# Patient Record
Sex: FEMALE | Race: WHITE | NOT HISPANIC OR LATINO | Employment: OTHER | URBAN - METROPOLITAN AREA
[De-identification: names, ages, dates, MRNs, and addresses within clinical notes are randomized per-mention and may not be internally consistent; named-entity substitution may affect disease eponyms.]

---

## 2017-02-16 DIAGNOSIS — Z12.31 ENCOUNTER FOR SCREENING MAMMOGRAM FOR MALIGNANT NEOPLASM OF BREAST: ICD-10-CM

## 2017-02-16 DIAGNOSIS — Z13.820 ENCOUNTER FOR SCREENING FOR OSTEOPOROSIS: ICD-10-CM

## 2017-03-08 ENCOUNTER — ALLSCRIPTS OFFICE VISIT (OUTPATIENT)
Dept: OTHER | Facility: OTHER | Age: 82
End: 2017-03-08

## 2018-01-12 NOTE — RESULT NOTES
Verified Results  (1) CBC/PLT/DIFF [id number] 09:51AM Omar Mathew Order Number: [id number]     Test Name Result Flag Reference   WBC COUNT 6 98 Thousand/uL  4 31-10 16   RBC COUNT 4 88 Million/uL  3 81-5 12   HEMOGLOBIN 14 6 g/dL  11 5-15 4   HEMATOCRIT 42 6 %  34 8-46  1   MCV 87 fL  82-98   MCH 29 9 pg  26 8-34 3   MCHC 34 3 g/dL  31 4-37 4   RDW 12 9 %  11 6-15 1   MPV 10 5 fL  8 9-12 7   PLATELET COUNT 144 Thousands/uL  149-390   NEUTROPHILS RELATIVE PERCENT 72 %  43-75   LYMPHOCYTES RELATIVE PERCENT 17 %  14-44   MONOCYTES RELATIVE PERCENT 10 %  4-12   EOSINOPHILS RELATIVE PERCENT 1 %  0-6   BASOPHILS RELATIVE PERCENT 0 %  0-1   NEUTROPHILS ABSOLUTE COUNT 5 02 Thousands/?L  1 85-7 62   LYMPHOCYTES ABSOLUTE COUNT 1 20 Thousands/?L  0 60-4 47   MONOCYTES ABSOLUTE COUNT 0 67 Thousand/?L  0 17-1 22   EOSINOPHILS ABSOLUTE COUNT 0 07 Thousand/?L  0 00-0 61   BASOPHILS ABSOLUTE COUNT 0 02 Thousands/?L  0 00-0 10   - Patient Instructions: This bloodwork is non-fasting  Please drink two glasses of water morning of bloodwork  - Patient Instructions: This bloodwork is non-fasting  Please drink two glasses of water morning of bloodwork  (1) COMPREHENSIVE METABOLIC PANEL [id number] 02:24OP Omar Mathew Order Number: [id number]     Test Name Result Flag Reference   GLUCOSE,RANDM 94 mg/dL     If the patient is fasting, the ADA then defines impaired fasting glucose as > 100 mg/dL and diabetes as > or equal to 123 mg/dL     SODIUM 135 mmol/L L 136-145   POTASSIUM 4 2 mmol/L  3 5-5 3   CHLORIDE 100 mmol/L  100-108   CARBON DIOXIDE 26 mmol/L  21-32   ANION GAP (CALC) 9 mmol/L  4-13   BLOOD UREA NITROGEN 10 mg/dL  5-25   CREATININE 0 93 mg/dL  0 60-1 30   Standardized to IDMS reference method   CALCIUM 8 7 mg/dL  8 3-10 1   BILI, TOTAL 0 90 mg/dL  0 20-1 00   ALK PHOSPHATAS 86 U/L     ALT (SGPT) 21 U/L  12-78   AST(SGOT) 20 U/L  5-45   ALBUMIN 3 7 g/dL  3 5-5 0   TOTAL PROTEIN 6 9 g/dL  6 4-8 2   eGFR Non-African American 57 2 ml/min/1 73sq m     - Patient Instructions: This is a fasting blood test  Water, black tea or black coffee only after 9:00pm the night before test Drink 2 glasses of water the morning of test - Patient Instructions: This bloodwork is non-fasting  Please drink two glasses of   water morning of bloodwork  National Kidney Disease Education Program recommendations are as follows:  GFR calculation is accurate only with a steady state creatinine  Chronic Kidney disease less than 60 ml/min/1 73 sq  meters  Kidney failure less than 15 ml/min/1 73 sq  meters  (1) LIPID PANEL FASTING W DIRECT LDL REFLEX [id number] 09:51AM Michael Kyle Order Number: [id number]     Test Name Result Flag Reference   CHOLESTEROL 188 mg/dL     LDL CHOLESTEROL CALCULATED 120 mg/dL H 0-100   - Patient Instructions: This is a fasting blood test  Water, black tea or black coffee only after 9:00pm the night before test   Drink 2 glasses of water the morning of test     - Patient Instructions: This is a fasting blood test  Water, black tea or black coffee only after 9:00pm the night before test Drink 2 glasses of water the morning of test - Patient Instructions: This bloodwork is non-fasting  Please drink two glasses of   water morning of bloodwork    Triglyceride:         Normal              <150 mg/dl       Borderline High    150-199 mg/dl       High               200-499 mg/dl       Very High          >499 mg/dl  Cholesterol:         Desirable        <200 mg/dl      Borderline High  200-239 mg/dl      High             >239 mg/dl  HDL Cholesterol:        High    >59 mg/dL      Low     <41 mg/dL  LDL Cholesterol:        Optimal          <100 mg/dl        Near Optimal     100-129 mg/dl        Above Optimal          Borderline High   130-159 mg/dl          High              160-189 mg/dl          Very High        >189 mg/dl  LDL CALCULATED:    This screening LDL is a calculated result  It does not have the accuracy of the Direct Measured LDL in the monitoring of patients with hyperlipidemia and/or statin therapy  Direct Measure LDL (PGL093) must be ordered separately in these patients  TRIGLYCERIDES 79 mg/dL  <=150   Specimen collection should occur prior to N-Acetylcysteine or Metamizole administration due to the potential for falsely depressed results  HDL,DIRECT 52 mg/dL  40-60   Specimen collection should occur prior to Metamizole administration due to the potential for falsely depressed results  (1) TSH [id number] 09:51AM Danni Parkinson Order Number: [id number]     Test Name Result Flag Reference   TSH 0 440 uIU/mL  0 358-3 740   - Patient Instructions: This bloodwork is non-fasting  Please drink two glasses of water morning of bloodwork  - Patient Instructions: This is a fasting blood test  Water, black tea or black coffee only after 9:00pm the night before test Drink 2 glasses of water the morning of test - Patient Instructions: This bloodwork is non-fasting  Please drink two glasses of   water morning of bloodwork  Patients undergoing fluorescein dye angiography may retain small amounts of fluorescein in the body for 48-72 hours post procedure  Samples containing fluorescein can produce falsely depressed TSH values  If the patient had this procedure,a specimen should be resubmitted post fluorescein clearance            The recommended reference ranges for TSH during pregnancy are as follows:  First trimester 0 1 to 2 5 uIU/mL  Second trimester  0 2 to 3 0 uIU/mL  Third trimester 0 3 to 3 0 uIU/m

## 2018-01-13 VITALS
WEIGHT: 142 LBS | HEART RATE: 76 BPM | TEMPERATURE: 97.8 F | SYSTOLIC BLOOD PRESSURE: 112 MMHG | BODY MASS INDEX: 27.88 KG/M2 | RESPIRATION RATE: 16 BRPM | HEIGHT: 60 IN | DIASTOLIC BLOOD PRESSURE: 70 MMHG

## 2018-01-15 ENCOUNTER — APPOINTMENT (OUTPATIENT)
Dept: RADIOLOGY | Facility: CLINIC | Age: 83
End: 2018-01-15
Payer: MEDICARE

## 2018-01-15 ENCOUNTER — GENERIC CONVERSION - ENCOUNTER (OUTPATIENT)
Dept: OTHER | Facility: OTHER | Age: 83
End: 2018-01-15

## 2018-01-15 ENCOUNTER — TRANSCRIBE ORDERS (OUTPATIENT)
Dept: RADIOLOGY | Facility: CLINIC | Age: 83
End: 2018-01-15

## 2018-01-15 ENCOUNTER — ALLSCRIPTS OFFICE VISIT (OUTPATIENT)
Dept: OTHER | Facility: OTHER | Age: 83
End: 2018-01-15

## 2018-01-15 DIAGNOSIS — J11.1 INFLUENZA DUE TO UNIDENTIFIED INFLUENZA VIRUS WITH OTHER RESPIRATORY MANIFESTATIONS: ICD-10-CM

## 2018-01-15 DIAGNOSIS — R53.81 OTHER MALAISE: ICD-10-CM

## 2018-01-15 DIAGNOSIS — R05.9 COUGH: ICD-10-CM

## 2018-01-15 DIAGNOSIS — Z13.220 ENCOUNTER FOR SCREENING FOR LIPOID DISORDERS: ICD-10-CM

## 2018-01-15 DIAGNOSIS — N28.9 DISORDER OF KIDNEY AND URETER: ICD-10-CM

## 2018-01-15 DIAGNOSIS — E87.1 HYPO-OSMOLALITY AND HYPONATREMIA (CODE): ICD-10-CM

## 2018-01-15 DIAGNOSIS — R53.83 OTHER FATIGUE: ICD-10-CM

## 2018-01-15 DIAGNOSIS — E87.2 ACIDOSIS: ICD-10-CM

## 2018-01-15 PROCEDURE — 71046 X-RAY EXAM CHEST 2 VIEWS: CPT

## 2018-01-16 ENCOUNTER — LAB REQUISITION (OUTPATIENT)
Dept: LAB | Facility: HOSPITAL | Age: 83
End: 2018-01-16
Payer: MEDICARE

## 2018-01-16 DIAGNOSIS — Z11.1 ENCOUNTER FOR SCREENING FOR RESPIRATORY TUBERCULOSIS: ICD-10-CM

## 2018-01-16 LAB
ALBUMIN SERPL BCP-MCNC: 3.6 G/DL (ref 3.5–5)
ALP SERPL-CCNC: 76 U/L (ref 46–116)
ALT SERPL W P-5'-P-CCNC: 34 U/L (ref 12–78)
ANION GAP SERPL CALCULATED.3IONS-SCNC: 7 MMOL/L (ref 4–13)
AST SERPL W P-5'-P-CCNC: 23 U/L (ref 5–45)
BASOPHILS # BLD AUTO: 0.02 THOUSANDS/ΜL (ref 0–0.1)
BASOPHILS NFR BLD AUTO: 0 % (ref 0–1)
BILIRUB SERPL-MCNC: 0.86 MG/DL (ref 0.2–1)
BUN SERPL-MCNC: 8 MG/DL (ref 5–25)
CALCIUM SERPL-MCNC: 8.8 MG/DL (ref 8.3–10.1)
CHLORIDE SERPL-SCNC: 99 MMOL/L (ref 100–108)
CHOLEST SERPL-MCNC: 167 MG/DL (ref 50–200)
CO2 SERPL-SCNC: 25 MMOL/L (ref 21–32)
CREAT SERPL-MCNC: 0.87 MG/DL (ref 0.6–1.3)
EOSINOPHIL # BLD AUTO: 0.1 THOUSAND/ΜL (ref 0–0.61)
EOSINOPHIL NFR BLD AUTO: 1 % (ref 0–6)
ERYTHROCYTE [DISTWIDTH] IN BLOOD BY AUTOMATED COUNT: 13.3 % (ref 11.6–15.1)
GFR SERPL CREATININE-BSD FRML MDRD: 60 ML/MIN/1.73SQ M
GLUCOSE SERPL-MCNC: 90 MG/DL (ref 65–140)
HCT VFR BLD AUTO: 40.7 % (ref 34.8–46.1)
HDLC SERPL-MCNC: 50 MG/DL (ref 40–60)
HGB BLD-MCNC: 14.3 G/DL (ref 11.5–15.4)
LDLC SERPL CALC-MCNC: 101 MG/DL (ref 0–100)
LYMPHOCYTES # BLD AUTO: 0.99 THOUSANDS/ΜL (ref 0.6–4.47)
LYMPHOCYTES NFR BLD AUTO: 10 % (ref 14–44)
MCH RBC QN AUTO: 30.4 PG (ref 26.8–34.3)
MCHC RBC AUTO-ENTMCNC: 35.1 G/DL (ref 31.4–37.4)
MCV RBC AUTO: 86 FL (ref 82–98)
MONOCYTES # BLD AUTO: 1.35 THOUSAND/ΜL (ref 0.17–1.22)
MONOCYTES NFR BLD AUTO: 14 % (ref 4–12)
NEUTROPHILS # BLD AUTO: 7.29 THOUSANDS/ΜL (ref 1.85–7.62)
NEUTS SEG NFR BLD AUTO: 75 % (ref 43–75)
NRBC BLD AUTO-RTO: 0 /100 WBCS
PLATELET # BLD AUTO: 221 THOUSANDS/UL (ref 149–390)
PMV BLD AUTO: 10.4 FL (ref 8.9–12.7)
POTASSIUM SERPL-SCNC: 4.9 MMOL/L (ref 3.5–5.3)
PROT SERPL-MCNC: 7.3 G/DL (ref 6.4–8.2)
RBC # BLD AUTO: 4.71 MILLION/UL (ref 3.81–5.12)
SODIUM SERPL-SCNC: 131 MMOL/L (ref 136–145)
TRIGL SERPL-MCNC: 79 MG/DL
TSH SERPL DL<=0.05 MIU/L-ACNC: 0.36 UIU/ML (ref 0.36–3.74)
WBC # BLD AUTO: 9.78 THOUSAND/UL (ref 4.31–10.16)

## 2018-01-16 PROCEDURE — 80061 LIPID PANEL: CPT | Performed by: INTERNAL MEDICINE

## 2018-01-16 PROCEDURE — 80053 COMPREHEN METABOLIC PANEL: CPT | Performed by: INTERNAL MEDICINE

## 2018-01-16 PROCEDURE — 85025 COMPLETE CBC W/AUTO DIFF WBC: CPT | Performed by: INTERNAL MEDICINE

## 2018-01-16 PROCEDURE — 84443 ASSAY THYROID STIM HORMONE: CPT | Performed by: INTERNAL MEDICINE

## 2018-01-17 ENCOUNTER — GENERIC CONVERSION - ENCOUNTER (OUTPATIENT)
Dept: OTHER | Facility: OTHER | Age: 83
End: 2018-01-17

## 2018-01-22 ENCOUNTER — GENERIC CONVERSION - ENCOUNTER (OUTPATIENT)
Dept: OTHER | Facility: OTHER | Age: 83
End: 2018-01-22

## 2018-01-22 VITALS
DIASTOLIC BLOOD PRESSURE: 70 MMHG | OXYGEN SATURATION: 98 % | HEART RATE: 106 BPM | SYSTOLIC BLOOD PRESSURE: 120 MMHG | WEIGHT: 142 LBS | HEIGHT: 60 IN | BODY MASS INDEX: 27.88 KG/M2 | TEMPERATURE: 96.9 F

## 2018-01-23 NOTE — MISCELLANEOUS
Assessment    1  Influenza (487 1) (J11 1)   2  Hyponatremia (276 1) (E87 1)   3  Acute renal insufficiency (593 9) (N28 9)   4  Dehydration (276 51) (E86 0)   5  Lactic acid acidosis (276 2) (E87 2)   6  Screening for lipid disorders (V77 91) (Z13 220)   7  Persistent cough (786 2) (R05)   8  Malaise and fatigue (780 79) (R53 81,R53 83)    Plan  Acute renal insufficiency    · (1) TSH; Status:Active; Requested WMP:[id number]; Perform:[facility]; TCZ:[id number];CQYETCY; For:Acute renal insufficiency; Ordered By:Elpidio Walsh Ma;  Depression screen    · *VB - PHQ-9 Tool; Status:Complete - Retrospective By Protocol Authorization;   Done:  33QPR1634 08:41AM   Performed: In Office; KXY:[id number]; Last Updated Cynthia Labor; 1/15/2018 8:42:29 AM;Ordered; For:Depression screen; Ordered By:Elpidio Walsh Ma; Hyponatremia    · (1) COMPREHENSIVE METABOLIC PANEL; Status:Active; Requested GGN:[id number]; Perform:[facility]; NYW:[id number];CRXCPFX;  For:Hyponatremia; Ordered By:Elpidio Walsh Ma; Influenza, Malaise and fatigue, Persistent cough    · * XR CHEST PA & LATERAL; Status:Active; Requested PUQ:[id number]; Perform:[facility]; NBE:[id number];OSDUUGR; For:Influenza, Malaise and fatigue, Persistent cough; Ordered By:Elpidio Walsh Ma;   · *1 - [facility] Co-Management  *  Status: Hold For - Scheduling  Requested  for: [id number]   Ordered; For: Influenza, Malaise and fatigue, Persistent cough; Ordered By: David Jimenez Performed:  Due: 97UOS3167  Care Summary provided  : Yes  Lactic acid acidosis    · (1) CBC/PLT/DIFF; Status:Active; Requested YWT:[id number]; Perform:[facility]; SYP:[id number];FOBNTIP; For:Lactic acid acidosis; Ordered By:Kyler Walsh;  Malaise and fatigue, Persistent cough    · Azithromycin 250 MG Oral Tablet; TAKE 2 TABLETS ON DAY 1 THEN TAKE 1  TABLET A DAY FOR 4 DAYS   Rx By: David Jimenez; Dispense: 0 Days ; #:6 Tablet;  Refill: 0; For: Malaise and fatigue, Persistent cough; LEAH = N; Verified Transmission to [address]; Last Updated By: System, [employer]; 1/15/2018 8:53:08 AM   · Mucinex 600 MG Oral Tablet Extended Release 12 Hour; TAKE 1 TABLET EVERY 12  HOURS AS NEEDED FOR CONGESTION   Rx By: Manuela Johnson; Dispense: 10 Days ; #:20 Tablet Extended Release 12 Hour; Refill: 0; For: Malaise and fatigue, Persistent cough; LEAH = N; Verified Transmission to [facility]; Last Updated By: [employer]; 1/15/2018 8:53:09 AM  Screening for genitourinary condition    · *VB - Urinary Incontinence Screen (Dx Z13 89 Screen for UI); Status:Complete -  Retrospective By Protocol Authorization;   Done: 35ZDU3601 08:42AM   Performed: In Office; [phone]; Last Updated Jennie Soriano; 1/15/2018 8:42:29 AM;Ordered; For:Screening for genitourinary condition; Ordered By:Kyler Walsh;  Screening for lipid disorders    · (1) LIPID PANEL FASTING W DIRECT LDL REFLEX; Status:Canceled;    Perform:[facility]; JCL:[id number];RBDJQPO; For:Screening for lipid disorders; Ordered By:Dianne Walsh; Discussion/Summary  Discussion Summary:   Treated for the flu, but continues with a cough and still tired  ? ?IF she has URI  ? JVD and edema and ?volume overloaded  Will check labs and CXR  Will start empiric abx and mucinex  Will consult VNA  RTC one week  Medication SE Review and Pt Understands Tx: Possible side effects of new medications were reviewed with the patient/guardian today  The treatment plan was reviewed with the patient/guardian  The patient/guardian understands and agrees with the treatment plan      Chief Complaint  Chief Complaint Free Text Note Form: Patient is being seen today for a RENÉ  Patient states she feels better but still has a cough and nausea  History of Present Illness  TCM Communication [facility]: The patient is being contacted for follow-up after hospitalization and 1-15-18   She was hospitalized at [address] [place] [address] and [facility]  The dates of hospitalization: 1-5-18 to 1-10-18, date of admission: 1-5-18, date of discharge: 1-10-18  Diagnosis: Influenza-Flu  She was discharged to home  Communication performed and completed by Alta Goodell   HPI: Non smoking RASHEED presents with her granddaughter for f/u admission to [facility]  Pt was in [facility] and was exposed to the flu  Upon return, was extremely weak and coughing  Presented to ER and influenza screen was positive, renal function elevated, Tachycardiac, elevated lactic acid, and low sodium  Admitted for several days and treated with IVF's and tamiflu  D/c'd to home, but continues with a dry cough and feeling very tired and weaK  Pusing fluids  Reports VNA has never showed up  No fever or chills  Lives alone  Review of Systems  Complete-Female:   Constitutional: feeling poorly and feeling tired, but no fever and no chills  ENT: no earache, no sore throat and no nasal discharge  Cardiovascular: lower extremity edema, but no chest pain, no intermittent leg claudication and no palpitations  Respiratory: cough, but as noted in HPI, no shortness of breath, no orthopnea, no wheezing, no shortness of breath during exertion and no PND  Gastrointestinal: no abdominal pain, no nausea, no constipation and no diarrhea  Genitourinary: no dysuria  Musculoskeletal: No complaints of arthralgias, no myalgias, no joint swelling or stiffness, no limb pain or swelling  Integumentary: No complaints of skin rash or lesions, no itching, no skin wounds, no breast pain or lump  Neurological: no headache, no confusion and no convulsions  Psychiatric: no anxiety and no depression  Active Problems    1  Depression (311) (F32 9)   2  Depression screen (V79 0) (Z13 89)   3  Encounter for screening mammogram for malignant neoplasm of breast (V76 12)   (Z12 31)   4  Hearing Loss (389 9)   5   Medicare annual wellness visit, subsequent (V70 0) (Z00 00)   6  Osteoarthritis (715 90) (M19 90)   7  Screen for colon cancer (V76 51) (Z12 11)   8  Screening for genitourinary condition (V81 6) (Z13 89)   9  Screening for neurological condition (V80 09) (Z13 89)   10  Screening for osteoporosis (V82 81) (Z13 820)   11  Visit for screening (V82 9) (Z13 9)    Past Medical History    1  History of Dislocation Of The Left Shoulder (831 00)   2  History of Encounter for other preprocedural examination (V72 83) (Z01 818)   3  History of cataract (V12 49) (Z86 69)   4  History of herpes zoster (V12 09) (Z86 19)   5  History of Impacted cerumen of both ears (380 4) (H61 23)   6  History of Impacted cerumen of right ear (380 4) (H61 21)   7  History of Lightheadedness (780 4) (R42)   8  History of Muscle Hematoma (924 9)   9  History of Postherpetic polyneuropathy (053 13) (B02 23)    Surgical History    1  History of Hernia Repair   2  History of Tubal Ligation  Surgical History Reviewed: The surgical history was reviewed and updated today  Family History  Father    1  Family history of Cirrhosis  Family History    2  Denied: Family history of Family Problems  Family History Reviewed: The family history was reviewed and updated today  Social History    · Caffeine Use   · Never A Smoker   · Uses Safety Equipment - Seatbelts  Social History Reviewed: The social history was reviewed and updated today  The social history was reviewed and is unchanged  Current Meds   1  No Reported Medications Recorded  Medication List Reviewed: The medication list was reviewed and updated today  Allergies    1   No Known Drug Allergies    Vitals  Signs   Recorded: [id number] 08:32AM   Temperature: 96 9 F, Tympanic  Heart Rate: 550  Systolic: 550, Sitting  Diastolic: 70, Sitting  Height: 5 ft   Weight: 142 lb   BMI Calculated: 27 73  BSA Calculated: 1 61  O2 Saturation: 98    Physical Exam    Constitutional   General appearance: Abnormal   AAAOx3, hydarated and NAD, but looks tired  Eyes   Conjunctiva and lids: No swelling, erythema or discharge  Pupils and irises: Equal, round and reactive to light  Ears, Nose, Mouth, and Throat   Nasal mucosa, septum, and turbinates: Normal without edema or erythema  Oropharynx: Normal with no erythema, edema, exudate or lesions  Pulmonary   Respiratory effort: No increased work of breathing or signs of respiratory distress  Auscultation of lungs: Clear to auscultation  Cardiovascular   Auscultation of heart: Normal rate and rhythm, normal S1 and S2, without murmurs  Mild JVD  Examination of extremities for edema and/or varicosities: Abnormal   trace bilat Leg edema  Carotid pulses: Normal     Abdomen   Abdomen: Non-tender, no masses  Lymphatic   Palpation of lymph nodes in neck: No lymphadenopathy      Musculoskeletal   Gait and station: Normal     Psychiatric   Orientation to person, place, and time: Normal     Mood and affect: Normal          Results/Data  *VB - Urinary Incontinence Screen (Dx Z13 89 Screen for UI) [id number] 08:42AM Logan Soulier     Test Name Result Flag Reference   Urinary Incontinence Assessment No UI       Falls Risk Assessment (Dx Z13 89 Screen for Neurologic Disorder) [id number] 08:41AM User, Ahs     Test Name Result Flag Reference   Falls Risk      No falls in the past year     *VB - PHQ-9 Tool [id number] 08:41AM Logan Soulier     Test Name Result Flag Reference   PHQ-9 Adult Depression Score 0     PHQ-9 Adult Depression Screening Negative         Signatures   Electronically signed by : SUMANTH Castro ; Neal 15 2018  8:59AM EST                       (Author)

## 2018-01-23 NOTE — RESULT NOTES
Verified Results  * XR CHEST PA & LATERAL [id number] 09:03AM Maryana Canseco Order Number: [id number]     Test Name Result Flag Reference   XR CHEST PA & LATERAL (Report)     CHEST      INDICATION: J11 1: Influenza due to unidentified influenza virus with other respiratory manifestations   R05: Cough   R53 81: Other malaise   R53 83: Other fatigue  History taken directly from the electronic ordering system  COMPARISON: None     VIEWS: Frontal and lateral projections     IMAGES: 2     FINDINGS:          The cardiac silhouette is unremarkable  Mediastinal and hilar contour within normal limits  No acute consolidation or suspicious infiltrates  Minimal blunting on the RIGHT suggesting small pleural effusion  No pneumothorax  Bony thorax unremarkable  IMPRESSION:     No active pulmonary disease  Small RIGHT pleural effusion         Workstation performed: [id number]     Signed by:   Damien Cason MD   1/15/18

## 2018-01-23 NOTE — RESULT NOTES
Verified Results  (1) CBC/PLT/DIFF [id number] 03:52PM Meg Harman     Test Name Result Flag Reference   WBC COUNT 9 78 Thousand/uL  4 31-10 16   RBC COUNT 4 71 Million/uL  3 81-5 12   HEMOGLOBIN 14 3 g/dL  11 5-15 4   HEMATOCRIT 40 7 %  34 8-46  1   MCV 86 fL  82-98   MCH 30 4 pg  26 8-34 3   MCHC 35 1 g/dL  31 4-37 4   RDW 13 3 %  11 6-15 1   MPV 10 4 fL  8 9-12 7   PLATELET COUNT 774 Thousands/uL  149-390   nRBC AUTOMATED 0 /100 WBCs     NEUTROPHILS RELATIVE PERCENT 75 %  43-75   LYMPHOCYTES RELATIVE PERCENT 10 % L 14-44   MONOCYTES RELATIVE PERCENT 14 % H 4-12   EOSINOPHILS RELATIVE PERCENT 1 %  0-6   BASOPHILS RELATIVE PERCENT 0 %  0-1   NEUTROPHILS ABSOLUTE COUNT 7 29 Thousands/? ??L  1 85-7 62   LYMPHOCYTES ABSOLUTE COUNT 0 99 Thousands/? ??L  0 60-4 47   MONOCYTES ABSOLUTE COUNT 1 35 Thousand/? ??L H 0 17-1 22   EOSINOPHILS ABSOLUTE COUNT 0 10 Thousand/? ??L  0 00-0 61   BASOPHILS ABSOLUTE COUNT 0 02 Thousands/? ??L  0 00-0 10   This is a patient instruction: This test is non-fasting  Please drink two glasses of water morning of bloodwork  (1) COMPREHENSIVE METABOLIC PANEL [id number] 75:17VR Meg Harman     Test Name Result Flag Reference   GLUCOSE,RANDM 90 mg/dL     If the patient is fasting, the ADA then defines impaired fasting glucose as > 100 mg/dL and diabetes as > or equal to 123 mg/dL  Specimen collection should occur prior to Sulfasalazine administration due to the potential for falsely depressed results  Specimen collection should occur prior to Sulfapyridine administration due to the potential for falsely elevated results  SODIUM 131 mmol/L L 136-145   POTASSIUM 4 9 mmol/L  3 5-5 3   Slightly Hemolyzed;  Results May be Affected   CHLORIDE 99 mmol/L L 100-108   CARBON DIOXIDE 25 mmol/L  21-32   ANION GAP (CALC) 7 mmol/L  4-13   BLOOD UREA NITROGEN 8 mg/dL  5-25   CREATININE 0 87 mg/dL  0 60-1 30   Standardized to IDMS reference method   CALCIUM 8 8 mg/dL  8 3-10 1   BILI, TOTAL 0 86 mg/dL  0 20-1 00   ALK PHOSPHATAS 76 U/L     ALT (SGPT) 34 U/L  12-78   Specimen collection should occur prior to Sulfasalazine and/or Sulfapyridine administration due to the potential for falsely depressed results  AST(SGOT) 23 U/L  5-45   Slightly Hemolyzed; Results May be Affected  Specimen collection should occur prior to Sulfasalazine administration due to the potential for falsely depressed results  ALBUMIN 3 6 g/dL  3 5-5 0   TOTAL PROTEIN 7 3 g/dL  6 4-8 2   eGFR 60 ml/min/1 73sq m     This is a patient instruction: Patient fasting for 8 hours or longer recommended  National Kidney Disease Education Program recommendations are as follows:  GFR calculation is accurate only with a steady state creatinine  Chronic Kidney disease less than 60 ml/min/1 73 sq  meters  Kidney failure less than 15 ml/min/1 73 sq  meters  (1) LIPID PANEL, FASTING [id number] 03:52PM Kelleygricelda [facility]     Test Name Result Flag Reference   CHOLESTEROL 167 mg/dL     Cholesterol:    Desirable <200 mg/dl    Borderline 200-239 mg/dl    High>239   HDL,DIRECT 50 mg/dL  40-60   HDL Cholesterol:    High>59 mg/dL    Low <41 mg/dL   LDL CHOLESTEROL CALCULATED 101 mg/dL H 0-100   This screening LDL is a calculated result  It does not have the accuracy of the Direct Measured LDL in the monitoring of patients with hyperlipidemia and/or statin therapy  Direct Measure LDL (RZB905) must be ordered separately in these patients  This is a patient instruction: This test requires patient fasting for 10-12 hours or longer  Drinking of black coffee or black tea is acceptable  Triglyceride:        Normal <150 mg/dl   Borderline High 150-199 mg/dl   High 200-499 mg/dl   Very High >499 mg/dl   TRIGLYCERIDES 79 mg/dL  <=150   Specimen collection should occur prior to N-Acetylcysteine or Metamizole administration due to the potential for falsely depressed results       (1) TSH [id number] 03:52PM [facility]     Test Name Result Flag Reference   TSH 0 364 uIU/mL  0 358-3 740   This is a patient instruction: This test is non-fasting  Please drink two glasses of water morning of bloodwork  Patients undergoing fluorescein dye angiography may retain small amounts of fluorescein in the body for 48-72 hours post procedure  Samples containing fluorescein can produce falsely depressed TSH values  If the patient had this procedure,a specimen should be resubmitted post fluorescein clearance            The recommended reference ranges for TSH during pregnancy are as follows:  First trimester 0 1 to 2 5 uIU/mL  Second trimester  0 2 to 3 0 uIU/mL  Third trimester 0 3 to 3 0 uIU/m

## 2018-05-30 ENCOUNTER — OFFICE VISIT (OUTPATIENT)
Dept: INTERNAL MEDICINE CLINIC | Facility: CLINIC | Age: 83
End: 2018-05-30
Payer: MEDICARE

## 2018-05-30 ENCOUNTER — APPOINTMENT (OUTPATIENT)
Dept: LAB | Facility: CLINIC | Age: 83
End: 2018-05-30
Payer: MEDICARE

## 2018-05-30 VITALS
BODY MASS INDEX: 28.66 KG/M2 | SYSTOLIC BLOOD PRESSURE: 118 MMHG | DIASTOLIC BLOOD PRESSURE: 86 MMHG | RESPIRATION RATE: 16 BRPM | HEIGHT: 60 IN | WEIGHT: 146 LBS | TEMPERATURE: 99.1 F | HEART RATE: 72 BPM

## 2018-05-30 DIAGNOSIS — R53.81 MALAISE AND FATIGUE: Primary | ICD-10-CM

## 2018-05-30 DIAGNOSIS — R29.6 MULTIPLE FALLS: ICD-10-CM

## 2018-05-30 DIAGNOSIS — E87.1 HYPO-OSMOLALITY AND HYPONATREMIA (CODE): ICD-10-CM

## 2018-05-30 DIAGNOSIS — R53.81 MALAISE AND FATIGUE: ICD-10-CM

## 2018-05-30 DIAGNOSIS — R42 DISEQUILIBRIUM: ICD-10-CM

## 2018-05-30 DIAGNOSIS — R26.9 GAIT DISORDER: ICD-10-CM

## 2018-05-30 DIAGNOSIS — R53.83 MALAISE AND FATIGUE: Primary | ICD-10-CM

## 2018-05-30 DIAGNOSIS — R53.83 MALAISE AND FATIGUE: ICD-10-CM

## 2018-05-30 DIAGNOSIS — H61.23 BILATERAL IMPACTED CERUMEN: ICD-10-CM

## 2018-05-30 LAB
ALBUMIN SERPL BCP-MCNC: 3.7 G/DL (ref 3.5–5)
ALP SERPL-CCNC: 88 U/L (ref 46–116)
ALT SERPL W P-5'-P-CCNC: 19 U/L (ref 12–78)
ANION GAP SERPL CALCULATED.3IONS-SCNC: 6 MMOL/L (ref 4–13)
AST SERPL W P-5'-P-CCNC: 17 U/L (ref 5–45)
BACTERIA UR QL AUTO: ABNORMAL /HPF
BASOPHILS # BLD AUTO: 0.03 THOUSANDS/ΜL (ref 0–0.1)
BASOPHILS NFR BLD AUTO: 0 % (ref 0–1)
BILIRUB SERPL-MCNC: 0.58 MG/DL (ref 0.2–1)
BILIRUB UR QL STRIP: NEGATIVE
BUN SERPL-MCNC: 11 MG/DL (ref 5–25)
CALCIUM SERPL-MCNC: 9.1 MG/DL (ref 8.3–10.1)
CHLORIDE SERPL-SCNC: 102 MMOL/L (ref 100–108)
CLARITY UR: ABNORMAL
CO2 SERPL-SCNC: 27 MMOL/L (ref 21–32)
COLOR UR: ABNORMAL
CREAT SERPL-MCNC: 0.94 MG/DL (ref 0.6–1.3)
EOSINOPHIL # BLD AUTO: 0.08 THOUSAND/ΜL (ref 0–0.61)
EOSINOPHIL NFR BLD AUTO: 1 % (ref 0–6)
ERYTHROCYTE [DISTWIDTH] IN BLOOD BY AUTOMATED COUNT: 12.7 % (ref 11.6–15.1)
FOLATE SERPL-MCNC: 5.3 NG/ML (ref 3.1–17.5)
GFR SERPL CREATININE-BSD FRML MDRD: 54 ML/MIN/1.73SQ M
GLUCOSE SERPL-MCNC: 79 MG/DL (ref 65–140)
GLUCOSE UR STRIP-MCNC: NEGATIVE MG/DL
HCT VFR BLD AUTO: 44.6 % (ref 34.8–46.1)
HGB BLD-MCNC: 14.6 G/DL (ref 11.5–15.4)
HGB UR QL STRIP.AUTO: NEGATIVE
HYALINE CASTS #/AREA URNS LPF: ABNORMAL /LPF
IMM GRANULOCYTES # BLD AUTO: 0.02 THOUSAND/UL (ref 0–0.2)
IMM GRANULOCYTES NFR BLD AUTO: 0 % (ref 0–2)
KETONES UR STRIP-MCNC: NEGATIVE MG/DL
LEUKOCYTE ESTERASE UR QL STRIP: ABNORMAL
LYMPHOCYTES # BLD AUTO: 1.37 THOUSANDS/ΜL (ref 0.6–4.47)
LYMPHOCYTES NFR BLD AUTO: 17 % (ref 14–44)
MCH RBC QN AUTO: 29.9 PG (ref 26.8–34.3)
MCHC RBC AUTO-ENTMCNC: 32.7 G/DL (ref 31.4–37.4)
MCV RBC AUTO: 91 FL (ref 82–98)
MONOCYTES # BLD AUTO: 0.74 THOUSAND/ΜL (ref 0.17–1.22)
MONOCYTES NFR BLD AUTO: 9 % (ref 4–12)
NEUTROPHILS # BLD AUTO: 5.84 THOUSANDS/ΜL (ref 1.85–7.62)
NEUTS SEG NFR BLD AUTO: 73 % (ref 43–75)
NITRITE UR QL STRIP: NEGATIVE
NON-SQ EPI CELLS URNS QL MICRO: ABNORMAL /HPF
NRBC BLD AUTO-RTO: 0 /100 WBCS
PH UR STRIP.AUTO: 6.5 [PH] (ref 4.5–8)
PLATELET # BLD AUTO: 223 THOUSANDS/UL (ref 149–390)
PMV BLD AUTO: 10.7 FL (ref 8.9–12.7)
POTASSIUM SERPL-SCNC: 4.3 MMOL/L (ref 3.5–5.3)
PROT SERPL-MCNC: 7.1 G/DL (ref 6.4–8.2)
PROT UR STRIP-MCNC: NEGATIVE MG/DL
RBC # BLD AUTO: 4.88 MILLION/UL (ref 3.81–5.12)
RBC #/AREA URNS AUTO: ABNORMAL /HPF
SODIUM SERPL-SCNC: 135 MMOL/L (ref 136–145)
SP GR UR STRIP.AUTO: 1.02 (ref 1–1.03)
TSH SERPL DL<=0.05 MIU/L-ACNC: 0.55 UIU/ML (ref 0.36–3.74)
UROBILINOGEN UR QL STRIP.AUTO: 1 E.U./DL
VIT B12 SERPL-MCNC: 233 PG/ML (ref 100–900)
WBC # BLD AUTO: 8.08 THOUSAND/UL (ref 4.31–10.16)
WBC #/AREA URNS AUTO: ABNORMAL /HPF

## 2018-05-30 PROCEDURE — 99214 OFFICE O/P EST MOD 30 MIN: CPT | Performed by: INTERNAL MEDICINE

## 2018-05-30 PROCEDURE — 80053 COMPREHEN METABOLIC PANEL: CPT

## 2018-05-30 PROCEDURE — 36415 COLL VENOUS BLD VENIPUNCTURE: CPT

## 2018-05-30 PROCEDURE — 82607 VITAMIN B-12: CPT

## 2018-05-30 PROCEDURE — 82746 ASSAY OF FOLIC ACID SERUM: CPT

## 2018-05-30 PROCEDURE — 69209 REMOVE IMPACTED EAR WAX UNI: CPT | Performed by: INTERNAL MEDICINE

## 2018-05-30 PROCEDURE — 84443 ASSAY THYROID STIM HORMONE: CPT

## 2018-05-30 PROCEDURE — 85025 COMPLETE CBC W/AUTO DIFF WBC: CPT

## 2018-05-30 PROCEDURE — 81001 URINALYSIS AUTO W/SCOPE: CPT

## 2018-05-30 PROCEDURE — 93000 ELECTROCARDIOGRAM COMPLETE: CPT | Performed by: INTERNAL MEDICINE

## 2018-05-30 RX ORDER — MECLIZINE HYDROCHLORIDE 25 MG/1
25 TABLET ORAL 3 TIMES DAILY PRN
Qty: 30 TABLET | Refills: 0 | Status: SHIPPED | OUTPATIENT
Start: 2018-05-30 | End: 2018-07-06 | Stop reason: ALTCHOICE

## 2018-05-30 NOTE — PROGRESS NOTES
Assessment/Plan:    No problem-specific Assessment & Plan notes found for this encounter  Diagnoses and all orders for this visit:    Malaise and fatigue  -     CBC and differential; Future  -     Comprehensive metabolic panel; Future  -     TSH, 3rd generation; Future  -     Vitamin B12; Future  -     Folate; Future  -     UA w Reflex to Microscopic w Reflex to Culture; Future  -     POCT ECG  -     Ambulatory referral to Physical Therapy; Future    Gait disorder  -     CBC and differential; Future  -     Comprehensive metabolic panel; Future  -     TSH, 3rd generation; Future  -     Vitamin B12; Future  -     Folate; Future  -     UA w Reflex to Microscopic w Reflex to Culture; Future  -     POCT ECG  -     Ambulatory referral to Physical Therapy; Future  -     meclizine (ANTIVERT) 25 mg tablet; Take 1 tablet (25 mg total) by mouth 3 (three) times a day as needed for dizziness    Multiple falls  -     CBC and differential; Future  -     Comprehensive metabolic panel; Future  -     TSH, 3rd generation; Future  -     Vitamin B12; Future  -     Folate; Future  -     UA w Reflex to Microscopic w Reflex to Culture; Future  -     POCT ECG  -     Ambulatory referral to Physical Therapy; Future  -     meclizine (ANTIVERT) 25 mg tablet; Take 1 tablet (25 mg total) by mouth 3 (three) times a day as needed for dizziness    Disequilibrium  -     CBC and differential; Future  -     Comprehensive metabolic panel; Future  -     TSH, 3rd generation; Future  -     Vitamin B12; Future  -     Folate; Future  -     UA w Reflex to Microscopic w Reflex to Culture; Future  -     POCT ECG  -     Ambulatory referral to Physical Therapy; Future  -     meclizine (ANTIVERT) 25 mg tablet; Take 1 tablet (25 mg total) by mouth 3 (three) times a day as needed for dizziness    Bilateral impacted cerumen  -     Ear cerumen removal    Other orders  -     Cancel: Ear cerumen removal      A/P: ??cause  Borderline orthostatics   Ekg not overly impressive  PE not overly impressive  Cerumen removed  No evidence for CNS or cardiac event  Will check labs  Increase po fluids and an empiric trial of antivert  If persists, consider a CT scan or MRI  Refer to PT and may need a different cane  RTC one week  Subjective:      Patient ID: Salma Gomez is a 80 y o  female  Healthy WF presents with her relative for four weeks of not feeling well  Pt reports feeling lightheaded at times  Further questioning, she doesn't feel like she is going to pass out, but has balance issues and ?dizziness  Has fallen several times  No headache, CP, SOB, or palpitations  No slurred speech, facial droop, paralysis, or paraesthesias  S/s anytime, but especially with change in position  No Fever or chills  No recent illnesses  No meds or dietary changes  No loss of hearing or ringing in the ears  Dizziness   Associated symptoms include fatigue  Pertinent negatives include no abdominal pain, arthralgias, chest pain, chills, coughing, diaphoresis, fever, headaches, myalgias, nausea or vomiting  Shortness of Breath   Pertinent negatives include no abdominal pain, chest pain, ear pain, fever, headaches, leg swelling, vomiting or wheezing  The following portions of the patient's history were reviewed and updated as appropriate:   She  has a past medical history of Arthritis; Cataract; Depression; Dislocation of left shoulder joint; Gait disorder; Herpes zoster; HL (hearing loss); Impacted cerumen of both ears; Impacted cerumen of right ear; Lightheadedness; Multiple falls; Near syncope; and Postherpetic polyneuropathy  She   Patient Active Problem List    Diagnosis Date Noted    Depression 08/25/2015    Gait abnormality 08/11/2015    Hearing loss 07/23/2013    Osteoarthritis 07/09/2012     She  has a past surgical history that includes Hernia repair and Tubal ligation  Her family history includes Cirrhosis in her father    She  reports that she has never smoked  She has never used smokeless tobacco  She reports that she does not drink alcohol or use drugs  Current Outpatient Prescriptions   Medication Sig Dispense Refill    meclizine (ANTIVERT) 25 mg tablet Take 1 tablet (25 mg total) by mouth 3 (three) times a day as needed for dizziness 30 tablet 0     No current facility-administered medications for this visit  No current outpatient prescriptions on file prior to visit  No current facility-administered medications on file prior to visit  She has No Known Allergies       Review of Systems   Constitutional: Positive for activity change and fatigue  Negative for chills, diaphoresis and fever  HENT: Negative for ear pain, hearing loss, postnasal drip and trouble swallowing  Eyes: Negative for visual disturbance  Respiratory: Positive for shortness of breath  Negative for cough, chest tightness and wheezing  Cardiovascular: Negative for chest pain, palpitations and leg swelling  Gastrointestinal: Negative for abdominal pain, constipation, diarrhea, nausea and vomiting  Endocrine: Negative for cold intolerance and heat intolerance  Genitourinary: Negative for difficulty urinating, dysuria and frequency  Musculoskeletal: Negative for arthralgias, gait problem and myalgias  Neurological: Positive for dizziness  Negative for light-headedness and headaches  Psychiatric/Behavioral: Positive for dysphoric mood  Negative for confusion  The patient is not nervous/anxious  Objective:      /70   Pulse 72   Temp 99 1 °F (37 3 °C) (Tympanic)   Resp 16   Ht 5' (1 524 m)   Wt 66 2 kg (146 lb)   BMI 28 51 kg/m²          Physical Exam   Constitutional: She is oriented to person, place, and time  She appears well-developed and well-nourished  No distress  HENT:   Head: Normocephalic and atraumatic  Mouth/Throat: Oropharynx is clear and moist  No oropharyngeal exudate  AU EAC bilat blocked with cerumen  TM not visualized  Post disimpaction, AU EAC patent with TM intact and wnl  Eyes: Conjunctivae and EOM are normal  Pupils are equal, round, and reactive to light  Neck: Neck supple  No JVD present  Cardiovascular: Normal rate, regular rhythm and normal heart sounds  No murmur heard  Pulmonary/Chest: Effort normal and breath sounds normal  No respiratory distress  She has no wheezes  Abdominal: Soft  Bowel sounds are normal    Musculoskeletal: She exhibits no edema  Lymphadenopathy:     She has no cervical adenopathy  Neurological: She is alert and oriented to person, place, and time  She has normal reflexes  No cranial nerve deficit  Coordination normal    Negative Orozco pike's, but some s/s with change in position  Psychiatric: She has a normal mood and affect  Her behavior is normal  Judgment and thought content normal    Nursing note and vitals reviewed  Ear cerumen removal  Date/Time: 5/30/2018 9:14 AM  Performed by: Patience Winter by: Hayley Perkins     Patient location:  Clinic  Other Assisting Provider: No    Consent:     Consent obtained:  Verbal    Consent given by:  Patient    Risks discussed:  Bleeding, infection, dizziness, incomplete removal, pain and TM perforation    Alternatives discussed:  No treatment and alternative treatment  Universal protocol:     Procedure explained and questions answered to patient or proxy's satisfaction: yes      Patient identity confirmed:  Verbally with patient  Procedure details:     Location:  L ear and R ear    Procedure type: irrigation      Approach:  External  Post-procedure details:     Complication:  None    Hearing quality:  Normal    Patient tolerance of procedure: Tolerated well, no immediate complications  Comments:      Moderate amount of cerumen removed bilat  TM's intact and wnl

## 2018-06-05 ENCOUNTER — OFFICE VISIT (OUTPATIENT)
Dept: INTERNAL MEDICINE CLINIC | Facility: CLINIC | Age: 83
End: 2018-06-05
Payer: MEDICARE

## 2018-06-05 VITALS
HEIGHT: 60 IN | WEIGHT: 151 LBS | BODY MASS INDEX: 29.64 KG/M2 | DIASTOLIC BLOOD PRESSURE: 80 MMHG | OXYGEN SATURATION: 96 % | HEART RATE: 72 BPM | RESPIRATION RATE: 18 BRPM | TEMPERATURE: 97.8 F | SYSTOLIC BLOOD PRESSURE: 126 MMHG

## 2018-06-05 DIAGNOSIS — R26.9 GAIT DISORDER: ICD-10-CM

## 2018-06-05 DIAGNOSIS — R42 VERTIGO: Primary | ICD-10-CM

## 2018-06-05 DIAGNOSIS — R53.83 MALAISE AND FATIGUE: ICD-10-CM

## 2018-06-05 DIAGNOSIS — R53.81 MALAISE AND FATIGUE: ICD-10-CM

## 2018-06-05 DIAGNOSIS — R29.6 MULTIPLE FALLS: ICD-10-CM

## 2018-06-05 PROCEDURE — 99213 OFFICE O/P EST LOW 20 MIN: CPT | Performed by: INTERNAL MEDICINE

## 2018-06-05 NOTE — PROGRESS NOTES
Assessment/Plan:    No problem-specific Assessment & Plan notes found for this encounter  Diagnoses and all orders for this visit:    Vertigo    Malaise and fatigue    Gait disorder    Multiple falls      A/P: Doing a little better  ??BPV  COntinue treatment and await PT  Continue current treatment otherwise and RTC one month  Subjective:      Patient ID: Stacey Brumfield is a 80 y o  female  WF RTC with her daughter for f/u several falls, fatigue, and dizziness/disequilibrium  Labs ok  Started on anti-vert and increased her fluids , but hasn't seen PT yet  Slightly better with less dizziness and less severe attacks  No falls  No new issues  Fatigue a little better as well  Ears better after flushing them  NO headaches, facial droop, paralysis, parethesias, etc  No CP or palpitations  The following portions of the patient's history were reviewed and updated as appropriate:   She  has a past medical history of Arthritis; Cataract; Depression; Dislocation of left shoulder joint; Gait disorder; Herpes zoster; HL (hearing loss); Impacted cerumen of both ears; Impacted cerumen of right ear; Lightheadedness; Multiple falls; Near syncope; and Postherpetic polyneuropathy  She   Patient Active Problem List    Diagnosis Date Noted    Depression 08/25/2015    Gait abnormality 08/11/2015    Hearing loss 07/23/2013    Osteoarthritis 07/09/2012     She  has a past surgical history that includes Hernia repair and Tubal ligation  Her family history includes Cirrhosis in her father  She  reports that she has never smoked  She has never used smokeless tobacco  She reports that she does not drink alcohol or use drugs  Current Outpatient Prescriptions   Medication Sig Dispense Refill    meclizine (ANTIVERT) 25 mg tablet Take 1 tablet (25 mg total) by mouth 3 (three) times a day as needed for dizziness 30 tablet 0     No current facility-administered medications for this visit        Current Outpatient Prescriptions on File Prior to Visit   Medication Sig    meclizine (ANTIVERT) 25 mg tablet Take 1 tablet (25 mg total) by mouth 3 (three) times a day as needed for dizziness     No current facility-administered medications on file prior to visit  She has No Known Allergies       Review of Systems   Constitutional: Positive for fatigue  Negative for activity change, chills, diaphoresis and fever  HENT: Negative  Eyes: Negative for visual disturbance  Respiratory: Negative for cough, chest tightness, shortness of breath and wheezing  Cardiovascular: Negative for chest pain, palpitations and leg swelling  Gastrointestinal: Negative for abdominal pain, constipation, diarrhea, nausea and vomiting  Endocrine: Negative for cold intolerance and heat intolerance  Genitourinary: Negative for difficulty urinating, dysuria and frequency  Musculoskeletal: Negative for arthralgias, gait problem and myalgias  Neurological: Positive for dizziness, seizures, weakness and light-headedness  Negative for syncope, facial asymmetry, speech difficulty and headaches  Psychiatric/Behavioral: Negative for confusion  The patient is not nervous/anxious  Objective:      /80 (BP Location: Left arm, Patient Position: Sitting, Cuff Size: Adult)   Pulse 72   Temp 97 8 °F (36 6 °C) (Tympanic)   Resp 18   Ht 5' (1 524 m)   Wt 68 5 kg (151 lb)   SpO2 96%   BMI 29 49 kg/m²          Physical Exam   Constitutional: She is oriented to person, place, and time  She appears well-developed and well-nourished  No distress  HENT:   Head: Normocephalic  Mouth/Throat: Oropharynx is clear and moist  No oropharyngeal exudate  Eyes: Conjunctivae and EOM are normal  Pupils are equal, round, and reactive to light  Neck: Neck supple  No JVD present  Cardiovascular: Normal rate, regular rhythm and normal heart sounds  No murmur heard    Pulmonary/Chest: Effort normal and breath sounds normal  No respiratory distress  She has no wheezes  Abdominal: Soft  Bowel sounds are normal  She exhibits no distension  There is no tenderness  Musculoskeletal: She exhibits no edema  Neurological: She is alert and oriented to person, place, and time  No cranial nerve deficit  Coordination normal    Psychiatric: She has a normal mood and affect  Her behavior is normal  Judgment and thought content normal    Nursing note and vitals reviewed

## 2018-06-11 ENCOUNTER — EVALUATION (OUTPATIENT)
Dept: PHYSICAL THERAPY | Facility: CLINIC | Age: 83
End: 2018-06-11
Payer: MEDICARE

## 2018-06-11 DIAGNOSIS — R53.81 DEBILITY: Primary | ICD-10-CM

## 2018-06-11 DIAGNOSIS — R26.9 ABNORMALITY OF GAIT: ICD-10-CM

## 2018-06-11 PROCEDURE — 97163 PT EVAL HIGH COMPLEX 45 MIN: CPT | Performed by: PHYSICAL THERAPIST

## 2018-06-11 PROCEDURE — G8978 MOBILITY CURRENT STATUS: HCPCS | Performed by: PHYSICAL THERAPIST

## 2018-06-11 PROCEDURE — G8979 MOBILITY GOAL STATUS: HCPCS | Performed by: PHYSICAL THERAPIST

## 2018-06-11 PROCEDURE — 97112 NEUROMUSCULAR REEDUCATION: CPT | Performed by: PHYSICAL THERAPIST

## 2018-06-11 NOTE — PROGRESS NOTES
PT Evaluation     Today's date: 2018  Patient name: Melany Rossi  : 1930  MRN: [id number]  Referring provider: Amberly Almazan DO  Dx:   Encounter Diagnosis     ICD-10-CM    1  Debility R53 81    2  Abnormality of gait R26 9                   Assessment  Impairments: abnormal coordination, abnormal gait, abnormal or restricted ROM, activity intolerance, impaired balance, lacks appropriate home exercise program and safety issue    Assessment details: Patient is a 80y o  year old female presenting to OPPT with a recent history of frequent falls  Patient demonstrates decreased strength, endurance, balance, and functional independence  Patient is unable to return to normal household work at this time and requires assistance for ADLs and IADLs  She will benefit from skilled PT interventions to maximize return to PLOF and independence as able  Thank you for this referral and the ability to participate in the care of this patient  Goals  In 4 weeks, patient will:  1  Demonstrate ability to perform 45 minutes of activity without requiring seated rest break  2   Demonstrate ability to maintain upright balance unsupported by UEs while reaching above shoulder height without resistance to promote stability with ADLs  3  Demonstrate ability to lift up to 10 lbs from  knees to waist unsupported by UEs to promote stability with ADLs  4   Demonstrate increased strength of bilateral LEs to allow for improved transfer quality and stability  5  Demonstrate increased strength of bilateral UEs to allow for improved transfer quality and stability    In 4-10 weeks, patient will:  1  Demonstrate reduced fall risk based on outcome measures  2  Demonstrate consistent carryover with HEP  3    Return to community ambulation with least restrictive AD for at least 500 feet      Plan  Patient would benefit from: skilled physical therapy  Planned modality interventions: TENS, ultrasound and unattended electrical stimulation  Other planned modality interventions: modalties prn  Planned therapy interventions: manual therapy, neuromuscular re-education, therapeutic exercise and home exercise program  Frequency: 2x week  Duration in weeks: 4  Treatment plan discussed with: patient        Subjective Evaluation    History of Present Illness  Mechanism of injury: Reports she awoke in the morning feeling light headed  She gets up and sits a minute and then she starts to feel okay  She takes deep breaths and that helps  She also notes she has been taking more water  She notes that she has been sitting more over the fall and winter, stay home  She was by herself part of the winter  Since the weather has cleared, she has been going to the [facility] center more to help with depression  She hopes to go to the swimming pool more often now that the weather has been better  Not a recurrent problem   Quality of life: good    Pain  No pain reported    Social Support  Steps to enter house: yes  2  Stairs in house: no   Lives in: multiple-level home (Doesn't go to the basement, has assist with laundry )    Employment status: not working  Hand dominance: right      Diagnostic Tests  No diagnostic tests performed  Treatments  Current treatment: physical therapy  Patient Goals  Patient goals for therapy: improved balance, increased motion, increased strength, independence with ADLs/IADLs and return to sport/leisure activities          Objective     Postural Observations  Seated posture: good  Standing posture: fair  Correction of posture: has no consistent effect    Additional Postural Observation Details  Forward lean with SPC, increased hip flexion in standing and walking, increased LE co-contraction and rigidity  Tenderness     Additional Tenderness Details  Denies tenderness to palpation throughout LEs  Does have bilateral LE edema, pitting left distal   Right with varicosities       Neurological Testing     Sensation     Lumbar   Left Intact: light touch    Right   Intact: light touch    Additional Neurological Details  Reflexes intact  No clonus      Active Range of Motion   Cervical/Thoracic Spine     Thoracic   Flexion: WFL    Lumbar   Flexion: WFL  Extension: WFL  Left lateral flexion: WFL  Right lateral flexion: WFL  Left rotation: WFL  Right rotation: [facility]    Additional Active Range of Motion Details  Bilateral heel cord restriction to neutral ankle range  Bilateral hamstring restrictions by 30% on right, 50% on left    Strength/Myotome Testing     Left Hip   Planes of Motion   Flexion: 3  Extension: 3-  Abduction: 3  Adduction: 3    Right Hip   Planes of Motion   Flexion: 3  Extension: 3-  Abduction: 3  Adduction: 3    Left Knee   Flexion: 4  Extension: 4    Right Knee   Flexion: 4  Extension: 4    Left Ankle/Foot   Dorsiflexion: 4  Plantar flexion: 4  Inversion: 4  Eversion: 4    Right Ankle/Foot   Dorsiflexion: 4  Plantar flexion: 4  Inversion: 4  Eversion: 4    Functional Assessment     Comments  Mini Best    Sit to Stand  1=Moderate: Comes to stand WITH use of hands on first attempt     Rise to Toes  0=Severe:  < or equal to 3 seconds    Stand on one leg  0=Severe:  Unable    Compensatory Stepping Correction - Forward  1=Moderate: More than one step used to recover equilibrium    Compensatory Stepping Correction - Backward  1=Moderate: More than one step used to recover equilibrium    Compensatory Stepping Correction - Lateral  0=Severe: Falls OR cannot step    Stance (Feet Together); Eyes open, Firm surface  1=Moderate: < 30 seconds    Stance (Feet Together);  Eyes closed, Foam surface  0=Severe:  Unable    Incline - Eyes Closed  0=Severe:  Unable    Change in gait speed  1= Moderate:  Unable to change walking speed or signs of imbalance    Walk with Head Turns - Horizontal  0= Severe:  performs head turns with imbalance    Walk with Pivot Turns  0= Severe:  Cannot turn with feet close at any speed without imbalance    Step over obstacles  1= Moderate: step over box but touches box OR displays cautious behavior by slowing gait    Timed up and go with dual task (3 meter walk)  TU Seconds  Dual Task TU Seconds  1= Moderate: Dual Task affects counting OR walking (>10%) when compared to the TUG without Dual Task    Total Score:           Flowsheet Rows      Most Recent Value   PT/OT G-Codes   FOTO information reviewed  Yes   Assessment Type  Evaluation   G code set  Mobility: Walking & Moving Around   Mobility: Walking and Moving Around Current Status ()  CK   Mobility: Walking and Moving Around Goal Status ()  CK          Precautions: Falls  Re-eval Date: 7/10/18    Date        Visit Count 1       FOTO See IE       Pain In See IE       Pain Out See IE             Daily Treatment Diary     Manual                         Manual Therapy:  Upcoming  Hamstrings, heel cords, ITB    Exercise Diary         Rhom EO        Rhom EC        Tand - R        Tand - L                Step ups - fwd        SLR x 3 Foam       HR/TR        Mini Squats                Amb with head turns        VOR 1                            Modalities         prn

## 2018-06-13 ENCOUNTER — OFFICE VISIT (OUTPATIENT)
Dept: PHYSICAL THERAPY | Facility: CLINIC | Age: 83
End: 2018-06-13
Payer: MEDICARE

## 2018-06-13 DIAGNOSIS — R53.81 DEBILITY: Primary | ICD-10-CM

## 2018-06-13 DIAGNOSIS — R26.9 ABNORMALITY OF GAIT: ICD-10-CM

## 2018-06-13 PROCEDURE — 97112 NEUROMUSCULAR REEDUCATION: CPT | Performed by: PHYSICAL THERAPIST

## 2018-06-13 PROCEDURE — 97110 THERAPEUTIC EXERCISES: CPT | Performed by: PHYSICAL THERAPIST

## 2018-06-13 NOTE — PROGRESS NOTES
Daily Note     Today's date: 2018  Patient name: Mark Cárdenas  : 1930  MRN: [id number]  Referring provider: Faizan Reece DO  Dx:   Encounter Diagnosis     ICD-10-CM    1  Debility R53 81    2  Abnormality of gait R26 9      Precautions: Falls  Re-eval Date: 7/10/18    Date       Visit Count 1 2      FOTO See IE       Pain In See IE 0      Pain Out See IE 0        Subjective: Patient reports she did okay after her last session  Objective: See treatment diary below      Assessment: Tolerated treatment fair  Patient demonstrated fatigue post treatment and exhibited good technique with therapeutic exercises  Does require demonstration and cues for technique  Good technique after instruction and review  Ongoing stiffness in LEs with increased co-contraction  Seated rest breaks required  Remains unsteady and fearful of falling  Plan: Continue per plan of care     Daily Treatment Diary     Manual           15 mins              Manual Therapy:  Upcoming  Hamstrings, heel cords, ITB    Exercise Diary         Aerobic  10 mins L2  Nustep      Rhom EO        Rhom EC        Tand - R        Tand - L                Step ups - fwd        SLR x 3 Foam 2x10  Foam  2 UE support      HR/TR  2x10  Foam  2 UE support      Mini Squats  2x10  Foam  2 UE support      Heel cord stretch  1 min x 3 B/L      Amb with head turns        VOR 1                            Modalities         prn

## 2018-06-18 ENCOUNTER — OFFICE VISIT (OUTPATIENT)
Dept: PHYSICAL THERAPY | Facility: CLINIC | Age: 83
End: 2018-06-18
Payer: MEDICARE

## 2018-06-18 DIAGNOSIS — R53.81 DEBILITY: Primary | ICD-10-CM

## 2018-06-18 PROCEDURE — 97112 NEUROMUSCULAR REEDUCATION: CPT

## 2018-06-18 PROCEDURE — 97150 GROUP THERAPEUTIC PROCEDURES: CPT

## 2018-06-18 PROCEDURE — 97110 THERAPEUTIC EXERCISES: CPT

## 2018-06-18 NOTE — PROGRESS NOTES
Daily Note     Today's date: 2018  Patient name: Melany Rossi  : 1930  MRN: [id number]  Referring provider: Amberly Almazan DO  Dx:   Encounter Diagnosis     ICD-10-CM    1  Debility R53 81                 Precautions: Falls  Re-eval Date: 7/10/18    Date      Visit Count 1 2 3     FOTO See IE       Pain In See IE 0 0     Pain Out See IE 0 0         Subjective: I get dizzy occasionally especially with holding my breath      Objective: See treatment diary below      Assessment: Tolerated treatment fairly well with pt provided max cues for proper form / technique  Pt required occasionally seated rest secondary to fatigue  Patient would benefit from continued PT to address overall strength, endurance and balance deficit to maximize overall LOF    Plan: Continue per plan of care       Manual           15 mins Resume             Manual Therapy:  Upcoming  Hamstrings, heel cords, ITB    Exercise Diary    30' 1:1 PT/MW     Aerobic  10 mins L2  Nustep 10 mins L2  Nustep     Rhom EO   2x30" CS/CGA  firm     Rhom EC   2x30" CS/CGA  firm     Tand - R   2x30" CS/CGA  firm     Tand - L   2x30" CS/CGA  firm             Step ups - fwd   Upcoming     SLR x 3 Foam 2x10  Foam  2 UE support 2x10  Foam  2 UE support     HR/TR  2x10  Foam  2 UE support 2x10  Foam  2 UE support     Mini Squats  2x10  Foam  2 UE support 2x10  Foam  2 UE support     Heel cord stretch  1 min x 3 B/L Wedge  3x 1' to pt ga     Amb with head turns   Upcoming     VOR 1                            Modalities         prn

## 2018-06-20 ENCOUNTER — OFFICE VISIT (OUTPATIENT)
Dept: PHYSICAL THERAPY | Facility: CLINIC | Age: 83
End: 2018-06-20
Payer: MEDICARE

## 2018-06-20 DIAGNOSIS — R53.81 DEBILITY: Primary | ICD-10-CM

## 2018-06-20 DIAGNOSIS — R26.9 ABNORMALITY OF GAIT: ICD-10-CM

## 2018-06-20 PROCEDURE — 97112 NEUROMUSCULAR REEDUCATION: CPT

## 2018-06-20 PROCEDURE — 97110 THERAPEUTIC EXERCISES: CPT

## 2018-06-20 NOTE — PROGRESS NOTES
Daily Note     Today's date: 2018  Patient name: Kashif Soriano  : 1930  MRN: [id number]  Referring provider: Shante Mercedes DO  Dx:   Encounter Diagnosis     ICD-10-CM    1  Debility R53 81    2  Abnormality of gait R26 9                   Precautions: Falls  Re-eval Date: 7/10/18    Date     Visit Count 1 2 3 4    FOTO See IE       Pain In See IE 0 0 0    Pain Out See IE 0 0 0      Subjective: I almost trip over my dog in the house all the time  Dog gets in my way and dont want to get rid of the dog as it was my daughters who passed away 2 yrs ago  Objective: See treatment diary below      Assessment: Tolerated treatment fair with noted > challenge this date with balance activities  Pt requires CS to Min A for balance recovery with neuro exercises  Pt stating verbally afraid of falling and ambulates in home without AD with occasional furniture walking  Pt reports no falls but several near misses  Pt carries a phone for emergencies prn  Patient would benefit from continued PT to address balance, strength and endurance deficits  Demonstrates poor safety awareness with sit/stand transfers to review upcoming prn      Plan: Continue per plan of care         Manual           15 mins Resume Resume            Manual Therapy:  Upcoming  Hamstrings, heel cords, ITB    Exercise Diary    30' 1:1 PT/MW     Aerobic  10 mins L2  Nustep 10 mins L2  Nustep L2 10'    Rhom EO   2x30" CS/CGA  firm 2x30" CS/CGA  firm    Rhom EC   2x30" CS/CGA  firm 2x30" CS/Osmany  firm    Tand - R   2x30" CS/CGA  firm 2x30" CS/Osmany  firm    Tand - L   2x30" CS/CGA  firm 2x30" CS/Osmany  firm            Step ups - fwd   Upcoming Bottom step  15 x ea BL    SLR x 3 Foam 2x10  Foam  2 UE support 2x10  Foam  2 UE support 2x10  Foam  2 UE support    HR/TR  2x10  Foam  2 UE support 2x10  Foam  2 UE support 2x10  Foam  2 UE support    Mini Squats  2x10  Foam  2 UE support 2x10  Foam  2 UE support 2x10  Foam  2 UE support    Heel cord stretch  1 min x 3 B/L Wedge  3x 1' to pt ga Wedge  3x 1' to pt ga    Amb with head turns   Upcoming upcoming    VOR 1        Sit/stand    5x cues                Modalities         prn

## 2018-06-27 ENCOUNTER — APPOINTMENT (OUTPATIENT)
Dept: PHYSICAL THERAPY | Facility: CLINIC | Age: 83
End: 2018-06-27
Payer: MEDICARE

## 2018-06-27 NOTE — PROGRESS NOTES
Daily Note     Today's date: 2018  Patient name: Tr Douglass  : 1930  MRN: [id number]  Referring provider: Rudi Garcia DO  Dx: No diagnosis found  Precautions: Falls  Re-eval Date: 7/10/18    Date     Visit Count 1 2 3 4    FOTO See IE       Pain In See IE 0 0 0    Pain Out See IE 0 0 0      Subjective: ***      Objective: See treatment diary below      Assessment: Tolerated treatment {Tolerated treatment :5292471433}   Patient {assessment:8543371073}      Plan: {PLAN:1816083576}        Manual           15 mins Resume Resume            Manual Therapy:  Upcoming  Hamstrings, heel cords, ITB    Exercise Diary    30' 1:1 PT/MW     Aerobic  10 mins L2  Nustep 10 mins L2  Nustep L2 10'    Rhom EO   2x30" CS/CGA  firm 2x30" CS/CGA  firm    Rhom EC   2x30" CS/CGA  firm 2x30" CS/Osmany  firm    Tand - R   2x30" CS/CGA  firm 2x30" CS/Osmany  firm    Tand - L   2x30" CS/CGA  firm 2x30" CS/Osmany  firm            Step ups - fwd   Upcoming Bottom step  15 x ea BL    SLR x 3 Foam 2x10  Foam  2 UE support 2x10  Foam  2 UE support 2x10  Foam  2 UE support    HR/TR  2x10  Foam  2 UE support 2x10  Foam  2 UE support 2x10  Foam  2 UE support    Mini Squats  2x10  Foam  2 UE support 2x10  Foam  2 UE support 2x10  Foam  2 UE support    Heel cord stretch  1 min x 3 B/L Wedge  3x 1' to pt ga Wedge  3x 1' to pt ga    Amb with head turns   Upcoming upcoming    VOR 1        Sit/stand    5x cues                Modalities         prn

## 2018-06-28 ENCOUNTER — OFFICE VISIT (OUTPATIENT)
Dept: PHYSICAL THERAPY | Facility: CLINIC | Age: 83
End: 2018-06-28
Payer: MEDICARE

## 2018-06-28 DIAGNOSIS — R53.81 DEBILITY: Primary | ICD-10-CM

## 2018-06-28 PROCEDURE — 97150 GROUP THERAPEUTIC PROCEDURES: CPT

## 2018-06-28 PROCEDURE — 97112 NEUROMUSCULAR REEDUCATION: CPT

## 2018-06-28 NOTE — PROGRESS NOTES
Daily Note     Today's date: 2018  Patient name: Christina Hernandez  : 1930  MRN: [id number]  Referring provider: Idalmis Lara DO  Dx:   Encounter Diagnosis     ICD-10-CM    1  Debility R53 81                   Precautions: Falls  Re-eval Date: 7/10/18    Date    Visit Count 1 2 3 4 5   FOTO See IE       Pain In See IE 0 0 0 0   Pain Out See IE 0 0 0 0     Subjective: I havent fallen since that the time in my bedroom, I just slipped a little  Objective: See treatment diary below      Assessment: Tolerated treatment fair with noted increase overall mm fatigue  Pt requested several seated rest with requiring additional time to completed exercise this date  Pt indicates is fearful of falling and requires CS/CGA for balance recovery  Patient demonstrated fatigue post treatment      Plan: Continue per plan of care         Manual           15 mins Resume Resume NP           Manual Therapy:  Upcoming  Hamstrings, heel cords, ITB    Exercise Diary    30' 1:1 PT/MW     Aerobic  10 mins L2  Nustep 10 mins L2  Nustep L2 10' L2 10'   Rhom EO   2x30" CS/CGA  firm 2x30" CS/CGA  firm 2x30" CS/CGA  firm   Rhom EC   2x30" CS/CGA  firm 2x30" CS/Osmany  firm 2x30" CS/CGA  firm   Tand - R   2x30" CS/CGA  firm 2x30" CS/Osmany  firm 2x30" CS/CGA  firm   Tand - L   2x30" CS/CGA  firm 2x30" CS/Osmany  firm 2x30" CS/CGA  firm           Step ups - fwd   Upcoming Bottom step  15 x ea BL Bottom step  15 x ea BL NP   SLR x 3 Foam 2x10  Foam  2 UE support 2x10  Foam  2 UE support 2x10  Foam  2 UE support 2x10  Foam  2 UE support   HR/TR  2x10  Foam  2 UE support 2x10  Foam  2 UE support 2x10  Foam  2 UE support 2x10  Foam  2 UE support   Mini Squats  2x10  Foam  2 UE support 2x10  Foam  2 UE support 2x10  Foam  2 UE support 2x10  Foam  2 UE support   Heel cord stretch  1 min x 3 B/L Wedge  3x 1' to pt ga Wedge  3x 1' to pt ga NP   Amb with head turns   Upcoming upcoming Upcoming   VOR 1        Sit/stand 5x cues 2x 5 cues   FT foam - balloon toss     5' CS/CGA       Modalities         prn

## 2018-07-02 ENCOUNTER — OFFICE VISIT (OUTPATIENT)
Dept: PHYSICAL THERAPY | Facility: CLINIC | Age: 83
End: 2018-07-02
Payer: MEDICARE

## 2018-07-02 DIAGNOSIS — R26.9 ABNORMALITY OF GAIT: ICD-10-CM

## 2018-07-02 DIAGNOSIS — R53.81 DEBILITY: Primary | ICD-10-CM

## 2018-07-02 PROCEDURE — 97110 THERAPEUTIC EXERCISES: CPT

## 2018-07-02 PROCEDURE — 97112 NEUROMUSCULAR REEDUCATION: CPT

## 2018-07-02 NOTE — PROGRESS NOTES
Daily Note     Today's date: 2018  Patient name: Darshan Medina  : 1930  MRN: [id number]  Referring provider: Radha Kenny DO  Dx:   Encounter Diagnosis     ICD-10-CM    1  Debility R53 81    2  Abnormality of gait R26 9                   Precautions: Falls  Re-eval Date: 7/10/18    Date        Visit Count 6       FOTO Complete NV       Pain In        Pain Out        Vitals /82  SPO2 98%  HR 70 bpm         Subjective: I feel woozie every once in awhile  More so when I go to stand up or when I am standing too long        Objective: See treatment diary below      Assessment: Tolerated treatment fair with multiple co's of wooziness with prolong standing/neuro exercise  Orthostatic vitals taken by PT/MW with pt stabilized end rx session  Resume full program NV as tolerated  Patient would benefit from continued PT to address balance, strength and endurance deficits to maximize overall LOF  Plan: Continue per plan of care         Manual          10'               Manual Therapy:  Upcoming  Hamstrings, heel cords, ITB    Exercise Diary  Co-rx with PT MW prn orthostatic vitals monitored this date       Aerobic L2 13'       Rhom EO 2x30" CS/CGA  firm       Rhom EC 2x30" CS/CGA  Firm NP       Tand - R 2x30" CS/CGA  firm       Tand - L 2x30" CS/CGA  firm               Step ups - fwd Bottom step  15 x ea BL NP       SLR x 3 2x10  Foam  2 UE support NP       HR/TR 2x10  Foam  2 UE support       Mini Squats 2x10  Foam  2 UE support NP       Heel cord stretch        Amb with head turns        VOR 1        Sit/stand 2x 30"  1x 5 reps  1x9 reps   cues BL hands       FT foam - balloon toss            Modalities         prn

## 2018-07-05 ENCOUNTER — OFFICE VISIT (OUTPATIENT)
Dept: PHYSICAL THERAPY | Facility: CLINIC | Age: 83
End: 2018-07-05
Payer: MEDICARE

## 2018-07-05 DIAGNOSIS — R26.9 ABNORMALITY OF GAIT: ICD-10-CM

## 2018-07-05 DIAGNOSIS — R53.81 DEBILITY: Primary | ICD-10-CM

## 2018-07-05 PROCEDURE — 97112 NEUROMUSCULAR REEDUCATION: CPT

## 2018-07-05 PROCEDURE — 97110 THERAPEUTIC EXERCISES: CPT

## 2018-07-05 NOTE — PROGRESS NOTES
Daily Note     Today's date: 2018  Patient name: Odin Norton  : 1930  MRN: [id number]  Referring provider: Aubrie Frye DO  Dx:   Encounter Diagnosis     ICD-10-CM    1  Debility R53 81    2  Abnormality of gait R26 9                   Precautions: Falls  Re-eval Date: 7/10/18    Date       Visit Count 6       FOTO Complete NV Completed      Pain In        Pain Out        Vitals /82  SPO2 98%  HR 70 bpm         Subjective: I feel ok now, only once      Objective: See treatment diary below      Assessment: Tolerated treatment fair with pt co's of feeling occasional "wooziness"  Pt provided occasional brief seated rest with pt provided cues for proper diaphragmatic breathing as pt cont's to hold breathing  Patient would benefit from continued PT to address overall LE strength, balance and endurance to maximize overall LOF and safety with ambulation      Plan: Continue per plan of care           Manual          10' NP              Manual Therapy:  Upcoming  Hamstrings, heel cords, ITB    Exercise Diary  Co-rx with PT MW prn orthostatic vitals monitored this date Co-rx with PT MW      Aerobic L2 13' L2 12'      Rhom EO 2x30" CS/CGA  firm 2x30" CS/CGA  firm      Rhom EC 2x30" CS/CGA  Firm NP 2x30" CS/CGA  Firm      Tand - R 2x30" CS/CGA  firm 2x30" CS/CGA  firm      Tand - L 2x30" CS/CGA  firm 2x30" CS/CGA  firm              Step ups - fwd Bottom step  15 x ea BL NP NP      SLR x 3 2x10  Foam  2 UE support NP 2x10  Foam  2 UE support      HR/TR 2x10  Foam  2 UE support 2x10  Foam  2 UE support      Mini Squats 2x10  Foam  2 UE support NP 2x10  Foam  2 UE support      Heel cord stretch        Amb with head turns        VOR 1  upcoming      Sit/stand 2x 30"  1x 5 reps  1x9 reps   cues BL hands       FT foam - balloon toss            Modalities         prn

## 2018-07-06 ENCOUNTER — OFFICE VISIT (OUTPATIENT)
Dept: INTERNAL MEDICINE CLINIC | Facility: CLINIC | Age: 83
End: 2018-07-06
Payer: MEDICARE

## 2018-07-06 VITALS
OXYGEN SATURATION: 96 % | DIASTOLIC BLOOD PRESSURE: 74 MMHG | BODY MASS INDEX: 28.74 KG/M2 | WEIGHT: 146.4 LBS | SYSTOLIC BLOOD PRESSURE: 130 MMHG | HEIGHT: 60 IN | TEMPERATURE: 99.3 F | HEART RATE: 86 BPM | RESPIRATION RATE: 18 BRPM

## 2018-07-06 DIAGNOSIS — R53.83 MALAISE AND FATIGUE: ICD-10-CM

## 2018-07-06 DIAGNOSIS — Z00.00 MEDICARE ANNUAL WELLNESS VISIT, SUBSEQUENT: ICD-10-CM

## 2018-07-06 DIAGNOSIS — R29.6 MULTIPLE FALLS: ICD-10-CM

## 2018-07-06 DIAGNOSIS — R53.81 MALAISE AND FATIGUE: ICD-10-CM

## 2018-07-06 DIAGNOSIS — R42 VERTIGO: Primary | ICD-10-CM

## 2018-07-06 DIAGNOSIS — R26.9 GAIT DISORDER: ICD-10-CM

## 2018-07-06 DIAGNOSIS — R42 DISEQUILIBRIUM: ICD-10-CM

## 2018-07-06 PROCEDURE — 99213 OFFICE O/P EST LOW 20 MIN: CPT | Performed by: INTERNAL MEDICINE

## 2018-07-06 PROCEDURE — G0439 PPPS, SUBSEQ VISIT: HCPCS | Performed by: INTERNAL MEDICINE

## 2018-07-06 NOTE — PROGRESS NOTES
Assessment/Plan:    No problem-specific Assessment & Plan notes found for this encounter  Diagnoses and all orders for this visit:    Vertigo    Malaise and fatigue    Gait disorder    Disequilibrium    Multiple falls    Medicare annual wellness visit, subsequent      A/P: Doing better and will continue with PT  Recommend pt continue the exercises at home as well  Continue current treatment otherwise and RTC six months for routine  Subjective:      Patient ID: Stacey Brumfield is a 80 y o  female  WF RTC for f/u fatigue, gait issues with falls, vertigo, and balance issues  Sent to PT and is feeling great  Fatigue, vertigo, and balance are improving  No falls  No new issues  Overall activity improving  Still using a cane  The following portions of the patient's history were reviewed and updated as appropriate:   She  has a past medical history of Arthritis; Cataract; Depression; Dislocation of left shoulder joint; Gait disorder; Herpes zoster; HL (hearing loss); Impacted cerumen of both ears; Impacted cerumen of right ear; Lightheadedness; Multiple falls; Near syncope; and Postherpetic polyneuropathy  She   Patient Active Problem List    Diagnosis Date Noted    Depression 08/25/2015    Gait abnormality 08/11/2015    Hearing loss 07/23/2013    Osteoarthritis 07/09/2012     She  has a past surgical history that includes Hernia repair and Tubal ligation  Her family history includes Cirrhosis in her father  She  reports that she has never smoked  She has never used smokeless tobacco  She reports that she does not drink alcohol or use drugs  No current outpatient prescriptions on file  No current facility-administered medications for this visit        Current Outpatient Prescriptions on File Prior to Visit   Medication Sig    [DISCONTINUED] meclizine (ANTIVERT) 25 mg tablet Take 1 tablet (25 mg total) by mouth 3 (three) times a day as needed for dizziness     No current facility-administered medications on file prior to visit  She has No Known Allergies       Review of Systems   Constitutional: Negative for activity change, chills, diaphoresis, fatigue and fever  HENT: Negative  Respiratory: Negative for cough, chest tightness, shortness of breath and wheezing  Cardiovascular: Negative for chest pain, palpitations and leg swelling  Gastrointestinal: Negative for abdominal pain, constipation, diarrhea, nausea and vomiting  Genitourinary: Negative for difficulty urinating, dysuria and frequency  Musculoskeletal: Positive for gait problem  Negative for arthralgias and myalgias  Neurological: Negative for dizziness, tremors, facial asymmetry, weakness, light-headedness, numbness and headaches  Psychiatric/Behavioral: Negative for confusion and dysphoric mood  The patient is not nervous/anxious  Objective:      /74 (BP Location: Left arm, Patient Position: Sitting, Cuff Size: Standard)   Pulse 86   Temp 99 3 °F (37 4 °C)   Resp 18   Ht 5' (1 524 m)   Wt 66 4 kg (146 lb 6 4 oz)   SpO2 96%   BMI 28 59 kg/m²          Physical Exam   Constitutional: She is oriented to person, place, and time  She appears well-developed and well-nourished  No distress  HENT:   Head: Normocephalic and atraumatic  Mouth/Throat: Oropharynx is clear and moist    Eyes: Conjunctivae and EOM are normal  Pupils are equal, round, and reactive to light  Cardiovascular: Normal rate, regular rhythm and normal heart sounds  No murmur heard  Pulmonary/Chest: Effort normal and breath sounds normal  No respiratory distress  She has no wheezes  Musculoskeletal: She exhibits no edema  Neurological: She is alert and oriented to person, place, and time  No cranial nerve deficit  Coordination normal    Psychiatric: She has a normal mood and affect  Her behavior is normal  Judgment and thought content normal    Nursing note and vitals reviewed

## 2018-07-06 NOTE — PROGRESS NOTES
Assessment and Plan:    Problem List Items Addressed This Visit     None      Visit Diagnoses     Vertigo    -  Primary    Malaise and fatigue        Gait disorder        Disequilibrium        Multiple falls        Medicare annual wellness visit, subsequent            Health Maintenance Due   Topic Date Due    GLAUCOMA SCREENING 65 + YR  01/28/1997    DTaP,Tdap,and Td Vaccines (1 - Tdap) 11/18/2011    PT PLAN OF CARE  07/12/2018         HPI:  Jenaro Cash is a 80 y o  female here for her Subsequent Wellness Visit  Patient Active Problem List   Diagnosis    Depression    Hearing loss    Gait abnormality    Osteoarthritis     Past Medical History:   Diagnosis Date    Arthritis     Cataract     LAST ASSESSED: [id number]    Depression     Dislocation of left shoulder joint     LAST ASSESSSED: [id number]    Gait disorder     LAST ASSESSED: [id number]    Herpes zoster     LAST ASSESSED: [id number]    HL (hearing loss)     Impacted cerumen of both ears     LAST ASSESSED: [id number]    Impacted cerumen of right ear     LAST ASSESSED: [id number]    Lightheadedness     MILD AND PT REFUSES EKG ETC  PT PROMISES TO CALL IF WORSENS  LAST ASSESSED: [id number]    Multiple falls     LAST ASSESSED: [id number]    Near syncope     LAST ASSESSED: [id number]    Postherpetic polyneuropathy     LAST ASSESSED: [id number]     Past Surgical History:   Procedure Laterality Date    HERNIA REPAIR      TUBAL LIGATION       Family History   Problem Relation Age of Onset    Cirrhosis Father      History   Smoking Status    Never Smoker   Smokeless Tobacco    Never Used     History   Alcohol Use No      History   Drug Use No       No current outpatient prescriptions on file  No current facility-administered medications for this visit        No Known Allergies  Immunization History   Administered Date(s) Administered     Influenza (IM) Preservative Free 09/01/2015    Influenza 10/18/2014, 09/01/2015, 09/15/2016, 10/01/2017, 11/13/2017    Influenza TIV (IM) 10/01/2017    Pneumococcal Polysaccharide PPV23 11/17/2011    TD (adult) Preservative Free 11/17/2011    Zoster 01/17/2014       Patient Care Team:  Ranjan Ibrahim DO as PCP - General      Medicare Screening Tests and Risk Assessments:  AWV Clinical     ISAR:   Previous hospitalizations?:  No       Once in a Lifetime Medicare Screening:   EKG performed?:  Yes    AAA screening performed? (if performed, please add date to Health Maintenance):  No       Medicare Screening Tests and Risk Assessment:   AAA Risk Assessment    None Indicated:  Yes    Osteoporosis Risk Assessment    None indicated:  Yes    HIV Risk Assessment    None indicated:  Yes        Drug and Alcohol Use:   Tobacco use    Cigarettes:  never smoker    Tobacco use duration    Tobacco Cessation Readiness    Alcohol use    Alcohol use:  never    Alcohol Treatment Readiness   Illicit Drug Use    Drug use:  never    Drug type:  no sedative use       Diet & Exercise:   Diet   What is your diet?:  Regular   How many servings a day of the following:   Fruits and Vegetables:  1-2 Meat:  1-2   Whole Grains:  1 Simple Carbs:  0   Dairy:  1 Soda:  0   Coffee:  2 Tea:  1   Exercise    Do you currently exercise?:  yes    Frequency:  occasional   Times per week:  3     Type of exercise:  cardio, walking       Cognitive Impairment Screening:   Depression screening preformed:  Yes     PHQ-9 Depression scale score:  0   Depression screening results:  no significant symptoms   Cognitive Impairment Screening    Do you have difficulty learning or retaining new information?:  Yes Do you have difficulty handling new tasks?:  Yes   Do you have difficulty with reasoning?:  Yes Do you have difficulty with spatial ability and orientation?:  No   Do you have difficulty with language?:  No Do you have difficulty with behavior?:  No       Functional Ability/Level of Safety:   Hearing    Hearing difficulties:  No Bilateral:  normal Left:  normal Right:  normal   Hearing aid:  No    Hearing Impairment Assessment    Hearing status:  No impairment   Do your family members ever complain that you turn on the radio or T V  too loudly?:  No Do you find that other people have to repeat themselves when talking to you?:  No   Do you have difficulty hearing while talking on the phone?:  No Has anyone ever told you that you are speaking too loudly when talking with them?:  No   Do you have trouble hearing the doorbell or phone ringing?:  No Do you have difficulty hearing such that you feel frustrated talking to people?:  No   Do you feel sad because you cannot hear well?:  No Do you feel inconvienced due to your hearing problem?:  No   Do you think you would be a happier person if you could hear better?:  No Would you be willing to go for a hearing aid fitting if suggested?:  No   Current Activities    Status:  unlimited ADL's   Help needed with the folllowing:    Using the phone:  Yes Transportation:  Yes   Shopping:  Yes Preparing Meals:  Yes   Doing Housework:  No Doing Laundry:  Yes   Managing Medications:  Yes Managing Money:  Yes   ADL    Feeding:  Minimum assistance   Oral hygiene and Facial grooming:  Minimum assistance   Bathing:  Minimum assistance   Upper Body Dressing:  Minimum assistance   Lower Body Dressing:  Minimum assistance   Toileting:  Minimum assistance   Bed Mobility:  Minimum assistance   Fall Risk   Have you fallen in the last 12 months?:  Yes Are you unsteady on your feet?:  No   How many times?:  1 Are you taking any medications that may cause fatigue or dizziness?:  No    Do you rush to the bathroom potentially risking a fall?:  No   Injury History   Polypharmacy:  No Antidepressant Use:  No   Sedative Use:  No Antihypertensive Use:  No   Previous Fall:  Yes Alcohol Use:  No   Deconditioning:  No Visual Impairment:  No   Cogitive Impairment:  No Mmobility Impairment:  No   Postural Hypotension:  No Urinary Incontinence:  No Home Safety:   Are there hazards in your environment?:  No   If you fell, would you need help to get back up from the ground?:  Yes Do you have problems or concerns getting in/out of a bed, chair, tub, or toilet?:  No   Do you feel unsteady when walking?:  No Is your activity limited by pain?:  No   Do you have handrails and grab-bars in the home?:  Yes Are emergency numbers kept by the phone and regularly updated?:  Yes   Are you and/or family members aware of the dangers of smoking in bed?:  Yes Are firearms stored securely?:  No   Do you have working smoke alarms and fire extinguisher?:  Yes Do all household members know how to use them?:  No   Have you left the stove on unsupervised?:  No    Home Safety Risk Factors   Unfamilar with surroundings:  No Uneven floors:  No   Stairs or handrail saftey risk:  No Loose rugs:  No   Household clutter:  No Poor household lighting:  No   No grab bars in bathroom:  No Further evaluation needed:  No       Advanced Directives:   Advanced Directives    Living Will:  Yes Durable POA for healthcare:   Yes   Advanced directive:  Yes    Patient's End of Life Decisions    Reviewed with patient:  No        Urinary Incontinence:   Do you have urinary incontinence?:  Yes Do you have incomplete emptying?:  No   Do you urinate frequently?:  No Do you have urinary urgency?:  Yes   Do you have urinary hesitancy?:  No Do you have dysuria (painful and/or difficult urination)?:  No   Do you have nocturia (waking up to urinate)?:  Yes Do you strain when urinating (have to push to urinate)?:  No   Do you have a weak stream when urinating?:  No Do you have intermittent streaming when urinating?:  No   Do you dribble urine after finishing?:  No    Do you have vaginal pressure?:  No Do you have vaginal dryness?:  No       Glaucoma:            Provider Screening     Preventative Screening/Counseling:   Cardiovascular Screening/Counseling:   (Labs Q5 years, EKG optional one-time)   General: Screening Current Counseling:  Healthy Diet, Healthy Weight, Improve Exercise Tolerance    EKG (Optional):  Yes           Diabetes Screening/Counseling:   (2 tests/year if Pre-Diabetes or 1 test/year if no Diabetes)   General:  Screening Current Counseling:  Improve Physical Activity          Colorectal Cancer Screening/Counseling:   (FOBT Q1 yr; Flex Sig Q4 yrs or Q10 yrs after Screening Colonoscopy; Screening Colonoscpy Q2 yrs High Risk or Q10 yrs Low Risk; Barium Enema Q2 yrs High Risk or Q4 yrs Low Risk)   General:  Screening Not Indicated           Prostate Cancer Screening/Counseling:   (Annual)          Breast Cancer Screening/Counseling:   (Baseline Age 28 - 43; Annual Age 36+)   General:  Screening Not Indicated          Cervical Cancer Screening/Counseling:   (Annual for High Risk or Childbearing Age with Abnormal Pap in Last 3 yrs; Every 2 all others)   General:  Screening Not Indicated           Osteoporosis Screening/Counseling:   (Every 2 Yrs if at risk or more if medically necessary)   General:  Patient Declines Counseling:  Calcium and Vitamin D Intake, Regular Weightbearing Exercise          AAA Screening/Counseling:   (Once per Lifetime with risk factors)    General:  Screening Not Indicated           Glaucoma Screening/Counseling:   (Annual)   General:  Screening Current          HIV Screening/Counseling:   (Voluntary; Once annually for high risk OR 3 times for Pregnancy at diagnosis of IUP; 3rd trimester; and at Labor   General:  Screening Not Indicated           Hepatitis C Screening:             Immunizations:   Influenza (annual): Influenza UTD This Year, Influenza Recommended Annually   Pneumococcal (Once in a Lifetime):  Patient Declines   Hepatitis B Series (low risk patients):  Series Not Indicated   Tdap (Non-Medicare Wellness Visit required):   Tdap Vaccine UTD       Other Preventative Couseling (Non-Medicare Wellness Visit Required):   nutrition counseling performed, fall prevention education provided, car/seat belt/driving safety reviewed, Increased physical activity counseling given       Referrals (Non-Medicare Wellness Visit Required):       Medical Equipment/Suppliers:   none needed               A/P: Doing well and no new falls since starting PT  Denies depression and feels safe at home  Diverse diet  Still drives and no problems operating a MV and uses seat belts  Doesn't drive at night  Has a living will, but need a copy for her chart  No DME or referrals needed today  RTC one year for medicare wellness

## 2018-07-09 ENCOUNTER — APPOINTMENT (OUTPATIENT)
Dept: PHYSICAL THERAPY | Facility: CLINIC | Age: 83
End: 2018-07-09
Payer: MEDICARE

## 2018-07-09 ENCOUNTER — OFFICE VISIT (OUTPATIENT)
Dept: PHYSICAL THERAPY | Facility: CLINIC | Age: 83
End: 2018-07-09
Payer: MEDICARE

## 2018-07-09 DIAGNOSIS — R26.9 ABNORMALITY OF GAIT: ICD-10-CM

## 2018-07-09 DIAGNOSIS — R53.81 DEBILITY: Primary | ICD-10-CM

## 2018-07-09 PROCEDURE — 97112 NEUROMUSCULAR REEDUCATION: CPT

## 2018-07-09 PROCEDURE — 97110 THERAPEUTIC EXERCISES: CPT

## 2018-07-09 NOTE — PROGRESS NOTES
Daily Note     Today's date: 2018  Patient name: Kadi Charles  : 1930  MRN: [id number]  Referring provider: Dar Liang DO  Dx:   Encounter Diagnosis     ICD-10-CM    1  Debility R53 81    2  Abnormality of gait R26 9        Start Time: 1400        Precautions: Falls  Re-eval Date: 7/10/18    Date      Visit Count 6  8     FOTO Complete NV Completed      Pain In        Pain Out        Vitals /82  SPO2 98%  HR 70 bpm  BP- 127/84  SPO2- 95%  RHR-78       Subjective: Patient reported feeling nauseated upon arrival ; minimal dizziness noted at start of PT  Objective: See treatment diary below  Assessment: Tolerated treatment well  Patient demonstrated fatigue post treatment , as well as dizziness with standing balance TE  Patient will benefit from further PT to decrease sx , such as nausea and dizziness, and improve overall endurance and LE strength  RHR 73 post treat  Plan: Continue per plan of care       Manual          10' NP              Manual Therapy:  Upcoming  Hamstrings, heel cords, ITB    Exercise Diary  Co-rx with PT MW prn orthostatic vitals monitored this date Co-rx with PT MW      Aerobic L2 13' L2 12' L2 12',, bnb     Rhom EO 2x30" CS/CGA  firm 2x30" CS/CGA  firm 2x30" CS/CGA  foam     Rhom EC 2x30" CS/CGA  Firm NP 2x30" CS/CGA  Firm 2x30" CS/CGA foam      Tand - R 2x30" CS/CGA  firm 2x30" CS/CGA  firm 2x30" CS/CGA  Foam      Tand - L 2x30" CS/CGA  firm 2x30" CS/CGA  firm 2x30" CS/CGA  Foam              Step ups - fwd Bottom step  15 x ea BL NP NP      SLR x 3 2x10  Foam  2 UE support NP 2x10  Foam  2 UE support 2x10   Foam   2 UE support     HR/TR 2x10  Foam  2 UE support 2x10  Foam  2 UE support 2x10   Foam   2 UE support      Mini Squats 2x10  Foam  2 UE support NP 2x10  Foam  2 UE support 2x10  Foam 2   UE support      Heel cord stretch        Amb with head turns        VOR 1  upcoming      Sit/stand 2x 30"  1x 5 reps  1x9 reps   cues BL hands FT foam - balloon toss            Modalities         prn

## 2018-07-11 ENCOUNTER — OFFICE VISIT (OUTPATIENT)
Dept: PHYSICAL THERAPY | Facility: CLINIC | Age: 83
End: 2018-07-11
Payer: MEDICARE

## 2018-07-11 DIAGNOSIS — R53.81 DEBILITY: Primary | ICD-10-CM

## 2018-07-11 PROCEDURE — 97110 THERAPEUTIC EXERCISES: CPT

## 2018-07-11 PROCEDURE — 97112 NEUROMUSCULAR REEDUCATION: CPT

## 2018-07-11 NOTE — PROGRESS NOTES
Daily Note     Today's date: 2018  Patient name: Kadi Charles  : 1930  MRN: [id number]  Referring provider: Dar Liang DO  Dx:   Encounter Diagnosis     ICD-10-CM    1  Debility R53 81                 Precautions: Falls  Re-eval Date: 7/10/18    Date     Visit Count 6  8 9    FOTO Complete NV Completed      Pain In        Pain Out        Vitals /82  SPO2 98%  HR 70 bpm  BP- 127/84  SPO2- 95%  RHR-78       Subjective: I am a little down today, my grandson went back  Home after visit of 1 week and sr  rec center closed today  I sat at home all day      Objective: See treatment diary below      Assessment: Tolerated treatment fairly well with pt denying any sx's of dizziness/blurred pr double vision  Pt demonstrated increase rapid /shallow breathing with cues for diaphragmatic breathing   Requires CS to Osmany for balance recovery with neuro exercise  Patient would benefit from continued PT to address balance, strength and endurance defits      Plan: Continue per plan of care     Manual          10' NP  np            Manual Therapy:  Upcoming  Hamstrings, heel cords, ITB    Exercise Diary  Co-rx with PT MW prn orthostatic vitals monitored this date Co-rx with PT MW      Aerobic L2 13' L2 12' L2 12',, bnb L2 17'    Rhom EO 2x30" CS/CGA  firm 2x30" CS/CGA  firm 2x30" CS/CGA  foam 1x 1' CS/CGA  firm    Rhom EC 2x30" CS/CGA  Firm NP 2x30" CS/CGA  Firm 2x30" CS/CGA foam      Tand - R 2x30" CS/CGA  firm 2x30" CS/CGA  firm 2x30" CS/CGA  Foam  1x30" firm CGA/Osmany    Tand - L 2x30" CS/CGA  firm 2x30" CS/CGA  firm 2x30" CS/CGA  Foam  1x30" firm  CGA/Osmany            Step ups - fwd Bottom step  15 x ea BL NP NP      SLR x 3 2x10  Foam  2 UE support NP 2x10  Foam  2 UE support 2x10   Foam   2 UE support NP    HR/TR 2x10  Foam  2 UE support 2x10  Foam  2 UE support 2x10   Foam   2 UE support  2x10   Foam   2 UE support     Mini Squats 2x10  Foam  2 UE support NP 2x10  Foam  2 UE support 2x10  Foam 2   UE support  np    Heel cord stretch        Amb with head turns    Head turns    seated / busy  30x x1 CS    Firm/busy  30" x1 CGA    Foam/Busy  30" x1 CGA/Osmany      VOR 1  upcoming  Stand   Firm/foam  1x1min  Self pace  Busy  CS/Osmany    Sit/stand 2x 30"  1x 5 reps  1x9 reps   cues BL hands   10x @ 30"  13x @ 30"    0HHA  21" height      FT foam - balloon toss            Modalities         prn

## 2018-07-16 ENCOUNTER — OFFICE VISIT (OUTPATIENT)
Dept: PHYSICAL THERAPY | Facility: CLINIC | Age: 83
End: 2018-07-16
Payer: MEDICARE

## 2018-07-16 DIAGNOSIS — R53.81 DEBILITY: Primary | ICD-10-CM

## 2018-07-16 PROCEDURE — 97110 THERAPEUTIC EXERCISES: CPT

## 2018-07-16 PROCEDURE — 97112 NEUROMUSCULAR REEDUCATION: CPT

## 2018-07-16 NOTE — PROGRESS NOTES
Daily Note     Today's date: 2018  Patient name: Lloyd Figueroa  : 1930  MRN: [id number]  Referring provider: Anh Moulton DO  Dx:   Encounter Diagnosis     ICD-10-CM    1  Debility R53 81                 Precautions: Falls  Re-eval Date: 7/10/18    Date    Visit Count 6  8 9 10   FOTO Complete NV Completed      Pain In        Pain Out        Vitals /82  SPO2 98%  HR 70 bpm  BP- 127/84  SPO2- 95%  RHR-78         Subjective: I was carrying the waste paper basket back into  Bathroom and almost fell  I dropped the basket and was able to catch myself      Objective: See treatment diary below      Assessment: Tolerated treatment fairly well with pt requiring CS/CGA for balance recovery with neuro activities  Pt demonstrated 1 brief episode with wooziness with CGA during ball toss activity   Patient would benefit from continued PT to address balance, BL LE strengthening/stability deficits and improve overall endurance to maximize LOF      Plan: Continue per plan of care          Manual          10' NP  np np           Manual Therapy:  Upcoming  Hamstrings, heel cords, ITB    Exercise Diary  Co-rx with PT MW prn orthostatic vitals monitored this date Co-rx with PT MW   Co-rx with PT MW   Aerobic L2 13' L2 12' L2 12',, bnb L2 17' L2 15'   Rhom EO 2x30" CS/CGA  firm 2x30" CS/CGA  firm 2x30" CS/CGA  foam 1x 1' CS/CGA  firm 1x 1' CS/CGA  foam   Rhom EC 2x30" CS/CGA  Firm NP 2x30" CS/CGA  Firm 2x30" CS/CGA foam   2x30" CS/CGA foam    Tand - R 2x30" CS/CGA  firm 2x30" CS/CGA  firm 2x30" CS/CGA  Foam  1x30" firm CGA/Osmany 1x30" firm CGA/Osmany   Tand - L 2x30" CS/CGA  firm 2x30" CS/CGA  firm 2x30" CS/CGA  Foam  1x30" firm  CGA/Osmany 1x30" firm CGA/Osmany           Step ups - fwd Bottom step  15 x ea BL NP NP      SLR x 3 2x10  Foam  2 UE support NP 2x10  Foam  2 UE support 2x10   Foam   2 UE support NP 3x10   Foam   2 UE support   HR/TR 2x10  Foam  2 UE support 2x10  Foam  2 UE support 2x10   Foam   2 UE support  2x10   Foam   2 UE support  3x10   Foam   2 UE support    Mini Squats 2x10  Foam  2 UE support NP 2x10  Foam  2 UE support 2x10  Foam 2   UE support  np 2x10  Foam 2   UE support    Heel cord stretch        Amb with head turns    Head turns    seated / busy  30x x1 CS    Firm/busy  30" x1 CGA    Foam/Busy  30" x1 CGA/Osmany      VOR 1  upcoming  Stand   Firm/foam  1x1min  Self pace  Busy  CS/Osmany Sit/foam  2x 1min  Self pace  Busy  Near/Far   Sit/stand 2x 30"  1x 5 reps  1x9 reps   cues BL hands   10x @ 30"  13x @ 30"    0HHA  21" height      FT foam - balloon toss     Ball toss  Foam/FT  2x 1'         Modalities         prn

## 2018-07-18 ENCOUNTER — EVALUATION (OUTPATIENT)
Dept: PHYSICAL THERAPY | Facility: CLINIC | Age: 83
End: 2018-07-18
Payer: MEDICARE

## 2018-07-18 DIAGNOSIS — R53.81 DEBILITY: Primary | ICD-10-CM

## 2018-07-18 DIAGNOSIS — R26.9 ABNORMALITY OF GAIT: ICD-10-CM

## 2018-07-18 PROCEDURE — 97112 NEUROMUSCULAR REEDUCATION: CPT | Performed by: PHYSICAL THERAPIST

## 2018-07-18 PROCEDURE — 97110 THERAPEUTIC EXERCISES: CPT | Performed by: PHYSICAL THERAPIST

## 2018-07-18 PROCEDURE — G8979 MOBILITY GOAL STATUS: HCPCS | Performed by: PHYSICAL THERAPIST

## 2018-07-18 PROCEDURE — 97164 PT RE-EVAL EST PLAN CARE: CPT | Performed by: PHYSICAL THERAPIST

## 2018-07-18 PROCEDURE — G8978 MOBILITY CURRENT STATUS: HCPCS | Performed by: PHYSICAL THERAPIST

## 2018-07-18 NOTE — PROGRESS NOTES
PT Re-Evaluation     Today's date: 18  Patient name: Leah Davis  : 1930  MRN: [id number]  Referring provider: Belkis Stevens DO  Dx:   Encounter Diagnosis     ICD-10-CM    1  Debility R53 81    2  Abnormality of gait R26 9                   Assessment  Impairments: abnormal coordination, abnormal gait, abnormal or restricted ROM, activity intolerance, impaired balance, lacks appropriate home exercise program and safety issue    Assessment details: Patient is a 80y o  year old female presenting to OPPT with a recent history of frequent falls  Patient initially with a multi-faceted increased fall risk  She has a history of anxiety and depression, bilateral LEs edema, bilateral LE weakness  She notes a history of falls that makes her nervous  She has been encouraged to follow up with PCP regarding ongoing depression and anxiety  She will be discharged to [facility] in 1-2 weeks  Thank you for this referral and the ability to participate in the care of this patient  Barriers to therapy: Anxiety  Depression    Goals  In 4 weeks, patient will:  1  Demonstrate ability to perform 45 minutes of activity without requiring seated rest break - met  2  Demonstrate ability to maintain upright balance unsupported by UEs while reaching above shoulder height without resistance to promote stability with ADLs - met  3  Demonstrate ability to lift up to 10 lbs from  knees to waist unsupported by UEs to promote stability with ADLs  - partially met pending UE pain  4  Demonstrate increased strength of bilateral LEs to allow for improved transfer quality and stability - met  5  Demonstrate increased strength of bilateral UEs to allow for improved transfer quality and stability -  met    In 4-10 weeks, patient will:  1  Demonstrate reduced fall risk based on outcome measures - ongoing  2  Demonstrate consistent carryover with HEP -  ongoing  3    Return to community ambulation with least restrictive AD for at least 500 feet - met with SPC or RW      Plan  Patient would benefit from: skilled physical therapy  Planned modality interventions: TENS, ultrasound and unattended electrical stimulation  Other planned modality interventions: modalties prn  Planned therapy interventions: manual therapy, neuromuscular re-education, therapeutic exercise and home exercise program  Frequency: 2x week  Duration in weeks: 4  Treatment plan discussed with: patient        Subjective Evaluation    History of Present Illness  Mechanism of injury: Update:  Patient with ongoing sensation of unsteadiness  This is now primarily when she rises after prolonged sitting  She notes that it is not "room spinning", more "whifty"  She notes that when she stands for a minute she typically feels better  Also notes she has a sensation of ongoing depression when she is at home by herself  Gets upset and this increases her symptoms  Also notes some anxiety at times  Encouraged to return to PCP for follow up as well as encouraged to seek out options in community for maintenance program       Reports she awoke in the morning feeling light headed  She gets up and sits a minute and then she starts to feel okay  She takes deep breaths and that helps  She also notes she has been taking more water  She notes that she has been sitting more over the fall and winter, stay home  She was by herself part of the winter  Since the weather has cleared, she has been going to the [facility] center more to help with depression  She hopes to go to the swimming pool more often now that the weather has been better    Not a recurrent problem   Quality of life: good    Social Support  Steps to enter house: yes  2  Stairs in house: no   Lives in: multiple-level home (Doesn't go to the basement, has assist with laundry )    Employment status: not working  Hand dominance: right      Diagnostic Tests  No diagnostic tests performed  Treatments  Current treatment: physical therapy  Patient Goals  Patient goals for therapy: improved balance, increased motion, increased strength, independence with ADLs/IADLs and return to sport/leisure activities          Objective     Postural Observations  Seated posture: good  Standing posture: fair  Correction of posture: has no consistent effect    Additional Postural Observation Details  Forward lean with SPC, increased hip flexion in standing and walking, increased LE co-contraction and rigidity  Tenderness     Additional Tenderness Details  Denies tenderness to palpation throughout LEs  Does have bilateral LE edema, pitting left distal   Right with varicosities       Neurological Testing     Sensation     Lumbar   Left   Intact: light touch    Right   Intact: light touch    Additional Neurological Details  Reflexes intact  No clonus      Active Range of Motion   Cervical/Thoracic Spine     Thoracic   Flexion: WFL    Lumbar   Flexion: WFL  Extension: WFL  Left lateral flexion: WFL  Right lateral flexion: WFL  Left rotation: [facility]  Right rotation: [facility]    Additional Active Range of Motion Details  Bilateral heel cord restriction to neutral ankle range  Bilateral hamstring restrictions by 30% on right, 50% on left    Strength/Myotome Testing     Left Hip   Planes of Motion   Flexion: 3  Extension: 3-  Abduction: 3  Adduction: 3    Right Hip   Planes of Motion   Flexion: 3  Extension: 3-  Abduction: 3  Adduction: 3    Left Knee   Flexion: 4  Extension: 4    Right Knee   Flexion: 4  Extension: 4    Left Ankle/Foot   Dorsiflexion: 4  Plantar flexion: 4  Inversion: 4  Eversion: 4    Right Ankle/Foot   Dorsiflexion: 4  Plantar flexion: 4  Inversion: 4  Eversion: 4    Functional Assessment     Comments  Mini Best  Total Score: 7/28     Sit to Stand  1=Moderate: Comes to stand WITH use of hands on first attempt     Rise to Toes  1=Moderate:  Heels up, but not full range (smaller than when holding hands) OR noticeable instability for 3 seconds     Stand on one leg  0=Severe:  Unable     Compensatory Stepping Correction - Forward  1=Moderate: More than one step used to recover equilibrium    Compensatory Stepping Correction - Backward  1=Moderate: More than one step used to recover equilibrium     Compensatory Stepping Correction - Lateral  1=Moderate: Several steps to recover equilibrium    Stance (Feet Together); Eyes open, Firm surface  2= Normal:  30 seconds     Stance (Feet Together);  Eyes closed, Foam surface  1=Moderate: < 30 seconds    Incline - Eyes Closed  1=Moderate:  Stands independently <30 sec OR aligns with surface    Change in gait speed  2= Normal:  Significantly changes walking speed without imbalance     Walk with Head Turns - Horizontal  2= Normal:  Performs head turns with no change in gait speed and good balance     Walk with Pivot Turns  1= Moderate:  Turns with feet close SLOW (> or = to 4 steps) with good balance     Step over obstacles  1= Moderate: step over box but touches box OR displays cautious behavior by slowing gait       Timed up and go with dual task (3 meter walk)  TU Seconds  Dual Task TU Seconds  1= Moderate: Dual Task affects counting OR walking (>10%) when compared to the TUG without Dual Task      (When scoring, if subject's gait speed slows more than 10% between the TUG without and with the Dual Task the score should be decreased by a point)  Total Score:         Precautions: Falls  Re-eval Date: 7/10/18    Date        Visit Count 11       FOTO        Pain In        Pain Out        Vitals          Manual          NP               Manual Therapy:  Upcoming  Hamstrings, heel cords, ITB    Exercise Diary         Aerobic L2 10'       Rhom EO        Rhom EC        Tand - R        Tand - L                Step ups - fwd        SLR x 3 3x10   Foam   2 UE support       HR/TR 3x10   Foam   2 UE support        Mini Squats 2x10  Foam 2   UE support        Heel cord stretch        Amb with head turns        VOR 1 Sit/stand        FT foam - balloon toss              Modalities         prn

## 2018-07-23 ENCOUNTER — OFFICE VISIT (OUTPATIENT)
Dept: PHYSICAL THERAPY | Facility: CLINIC | Age: 83
End: 2018-07-23
Payer: MEDICARE

## 2018-07-23 DIAGNOSIS — R53.81 DEBILITY: Primary | ICD-10-CM

## 2018-07-23 PROCEDURE — 97110 THERAPEUTIC EXERCISES: CPT

## 2018-07-23 PROCEDURE — 97112 NEUROMUSCULAR REEDUCATION: CPT

## 2018-07-23 NOTE — PROGRESS NOTES
Daily Note     Today's date: 2018  Patient name: Marjorie East  : 1930  MRN: [id number]  Referring provider: Yusra Aranda DO  Dx:   Encounter Diagnosis     ICD-10-CM    1  Debility R53 81                   Precautions: Falls  Re-eval Date: 18    Date       Visit Count 11 12      FOTO        Pain In        Pain Out        Vitals          Subjective: I am trying to walk outside more, but afraid and feel unsteady walking outside  I am ok in the house  Use the cane with walking      Objective: See treatment diary below      Assessment: Tolerated treatment fairly well with pt indicating intermittent seated rest secondary to fatigue  Denied any co's of dizziness  Requires CS/Min A for balance recovery with neuro exercises/safety  Patient demonstrated fatigue post treatment      Plan: Continue per plan of care       Manual          NP               Manual Therapy:  Upcoming  Hamstrings, heel cords, ITB    Exercise Diary         Aerobic L2 10' L2 20'      Rhom EO  Foam  Dynamic reach  2x 1' alt UE  2x 1' R/red, L/green      Rhom EC  1 min   Foam/firm  CS/Osmany      Tand - R  2x 1 min w/dynamic cone reach  CS/CGA  firm      Tand - L  2x 1 min w/dynamic cone reach  CS/CGA  firm              Step ups - fwd  6" 1 HR  10x R/L      SLR x 3 3x10   Foam   2 UE support review      HR/TR 3x10   Foam   2 UE support  3x10   Foam   2 UE support       Mini Squats 2x10  Foam 2   UE support  review      Heel cord stretch  review      Amb with head turns  4x40'  S/s and Up/down      VOR 1  resume      Sit/stand        FT foam - balloon toss              Modalities         prn

## 2018-07-30 ENCOUNTER — APPOINTMENT (OUTPATIENT)
Dept: PHYSICAL THERAPY | Facility: CLINIC | Age: 83
End: 2018-07-30
Payer: MEDICARE

## 2018-08-01 ENCOUNTER — OFFICE VISIT (OUTPATIENT)
Dept: PHYSICAL THERAPY | Facility: CLINIC | Age: 83
End: 2018-08-01
Payer: MEDICARE

## 2018-08-01 ENCOUNTER — APPOINTMENT (OUTPATIENT)
Dept: PHYSICAL THERAPY | Facility: CLINIC | Age: 83
End: 2018-08-01
Payer: MEDICARE

## 2018-08-01 DIAGNOSIS — R53.81 DEBILITY: Primary | ICD-10-CM

## 2018-08-01 PROCEDURE — 97535 SELF CARE MNGMENT TRAINING: CPT

## 2018-08-01 NOTE — PROGRESS NOTES
Daily Note     Today's date: 2018  Patient name: Rosalynd Spatz  : 1930  MRN: [id number]  Referring provider: Lj Correa DO  Dx:   Encounter Diagnosis     ICD-10-CM    1  Debility R53 81                   Precautions: Falls  Re-eval Date: 18    Date      Visit Count 11 12 13     FOTO        Pain In        Pain Out        Vitals          Subjective: I want to be done with PT I am exercising almost every day  Go to rec center 3x/week  Occasional dizziness but only lasts a few seconds when I get up or move too fast    Objective: See treatment diary below      Assessment: Patient defer TE/neuro exercises today as pt states is wanting to go swimming before rain starts  Pt review HEP and provided additional handouts for BL LE strengthening and VOR exercises  Pt encourage carryover to tolerance          Plan: DC to HEP/community wellness  See last RA for additional findings    Manual          NP  NP             Manual Therapy:  Upcoming  Hamstrings, heel cords, ITB    Exercise Diary    Pt review     Aerobic L2 10' L2 20'      Rhom EO  Foam  Dynamic reach  2x 1' alt UE  2x 1' R/red, L/green      Rhom EC  1 min   Foam/firm  CS/Osmany      Tand - R  2x 1 min w/dynamic cone reach  CS/CGA  firm      Tand - L  2x 1 min w/dynamic cone reach  CS/CGA  firm              Step ups - fwd  6" 1 HR  10x R/L      SLR x 3 3x10   Foam   2 UE support review      HR/TR 3x10   Foam   2 UE support  3x10   Foam   2 UE support       Mini Squats 2x10  Foam 2   UE support  review      Heel cord stretch  review      Amb with head turns  4x40'  S/s and Up/down      VOR 1  resume      Sit/stand        FT foam - balloon toss              Modalities         prn

## 2018-11-12 ENCOUNTER — OFFICE VISIT (OUTPATIENT)
Dept: INTERNAL MEDICINE CLINIC | Facility: CLINIC | Age: 83
End: 2018-11-12
Payer: MEDICARE

## 2018-11-12 VITALS
WEIGHT: 151.4 LBS | TEMPERATURE: 98.7 F | DIASTOLIC BLOOD PRESSURE: 88 MMHG | SYSTOLIC BLOOD PRESSURE: 134 MMHG | HEIGHT: 60 IN | BODY MASS INDEX: 29.72 KG/M2 | HEART RATE: 82 BPM | RESPIRATION RATE: 18 BRPM | OXYGEN SATURATION: 96 %

## 2018-11-12 DIAGNOSIS — J01.10 ACUTE NON-RECURRENT FRONTAL SINUSITIS: Primary | ICD-10-CM

## 2018-11-12 PROCEDURE — 99213 OFFICE O/P EST LOW 20 MIN: CPT | Performed by: INTERNAL MEDICINE

## 2018-11-12 RX ORDER — AMOXICILLIN AND CLAVULANATE POTASSIUM 875; 125 MG/1; MG/1
1 TABLET, FILM COATED ORAL EVERY 12 HOURS SCHEDULED
Qty: 14 TABLET | Refills: 0 | Status: SHIPPED | OUTPATIENT
Start: 2018-11-12 | End: 2018-11-19

## 2018-11-12 NOTE — PROGRESS NOTES
Assessment/Plan:       Diagnoses and all orders for this visit:    Acute non-recurrent frontal sinusitis  -     amoxicillin-clavulanate (AUGMENTIN) 875-125 mg per tablet; Take 1 tablet by mouth every 12 (twelve) hours for 7 days        No problem-specific Assessment & Plan notes found for this encounter  We will follow up PRN    Subjective:      Patient ID: Nuha Soto is a 80 y o  female  The patient notes 3 days of URI symptoms and notes that she has cough and sinus congestion  She states the cough is productive and notes sinus congestion  She continues to have vertigo, but had vestibular rehab and notes that this helps  However, she has not been using the techniques shown there  Sinusitis   This is a chronic problem  The current episode started more than 1 year ago  The problem has been waxing and waning since onset  There has been no fever  The fever has been present for less than 1 day  The pain is mild  Associated symptoms include chills, congestion, coughing, shortness of breath and sinus pressure  Pertinent negatives include no diaphoresis, ear pain, headaches, hoarse voice, neck pain, sneezing, sore throat or swollen glands  Past treatments include nothing  The following portions of the patient's history were reviewed and updated as appropriate:   She has a past medical history of Arthritis; Cataract; Depression; Dislocation of left shoulder joint; Gait disorder; Herpes zoster; HL (hearing loss); Impacted cerumen of both ears; Impacted cerumen of right ear; Lightheadedness; Multiple falls; Near syncope; and Postherpetic polyneuropathy  ,   does not have any pertinent problems on file  ,   has a past surgical history that includes Hernia repair and Tubal ligation  ,  family history includes Cirrhosis in her father  ,   reports that she has never smoked  She has never used smokeless tobacco  She reports that she does not drink alcohol or use drugs  ,  has No Known Allergies     Current Outpatient Prescriptions   Medication Sig Dispense Refill    amoxicillin-clavulanate (AUGMENTIN) 875-125 mg per tablet Take 1 tablet by mouth every 12 (twelve) hours for 7 days 14 tablet 0     No current facility-administered medications for this visit  Review of Systems   Constitutional: Positive for chills  Negative for diaphoresis  HENT: Positive for congestion and sinus pressure  Negative for ear pain, hoarse voice, rhinorrhea, sinus pain, sneezing and sore throat  Respiratory: Positive for cough and shortness of breath  Cardiovascular: Negative for chest pain and palpitations  Gastrointestinal: Negative for abdominal pain, anal bleeding, constipation, diarrhea and nausea  Endocrine: Negative  Genitourinary: Negative  Musculoskeletal: Negative  Negative for neck pain  Neurological: Negative for headaches  Objective:  Vitals:    11/12/18 1518   BP: 134/88   BP Location: Right arm   Patient Position: Sitting   Cuff Size: Standard   Pulse: 82   Resp: 18   Temp: 98 7 °F (37 1 °C)   SpO2: 96%   Weight: 68 7 kg (151 lb 6 4 oz)   Height: 5' (1 524 m)     Body mass index is 29 57 kg/m²  Physical Exam   Constitutional: She is oriented to person, place, and time  She appears well-developed and well-nourished  HENT:   Head: Normocephalic and atraumatic  Eyes: Pupils are equal, round, and reactive to light  Conjunctivae and EOM are normal    Neck: Normal range of motion  Neck supple  Cardiovascular: Normal rate, regular rhythm, normal heart sounds and intact distal pulses  Exam reveals no gallop  No murmur heard  Pulmonary/Chest: Effort normal and breath sounds normal  No respiratory distress  She has no wheezes  She has no rales  Abdominal: Soft  Bowel sounds are normal  She exhibits no distension  There is no tenderness  There is no rebound  Musculoskeletal: Normal range of motion  She exhibits no edema or tenderness     Neurological: She is alert and oriented to person, place, and time  No cranial nerve deficit  Skin: Skin is warm and dry  No erythema  Psychiatric: She has a normal mood and affect  Vitals reviewed

## 2019-01-07 ENCOUNTER — OFFICE VISIT (OUTPATIENT)
Dept: INTERNAL MEDICINE CLINIC | Facility: CLINIC | Age: 84
End: 2019-01-07
Payer: MEDICARE

## 2019-01-07 VITALS
RESPIRATION RATE: 16 BRPM | HEIGHT: 60 IN | DIASTOLIC BLOOD PRESSURE: 78 MMHG | WEIGHT: 151 LBS | HEART RATE: 76 BPM | TEMPERATURE: 98.2 F | BODY MASS INDEX: 29.64 KG/M2 | SYSTOLIC BLOOD PRESSURE: 118 MMHG

## 2019-01-07 DIAGNOSIS — Z13.29 SCREENING FOR THYROID DISORDER: ICD-10-CM

## 2019-01-07 DIAGNOSIS — F41.1 GAD (GENERALIZED ANXIETY DISORDER): ICD-10-CM

## 2019-01-07 DIAGNOSIS — R26.9 GAIT ABNORMALITY: ICD-10-CM

## 2019-01-07 DIAGNOSIS — F32.A DEPRESSION, UNSPECIFIED DEPRESSION TYPE: ICD-10-CM

## 2019-01-07 DIAGNOSIS — R55 NEAR SYNCOPE: ICD-10-CM

## 2019-01-07 DIAGNOSIS — Z13.220 SCREENING FOR LIPID DISORDERS: ICD-10-CM

## 2019-01-07 DIAGNOSIS — Z13.1 SCREENING FOR DIABETES MELLITUS (DM): Primary | ICD-10-CM

## 2019-01-07 DIAGNOSIS — M15.9 PRIMARY OSTEOARTHRITIS INVOLVING MULTIPLE JOINTS: ICD-10-CM

## 2019-01-07 PROCEDURE — 93000 ELECTROCARDIOGRAM COMPLETE: CPT | Performed by: INTERNAL MEDICINE

## 2019-01-07 PROCEDURE — 99214 OFFICE O/P EST MOD 30 MIN: CPT | Performed by: INTERNAL MEDICINE

## 2019-01-07 NOTE — PROGRESS NOTES
Assessment/Plan:    No problem-specific Assessment & Plan notes found for this encounter  Diagnoses and all orders for this visit:    Screening for diabetes mellitus (DM)  -     Hemoglobin A1C; Future    Near syncope  -     CBC and differential; Future  -     Comprehensive metabolic panel; Future  -     Hemoglobin A1C; Future  -     TSH, 3rd generation with Free T4 reflex; Future  -     UA w Reflex to Microscopic w Reflex to Culture  -     POCT ECG  -     Ambulatory referral to Physical Therapy; Future    Gait abnormality  -     Ambulatory referral to Physical Therapy; Future    Screening for thyroid disorder  -     TSH, 3rd generation with Free T4 reflex; Future    Screening for lipid disorders  -     Lipid Panel with Direct LDL reflex; Future    Depression, unspecified depression type    Primary osteoarthritis involving multiple joints    AMANDA (generalized anxiety disorder)      A/P: EKG w/o acute changes and negative orthostatics  ??cause  Gait is bad and at risk of falls  Will check labs, including a urine  No meds  May need an CT head, carotids and echo  Will order PT  Hold on pneumo vaccine for now  Continue current treatment and RTC two weeks  Subjective:      Patient ID: Solange Odonnell is a 80 y o  female  WF RTC with her relative for f/u DJD, depression, etc  Reports several months for worsening walking and feeling lightheaded and like she may pass out at times  No triggers  No CP, SOB palpitations, edema, orthopnea, or PND  May get more when she goes from sitting to standing  Balance is off and needs to use a can and hold on to objects  No Slurred speech, facial droop, paralysis, etc  Due for labs and vaccines  No sz  No urinary changes  No dizziness  Some increase anxiety  The following portions of the patient's history were reviewed and updated as appropriate:   She  has a past medical history of Arthritis;  Cataract; Depression; Dislocation of left shoulder joint; Gait disorder; Herpes zoster; HL (hearing loss); Impacted cerumen of both ears; Impacted cerumen of right ear; Lightheadedness; Multiple falls; Near syncope; and Postherpetic polyneuropathy  She   Patient Active Problem List    Diagnosis Date Noted    Depression 08/25/2015    Gait abnormality 08/11/2015    Hearing loss 07/23/2013    Osteoarthritis 07/09/2012     She  has a past surgical history that includes Hernia repair and Tubal ligation  Her family history includes Cirrhosis in her father  She  reports that she has never smoked  She has never used smokeless tobacco  She reports that she does not drink alcohol or use drugs  No current outpatient prescriptions on file  No current facility-administered medications for this visit  No current outpatient prescriptions on file prior to visit  No current facility-administered medications on file prior to visit  She has No Known Allergies       Review of Systems   Constitutional: Negative for activity change, chills, diaphoresis, fatigue and fever  HENT: Negative  Eyes: Negative for visual disturbance  Respiratory: Negative for cough, chest tightness, shortness of breath and wheezing  Cardiovascular: Negative for chest pain, palpitations and leg swelling  Gastrointestinal: Negative for abdominal pain, constipation, diarrhea, nausea and vomiting  Endocrine: Negative for cold intolerance and heat intolerance  Genitourinary: Negative for difficulty urinating, dysuria and frequency  Musculoskeletal: Negative for arthralgias, gait problem and myalgias  Neurological: Positive for syncope, weakness and light-headedness  Negative for dizziness, tremors, seizures, facial asymmetry, speech difficulty, numbness and headaches  Psychiatric/Behavioral: Negative for confusion and dysphoric mood  The patient is nervous/anxious            Objective:      /78   Pulse 76   Temp 98 2 °F (36 8 °C) (Tympanic)   Resp 16   Ht 5' (1 524 m)   Wt 68 5 kg (151 lb)   BMI 29 49 kg/m²          Physical Exam   Constitutional: She is oriented to person, place, and time  She appears well-developed and well-nourished  No distress  HENT:   Head: Normocephalic and atraumatic  Right Ear: External ear normal    Left Ear: External ear normal    Nose: Nose normal    Mouth/Throat: Oropharynx is clear and moist    Eyes: Pupils are equal, round, and reactive to light  Conjunctivae and EOM are normal    Neck: Neck supple  No JVD present  Cardiovascular: Normal rate, regular rhythm and normal heart sounds  No murmur heard  Pulmonary/Chest: Effort normal and breath sounds normal  No respiratory distress  She has no wheezes  She has no rales  Abdominal: Soft  She exhibits no distension  There is no tenderness  Musculoskeletal: She exhibits no edema  Neurological: She is alert and oriented to person, place, and time  She has normal reflexes  No cranial nerve deficit  Gait is very unsteady and teeters side to side  Orozco Charles Mix's is negative, but some s/s with going from sitting to standing  Tilt test is negative  Psychiatric: She has a normal mood and affect  Her behavior is normal  Judgment and thought content normal    Nursing note and vitals reviewed

## 2019-01-09 ENCOUNTER — APPOINTMENT (OUTPATIENT)
Dept: LAB | Facility: CLINIC | Age: 84
End: 2019-01-09
Payer: MEDICARE

## 2019-01-09 DIAGNOSIS — Z13.1 SCREENING FOR DIABETES MELLITUS (DM): ICD-10-CM

## 2019-01-09 DIAGNOSIS — Z13.29 SCREENING FOR THYROID DISORDER: ICD-10-CM

## 2019-01-09 DIAGNOSIS — R55 NEAR SYNCOPE: ICD-10-CM

## 2019-01-09 DIAGNOSIS — Z13.220 SCREENING FOR LIPID DISORDERS: ICD-10-CM

## 2019-01-09 LAB
ALBUMIN SERPL BCP-MCNC: 4 G/DL (ref 3.5–5)
ALP SERPL-CCNC: 91 U/L (ref 46–116)
ALT SERPL W P-5'-P-CCNC: 20 U/L (ref 12–78)
ANION GAP SERPL CALCULATED.3IONS-SCNC: 7 MMOL/L (ref 4–13)
AST SERPL W P-5'-P-CCNC: 18 U/L (ref 5–45)
BACTERIA UR QL AUTO: NORMAL /HPF
BASOPHILS # BLD AUTO: 0.04 THOUSANDS/ΜL (ref 0–0.1)
BASOPHILS NFR BLD AUTO: 1 % (ref 0–1)
BILIRUB SERPL-MCNC: 0.86 MG/DL (ref 0.2–1)
BILIRUB UR QL STRIP: NEGATIVE
BUN SERPL-MCNC: 11 MG/DL (ref 5–25)
CALCIUM SERPL-MCNC: 9.1 MG/DL (ref 8.3–10.1)
CHLORIDE SERPL-SCNC: 98 MMOL/L (ref 100–108)
CHOLEST SERPL-MCNC: 210 MG/DL (ref 50–200)
CLARITY UR: CLEAR
CO2 SERPL-SCNC: 25 MMOL/L (ref 21–32)
COLOR UR: YELLOW
CREAT SERPL-MCNC: 1.06 MG/DL (ref 0.6–1.3)
EOSINOPHIL # BLD AUTO: 0.13 THOUSAND/ΜL (ref 0–0.61)
EOSINOPHIL NFR BLD AUTO: 2 % (ref 0–6)
ERYTHROCYTE [DISTWIDTH] IN BLOOD BY AUTOMATED COUNT: 12.5 % (ref 11.6–15.1)
EST. AVERAGE GLUCOSE BLD GHB EST-MCNC: 117 MG/DL
GFR SERPL CREATININE-BSD FRML MDRD: 47 ML/MIN/1.73SQ M
GLUCOSE P FAST SERPL-MCNC: 99 MG/DL (ref 65–99)
GLUCOSE UR STRIP-MCNC: NEGATIVE MG/DL
HBA1C MFR BLD: 5.7 % (ref 4.2–6.3)
HCT VFR BLD AUTO: 46.5 % (ref 34.8–46.1)
HDLC SERPL-MCNC: 49 MG/DL (ref 40–60)
HGB BLD-MCNC: 15.6 G/DL (ref 11.5–15.4)
HGB UR QL STRIP.AUTO: NEGATIVE
HYALINE CASTS #/AREA URNS LPF: NORMAL /LPF
IMM GRANULOCYTES # BLD AUTO: 0.02 THOUSAND/UL (ref 0–0.2)
IMM GRANULOCYTES NFR BLD AUTO: 0 % (ref 0–2)
KETONES UR STRIP-MCNC: NEGATIVE MG/DL
LDLC SERPL CALC-MCNC: 135 MG/DL (ref 0–100)
LEUKOCYTE ESTERASE UR QL STRIP: ABNORMAL
LYMPHOCYTES # BLD AUTO: 1.88 THOUSANDS/ΜL (ref 0.6–4.47)
LYMPHOCYTES NFR BLD AUTO: 25 % (ref 14–44)
MCH RBC QN AUTO: 30.6 PG (ref 26.8–34.3)
MCHC RBC AUTO-ENTMCNC: 33.5 G/DL (ref 31.4–37.4)
MCV RBC AUTO: 91 FL (ref 82–98)
MONOCYTES # BLD AUTO: 0.64 THOUSAND/ΜL (ref 0.17–1.22)
MONOCYTES NFR BLD AUTO: 9 % (ref 4–12)
NEUTROPHILS # BLD AUTO: 4.81 THOUSANDS/ΜL (ref 1.85–7.62)
NEUTS SEG NFR BLD AUTO: 63 % (ref 43–75)
NITRITE UR QL STRIP: NEGATIVE
NON-SQ EPI CELLS URNS QL MICRO: NORMAL /HPF
NRBC BLD AUTO-RTO: 0 /100 WBCS
PH UR STRIP.AUTO: 7 [PH] (ref 4.5–8)
PLATELET # BLD AUTO: 221 THOUSANDS/UL (ref 149–390)
PMV BLD AUTO: 10 FL (ref 8.9–12.7)
POTASSIUM SERPL-SCNC: 4.2 MMOL/L (ref 3.5–5.3)
PROT SERPL-MCNC: 7.5 G/DL (ref 6.4–8.2)
PROT UR STRIP-MCNC: NEGATIVE MG/DL
RBC # BLD AUTO: 5.1 MILLION/UL (ref 3.81–5.12)
RBC #/AREA URNS AUTO: NORMAL /HPF
SODIUM SERPL-SCNC: 130 MMOL/L (ref 136–145)
SP GR UR STRIP.AUTO: 1.01 (ref 1–1.03)
TRIGL SERPL-MCNC: 130 MG/DL
TSH SERPL DL<=0.05 MIU/L-ACNC: 1.39 UIU/ML (ref 0.36–3.74)
UROBILINOGEN UR QL STRIP.AUTO: 0.2 E.U./DL
WBC # BLD AUTO: 7.52 THOUSAND/UL (ref 4.31–10.16)
WBC #/AREA URNS AUTO: NORMAL /HPF

## 2019-01-09 PROCEDURE — 83036 HEMOGLOBIN GLYCOSYLATED A1C: CPT

## 2019-01-09 PROCEDURE — 80053 COMPREHEN METABOLIC PANEL: CPT

## 2019-01-09 PROCEDURE — 81001 URINALYSIS AUTO W/SCOPE: CPT | Performed by: INTERNAL MEDICINE

## 2019-01-09 PROCEDURE — 36415 COLL VENOUS BLD VENIPUNCTURE: CPT

## 2019-01-09 PROCEDURE — 85025 COMPLETE CBC W/AUTO DIFF WBC: CPT

## 2019-01-09 PROCEDURE — 84443 ASSAY THYROID STIM HORMONE: CPT

## 2019-01-09 PROCEDURE — 80061 LIPID PANEL: CPT

## 2019-01-10 DIAGNOSIS — R79.9 ABNORMAL BLOOD CHEMISTRY: Primary | ICD-10-CM

## 2019-01-15 ENCOUNTER — APPOINTMENT (OUTPATIENT)
Dept: PHYSICAL THERAPY | Facility: CLINIC | Age: 84
End: 2019-01-15
Payer: MEDICARE

## 2019-01-16 ENCOUNTER — EVALUATION (OUTPATIENT)
Dept: PHYSICAL THERAPY | Facility: CLINIC | Age: 84
End: 2019-01-16
Payer: MEDICARE

## 2019-01-16 DIAGNOSIS — R53.81 PHYSICAL DECONDITIONING: Primary | ICD-10-CM

## 2019-01-16 DIAGNOSIS — R29.898 LEG WEAKNESS, BILATERAL: ICD-10-CM

## 2019-01-16 PROCEDURE — 97163 PT EVAL HIGH COMPLEX 45 MIN: CPT | Performed by: PHYSICAL THERAPIST

## 2019-01-16 PROCEDURE — 97110 THERAPEUTIC EXERCISES: CPT | Performed by: PHYSICAL THERAPIST

## 2019-01-16 NOTE — PROGRESS NOTES
PT Evaluation     Today's date: 2019  Patient name: Shama De Santiago  : 1930  MRN: [id number]  Referring provider: Michelle Diallo DO  Dx:   Encounter Diagnosis     ICD-10-CM    1  Physical deconditioning R53 81    2  Leg weakness, bilateral R29 898        Start Time: 1300  Stop Time: 1400  Total time in clinic (min): 60 minutes    Assessment  Assessment details: Patient is a 80y o  year old female presenting to OPPT s/p diagnosis of debility and deconditioning, LE weakness  Patient demonstrates decreased strength, endurance, balance, and functional independence  Patient is unable to return to work at this time and requires assistance for ADLs and IADLs  She will benefit from skilled PT interventions to maximize return to PLOF and independence as able  Thank you for this referral and the ability to participate in the care of this patient  Impairments: abnormal coordination, abnormal gait, abnormal or restricted ROM, activity intolerance, impaired balance, impaired physical strength, lacks appropriate home exercise program and safety issue  Understanding of Dx/Px/POC: good   Prognosis: good    Goals  In 4 weeks, patient will:  1  Demonstrate ability to perform 30 minutes of activity without requiring seated rest break  2   Demonstrate ability to maintain upright balance unsupported by UEs while reaching above shoulder height without resistance to promote stability with ADLs  3  Demonstrate ability to lift up to 5 lbs from  knees to waist unsupported by UEs to promote stability with ADLs  4   Demonstrate increased strength of bilateral LEs to allow for improved transfer quality and stability    In 4-10 weeks, patient will:  1  Demonstrate reduced fall risk based on outcome measures  2  Demonstrate consistent carryover with HEP  3    Return to community ambulation with least restrictive AD for at least 400 feet      Plan  Patient would benefit from: skilled physical therapy  Other planned modality interventions: Modalities prn for symptom management  Planned therapy interventions: manual therapy, neuromuscular re-education, therapeutic exercise, home exercise program and gait training  Frequency: 2x week  Duration in weeks: 4  Treatment plan discussed with: patient        Subjective Evaluation    History of Present Illness  Mechanism of injury: Patient reports a few weeks of dizziness and weakness  Denies fall, depends on single point cane when outside her home  She has been limiting her mobility recently due to cold, possible icy weather  She has not been negotiating stairs  Patient does note waking in morning with back pain that decreases as the day goes on  She has intermittent knee pain as well  Her usual routine is to go to the [employer] for lunch daily M-F  She notes that she "used to go to water aerobics and do the exercises at the center", but she has stopped doing this for an unstated reason  She notes that she is not as mobile or steady on her feet  Additionally notes she does not cook for herself anymore  She will do light meals for breakfast and dinner  She does light housekeeping  Functional issues stated include: fear of falling, difficulty with stairs, sensation of unsteadiness with gait  Intermittent dizziness, decreased participation in community events and more homebound than 6 months ago    Recurrent probem    Quality of life: good    Pain  No pain reported    Social Support  Lives in: multiple-level home  Lives with: alone    Hand dominance: right    Treatments  Previous treatment: physical therapy  Patient Goals  Patient goals for therapy: increased strength, independence with ADLs/IADLs, return to sport/leisure activities, improved balance and increased motion          Objective     Neurological Testing     Sensation     Hip   Left Hip   Diminished: light touch    Right Hip   Diminished: light touch    Active Range of Motion     Additional Active Range of Motion Details  Hip flexion to 90 degrees bilaterally  Knee flexion to 90 degrees bilaterally  Heel cords limited by 5 degrees bilaterally  HS limited by 30% bilaterally    Strength/Myotome Testing     Left Hip   Planes of Motion   Flexion: 3  Extension: 3  Abduction: 3+  Adduction: 3+    Right Hip   Planes of Motion   Flexion: 3  Extension: 3  Abduction: 3+  Adduction: 3+    Additional Strength Details  Knee extension: R - 4/5; L - 4-/5  Knee flexion: R - 4/5; L - 4/5  DF and PF 4/5 bilaterally    Ambulation     Comments   Ambulates with decreased ankle and knee ROM, decreased flexibility and increased co-contraction  She demonstrates decreased foot clearance with increased ERLINDA      Functional Assessment     Comments  Tinetti Balance and Gait Assessment  Sitting Balance   1= Steady, safe    Rises From Chair  1= Able to Rise, requires use of arms to help    Attempts to Rise  1= Able to Rise, requires more than one attempt    Standing Balance (First 5 seconds)  1= Steady, but uses walker or other support    Nudged  1= Staggers, grabs, catches self    Eyes Closed  0= Unsteady    Turning 360 degrees  0= Discontinuous steps    1= Steady    Sitting Down (Getting Seated)  1= Uses arms or not a smooth transition    Initiation of Gait  1= No hesitancy    Step Length and Height  0= Step to     Foot Clearance  1= Left foot clears the floor  1= Right foot clears the floor    Step Symmetry  0= Right and left step length are not equal    Step Continuity  1= Steps appear continuous    Path  1= Mild/moderate deviation OR uses a walking aid    Trunk  0= Marked sway or uses a walking aid    Walking Time  0= Heels apart    Total Score: 12/28        Flowsheet Rows      Most Recent Value   PT/OT G-Codes   Current Score  55   Projected Score  62          Precautions: Falls, Fear of Falling  Re-eval Date: 2/15/19    Date 1/16       Visit Count 1       FOTO        Pain In See IE       Pain Out See IE             Daily Treatment Diary     Manual LE stretching                                            Date        [facility] vs  Nustep Nustep  L1  10 mins       HS stretch          Piriformis stretch        Hip flexor stretch         Hip abd/add         SLRs    x 4        TR/HR          Mini squats          Step ups fwd/lateral          ADL Suite for dynamic reach          Sit to stands        Lift and carry up to 5 lbs                        Modalities          prn

## 2019-01-17 ENCOUNTER — OFFICE VISIT (OUTPATIENT)
Dept: PHYSICAL THERAPY | Facility: CLINIC | Age: 84
End: 2019-01-17
Payer: MEDICARE

## 2019-01-17 DIAGNOSIS — R29.898 LEG WEAKNESS, BILATERAL: ICD-10-CM

## 2019-01-17 DIAGNOSIS — R53.81 PHYSICAL DECONDITIONING: Primary | ICD-10-CM

## 2019-01-17 PROCEDURE — 97140 MANUAL THERAPY 1/> REGIONS: CPT

## 2019-01-17 PROCEDURE — 97110 THERAPEUTIC EXERCISES: CPT

## 2019-01-17 NOTE — PROGRESS NOTES
Daily Note     Today's date: 2019  Patient name: Yunior Rios  : 1930  MRN: [id number]  Referring provider: Yaneth Molina DO  Dx:   Encounter Diagnosis     ICD-10-CM    1  Physical deconditioning R53 81    2  Leg weakness, bilateral R29 898                   Precautions: Falls, Fear of Falling  Re-eval Date: 2/15/19    Date       Visit Count 1 2      FOTO        Pain In See IE 0      Pain Out See IE 0        Subjective: I am very careful when I walk as I dont want to fall  Using my  husbands SPC      Objective: See treatment diary below      Assessment: Tolerated treatment fair with co's of quick mm fatigue with SLR flexion in supine  Requires AAROM this date with activity SLR flex  Pt expressed brief episode of dizziness with step up activity  Pt pt vitals WNL /80, HR 67, SPO2 97%  Pt denied any dizziness remaining duration of PT session  Patient would benefit from continued PT  To maximize overall strength, balance and endurance to improve QOL    Plan: Continue per plan of care       Manual   15 min      GENTLE LE stretching  BL HS, gastroc, add mm, piriformis      Date        [facility] vs  Nustep Nustep  L1  10 mins L2 10 min      HS stretch    W/ MT      Piriformis stretch  w/MT      Hip flexor stretch   upcoming      Hip abd/add   2x10  Red/ball      SLRs    x 4  Supine / flex  3x5 BL AAROM      TR/HR    Firm  2x10  2 HHA      Mini squats    Firm  2x10  2 HHA      Step ups fwd/lateral    Fwd  4"  1x 10 BL  2 HHA      ADL Suite for dynamic reach          Sit to stands        Lift and carry up to 5 lbs                    Modalities

## 2019-01-21 ENCOUNTER — OFFICE VISIT (OUTPATIENT)
Dept: PHYSICAL THERAPY | Facility: CLINIC | Age: 84
End: 2019-01-21
Payer: MEDICARE

## 2019-01-21 DIAGNOSIS — R53.81 PHYSICAL DECONDITIONING: Primary | ICD-10-CM

## 2019-01-21 PROCEDURE — 97110 THERAPEUTIC EXERCISES: CPT

## 2019-01-21 PROCEDURE — 97140 MANUAL THERAPY 1/> REGIONS: CPT

## 2019-01-21 NOTE — PROGRESS NOTES
Daily Note     Today's date: 2019  Patient name: Jessenia Reyes  : 1930  MRN: [id number]  Referring provider: Ayaan Francis DO  Dx:   Encounter Diagnosis     ICD-10-CM    1  Physical deconditioning R53 81                   Precautions: Falls, Fear of Falling  Re-eval Date: 2/15/19  RTD 19    Date      Visit Count 1 2 3     FOTO        Pain In See IE 0 0     Pain Out See IE 0 0       Subjective: Occasionally I have increase LBP depending on how I move      Objective: See treatment diary below      Assessment: Tolerated treatment fairly well with pt provided cues for proper breathing techniques with exercises as pt indicates holding her breath  Pt fatigues quickly and provided seated rest prn  Pt demonstrates inflexibility BL LE with pt indicating good tolerance with MT today  Patient would benefit from continued PT to improve overall strength, balance and endurance to max QOL      Plan: Continue per plan of care         Manual   15 min 15 min     GENTLE LE stretching  BL HS, gastroc, add mm, piriformis      Date        [facility] vs  Nustep Nustep  L1  10 mins L2 10 min L2 11 min     HS stretch    W/ MT W/ MT     Piriformis stretch  w/MT w/MT     Hip flexor stretch   upcoming      Hip abd/add   2x10  Red/ball 2x10  Green/ball     SLRs    x 4  Supine / flex  3x5 BL AAROM Supine / flex  3x5 BL AAROM     TR/HR    Firm  2x10  2 HHA Firm  3x10  2 HHA     Mini squats    Firm  2x10  2 HHA Firm  2x10  2 HHA     Step ups fwd/lateral    Fwd  4"  1x 10 BL  2 HHA Fwd  6"  1x 10 BL  2 HHA     ADL Suite for dynamic reach          Sit to stands        Lift and carry up to 5 lbs                    Modalities    Pt def

## 2019-01-23 ENCOUNTER — OFFICE VISIT (OUTPATIENT)
Dept: INTERNAL MEDICINE CLINIC | Facility: CLINIC | Age: 84
End: 2019-01-23
Payer: MEDICARE

## 2019-01-23 ENCOUNTER — APPOINTMENT (OUTPATIENT)
Dept: LAB | Facility: CLINIC | Age: 84
End: 2019-01-23
Payer: MEDICARE

## 2019-01-23 VITALS
DIASTOLIC BLOOD PRESSURE: 88 MMHG | SYSTOLIC BLOOD PRESSURE: 132 MMHG | RESPIRATION RATE: 18 BRPM | BODY MASS INDEX: 29.45 KG/M2 | TEMPERATURE: 98.6 F | OXYGEN SATURATION: 95 % | HEIGHT: 60 IN | WEIGHT: 150 LBS | HEART RATE: 83 BPM

## 2019-01-23 DIAGNOSIS — R55 NEAR SYNCOPE: ICD-10-CM

## 2019-01-23 DIAGNOSIS — D75.1 ERYTHROCYTOSIS: ICD-10-CM

## 2019-01-23 DIAGNOSIS — E87.1 HYPONATREMIA: ICD-10-CM

## 2019-01-23 DIAGNOSIS — R42 VERTIGO: ICD-10-CM

## 2019-01-23 DIAGNOSIS — E78.2 MIXED HYPERLIPIDEMIA: ICD-10-CM

## 2019-01-23 DIAGNOSIS — R26.9 GAIT ABNORMALITY: Primary | ICD-10-CM

## 2019-01-23 DIAGNOSIS — R06.02 SOB (SHORTNESS OF BREATH) ON EXERTION: ICD-10-CM

## 2019-01-23 LAB
ANION GAP SERPL CALCULATED.3IONS-SCNC: 7 MMOL/L (ref 4–13)
BUN SERPL-MCNC: 10 MG/DL (ref 5–25)
CALCIUM SERPL-MCNC: 9.1 MG/DL (ref 8.3–10.1)
CHLORIDE SERPL-SCNC: 99 MMOL/L (ref 100–108)
CO2 SERPL-SCNC: 26 MMOL/L (ref 21–32)
CREAT SERPL-MCNC: 0.94 MG/DL (ref 0.6–1.3)
GFR SERPL CREATININE-BSD FRML MDRD: 54 ML/MIN/1.73SQ M
GLUCOSE SERPL-MCNC: 96 MG/DL (ref 65–140)
POTASSIUM SERPL-SCNC: 4.3 MMOL/L (ref 3.5–5.3)
SODIUM SERPL-SCNC: 132 MMOL/L (ref 136–145)

## 2019-01-23 PROCEDURE — 99214 OFFICE O/P EST MOD 30 MIN: CPT | Performed by: INTERNAL MEDICINE

## 2019-01-23 PROCEDURE — 80048 BASIC METABOLIC PNL TOTAL CA: CPT

## 2019-01-23 PROCEDURE — 36415 COLL VENOUS BLD VENIPUNCTURE: CPT

## 2019-01-23 NOTE — PROGRESS NOTES
Assessment/Plan:    No problem-specific Assessment & Plan notes found for this encounter  Diagnoses and all orders for this visit:    Gait abnormality  -     Cane    Near syncope  -     Echo complete with contrast if indicated; Future    Vertigo    Hyponatremia  -     Basic metabolic panel; Future    Erythrocytosis    Mixed hyperlipidemia    SOB (shortness of breath) on exertion  -     Echo complete with contrast if indicated; Future        A/P: Doing a little better with walking, but could use a cane  Continue with PT  Discussed labs and pt to decrease her fluids a little bit  Will start a diet for her LDL  ?? hyponatremia contributing and will recheck and if low, start a w/u  ??cardiac issues and will check an echo  May need a stress test  If cardiac negative, ? MRI  Continue current treatment otherwise  RTC one month for f/u  Subjective:      Patient ID: Luis Manuel Knowles is a 80 y o  female  WF RTC for f/u near syncope, gait abnormality, and SOB  Attending PT and doing a little better  Still gets light headed at times and GOYAL  No associated CP, palpitations, edema, orthopnea, or PND  Lab ok except for slightly elevated h/h, elevated LDL, and low sodium  No new c/o's  The following portions of the patient's history were reviewed and updated as appropriate:   She  has a past medical history of Arthritis; Cataract; Depression; Dislocation of left shoulder joint; Gait disorder; Herpes zoster; HL (hearing loss); Impacted cerumen of both ears; Impacted cerumen of right ear; Lightheadedness; Multiple falls; Near syncope; and Postherpetic polyneuropathy  She   Patient Active Problem List    Diagnosis Date Noted    Depression 08/25/2015    Gait abnormality 08/11/2015    Hearing loss 07/23/2013    Osteoarthritis 07/09/2012     She  has a past surgical history that includes Hernia repair and Tubal ligation  Her family history includes Cirrhosis in her father    She  reports that she has never smoked  She has never used smokeless tobacco  She reports that she does not drink alcohol or use drugs  No current outpatient prescriptions on file  No current facility-administered medications for this visit  No current outpatient prescriptions on file prior to visit  No current facility-administered medications on file prior to visit  She has No Known Allergies       Review of Systems   Constitutional: Negative for activity change, chills, diaphoresis, fatigue and fever  HENT: Negative  Respiratory: Positive for shortness of breath  Negative for cough, chest tightness and wheezing  Cardiovascular: Negative for chest pain, palpitations and leg swelling  Gastrointestinal: Negative for abdominal pain, constipation, diarrhea, nausea and vomiting  Genitourinary: Negative for difficulty urinating, dysuria and frequency  Musculoskeletal: Positive for gait problem  Negative for arthralgias and myalgias  Neurological: Positive for dizziness, syncope and light-headedness  Negative for tremors, seizures, facial asymmetry, speech difficulty, weakness, numbness and headaches  Psychiatric/Behavioral: Negative for confusion and dysphoric mood  The patient is not nervous/anxious  Objective:      /88 (BP Location: Left arm, Patient Position: Sitting, Cuff Size: Adult)   Pulse 83   Temp 98 6 °F (37 °C) (Tympanic)   Resp 18   Ht 5' (1 524 m)   Wt 68 kg (150 lb)   SpO2 95%   BMI 29 29 kg/m²          Physical Exam   Constitutional: She is oriented to person, place, and time  She appears well-developed and well-nourished  No distress  HENT:   Head: Normocephalic and atraumatic  Mouth/Throat: Oropharynx is clear and moist    Eyes: Pupils are equal, round, and reactive to light  Conjunctivae and EOM are normal    Neck: Normal range of motion  Neck supple  No JVD present  Cardiovascular: Normal rate, regular rhythm and normal heart sounds      No murmur heard   Pulmonary/Chest: Effort normal and breath sounds normal  No respiratory distress  She has no wheezes  She has no rales  Abdominal: Soft  Bowel sounds are normal  She exhibits no distension  There is no tenderness  There is no rebound  Musculoskeletal: She exhibits no edema or tenderness  Neurological: She is alert and oriented to person, place, and time  Psychiatric: She has a normal mood and affect  Her behavior is normal  Judgment and thought content normal    Nursing note and vitals reviewed

## 2019-01-24 ENCOUNTER — OFFICE VISIT (OUTPATIENT)
Dept: PHYSICAL THERAPY | Facility: CLINIC | Age: 84
End: 2019-01-24
Payer: MEDICARE

## 2019-01-24 DIAGNOSIS — R53.81 PHYSICAL DECONDITIONING: Primary | ICD-10-CM

## 2019-01-24 DIAGNOSIS — R29.898 LEG WEAKNESS, BILATERAL: ICD-10-CM

## 2019-01-24 PROCEDURE — 97110 THERAPEUTIC EXERCISES: CPT

## 2019-01-24 PROCEDURE — 97112 NEUROMUSCULAR REEDUCATION: CPT

## 2019-01-24 NOTE — PROGRESS NOTES
Daily Note     Today's date: 2019  Patient name: Bhavani Reardon  : 1930  MRN: [id number]  Referring provider: Chiquita Muñiz DO  Dx:   Encounter Diagnosis     ICD-10-CM    1  Physical deconditioning R53 81    2  Leg weakness, bilateral R29 898                 MEDICARE  Precautions: Falls, Fear of Falling  Re-eval Date: 2/15/19    RTD - 19    Date     Visit Count 1 2 3 4    FOTO        Pain In See IE 0 0 0    Pain Out See IE 0 0 0        Subjective:  yesterday said I am doing better  Denies any recent falls      Objective: See treatment diary below      Assessment: Tolerated treatment fairly well  Request 2 brief seated rest with standing exercise with co's of brief episodes of dizziness lasting, <5 seconds  Pt indicated good tolerance with TE denied any increase pain/discomfort  Patient would benefit from continued PT to improve overall strength, balance and endurance to maximize QOL as live independent  home      Plan: Continue per plan of care         Manual   15 min 15 min resume    GENTLE LE stretching  BL HS, gastroc, add mm, piriformis      Date        [address] [facility] vs  Nustep Nustep  L1  10 mins L2 10 min L2 11 min L2 15 min    HS stretch    W/ MT W/ MT     Piriformis stretch  w/MT w/MT     Hip flexor stretch   upcoming      Hip abd/add   2x10  Red/ball 2x10  Green/ball 3x10  Green/ball  Sit/edge chair    SLRs    x 4  Supine / flex  3x5 BL AAROM Supine / flex  3x5 BL AAROM Stand/Foam  3x10  3way  2 HHA    TR/HR    Firm  2x10  2 HHA Firm  3x10  2 HHA Foam  3x10  2 HHA    Mini squats    Firm  2x10  2 HHA Firm  2x10  2 HHA Foam  2x10  2 HHA    Step ups fwd/lateral    Fwd  4"  1x 10 BL  2 HHA Fwd  6"  1x 10 BL  2 HHA Stairs 3x   1 HR   1 step at a time    ADL Suite for dynamic reach          Sit to stands        Lift and carry up to 5 lbs                    Modalities    Pt def Pt defer

## 2019-01-30 ENCOUNTER — HOSPITAL ENCOUNTER (OUTPATIENT)
Dept: NON INVASIVE DIAGNOSTICS | Facility: HOSPITAL | Age: 84
Discharge: HOME | End: 2019-01-30
Payer: MEDICARE

## 2019-01-30 DIAGNOSIS — R55 NEAR SYNCOPE: ICD-10-CM

## 2019-01-30 DIAGNOSIS — R06.02 SOB (SHORTNESS OF BREATH) ON EXERTION: ICD-10-CM

## 2019-01-30 PROCEDURE — 93306 TTE W/DOPPLER COMPLETE: CPT | Performed by: INTERNAL MEDICINE

## 2019-01-30 PROCEDURE — 93306 TTE W/DOPPLER COMPLETE: CPT

## 2019-01-31 ENCOUNTER — APPOINTMENT (OUTPATIENT)
Dept: PHYSICAL THERAPY | Facility: CLINIC | Age: 84
End: 2019-01-31
Payer: MEDICARE

## 2019-01-31 NOTE — PROGRESS NOTES
Daily Note     Today's date: 2019  Patient name: Salma Herrera  : 1930  MRN: [id number]  Referring provider: Sanjeev Hawk DO  Dx: No diagnosis found  MEDICARE  Precautions: Falls, Fear of Falling  Re-eval Date: 2/15/19    RTD - 19    Date     Visit Count 1 2 3 4    FOTO        Pain In See IE 0 0 0    Pain Out See IE 0 0 0      Subjective: ***      Objective: See treatment diary below      Assessment: Tolerated treatment {Tolerated treatment :3566233064}   Patient {assessment:1945701577}      Plan: {PLAN:3929604278}      Manual   15 min 15 min resume    GENTLE LE stretching  BL HS, gastroc, add mm, piriformis      Date        [address] [facility] vs  Nustep Nustep  L1  10 mins L2 10 min L2 11 min L2 15 min    HS stretch    W/ MT W/ MT     Piriformis stretch  w/MT w/MT     Hip flexor stretch   upcoming      Hip abd/add   2x10  Red/ball 2x10  Green/ball 3x10  Green/ball  Sit/edge chair    SLRs    x 4  Supine / flex  3x5 BL AAROM Supine / flex  3x5 BL AAROM Stand/Foam  3x10  3way  2 HHA    TR/HR    Firm  2x10  2 HHA Firm  3x10  2 HHA Foam  3x10  2 HHA    Mini squats    Firm  2x10  2 HHA Firm  2x10  2 HHA Foam  2x10  2 HHA    Step ups fwd/lateral    Fwd  4"  1x 10 BL  2 HHA Fwd  6"  1x 10 BL  2 HHA Stairs 3x   1 HR   1 step at a time    ADL Suite for dynamic reach          Sit to stands        Lift and carry up to 5 lbs                    Modalities    Pt def Pt defer

## 2019-02-05 ENCOUNTER — OFFICE VISIT (OUTPATIENT)
Dept: PHYSICAL THERAPY | Facility: CLINIC | Age: 84
End: 2019-02-05
Payer: MEDICARE

## 2019-02-05 DIAGNOSIS — R53.81 PHYSICAL DECONDITIONING: Primary | ICD-10-CM

## 2019-02-05 DIAGNOSIS — R29.898 LEG WEAKNESS, BILATERAL: ICD-10-CM

## 2019-02-05 PROCEDURE — 97112 NEUROMUSCULAR REEDUCATION: CPT | Performed by: PHYSICAL THERAPIST

## 2019-02-05 PROCEDURE — 97110 THERAPEUTIC EXERCISES: CPT | Performed by: PHYSICAL THERAPIST

## 2019-02-05 NOTE — PROGRESS NOTES
Daily Note     Today's date: 2019  Patient name: Julia Bloom  : 1930  MRN: [id number]  Referring provider: Billy Sullivan DO  Dx:   Encounter Diagnosis     ICD-10-CM    1  Physical deconditioning R53 81    2  Leg weakness, bilateral R29 898      MEDICARE  Precautions: Falls, Fear of Falling  Re-eval Date: 2/15/19    RTD - 19    Date    Visit Count 1 2 3 4 5   FOTO        Pain In See IE 0 0 0 0   Pain Out See IE 0 0 0 0     Subjective: I'm doing okay  I usually go home from here and sit  Objective: See treatment diary below      Assessment: Tolerated treatment fair  Patient would benefit from continued PT  Patient struggling cognitively this date  LOB with divided attention or distraction, required assist to recover from LOB with loss of attention/focus on task  Plan: Continue per plan of care       Manual   15 min 15 min resume    GENTLE LE stretching  BL HS, gastroc, add mm, piriformis      Date        SRC vs  Nustep Nustep  L1  10 mins L2 10 min L2 11 min L2 15 min L2 15 min   HS stretch    W/ MT W/ MT     Piriformis stretch  w/MT w/MT     Hip flexor stretch   upcoming      Hip abd/add   2x10  Red/ball 2x10  Green/ball 3x10  Green/ball  Sit/edge chair    SLRs    x 4  Supine / flex  3x5 BL AAROM Supine / flex  3x5 BL AAROM Stand/Foam  3x10  3way  2 HHA Stand/Firm  3x10  3way  2 HHA  *unable to tolerate foam   TR/HR    Firm  2x10  2 HHA Firm  3x10  2 HHA Foam  3x10  2 HHA Stand/Firm  3x10  3way  2 HHA  *unable to tolerate foam   Mini squats    Firm  2x10  2 HHA Firm  2x10  2 HHA Foam  2x10  2 HHA Stand/Firm  3x10  3way  2 HHA  *unable to tolerate foam   Step ups fwd/lateral    Fwd  4"  1x 10 BL  2 HHA Fwd  6"  1x 10 BL  2 HHA Stairs 3x   1 HR   1 step at a time Fwd  6"  1x 10 BL  2 HHA   ADL Suite for dynamic reach          Sit to stands      10 times with VCs for technique   Lift and carry up to 5 lbs        Rhom EO     1 mins x 2   Rhom EO with divided attention     5 mins, min A to recover       Modalities    Pt def Pt defer Defer

## 2019-02-07 ENCOUNTER — OFFICE VISIT (OUTPATIENT)
Dept: PHYSICAL THERAPY | Facility: CLINIC | Age: 84
End: 2019-02-07
Payer: MEDICARE

## 2019-02-07 DIAGNOSIS — R29.898 LEG WEAKNESS, BILATERAL: ICD-10-CM

## 2019-02-07 DIAGNOSIS — R53.81 PHYSICAL DECONDITIONING: Primary | ICD-10-CM

## 2019-02-07 PROCEDURE — 97110 THERAPEUTIC EXERCISES: CPT | Performed by: PHYSICAL THERAPIST

## 2019-02-07 PROCEDURE — 97112 NEUROMUSCULAR REEDUCATION: CPT | Performed by: PHYSICAL THERAPIST

## 2019-02-07 NOTE — PROGRESS NOTES
Daily Note     Today's date: 2019  Patient name: Salma Herrera  : 1930  MRN: [id number]  Referring provider: Sanjeev Hawk DO  Dx:   Encounter Diagnosis     ICD-10-CM    1  Physical deconditioning R53 81    2  Leg weakness, bilateral R29 898        MEDICARE  Precautions: Falls, Fear of Falling  Re-eval Date: 2/15/19    RTD - 19    Date        Visit Count 6       FOTO        Pain In 0       Pain Out 0         Subjective: Once in awhile I get pain in my back in the morning  I get frustrated ta home when I can't do the exercises like I do when I'm here  Objective: See treatment diary below      Assessment: Tolerated treatment well  Patient demonstrated fatigue post treatment and would benefit from continued PT  Noted to have improved attention and ability to fully participate this date  Plan: Continue per plan of care       Manual         GENTLE LE stretching  BL HS, gastroc, add mm, piriformis      Date        [facility] vs  Nustep L2 15 min       HS stretch          Piriformis stretch        Hip flexor stretch         Hip abd/add         SLRs    x 4 Stand/Firm  3x10  3way  2 HHA  *unable to tolerate foam       TR/HR   Stand/Firm  3x10  3way  2 HHA  *unable to tolerate foam       Mini squats   Stand/Firm  3x10  3way  2 HHA  *unable to tolerate foam       Step ups fwd/lateral   Fwd  6"  3x 10 BL  2 HHA       ADL Suite for dynamic reach          Sit to stands  10 times with VCs for technique       Lift and carry up to 5 lbs        Rhom EO 1 mins x 2       Rhom EO with divided attention 5 mins, min A to recover           Modalities

## 2019-02-12 ENCOUNTER — APPOINTMENT (OUTPATIENT)
Dept: PHYSICAL THERAPY | Facility: CLINIC | Age: 84
End: 2019-02-12
Payer: MEDICARE

## 2019-02-19 ENCOUNTER — OFFICE VISIT (OUTPATIENT)
Dept: PHYSICAL THERAPY | Facility: CLINIC | Age: 84
End: 2019-02-19
Payer: MEDICARE

## 2019-02-19 ENCOUNTER — TELEPHONE (OUTPATIENT)
Dept: INTERNAL MEDICINE CLINIC | Facility: CLINIC | Age: 84
End: 2019-02-19

## 2019-02-19 DIAGNOSIS — R53.81 PHYSICAL DECONDITIONING: Primary | ICD-10-CM

## 2019-02-19 DIAGNOSIS — R29.898 LEG WEAKNESS, BILATERAL: ICD-10-CM

## 2019-02-19 PROCEDURE — 97110 THERAPEUTIC EXERCISES: CPT | Performed by: PHYSICAL THERAPIST

## 2019-02-19 PROCEDURE — 97112 NEUROMUSCULAR REEDUCATION: CPT | Performed by: PHYSICAL THERAPIST

## 2019-02-19 NOTE — PROGRESS NOTES
Daily Note     Today's date: 2019  Patient name: Remi Lesches  : 1930  MRN: [id number]  Referring provider: Ruperto Soulier, DO  Dx:   Encounter Diagnosis     ICD-10-CM    1  Physical deconditioning R53 81    2  Leg weakness, bilateral R29 898        MEDICARE  Precautions: Falls, Fear of Falling  Re-eval Date: 2/15/19    RTD - 19    Date       Visit Count 6 7      FOTO        Pain In 0 0      Pain Out 0 0        Subjective: Patient reports she is doing well, but she rests a lot at home  Objective: See treatment diary below      Assessment: Tolerated treatment well  Patient demonstrated fatigue post treatment and would benefit from continued PT  Patient requires cues for safety and technique  Overall progressing steadily, but slowly  Balance and recovery from LOB remain limited with increased fall risk  Plan: Continue per plan of care       Manual         GENTLE LE stretching  BL HS, gastroc, add mm, piriformis      Date        SRC vs  Nustep L2 15 min L2 15 min      HS stretch          Piriformis stretch        Hip flexor stretch         Hip abd/add         SLRs    x 4 Stand/Firm  3x10  3way  2 HHA  *unable to tolerate foam Stand/Foam  3x10  3way  2 HHA      TR/HR   Stand/Firm  3x10  3way  2 HHA  *unable to tolerate foam Stand/Foam  3x10  3way  2 HHA      Mini squats   Stand/Firm  3x10  3way  2 HHA  *unable to tolerate foam Stand/Foam  3x10  3way  2 HHA      Step ups fwd/lateral   Fwd  6"  3x 10 BL  2 HHA Stand/Firm  3x10  3way  2 HHA      ADL Suite for dynamic reach          Sit to stands  10 times with VCs for technique  10 times with VCs for technique      Lift and carry up to 5 lbs        Rhom EO 1 mins x 2 1 mins x 2      Rhom EO with divided attention 5 mins, min A to recover 1 mins x 2, min A to recover LOB          Modalities

## 2019-02-19 NOTE — TELEPHONE ENCOUNTER
Do you want me to reach out to her now, or wait for her daughter to have the conversation with her first   I don't want to blond side her if she does not know about the converations

## 2019-02-19 NOTE — TELEPHONE ENCOUNTER
Daughter, Denton Walsh, lives in [place] and [address] neighbor called her stating she feels her mother should be in assistant living  Denton Walsh would like to know what you think about this and if they should look into getting a medical alert necklace for her? Ada has appt with you 2/22/19  After her visit with you, Denton Walsh would like us to reach out to her to let her know how her mother is doing       Call back number is [phone] or [phone]

## 2019-02-19 NOTE — TELEPHONE ENCOUNTER
If ok, may be talk with the daughter to get some idea of her concerns and then we can eval the pt on her appt  Not sure what her living situation is, ie house/apartment, local support, what kind of nursing home insurance she might have, etc    Thanks

## 2019-02-20 ENCOUNTER — PATIENT OUTREACH (OUTPATIENT)
Dept: INTERNAL MEDICINE CLINIC | Facility: CLINIC | Age: 84
End: 2019-02-20

## 2019-02-20 DIAGNOSIS — Z71.89 ENCOUNTER FOR COUNSELING FOR CARE MANAGEMENT OF PATIENT WITH CHRONIC CONDITIONS AND COMPLEX HEALTH NEEDS USING NURSE-BASED MODEL: Primary | ICD-10-CM

## 2019-02-20 NOTE — PROGRESS NOTES
Outpatient Care Management Note: Received return call from Riri Veronica  She spoke with her mother about assisted living, at this time Hanna Baeza is not interested  She is interested in a medical alert system  The neighbors concern is that the house has coal heat and someone assists with filling the coal hopper and mows the lawn for her and they may not always be willing/able to assist   Ada still drives and is active at the recreation  center and the pool  At this time Riri Veronica feels the only need is the medical alert system  If Hanna Baeza is agreeable I can make a referral to the [address] [place] [address] on Aging to obtain one  Outpatient Care Management Note:  Attempted to reach Ada's daughter, Riri Veronica for some additional information  Message left requesting a return call  Contact information left on message

## 2019-02-21 ENCOUNTER — APPOINTMENT (OUTPATIENT)
Dept: PHYSICAL THERAPY | Facility: CLINIC | Age: 84
End: 2019-02-21
Payer: MEDICARE

## 2019-02-22 ENCOUNTER — OFFICE VISIT (OUTPATIENT)
Dept: INTERNAL MEDICINE CLINIC | Facility: CLINIC | Age: 84
End: 2019-02-22
Payer: MEDICARE

## 2019-02-22 ENCOUNTER — OFFICE VISIT (OUTPATIENT)
Dept: PHYSICAL THERAPY | Facility: CLINIC | Age: 84
End: 2019-02-22
Payer: MEDICARE

## 2019-02-22 VITALS
SYSTOLIC BLOOD PRESSURE: 130 MMHG | BODY MASS INDEX: 29.25 KG/M2 | DIASTOLIC BLOOD PRESSURE: 80 MMHG | OXYGEN SATURATION: 92 % | HEART RATE: 74 BPM | HEIGHT: 60 IN | TEMPERATURE: 98.9 F | RESPIRATION RATE: 18 BRPM | WEIGHT: 149 LBS

## 2019-02-22 DIAGNOSIS — R55 NEAR SYNCOPE: Primary | ICD-10-CM

## 2019-02-22 DIAGNOSIS — R42 VERTIGO: ICD-10-CM

## 2019-02-22 DIAGNOSIS — E87.1 HYPONATREMIA: ICD-10-CM

## 2019-02-22 DIAGNOSIS — R26.9 GAIT ABNORMALITY: ICD-10-CM

## 2019-02-22 DIAGNOSIS — R26.9 GAIT ABNORMALITY: Primary | ICD-10-CM

## 2019-02-22 PROCEDURE — 97110 THERAPEUTIC EXERCISES: CPT

## 2019-02-22 PROCEDURE — 99214 OFFICE O/P EST MOD 30 MIN: CPT | Performed by: INTERNAL MEDICINE

## 2019-02-22 PROCEDURE — 97112 NEUROMUSCULAR REEDUCATION: CPT

## 2019-02-22 NOTE — PROGRESS NOTES
Daily Note     Today's date: 2019  Patient name: Jessenia Reyes  : 1930  MRN: [id number]  Referring provider: Ayaan Francis DO  Dx:   Encounter Diagnosis     ICD-10-CM    1  Gait abnormality R26 9                 MEDICARE  Precautions: Falls, Fear of Falling  Re-eval Date: 2/15/19    RTD - 19    Date      Visit Count 6 7 8     FOTO        Pain In 0 0 0     Pain Out 0 0 0       Subjective: "I just saw the doctor and he told me I am drinking to much  Before he said I have to drink more  I don't know what he is talking about "      Objective: See treatment diary below      Assessment: Tolerated treatment fairly well with pt demonstrating quick endurance fatigue  Requested 3 seated rest with standing exercises  Pt provided 50% cues verbal/tactile for proper form / technique  Pt performed diaphragmatic breathing t/o seated rest   Patient demonstrated fatigue post treatment      Plan: Continue per plan of care       Manual         GENTLE LE stretching  BL HS, gastroc, add mm, piriformis      Date        SRC vs  Nustep L2 15 min L2 15 min L2 15 min     HS stretch          Piriformis stretch        Hip flexor stretch         Hip abd/add         SLRs    x 4 Stand/Firm  3x10  3way  2 HHA  *unable to tolerate foam Stand/Foam  3x10  3way  2 HHA Stand/Foam  3x10  3way  2 HHA     TR/HR   Stand/Firm  3x10  3way  2 HHA  *unable to tolerate foam Stand/Foam  3x10  3way  2 HHA Stand/Foam  3x10  3way  2 HHA     Mini squats   Stand/Firm  3x10  3way  2 HHA  *unable to tolerate foam Stand/Foam  3x10  3way  2 HHA Stand/Foam  2x10  3way  2 HHA     Step ups fwd/lateral   Fwd  6"  3x 10 BL  2 HHA Stand/Firm  3x10  3way  2 HHA resume     ADL Suite for dynamic reach          Sit to stands  10 times with VCs for technique  10 times with VCs for technique 10 times with VCs for technique     Lift and carry up to 5 lbs        Rhom EO 1 mins x 2 1 mins x 2              Rhom EO with divided attention 5 mins, min A to recover 1 mins x 2, min A to recover LOB      LAQ   10x BL     Hip flex   10x BL  10x alt     Ant/Post Rolls   10x ea     Seated Rows   10x         Modalities

## 2019-02-22 NOTE — PROGRESS NOTES
Assessment/Plan:    No problem-specific Assessment & Plan notes found for this encounter  Diagnoses and all orders for this visit:    Near syncope    Gait abnormality    Vertigo    Hyponatremia      A/P: Better, but not a goal  Agreeable to the medical alert  Needs more PT and instructions  Appears more deconditioning than anything  Still not ready to consider downsizing  Will see if PT has any input  Appreciate  input and will see if she can help the daughter with the medical alert system  Continue current treatment and RTC two months for f/u and routine  Subjective:      Patient ID: Coy Streeter is a 80 y o  female  WF RTC for f/u gait abnormality, deconditioning, near syncope, hyponatremia, etc  Attending PT  Daughter called concerned about how much longer pt can live alone  Had the  reach out to the daughter and discuss her concerns  Pt not interested in different living arrangements, but did admit to be open about a medical alert system at home if that will keep her safe and able to live at home  F/u labs showed hyponatremia to be improving  No new issues  No further near syncope and GOYAL is less  Gait a little better  Went to a dance last week and did ok  The following portions of the patient's history were reviewed and updated as appropriate: WF rT  She  has a past medical history of Arthritis, Cataract, Depression, Dislocation of left shoulder joint, Gait disorder, Herpes zoster, HL (hearing loss), Impacted cerumen of both ears, Impacted cerumen of right ear, Lightheadedness, Multiple falls, Near syncope, and Postherpetic polyneuropathy  She   Patient Active Problem List    Diagnosis Date Noted    Depression 08/25/2015    Gait abnormality 08/11/2015    Hearing loss 07/23/2013    Osteoarthritis 07/09/2012     She  has a past surgical history that includes Hernia repair and Tubal ligation  Her family history includes Cirrhosis in her father    She  reports that she has never smoked  She has never used smokeless tobacco  She reports that she does not drink alcohol or use drugs  No current outpatient medications on file  No current facility-administered medications for this visit  No current outpatient medications on file prior to visit  No current facility-administered medications on file prior to visit  She has No Known Allergies       Review of Systems   Constitutional: Negative for activity change, chills, diaphoresis, fatigue and fever  Respiratory: Positive for shortness of breath  Negative for cough, chest tightness and wheezing  Cardiovascular: Negative for chest pain, palpitations and leg swelling  Gastrointestinal: Negative for abdominal pain, constipation, diarrhea, nausea and vomiting  Genitourinary: Negative for difficulty urinating, dysuria and frequency  Musculoskeletal: Positive for gait problem  Negative for arthralgias and myalgias  Neurological: Positive for weakness  Negative for light-headedness and headaches  Psychiatric/Behavioral: Negative for confusion and dysphoric mood  The patient is not nervous/anxious  Objective:      /80 (BP Location: Left arm, Patient Position: Sitting, Cuff Size: Adult)   Pulse 74   Temp 98 9 °F (37 2 °C) (Tympanic)   Resp 18   Ht 5' (1 524 m)   Wt 67 6 kg (149 lb)   SpO2 92%   BMI 29 10 kg/m²          Physical Exam   Constitutional: She is oriented to person, place, and time  She appears well-developed and well-nourished  HENT:   Head: Normocephalic and atraumatic  Mouth/Throat: Oropharynx is clear and moist    Eyes: Pupils are equal, round, and reactive to light  Conjunctivae and EOM are normal    Cardiovascular: Normal rate, regular rhythm and normal heart sounds  Pulmonary/Chest: Effort normal and breath sounds normal  No respiratory distress  She has no wheezes  She has no rales     Musculoskeletal:   Gait is a little more stable, but turns quickly and abruptly  Neurological: She is alert and oriented to person, place, and time  Psychiatric: She has a normal mood and affect  Her behavior is normal  Judgment and thought content normal    Nursing note and vitals reviewed

## 2019-02-26 ENCOUNTER — OFFICE VISIT (OUTPATIENT)
Dept: PHYSICAL THERAPY | Facility: CLINIC | Age: 84
End: 2019-02-26
Payer: MEDICARE

## 2019-02-26 DIAGNOSIS — R26.9 GAIT ABNORMALITY: Primary | ICD-10-CM

## 2019-02-26 DIAGNOSIS — R29.898 LEG WEAKNESS, BILATERAL: ICD-10-CM

## 2019-02-26 DIAGNOSIS — R53.81 PHYSICAL DECONDITIONING: ICD-10-CM

## 2019-02-26 PROCEDURE — 97112 NEUROMUSCULAR REEDUCATION: CPT | Performed by: PHYSICAL THERAPIST

## 2019-02-26 PROCEDURE — 97110 THERAPEUTIC EXERCISES: CPT | Performed by: PHYSICAL THERAPIST

## 2019-02-26 PROCEDURE — 97164 PT RE-EVAL EST PLAN CARE: CPT | Performed by: PHYSICAL THERAPIST

## 2019-02-27 ENCOUNTER — PATIENT OUTREACH (OUTPATIENT)
Dept: INTERNAL MEDICINE CLINIC | Facility: CLINIC | Age: 84
End: 2019-02-27

## 2019-02-27 NOTE — PROGRESS NOTES
PT Re-Evaluation     Today's date: 2019  Patient name: Yunior Rios  : 1930  MRN: [id number]  Referring provider: Yaneth Molina DO  Dx:   Encounter Diagnosis     ICD-10-CM    1  Gait abnormality R26 9    2  Physical deconditioning R53 81    3  Leg weakness, bilateral R29 898                   Assessment  Assessment details: Patient is a 80y o  year old female presenting to OPPT s/p diagnosis of debility and deconditioning, LE weakness  Patient has been increasing her activity and is going to the [employer] daily for lunch  She does note that at home she "sits a lot"  She has been provided with both seated and standing exercises to perform at home  Cognitively her status waxes and wanes  During times that she is more alert, her balance and endurance are noted to be improved  Patient does note incontinence that causes her to "rush" to the bathroom  Will discuss with pelvic floor therapist some strategies for patient to trial to decrease fall risk related to "rushing"  Patient has been noted with increased LE strength, endurance is slowly improving, and interventions are focused on continued balance deficits  Thank you for this referral and the ability to participate in the care of this patient  Impairments: abnormal coordination, abnormal gait, abnormal or restricted ROM, activity intolerance, impaired balance, impaired physical strength, lacks appropriate home exercise program and safety issue  Understanding of Dx/Px/POC: good   Prognosis: good    Goals  In 4 weeks, patient will:  1  Demonstrate ability to perform 30 minutes of activity without requiring seated rest break  - partially met  2  Demonstrate ability to maintain upright balance unsupported by UEs while reaching above shoulder height without resistance to promote stability with ADLs - met  3     Demonstrate ability to lift up to 5 lbs from  knees to waist unsupported by UEs to promote stability with ADLs - partially met  4  Demonstrate increased strength of bilateral LEs to allow for improved transfer quality and stability - ongoing    In 4-10 weeks, patient will: - ONGOING  1  Demonstrate reduced fall risk based on outcome measures  2  Demonstrate consistent carryover with HEP  3  Return to community ambulation with least restrictive AD for at least 400 feet      Plan  Patient would benefit from: skilled physical therapy  Other planned modality interventions: Modalities prn for symptom management  Planned therapy interventions: manual therapy, neuromuscular re-education, therapeutic exercise, home exercise program and gait training  Frequency: 2x week  Duration in weeks: 4  Treatment plan discussed with: patient        Subjective Evaluation    History of Present Illness  Mechanism of injury: UPDATE:  Patient reports she is doing better  Not getting as tired when she walks  Still feels "lazy" and isn't doing HEP as regularly as she should  Patient reports she is walking with her legs wider apart because incontinence pad is bothersome  Discussed other options for protection  Also discussed replacing her [facility] for improved stability  Declines use of her 4WRW because it is "too hard to fold it and lift it"  Patient reports a few weeks of dizziness and weakness  Denies fall, depends on single point cane when outside her home  She has been limiting her mobility recently due to cold, possible icy weather  She has not been negotiating stairs  Patient does note waking in morning with back pain that decreases as the day goes on  She has intermittent knee pain as well  Her usual routine is to go to the [employer] center for lunch daily M-F  She notes that she "used to go to water aerobics and do the exercises at the center", but she has stopped doing this for an unstated reason  She notes that she is not as mobile or steady on her feet  Additionally notes she does not cook for herself anymore    She will do light meals for breakfast and dinner  She does light housekeeping  Functional issues stated include: fear of falling, difficulty with stairs, sensation of unsteadiness with gait  Intermittent dizziness, decreased participation in community events and more homebound than 6 months ago  Recurrent probem    Quality of life: good    Pain  No pain reported    Social Support  Lives in: multiple-level home  Lives with: alone    Hand dominance: right    Treatments  Previous treatment: physical therapy  Patient Goals  Patient goals for therapy: increased strength, independence with ADLs/IADLs, return to sport/leisure activities, improved balance and increased motion          Objective     Neurological Testing     Sensation     Hip   Left Hip   Diminished: light touch    Right Hip   Diminished: light touch    Active Range of Motion     Additional Active Range of Motion Details  Hip flexion to 90 degrees bilaterally  Knee flexion to 90 degrees bilaterally  Heel cords to neutral bilaterally  HS limited by 30% bilaterally    Strength/Myotome Testing     Left Hip   Planes of Motion   Flexion: 4-  Extension: 3  Abduction: 4-  Adduction: 4-    Right Hip   Planes of Motion   Flexion: 4-  Extension: 3  Abduction: 4-  Adduction: 4-    Additional Strength Details  Knee extension: B/L: 4+/5  Knee flexion: B/L: 4/5  DF and PF 4/5 bilaterally    Ambulation     Comments   At eval:  Ambulates with decreased ankle and knee ROM, decreased flexibility and increased co-contraction  She demonstrates decreased foot clearance with increased ERLINDA  At re-eval:  Ambulates with SPC, continues with increased ERLINDA, however, this is not related to musculoskeletal deficits at this time  Patient reports this is related to discomfort with wearing an incontinence pad  Alternatives discussed      Functional Assessment        Comments  Tinetti Balance and Gait Assessment  Initial Score: 12/28    Sitting Balance   1= Steady, safe    Rises From Chair  2= Able to rise without using arms to help    Attempts to Rise  2= Able to rise, requires one attempt    Standing Balance (First 5 seconds)  1= Steady, but uses walker or other support    Nudged  1= Staggers, grabs, catches self    Eyes Closed  0= Unsteady     Turning 360 degrees  0= Discontinuous steps  1= Steady    Sitting Down (Getting Seated)  1= Uses arms or not a smooth transition    Initiation of Gait  1= No hesitancy    Step Length and Height  1= Step through right    Foot Clearance  1= Left foot clears the floor  1= Right foot clears the floor    Step Symmetry  1= Right and left step length appear equal    Step Continuity  1= Steps appear continuous    Path  1= Mild/moderate deviation OR uses a walking aid    Trunk  0= Marked sway or uses a walking aid    Walking Time  0= Heels apart    Total Score: 16/28      EDICARE  Precautions: Falls, Fear of Falling  Re-eval Date: 3/25/19    RTD - 2/22/19    Date 2/7 2/19 2/22 2/26    Visit Count 6 7 8 9    FOTO        Pain In 0 0 0 See RE    Pain Out 0 0 0 See RE        Manual         GENTLE LE stretching  BL HS, gastroc, add mm, piriformis      Date        SRC vs  Nustep L2 15 min L2 15 min L2 15 min L2 15 min    HS stretch          Piriformis stretch        Hip flexor stretch         Hip abd/add         SLRs    x 4 Stand/Firm  3x10  3way  2 HHA  *unable to tolerate foam Stand/Foam  3x10  3way  2 HHA Stand/Foam  3x10  3way  2 HHA     TR/HR   Stand/Firm  3x10  3way  2 HHA  *unable to tolerate foam Stand/Foam  3x10  3way  2 HHA Stand/Foam  3x10  3way  2 HHA     Mini squats   Stand/Firm  3x10  3way  2 HHA  *unable to tolerate foam Stand/Foam  3x10  3way  2 HHA Stand/Foam  2x10  3way  2 HHA     Step ups fwd/lateral   Fwd  6"  3x 10 BL  2 HHA Stand/Firm  3x10  3way  2 HHA resume     ADL Suite for dynamic reach      10 mins    Sit to stands  10 times with VCs for technique  10 times with VCs for technique 10 times with VCs for technique 10 times with VCs for technique    Lift and carry up to 5 lbs    Attempt next visit    Rhom EO 1 mins x 2 1 mins x 2              Rhom EO with divided attention 5 mins, min A to recover 1 mins x 2, min A to recover LOB      LAQ   10x BL     Hip flex   10x BL  10x alt     Ant/Post Rolls   10x ea     Seated Rows   10x     Obstacle Course/dynamic balance    30 mins      Dynamic Balance:  Yellow foam brayan pads, step up/over 2" objects  Walk with WBOS, walk semi-tandem, fwd reach of 5-7", throwing while standing on foam, balloon taps    Modalities

## 2019-02-27 NOTE — PROGRESS NOTES
Outpatient Care Management Note:  Received return call from [address]  She is agreeable to a isit from the [address] [place] [address] on Aging but does not feel that she needs care management services as she is on few medications  Left message for her daughter Xuan Goldberg that Lalo Greco was agreeable to the [facility] on Aging visit  and independent  Outpatient Care Management Note:  Reached out to the [facility] on [facility] to begin referral process for a medical alert system that was requested by [address], her daughter and Dr Saranya Pedro  Reached out to [facility] to notify her that the referral had been made and to complete an initial assessment and determine if she was agreeable to case management  Message left on machine to please return call  Contact information left on machine

## 2019-02-28 ENCOUNTER — OFFICE VISIT (OUTPATIENT)
Dept: PHYSICAL THERAPY | Facility: CLINIC | Age: 84
End: 2019-02-28
Payer: MEDICARE

## 2019-02-28 DIAGNOSIS — R26.9 GAIT ABNORMALITY: ICD-10-CM

## 2019-02-28 DIAGNOSIS — R29.898 LEG WEAKNESS, BILATERAL: ICD-10-CM

## 2019-02-28 DIAGNOSIS — R53.81 PHYSICAL DECONDITIONING: Primary | ICD-10-CM

## 2019-02-28 PROCEDURE — 97110 THERAPEUTIC EXERCISES: CPT | Performed by: PHYSICAL THERAPIST

## 2019-02-28 PROCEDURE — 97112 NEUROMUSCULAR REEDUCATION: CPT | Performed by: PHYSICAL THERAPIST

## 2019-02-28 NOTE — PROGRESS NOTES
Daily Note     Today's date: 2019  Patient name: Valerie Hawk  : 1930  MRN: [id number]  Referring provider: Regis Turcios DO  Dx:   Encounter Diagnosis     ICD-10-CM    1  Physical deconditioning R53 81    2  Leg weakness, bilateral R29 898    3  Gait abnormality R26 9      MEDICARE  Precautions: Falls, Fear of Falling  Re-eval Date: 3/25/19    RTD - 19    Date    Visit Count 6 7 8 9 10   FOTO        Pain In 0 0 0 See RE 0   Pain Out 0 0 0 See RE 0     Subjective: I'm doing okay  I got this new cane and I think I'm doing pretty well  Objective: See treatment diary below      Assessment: Tolerated treatment well  Patient demonstrated fatigue post treatment and would benefit from continued PT  Patient progressing steadily with gait and new SPC  SPC adjusted for appropriate fit  Patient does still require cues for decreasing ERLINDA  Very visually dependent, decreased head/trunk movement with ambulation  Plan: Continue per plan of care       Manual         GENTLE LE stretching  BL HS, gastroc, add mm, piriformis      Date        [address] [facility] vs  Nustep L2 15 min L2 15 min L2 15 min L2 15 min L2 15 min   HS stretch          Piriformis stretch        Hip flexor stretch         Hip abd/add         SLRs    x 4 Stand/Firm  3x10  3way  2 HHA  *unable to tolerate foam Stand/Foam  3x10  3way  2 HHA Stand/Foam  3x10  3way  2 HHA  Stand/Foam  3x10  3way  2 HHA   TR/HR   Stand/Firm  3x10  3way  2 HHA  *unable to tolerate foam Stand/Foam  3x10  3way  2 HHA Stand/Foam  3x10  3way  2 HHA  Stand/Foam  3x10  3way  2 HHA   Mini squats   Stand/Firm  3x10  3way  2 HHA  *unable to tolerate foam Stand/Foam  3x10  3way  2 HHA Stand/Foam  2x10  3way  2 HHA  Stand/Foam  3x10  3way  2 HHA   Step ups fwd/lateral   Fwd  6"  3x 10 BL  2 HHA Stand/Firm  3x10  3way  2 HHA resume     ADL Suite for dynamic reach      10 mins    Sit to stands  10 times with VCs for technique  10 times with VCs for technique 10 times with VCs for technique 10 times with VCs for technique 10 times with VCs for technique   Lift and carry up to 5 lbs    Attempt next visit 2#  With obstacle course   Rhom EO 1 mins x 2 1 mins x 2              Rhom EO with divided attention 5 mins, min A to recover 1 mins x 2, min A to recover LOB      LAQ   10x BL     Hip flex   10x BL  10x alt     Ant/Post Rolls   10x ea     Seated Rows   10x     Obstacle Course/dynamic balance    30 mins 15 mins     Dynamic Balance:  Yellow foam brayan pads, step up/over 2" objects  Walk with WBOS, walk semi-tandem, fwd reach of 5-7", throwing while standing on foam, balloon taps    Modalities

## 2019-03-05 ENCOUNTER — APPOINTMENT (OUTPATIENT)
Dept: PHYSICAL THERAPY | Facility: CLINIC | Age: 84
End: 2019-03-05
Payer: MEDICARE

## 2019-03-07 ENCOUNTER — OFFICE VISIT (OUTPATIENT)
Dept: PHYSICAL THERAPY | Facility: CLINIC | Age: 84
End: 2019-03-07
Payer: MEDICARE

## 2019-03-07 DIAGNOSIS — R53.81 PHYSICAL DECONDITIONING: Primary | ICD-10-CM

## 2019-03-07 DIAGNOSIS — R29.898 LEG WEAKNESS, BILATERAL: ICD-10-CM

## 2019-03-07 PROCEDURE — 97110 THERAPEUTIC EXERCISES: CPT

## 2019-03-07 PROCEDURE — 97112 NEUROMUSCULAR REEDUCATION: CPT

## 2019-03-07 NOTE — PROGRESS NOTES
Daily Note     Today's date: 3/7/2019  Patient name: Morgan Ferrera  : 1930  MRN: [id number]  Referring provider: Annel Bess DO  Dx:   Encounter Diagnosis     ICD-10-CM    1  Physical deconditioning R53 81    2  Leg weakness, bilateral R29 898                 MEDICARE  Precautions: Falls, Fear of Falling  Re-eval Date: 3/25/19    RTD - 19    Date 3/7       Visit Count 11       FOTO Completed       Pain In 0       Pain Out 0         Subjective: I had trouble getting out of my driveway with the snow  My friend had trouble picking me up to come to PT      Objective: See treatment diary below      Assessment: Tolerated treatment fairly well without pt co's of increase pain/sx's  Pt reported slight dizziness with pt holding breath with exercises  LOB 2x with Osmany for balance recovery  Pt ambulates with wide ERLINDA and decrease stride length and slow dhruv with SPC  Patient demonstrated fatigue post treatment      Plan: Continue per plan of care  Manual         GENTLE LE stretching  BL HS, gastroc, add mm, piriformis      Date        [address] [facility] vs  Nustep L2 15 min       HS stretch          Piriformis stretch        Hip flexor stretch         Hip abd/add         SLRs    x 4 Stand/Firm  3x10  3way  2 HHA  *unable to tolerate foam       TR/HR   Stand/Firm  3x10  3way  2 HHA  *unable to tolerate foam       Mini squats   Stand/Firm  3x10  3way  2 HHA  *unable to tolerate foam       Step ups fwd/lateral   Fwd  6"  3x 10 BL  2 HHA       ADL Suite for dynamic reach          Sit to stands  10 times with VCs for technique       Lift and carry up to 5 lbs 5#  With obstacle course       Rhom EO 1 mins x 2               Rhom EO with divided attention 5 mins, min A to recover       LAQ        Hip flex        Ant/Post Rolls        Seated Rows        Obstacle Course/dynamic balance          Dynamic Balance:  Yellow foam brayan pads, step up/over 2" objects    Walk with WBOS, walk semi-tandem, fwd reach of 5-7", throwing while standing on foam, balloon taps    Modalities

## 2019-03-12 ENCOUNTER — OFFICE VISIT (OUTPATIENT)
Dept: PHYSICAL THERAPY | Facility: CLINIC | Age: 84
End: 2019-03-12
Payer: MEDICARE

## 2019-03-12 DIAGNOSIS — R53.81 PHYSICAL DECONDITIONING: Primary | ICD-10-CM

## 2019-03-12 PROCEDURE — 97140 MANUAL THERAPY 1/> REGIONS: CPT

## 2019-03-12 PROCEDURE — 97110 THERAPEUTIC EXERCISES: CPT

## 2019-03-12 PROCEDURE — 97112 NEUROMUSCULAR REEDUCATION: CPT

## 2019-03-12 NOTE — PROGRESS NOTES
Daily Note     Today's date: 3/12/2019  Patient name: Geo Mendez  : 1930  MRN: [id number]  Referring provider: Dayton Barillas DO  Dx:   Encounter Diagnosis     ICD-10-CM    1  Physical deconditioning R53 81      Re-eval Date: 3/25/19    RTD - 19    Date 3/7 3 12 19      Visit Count 11 12      FOTO Completed       Pain In 0 0/10   B LE's "Just Tired"      Pain Out 0 0/10                   Subjective:  Pt  states her legs feel tired today  Objective: See treatment diary below      Assessment: Tolerated treatment FAIR+ overall    Patient felt B LE stretching was beneficial today, since she does feel tight in her Legs  Pt  received cueing well for trial on Obstacle course, and also for proper ERLINDA and feet placement during ambulation      Plan: Con't services 2x/week as per POC/Goals      Manual   3 12 19      Hams  *Gentle  B 5x10"      Gastrocs  *Gentle  B 5x10"      Adductors  *Gentle  B 5x10"      Piriformis  *Gentle  B 5x10"      GENTLE LE stretching  BL HS, gastroc, add mm, piriformis      Date  3 12 19      [facility] vs  Nustep L2 15 min L2 15 min      HS stretch    (See Above)      Piriformis stretch  (See Above)      Hip flexor stretch   *B SKTC  5x 10"      Hip abd/add         SLRs    x 4 Stand/Firm  3x10  3way  2 HHA  *unable to tolerate foam Stand/Firm  3x10  3way  2 HHA      TR/HR   Stand/Firm  3x10  3way  2 HHA  *unable to tolerate foam Stand/Firm  3x10  3way  2 HHA      Mini squats   Stand/Firm  3x10  3way  2 HHA  *unable to tolerate foam Stand/Firm  3x10  3way  2 HHA      Step ups fwd/lateral   Fwd  6"  3x 10 BL  2 HHA Fwd  6"  3x 10 BL  2 HHA      ADL Suite for dynamic reach          Sit to stands  10 times with VCs for technique 10 times with VCs for technique      Lift and carry up to 5 lbs 5#  With obstacle course       Rhom EO 1 mins x 2 1 mins x 2      Ambul Train for safe, proper ERLINDA and foot placement during Gait pattern  * t/o clinic during treatment session, and also out in corridor upon conclusion of treatment session  Emilee EO with divided attention 5 mins, min A to recover       LAQ  *B 30x  seated        Hip flex  *B 30x  seated        Ant/Post Rolls        Seated Rows        Obstacle Course/dynamic balance  *2 Trials  Yellow foam brayan pads to Red Floor Mat even/uneven surface,  to  step up onto wooden block        Dynamic Balance:  Yellow foam brayan pads, step up/over 2" objects  Walk with WBOS, walk semi-tandem, fwd reach of 5-7", throwing while standing on foam, balloon taps    Modalities   3 12 19

## 2019-03-14 ENCOUNTER — OFFICE VISIT (OUTPATIENT)
Dept: PHYSICAL THERAPY | Facility: CLINIC | Age: 84
End: 2019-03-14
Payer: MEDICARE

## 2019-03-14 DIAGNOSIS — R53.81 PHYSICAL DECONDITIONING: Primary | ICD-10-CM

## 2019-03-14 PROCEDURE — 97112 NEUROMUSCULAR REEDUCATION: CPT

## 2019-03-14 PROCEDURE — 97110 THERAPEUTIC EXERCISES: CPT

## 2019-03-14 PROCEDURE — 97140 MANUAL THERAPY 1/> REGIONS: CPT

## 2019-03-14 NOTE — PROGRESS NOTES
Daily Note     Today's date: 3/14/2019  Patient name: Carmita Villagomez  : 1930  MRN: [id number]  Referring provider: Manuela Johnson DO  Dx:   Encounter Diagnosis     ICD-10-CM    1  Physical deconditioning R53 81          Re-eval Date: 3/25/19    RTD - 19    Date 3/7 3 12 19 3 14 19     Visit Count 11 12 13     FOTO Completed       Pain In 0 0/10   B LE's "Just Tired" 0/10   B LE's "Tired"     Pain Out 0 0/10 0/10               Subjective:  Pt  states her Legs are "tired again today "      Objective: See treatment diary below      Assessment: Tolerated treatment FAIR+ overall    Patient noted with improved transfers (sit-stand-sit), as well as better ERLINDA during ambulation         Plan: Con't services 2x/week as per POC/Goals        Manual   3  3 14 19     Hams  *Gentle  B 5x10" Seated  Gentle  B 5x10"     Gastrocs  *Gentle  B 5x10" Seated  Gentle  B 5x10"     Adductors  *Gentle  B 5x10" Seated  Gentle  B 5x10"     Piriformis  *Gentle  B 5x10" Seated  Gentle  B 5x10"     GENTLE LE stretching  BL HS, gastroc, add mm, piriformis      Date  3 12 19 3 14 19     [facility] vs  [place] L2 15 min L2 15 min L2 15 min     HS stretch    (See Above) (See Above)     Piriformis stretch  (See Above) (See Above)     Hip flexor stretch   *B SKTC  5x 10" B SKTC  5x 10"     Hip abd/add         SLRs    x 4 Stand/Firm  3x10  3way  2 HHA  *unable to tolerate foam Stand/Firm  3x10  3way  2 HHA Stand/Firm  3x10  3way  2 HHA     TR/HR   Stand/Firm  3x10  3way  2 HHA  *unable to tolerate foam Stand/Firm  3x10  3way  2 HHA Stand/Firm  3x10  3way  2 HHA     Mini squats   Stand/Firm  3x10  3way  2 HHA  *unable to tolerate foam Stand/Firm  3x10  3way  2 HHA Stand/Firm  3x10  3way  2 HHA     Step ups fwd/lateral   Fwd  6"  3x 10 BL  2 HHA Fwd  6"  3x 10 BL  2 HHA Fwd  6"  3x 10 BL  2 HHA     ADL Suite for dynamic reach          Sit to stands  10 times with VCs for technique 10 times with VCs for technique 10 reps  Less VC's     Lift and carry up to 5 lbs 5#  With obstacle course       Rhom EO 1 mins x 2 1 mins x 2 1 mins x 2     Ambul Train for safe, proper ERLINDA and foot placement during Gait pattern  * t/o clinic during treatment session, and also out in corridor upon conclusion of treatment session  t/o clinic during treatment session, and also out in corridor upon conclusion of treatment session       Rhom EO with divided attention 5 mins, min A to recover       LAQ  *B 30x  seated   B 30x  seated     Hip flex  *B 30x  seated   B 30x  seated     Ant/Post Rolls        Seated Rows        Obstacle Course/dynamic balance  *2 Trials  Yellow foam brayan pads to Red Floor Mat even/uneven surface,  to  step up onto wooden block Resume       Modalities   3 12 19 3 14 19

## 2019-03-19 ENCOUNTER — OFFICE VISIT (OUTPATIENT)
Dept: PHYSICAL THERAPY | Facility: CLINIC | Age: 84
End: 2019-03-19
Payer: MEDICARE

## 2019-03-19 DIAGNOSIS — R53.81 PHYSICAL DECONDITIONING: Primary | ICD-10-CM

## 2019-03-19 PROCEDURE — 97112 NEUROMUSCULAR REEDUCATION: CPT

## 2019-03-19 PROCEDURE — 97110 THERAPEUTIC EXERCISES: CPT

## 2019-03-19 NOTE — PROGRESS NOTES
Daily Note     Today's date: 3/19/2019  Patient name: Cecile Richards  : 1930  MRN: [id number]  Referring provider: Bruno Castillo DO  Dx:   Encounter Diagnosis     ICD-10-CM    1  Physical deconditioning R53 81      Re-eval Date: 3/25/19    RTD - 19    Date 3/7 3 12 19 3 14 19 3 19 19    Visit Count 11 12 13 14    FOTO Completed       Pain In 0 0/10   B LE's "Just Tired" 0/10   B LE's "Tired" 0/10    Pain Out 0 0/10 0/10 0/10    Next MD Visit                      Subjective:  Pt  States her main issue is feeling Dizzy, which she says is "Nothing New", however episodes have become more frequent  Objective: See treatment diary below      Assessment: Tolerated treatment Fair+ with exercises and ambulation: Fair to Fair minus with neuro-re-ed activities  Attempted fast/slow/with turn activity, however, pt did not tolerate well, as this made her even more dizzy and unsteady  Plan: Con't services as per POC      Manual   3 12 19 3 14 19 3 19 19    Hams  *Gentle  B 5x10" Seated  Gentle  B 5x10" Resume NV    Gastrocs  *Gentle  B 5x10" Seated  Gentle  B 5x10" Resume NV    Adductors  *Gentle  B 5x10" Seated  Gentle  B 5x10" Resume NV    Piriformis  *Gentle  B 5x10" Seated  Gentle  B 5x10" Resume NV    GENTLE LE stretching  BL HS, gastroc, add mm, piriformis      Date  3 12 19 3 14 19 3 19 19    [address] [facility] vs  [facility] L2 15 min L2 15 min L2 15 min L2 15 min    HS stretch    (See Above) (See Above)     Piriformis stretch  (See Above) (See Above)     Hip flexor stretch   *B SKTC  5x 10" B SKTC  5x 10"     Hip abd/add         SLRs    x 4 Stand/Firm  3x10  3way  2 HHA  *unable to tolerate foam Stand/Firm  3x10  3way  2 HHA Stand/Firm  3x10  3way  2 HHA Stand/Firm  3x10  3way  2 HHA    TR/HR   Stand/Firm  3x10  3way  2 HHA  *unable to tolerate foam Stand/Firm  3x10  3way  2 HHA Stand/Firm  3x10  3way  2 HHA Stand/Firm  3x10  3way  2 HHA    Mini squats   Stand/Firm  3x10  3way  2 HHA  *unable to tolerate foam Stand/Firm  3x10  3way  2 HHA Stand/Firm  3x10  3way  2 HHA Stand/Firm  3x10  3way  2 HHA    Step ups fwd/lateral   Fwd  6"  3x 10 BL  2 HHA Fwd  6"  3x 10 BL  2 HHA Fwd  6"  3x 10 BL  2 HHA Fwd  6"  3x 10 BL  2 HHA    ADL Suite for dynamic reach          Sit to stands  10 times with VCs for technique 10 times with VCs for technique 10 reps  Less VC's 2 x 5 reps    Lift and carry up to 5 lbs 5#  With obstacle course       Rhom EO 1 mins x 2 1 mins x 2 1 mins x 2 1 min x 2    Ambul Train for safe, proper ERLINDA and foot placement during Gait pattern  * t/o clinic during treatment session, and also out in corridor upon conclusion of treatment session  t/o clinic during treatment session, and also out in corridor upon conclusion of treatment session   t/o clinic during treatment session  *VC's for proper OS and feet placement    Rhom EO with divided attention 5 mins, min A to recover       LAQ  *B 30x  seated   B 30x  seated B 30x  seated    Hip flex  *B 30x  seated   B 30x  seated B 30x  seated    Ant/Post Rolls        Seated Rows        Obstacle Course/dynamic balance  *2 Trials  Yellow foam brayan pads to Red Floor Mat even/uneven surface,  to  step up onto wooden block Resume **Fast/slow walk with incorp  Turns    **4 short dist trials      Modalities   3 12 19 3 14 19 3 19 19

## 2019-03-21 ENCOUNTER — APPOINTMENT (OUTPATIENT)
Dept: PHYSICAL THERAPY | Facility: CLINIC | Age: 84
End: 2019-03-21
Payer: MEDICARE

## 2019-03-26 ENCOUNTER — OFFICE VISIT (OUTPATIENT)
Dept: PHYSICAL THERAPY | Facility: CLINIC | Age: 84
End: 2019-03-26
Payer: MEDICARE

## 2019-03-26 DIAGNOSIS — R53.81 PHYSICAL DECONDITIONING: Primary | ICD-10-CM

## 2019-03-26 PROCEDURE — 97112 NEUROMUSCULAR REEDUCATION: CPT

## 2019-03-26 PROCEDURE — 97110 THERAPEUTIC EXERCISES: CPT

## 2019-03-26 NOTE — PROGRESS NOTES
Daily Note     Today's date: 3/26/2019  Patient name: Nuha Soto  : 1930  MRN: [id number]  Referring provider: Pearl Bahena DO  Dx:   Encounter Diagnosis     ICD-10-CM    1  Physical deconditioning R53 81                 Re-eval Date: 3/25/19    RTD - 19    Date 3/7 3 12 19 3 14 19 3 19 19 3/261   Visit Count 11 12 13 14 15   FOTO Completed       Pain In 0 0/10   B LE's "Just Tired" 0/10   B LE's "Tired" 0/10 0   Pain Out 0 0/10 0/10 0/10 0   Next MD Visit            Subjective: I am driving myself to/from PT as my friend moved away  Denies any recent falls      Objective: See treatment diary below      Assessment: Tolerated treatment fair with noted increase co's of dizziness associated with anxiety with activities with LOB  Cues to narrow ERLINDA with ambulation and WS   Patient would benefit from continued PT      Plan: Continue per plan of care       Manual   3 12 19 3 14 19 3 19 19    Hams  *Gentle  B 5x10" Seated  Gentle  B 5x10" Resume NV    Gastrocs  *Gentle  B 5x10" Seated  Gentle  B 5x10" Resume NV    Adductors  *Gentle  B 5x10" Seated  Gentle  B 5x10" Resume NV    Piriformis  *Gentle  B 5x10" Seated  Gentle  B 5x10" Resume NV    GENTLE LE stretching  BL HS, gastroc, add mm, piriformis      TE  3 12 19 3 14 19 3 19 19 TE 1:1 with co/tx   [facility] vs  Nustep L2 15 min L2 15 min L2 15 min L2 15 min L3 15 min   HS stretch    (See Above) (See Above)     Piriformis stretch  (See Above) (See Above)     Hip flexor stretch   *B SKTC  5x 10" B SKTC  5x 10"     Hip abd/add         SLRs    x 4 Stand/Firm  3x10  3way  2 HHA  *unable to tolerate foam Stand/Firm  3x10  3way  2 HHA Stand/Firm  3x10  3way  2 HHA Stand/Firm  3x10  3way  2 HHA Stand/Firm  3x10  3way  2 HHA   TR/HR   Stand/Firm  3x10  3way  2 HHA  *unable to tolerate foam Stand/Firm  3x10  3way  2 HHA Stand/Firm  3x10  3way  2 HHA Stand/Firm  3x10  3way  2 HHA Stand/Firm  3x10  3way  2 HHA   Mini squats   Stand/Firm  3x10  3way  2 HHA  *unable to tolerate foam Stand/Firm  3x10  3way  2 HHA Stand/Firm  3x10  3way  2 HHA Stand/Firm  3x10  3way  2 HHA Stand/Firm  3x10  3way  2 HHA   Step ups fwd/lateral   Fwd  6"  3x 10 BL  2 HHA Fwd  6"  3x 10 BL  2 HHA Fwd  6"  3x 10 BL  2 HHA Fwd  6"  3x 10 BL  2 HHA Alt toe taps  6" 6x  4" 6x  2" 6x   ADL Suite for dynamic reach          Sit to stands  10 times with VCs for technique 10 times with VCs for technique 10 reps  Less VC's 2 x 5 reps 2 x 5 reps   Lift and carry up to 5 lbs 5#  With obstacle course       Rhom EO 1 mins x 2 1 mins x 2 1 mins x 2 1 min x 2    Ambul Train for safe, proper ERLINDA and foot placement during Gait pattern  * t/o clinic during treatment session, and also out in corridor upon conclusion of treatment session  t/o clinic during treatment session, and also out in corridor upon conclusion of treatment session   t/o clinic during treatment session  *VC's for proper OS and feet placement    Rhom EO with divided attention 5 mins, min A to recover       LAQ  *B 30x  seated   B 30x  seated B 30x  seated    Hip flex  *B 30x  seated   B 30x  seated B 30x  seated    Ant/Post Rolls        Seated Rows        Obstacle Course/dynamic balance  *2 Trials  Yellow foam brayan pads to Red Floor Mat even/uneven surface,  to  step up onto wooden block Resume **Fast/slow walk with incorp  Turns    **4 short dist trials Sit/stand, step over/walk around obstacles,   Side step,  360* CW/CCW     Modalities   3 12 19 3 14 19 3 19 19

## 2019-03-28 ENCOUNTER — EVALUATION (OUTPATIENT)
Dept: PHYSICAL THERAPY | Facility: CLINIC | Age: 84
End: 2019-03-28
Payer: MEDICARE

## 2019-03-28 DIAGNOSIS — R26.9 GAIT ABNORMALITY: ICD-10-CM

## 2019-03-28 DIAGNOSIS — R29.898 LEG WEAKNESS, BILATERAL: ICD-10-CM

## 2019-03-28 DIAGNOSIS — R53.81 PHYSICAL DECONDITIONING: Primary | ICD-10-CM

## 2019-03-28 PROCEDURE — 97110 THERAPEUTIC EXERCISES: CPT | Performed by: PHYSICAL MEDICINE & REHABILITATION

## 2019-03-28 PROCEDURE — 97164 PT RE-EVAL EST PLAN CARE: CPT | Performed by: PHYSICAL THERAPIST

## 2019-03-28 PROCEDURE — 97112 NEUROMUSCULAR REEDUCATION: CPT | Performed by: PHYSICAL MEDICINE & REHABILITATION

## 2019-03-28 NOTE — PROGRESS NOTES
PT Re-Evaluation     Today's date: 3/28/2019  Patient name: Ronnie Rodriguez  : 1930  MRN: [id number]  Referring provider: David Jimenez DO  Dx:   Encounter Diagnosis     ICD-10-CM    1  Physical deconditioning R53 81    2  Leg weakness, bilateral R29 898    3  Gait abnormality R26 9                   Assessment  Assessment details: Patient is a 80y o  year old female presenting to [facility] s/p diagnosis of debility and deconditioning, LE weakness  Patient has been increasing her activity and is going to the [employer] [facility] center daily for lunch, now participating in exercises when available there  She does note that at home she "sits a lot", but plans to return to water aerobics this summer  Cognitively her status continues to wax and wane  During times that she is more alert, her balance and endurance are noted to be improved  Patient continues to be irritated with incontinence pads  Patient will be discharged in one week to [facility]  Thank you for this referral and the ability to participate in the care of this patient  Impairments: abnormal coordination, abnormal gait, abnormal or restricted ROM, activity intolerance, impaired balance, impaired physical strength, lacks appropriate home exercise program and safety issue  Understanding of Dx/Px/POC: good   Prognosis: good    Goals  In 4 weeks, patient will:  1  Demonstrate ability to perform 30 minutes of activity without requiring seated rest break  - met  2  Demonstrate ability to maintain upright balance unsupported by UEs while reaching above shoulder height without resistance to promote stability with ADLs - met  3  Demonstrate ability to lift up to 5 lbs from  knees to waist unsupported by UEs to promote stability with ADLs - met  4  Demonstrate increased strength of bilateral LEs to allow for improved transfer quality and stability - ongoing    In 4-10 weeks, patient will:  1  Demonstrate reduced fall risk based on outcome measures - met  2  Demonstrate consistent carryover with HEP - ongoing  3  Return to community ambulation with least restrictive AD for at least 400 feet - met      Plan  Patient would benefit from: skilled physical therapy  Other planned modality interventions: Modalities prn for symptom management  Planned therapy interventions: manual therapy, neuromuscular re-education, therapeutic exercise, home exercise program and gait training  Frequency: 2x week  Duration in weeks: 4  Treatment plan discussed with: patient        Subjective Evaluation    History of Present Illness  Mechanism of injury: UPDATE:  Patient reports she is doing better, doing exercises when they have them at the [facility]  Hopes to return to water aerobics this summer  Patient reports a few weeks of dizziness and weakness  Denies fall, depends on single point cane when outside her home  She has been limiting her mobility recently due to cold, possible icy weather  She has not been negotiating stairs  Patient does note waking in morning with back pain that decreases as the day goes on  She has intermittent knee pain as well  Her usual routine is to go to the [facility] for lunch daily M-F  She notes that she "used to go to water aerobics and do the exercises at the center", but she has stopped doing this for an unstated reason  She notes that she is not as mobile or steady on her feet  Additionally notes she does not cook for herself anymore  She will do light meals for breakfast and dinner  She does light housekeeping  Functional issues stated include: fear of falling, difficulty with stairs, sensation of unsteadiness with gait  Intermittent dizziness, decreased participation in community events and more homebound than 6 months ago            Recurrent probem    Quality of life: good    Pain  No pain reported    Social Support  Lives in: multiple-level home  Lives with: alone    Hand dominance: right    Treatments  Previous treatment: physical therapy  Patient Goals  Patient goals for therapy: increased strength, independence with ADLs/IADLs, return to sport/leisure activities, improved balance and increased motion          Objective     Neurological Testing     Sensation     Hip   Left Hip   Diminished: light touch    Right Hip   Diminished: light touch    Active Range of Motion     Additional Active Range of Motion Details  Hip flexion to 90 degrees bilaterally  Knee flexion to 90 degrees bilaterally  Heel cords to neutral bilaterally  HS limited by 30% bilaterally    Strength/Myotome Testing     Left Hip   Planes of Motion   Flexion: 4-  Extension: 3  Abduction: 4-  Adduction: 4-    Right Hip   Planes of Motion   Flexion: 4-  Extension: 3  Abduction: 4-  Adduction: 4-    Additional Strength Details  Knee extension: B/L: 4+/5  Knee flexion: B/L: 4/5  DF and PF 4/5 bilaterally    Ambulation     Comments   At eval:  Ambulates with decreased ankle and knee ROM, decreased flexibility and increased co-contraction  She demonstrates decreased foot clearance with increased ERLINDA  At re-eval:  Ambulates with SPC, continues with increased ERLINDA, however, this is not related to musculoskeletal deficits at this time  Patient reports this is related to discomfort with wearing an incontinence pad  Alternatives discussed with patient      Functional Assessment        Comments  Tinetti Balance and Gait Assessment  Initial Score: 12/28  Follow up Score: 16/28    Sitting Balance   1= Steady, safe  Rises From Chair  2= Able to rise without using arms to help  Attempts to Rise  2= Able to rise, requires one attempt  Standing Balance (First 5 seconds)  2= Steady without walker or other support  Nudged  1= Staggers, grabs, catches self  Eyes Closed  1= Steady  Turning 360 degrees  1= Continuous steps  1= Steady  Sitting Down (Getting Seated)  2= Safe, smooth motion    Initiation of Gait  1= No hesitancy  Step Length and Height  1= Step through right  1= Step through left  Foot Clearance  1= Left foot clears the floor  1= Right foot clears the floor  Step Symmetry  1= Right and left step length appear equal  Step Continuity  1= Steps appear continuous  Path  1= Mild/moderate deviation OR uses a walking aid  Trunk  2= No sway, no flexion of knees or back use of arms or walking aid  Walking Time  0= Heels apart    Total Score: 22/28    Re-eval Date: Discharge 1 week from 3/28    Date 3/28       Visit Count 16       FOTO        Pain In        Pain Out        Next MD Visit             Manual         Hams        Gastrocs        Adductors        Piriformis        GENTLE LE stretching  BL HS, gastroc, add mm, piriformis      TE TE 1:1 by GM, PT       SRC vs  Nustep L3 15 min       HS stretch          Piriformis stretch        Hip flexor stretch         Hip abd/add         SLRs    x 4 Stand/Firm  3x10  3way  2 HHA       TR/HR   Stand/Firm  3x10  3way  2 HHA       Mini squats   Stand/Firm  3x10  3way  2 HHA       Step ups fwd/lateral   Alt toe taps  6" 6x  4" 6x  2" 6x       ADL Suite for dynamic reach          Sit to stands 2 x 5 reps       Lift and carry up to 5 lbs        Rhom EO        Ambul Train for safe, proper ERLINDA and foot placement during Gait pattern 10 mins SPC       Rhom EO with divided attention        LAQ        Hip flex        Ant/Post Rolls        Seated Rows        Obstacle Course/dynamic balance Sit/stand, step over/walk around obstacles,   Side step,  360* CW/CCW         Modalities

## 2019-03-30 ENCOUNTER — APPOINTMENT (EMERGENCY)
Dept: RADIOLOGY | Facility: HOSPITAL | Age: 84
DRG: 149 | End: 2019-03-30
Payer: MEDICARE

## 2019-03-30 ENCOUNTER — APPOINTMENT (EMERGENCY)
Dept: CT IMAGING | Facility: HOSPITAL | Age: 84
DRG: 149 | End: 2019-03-30
Payer: MEDICARE

## 2019-03-30 ENCOUNTER — HOSPITAL ENCOUNTER (INPATIENT)
Facility: HOSPITAL | Age: 84
LOS: 4 days | Discharge: NURSING, REHAB OR LONG-TERM CARE | DRG: 149 | End: 2019-04-03
Attending: EMERGENCY MEDICINE | Admitting: INTERNAL MEDICINE
Payer: MEDICARE

## 2019-03-30 DIAGNOSIS — I63.9 INFARCTION OF LEFT THALAMUS (HCC): ICD-10-CM

## 2019-03-30 DIAGNOSIS — E07.89 THYROID MASS OF UNCLEAR ETIOLOGY: ICD-10-CM
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
## 2019-03-30 DIAGNOSIS — J98.59 MEDIASTINAL MASS: ICD-10-CM

## 2019-03-30 DIAGNOSIS — I63.9 THALAMIC INFARCTION (HCC): Primary | ICD-10-CM

## 2019-03-30 DIAGNOSIS — I10 HYPERTENSION: ICD-10-CM

## 2019-03-30 DIAGNOSIS — M15.9 PRIMARY OSTEOARTHRITIS INVOLVING MULTIPLE JOINTS: ICD-10-CM

## 2019-03-30 DIAGNOSIS — F32.A DEPRESSION, UNSPECIFIED DEPRESSION TYPE: ICD-10-CM

## 2019-03-30 PROBLEM — M48.061 SPINAL STENOSIS AT L4-L5 LEVEL: Chronic | Status: ACTIVE | Noted: 2019-03-30

## 2019-03-30 PROBLEM — R42 DIZZINESS: Status: ACTIVE | Noted: 2019-03-30

## 2019-03-30 PROBLEM — I63.81 INFARCTION OF LEFT THALAMUS (HCC): Status: ACTIVE | Noted: 2019-03-30

## 2019-03-30 LAB
ALBUMIN SERPL BCP-MCNC: 4 G/DL (ref 3.5–5.7)
ALP SERPL-CCNC: 75 U/L (ref 55–165)
ALT SERPL W P-5'-P-CCNC: 9 U/L (ref 7–52)
ANION GAP SERPL CALCULATED.3IONS-SCNC: 5 MMOL/L (ref 4–13)
AST SERPL W P-5'-P-CCNC: 15 U/L (ref 13–39)
BASOPHILS # BLD AUTO: 0.1 THOUSANDS/ΜL (ref 0–0.1)
BASOPHILS NFR BLD AUTO: 1 % (ref 0–2)
BILIRUB SERPL-MCNC: 0.5 MG/DL (ref 0.2–1)
BILIRUB UR QL STRIP: NEGATIVE
BUN SERPL-MCNC: 13 MG/DL (ref 7–25)
CALCIUM SERPL-MCNC: 9.4 MG/DL (ref 8.6–10.5)
CHLORIDE SERPL-SCNC: 100 MMOL/L (ref 98–107)
CHOLEST SERPL-MCNC: 199 MG/DL (ref 0–200)
CLARITY UR: CLEAR
CO2 SERPL-SCNC: 27 MMOL/L (ref 21–31)
COLOR UR: YELLOW
CREAT SERPL-MCNC: 0.93 MG/DL (ref 0.6–1.2)
EOSINOPHIL # BLD AUTO: 0.1 THOUSAND/ΜL (ref 0–0.61)
EOSINOPHIL NFR BLD AUTO: 1 % (ref 0–5)
ERYTHROCYTE [DISTWIDTH] IN BLOOD BY AUTOMATED COUNT: 13 % (ref 11.5–14.5)
GFR SERPL CREATININE-BSD FRML MDRD: 55 ML/MIN/1.73SQ M
GLUCOSE SERPL-MCNC: 98 MG/DL (ref 65–99)
GLUCOSE SERPL-MCNC: 99 MG/DL (ref 65–140)
GLUCOSE UR STRIP-MCNC: NEGATIVE MG/DL
HCT VFR BLD AUTO: 43.7 % (ref 42–47)
HDLC SERPL-MCNC: 40 MG/DL (ref 40–60)
HGB BLD-MCNC: 14.8 G/DL (ref 12–16)
HGB UR QL STRIP.AUTO: NEGATIVE
KETONES UR STRIP-MCNC: NEGATIVE MG/DL
LDLC SERPL CALC-MCNC: 133 MG/DL (ref 75–193)
LEUKOCYTE ESTERASE UR QL STRIP: NEGATIVE
LYMPHOCYTES # BLD AUTO: 1.3 THOUSANDS/ΜL (ref 0.6–4.47)
LYMPHOCYTES NFR BLD AUTO: 16 % (ref 21–51)
MCH RBC QN AUTO: 30.4 PG (ref 26–34)
MCHC RBC AUTO-ENTMCNC: 33.9 G/DL (ref 31–37)
MCV RBC AUTO: 90 FL (ref 81–99)
MONOCYTES # BLD AUTO: 0.8 THOUSAND/ΜL (ref 0.17–1.22)
MONOCYTES NFR BLD AUTO: 10 % (ref 2–12)
NEUTROPHILS # BLD AUTO: 5.9 THOUSANDS/ΜL (ref 1.4–6.5)
NEUTS SEG NFR BLD AUTO: 73 % (ref 42–75)
NITRITE UR QL STRIP: NEGATIVE
PH UR STRIP.AUTO: 7 [PH]
PLATELET # BLD AUTO: 211 THOUSANDS/UL (ref 149–390)
PMV BLD AUTO: 7.8 FL (ref 8.6–11.7)
POTASSIUM SERPL-SCNC: 4.2 MMOL/L (ref 3.5–5.5)
PROT SERPL-MCNC: 6.4 G/DL (ref 6.4–8.9)
PROT UR STRIP-MCNC: NEGATIVE MG/DL
RBC # BLD AUTO: 4.88 MILLION/UL (ref 3.9–5.2)
SODIUM SERPL-SCNC: 132 MMOL/L (ref 134–143)
SP GR UR STRIP.AUTO: 1.01 (ref 1–1.03)
TRIGL SERPL-MCNC: 130 MG/DL (ref 44–166)
TROPONIN I SERPL-MCNC: <0.03 NG/ML
TSH SERPL DL<=0.05 MIU/L-ACNC: 0.67 UIU/ML (ref 0.45–5.33)
UROBILINOGEN UR QL STRIP.AUTO: 0.2 E.U./DL
WBC # BLD AUTO: 8 THOUSAND/UL (ref 4.8–10.8)

## 2019-03-30 PROCEDURE — 84443 ASSAY THYROID STIM HORMONE: CPT | Performed by: EMERGENCY MEDICINE

## 2019-03-30 PROCEDURE — 96374 THER/PROPH/DIAG INJ IV PUSH: CPT

## 2019-03-30 PROCEDURE — 70450 CT HEAD/BRAIN W/O DYE: CPT

## 2019-03-30 PROCEDURE — 83036 HEMOGLOBIN GLYCOSYLATED A1C: CPT | Performed by: EMERGENCY MEDICINE

## 2019-03-30 PROCEDURE — 99223 1ST HOSP IP/OBS HIGH 75: CPT | Performed by: PHYSICIAN ASSISTANT

## 2019-03-30 PROCEDURE — 72100 X-RAY EXAM L-S SPINE 2/3 VWS: CPT

## 2019-03-30 PROCEDURE — 82607 VITAMIN B-12: CPT | Performed by: EMERGENCY MEDICINE

## 2019-03-30 PROCEDURE — 72125 CT NECK SPINE W/O DYE: CPT

## 2019-03-30 PROCEDURE — 84484 ASSAY OF TROPONIN QUANT: CPT | Performed by: EMERGENCY MEDICINE

## 2019-03-30 PROCEDURE — 72131 CT LUMBAR SPINE W/O DYE: CPT

## 2019-03-30 PROCEDURE — 80053 COMPREHEN METABOLIC PANEL: CPT | Performed by: EMERGENCY MEDICINE

## 2019-03-30 PROCEDURE — 99285 EMERGENCY DEPT VISIT HI MDM: CPT

## 2019-03-30 PROCEDURE — 96361 HYDRATE IV INFUSION ADD-ON: CPT

## 2019-03-30 PROCEDURE — 72072 X-RAY EXAM THORAC SPINE 3VWS: CPT

## 2019-03-30 PROCEDURE — 80061 LIPID PANEL: CPT | Performed by: PHYSICIAN ASSISTANT

## 2019-03-30 PROCEDURE — 81003 URINALYSIS AUTO W/O SCOPE: CPT | Performed by: EMERGENCY MEDICINE

## 2019-03-30 PROCEDURE — 72128 CT CHEST SPINE W/O DYE: CPT

## 2019-03-30 PROCEDURE — 82948 REAGENT STRIP/BLOOD GLUCOSE: CPT

## 2019-03-30 PROCEDURE — 93005 ELECTROCARDIOGRAM TRACING: CPT

## 2019-03-30 PROCEDURE — 85025 COMPLETE CBC W/AUTO DIFF WBC: CPT | Performed by: EMERGENCY MEDICINE

## 2019-03-30 PROCEDURE — 36415 COLL VENOUS BLD VENIPUNCTURE: CPT | Performed by: EMERGENCY MEDICINE

## 2019-03-30 PROCEDURE — 82306 VITAMIN D 25 HYDROXY: CPT | Performed by: EMERGENCY MEDICINE

## 2019-03-30 RX ORDER — ONDANSETRON 2 MG/ML
4 INJECTION INTRAMUSCULAR; INTRAVENOUS EVERY 6 HOURS PRN
Status: DISCONTINUED | OUTPATIENT
Start: 2019-03-30 | End: 2019-04-03 | Stop reason: HOSPADM

## 2019-03-30 RX ORDER — DOCUSATE SODIUM 100 MG/1
100 CAPSULE, LIQUID FILLED ORAL 2 TIMES DAILY PRN
Status: DISCONTINUED | OUTPATIENT
Start: 2019-03-30 | End: 2019-04-03 | Stop reason: HOSPADM

## 2019-03-30 RX ORDER — ASPIRIN 325 MG
325 TABLET ORAL ONCE
Status: COMPLETED | OUTPATIENT
Start: 2019-03-30 | End: 2019-03-30

## 2019-03-30 RX ORDER — HYDRALAZINE HYDROCHLORIDE 20 MG/ML
10 INJECTION INTRAMUSCULAR; INTRAVENOUS ONCE
Status: COMPLETED | OUTPATIENT
Start: 2019-03-30 | End: 2019-03-30

## 2019-03-30 RX ORDER — ASPIRIN 81 MG/1
81 TABLET, CHEWABLE ORAL DAILY
Status: DISCONTINUED | OUTPATIENT
Start: 2019-03-31 | End: 2019-04-03 | Stop reason: HOSPADM

## 2019-03-30 RX ORDER — SODIUM CHLORIDE 9 MG/ML
75 INJECTION, SOLUTION INTRAVENOUS ONCE
Status: COMPLETED | OUTPATIENT
Start: 2019-03-30 | End: 2019-03-31

## 2019-03-30 RX ORDER — ASPIRIN 325 MG
325 TABLET ORAL ONCE
Status: DISCONTINUED | OUTPATIENT
Start: 2019-03-30 | End: 2019-03-30

## 2019-03-30 RX ORDER — ATORVASTATIN CALCIUM 40 MG/1
40 TABLET, FILM COATED ORAL EVERY EVENING
Status: DISCONTINUED | OUTPATIENT
Start: 2019-03-30 | End: 2019-04-03 | Stop reason: HOSPADM

## 2019-03-30 RX ADMIN — HYDRALAZINE HYDROCHLORIDE 10 MG: 20 INJECTION INTRAMUSCULAR; INTRAVENOUS at 15:54

## 2019-03-30 RX ADMIN — ONDANSETRON 4 MG: 2 INJECTION INTRAMUSCULAR; INTRAVENOUS at 19:11

## 2019-03-30 RX ADMIN — SODIUM CHLORIDE 75 ML/HR: 9 INJECTION, SOLUTION INTRAVENOUS at 17:57

## 2019-03-30 RX ADMIN — SODIUM CHLORIDE 1000 ML: 0.9 INJECTION, SOLUTION INTRAVENOUS at 12:36

## 2019-03-30 RX ADMIN — ATORVASTATIN CALCIUM 40 MG: 40 TABLET, FILM COATED ORAL at 18:14

## 2019-03-30 RX ADMIN — ASPIRIN 325 MG: 325 TABLET, FILM COATED ORAL at 16:23

## 2019-03-30 RX ADMIN — HYDRALAZINE HYDROCHLORIDE 10 MG: 20 INJECTION INTRAMUSCULAR; INTRAVENOUS at 16:45

## 2019-03-31 ENCOUNTER — APPOINTMENT (INPATIENT)
Dept: CT IMAGING | Facility: HOSPITAL | Age: 84
DRG: 149 | End: 2019-03-31
Payer: MEDICARE

## 2019-03-31 LAB
ANION GAP SERPL CALCULATED.3IONS-SCNC: 7 MMOL/L (ref 4–13)
ATRIAL RATE: 79 BPM
BUN SERPL-MCNC: 13 MG/DL (ref 7–25)
CALCIUM SERPL-MCNC: 8.8 MG/DL (ref 8.6–10.5)
CHLORIDE SERPL-SCNC: 104 MMOL/L (ref 98–107)
CHOLEST SERPL-MCNC: 165 MG/DL (ref 0–200)
CO2 SERPL-SCNC: 23 MMOL/L (ref 21–31)
CREAT SERPL-MCNC: 0.85 MG/DL (ref 0.6–1.2)
ERYTHROCYTE [DISTWIDTH] IN BLOOD BY AUTOMATED COUNT: 12.8 % (ref 11.5–14.5)
EST. AVERAGE GLUCOSE BLD GHB EST-MCNC: 114 MG/DL
GFR SERPL CREATININE-BSD FRML MDRD: 61 ML/MIN/1.73SQ M
GLUCOSE SERPL-MCNC: 98 MG/DL (ref 65–99)
HBA1C MFR BLD: 5.6 % (ref 4.2–6.3)
HCT VFR BLD AUTO: 39.7 % (ref 42–47)
HDLC SERPL-MCNC: 39 MG/DL (ref 40–60)
HGB BLD-MCNC: 13.4 G/DL (ref 12–16)
LDLC SERPL CALC-MCNC: 116 MG/DL (ref 75–193)
MCH RBC QN AUTO: 30.1 PG (ref 26–34)
MCHC RBC AUTO-ENTMCNC: 33.7 G/DL (ref 31–37)
MCV RBC AUTO: 89 FL (ref 81–99)
P AXIS: -23 DEGREES
PLATELET # BLD AUTO: 193 THOUSANDS/UL (ref 149–390)
PMV BLD AUTO: 8.5 FL (ref 8.6–11.7)
POTASSIUM SERPL-SCNC: 4.3 MMOL/L (ref 3.5–5.5)
PR INTERVAL: 140 MS
QRS AXIS: -23 DEGREES
QRSD INTERVAL: 74 MS
QT INTERVAL: 356 MS
QTC INTERVAL: 408 MS
RBC # BLD AUTO: 4.44 MILLION/UL (ref 3.9–5.2)
SODIUM SERPL-SCNC: 134 MMOL/L (ref 134–143)
T WAVE AXIS: 32 DEGREES
T4 FREE SERPL-MCNC: 1.14 NG/DL (ref 0.76–1.46)
TRIGL SERPL-MCNC: 49 MG/DL (ref 44–166)
VENTRICULAR RATE: 79 BPM
VIT B12 SERPL-MCNC: 250 PG/ML (ref 100–900)
WBC # BLD AUTO: 9.6 THOUSAND/UL (ref 4.8–10.8)

## 2019-03-31 PROCEDURE — 97163 PT EVAL HIGH COMPLEX 45 MIN: CPT

## 2019-03-31 PROCEDURE — 80061 LIPID PANEL: CPT | Performed by: PHYSICIAN ASSISTANT

## 2019-03-31 PROCEDURE — G8987 SELF CARE CURRENT STATUS: HCPCS

## 2019-03-31 PROCEDURE — 84439 ASSAY OF FREE THYROXINE: CPT | Performed by: PHYSICIAN ASSISTANT

## 2019-03-31 PROCEDURE — 93010 ELECTROCARDIOGRAM REPORT: CPT | Performed by: INTERNAL MEDICINE

## 2019-03-31 PROCEDURE — 97167 OT EVAL HIGH COMPLEX 60 MIN: CPT

## 2019-03-31 PROCEDURE — 85027 COMPLETE CBC AUTOMATED: CPT | Performed by: PHYSICIAN ASSISTANT

## 2019-03-31 PROCEDURE — 70491 CT SOFT TISSUE NECK W/DYE: CPT

## 2019-03-31 PROCEDURE — 99232 SBSQ HOSP IP/OBS MODERATE 35: CPT | Performed by: HOSPITALIST

## 2019-03-31 PROCEDURE — G8979 MOBILITY GOAL STATUS: HCPCS

## 2019-03-31 PROCEDURE — G8988 SELF CARE GOAL STATUS: HCPCS

## 2019-03-31 PROCEDURE — 80048 BASIC METABOLIC PNL TOTAL CA: CPT | Performed by: PHYSICIAN ASSISTANT

## 2019-03-31 PROCEDURE — G8978 MOBILITY CURRENT STATUS: HCPCS

## 2019-03-31 PROCEDURE — 71260 CT THORAX DX C+: CPT

## 2019-03-31 RX ORDER — AMLODIPINE BESYLATE 5 MG/1
5 TABLET ORAL DAILY
Status: DISCONTINUED | OUTPATIENT
Start: 2019-03-31 | End: 2019-04-03 | Stop reason: HOSPADM

## 2019-03-31 RX ORDER — HYDRALAZINE HYDROCHLORIDE 20 MG/ML
10 INJECTION INTRAMUSCULAR; INTRAVENOUS EVERY 6 HOURS PRN
Status: DISCONTINUED | OUTPATIENT
Start: 2019-03-31 | End: 2019-04-03 | Stop reason: HOSPADM

## 2019-03-31 RX ORDER — ACETAMINOPHEN 325 MG/1
650 TABLET ORAL EVERY 6 HOURS PRN
Status: DISCONTINUED | OUTPATIENT
Start: 2019-03-31 | End: 2019-04-03 | Stop reason: HOSPADM

## 2019-03-31 RX ADMIN — AMLODIPINE BESYLATE 5 MG: 5 TABLET ORAL at 18:27

## 2019-03-31 RX ADMIN — ACETAMINOPHEN 650 MG: 325 TABLET ORAL at 23:45

## 2019-03-31 RX ADMIN — IOHEXOL 120 ML: 350 INJECTION, SOLUTION INTRAVENOUS at 11:38

## 2019-03-31 RX ADMIN — HYDRALAZINE HYDROCHLORIDE 10 MG: 20 INJECTION INTRAMUSCULAR; INTRAVENOUS at 18:27

## 2019-03-31 RX ADMIN — ENOXAPARIN SODIUM 40 MG: 40 INJECTION SUBCUTANEOUS at 10:20

## 2019-03-31 RX ADMIN — ATORVASTATIN CALCIUM 40 MG: 40 TABLET, FILM COATED ORAL at 17:44

## 2019-03-31 RX ADMIN — ACETAMINOPHEN 650 MG: 325 TABLET ORAL at 17:49

## 2019-03-31 RX ADMIN — ACETAMINOPHEN 650 MG: 325 TABLET ORAL at 10:20

## 2019-03-31 RX ADMIN — ASPIRIN 81 MG 81 MG: 81 TABLET ORAL at 10:20

## 2019-04-01 ENCOUNTER — APPOINTMENT (INPATIENT)
Dept: NON INVASIVE DIAGNOSTICS | Facility: HOSPITAL | Age: 84
DRG: 149 | End: 2019-04-01
Payer: MEDICARE

## 2019-04-01 ENCOUNTER — APPOINTMENT (INPATIENT)
Dept: MRI IMAGING | Facility: HOSPITAL | Age: 84
DRG: 149 | End: 2019-04-01
Payer: MEDICARE

## 2019-04-01 LAB
ANION GAP SERPL CALCULATED.3IONS-SCNC: 7 MMOL/L (ref 4–13)
BASOPHILS # BLD AUTO: 0 THOUSANDS/ΜL (ref 0–0.1)
BASOPHILS NFR BLD AUTO: 1 % (ref 0–2)
BUN SERPL-MCNC: 12 MG/DL (ref 7–25)
CALCIUM SERPL-MCNC: 9.1 MG/DL (ref 8.6–10.5)
CHLORIDE SERPL-SCNC: 101 MMOL/L (ref 98–107)
CO2 SERPL-SCNC: 24 MMOL/L (ref 21–31)
CREAT SERPL-MCNC: 0.78 MG/DL (ref 0.6–1.2)
EOSINOPHIL # BLD AUTO: 0.1 THOUSAND/ΜL (ref 0–0.61)
EOSINOPHIL NFR BLD AUTO: 1 % (ref 0–5)
ERYTHROCYTE [DISTWIDTH] IN BLOOD BY AUTOMATED COUNT: 13.2 % (ref 11.5–14.5)
GFR SERPL CREATININE-BSD FRML MDRD: 68 ML/MIN/1.73SQ M
GLUCOSE SERPL-MCNC: 100 MG/DL (ref 65–99)
HCT VFR BLD AUTO: 41 % (ref 42–47)
HGB BLD-MCNC: 14.1 G/DL (ref 12–16)
LYMPHOCYTES # BLD AUTO: 1.6 THOUSANDS/ΜL (ref 0.6–4.47)
LYMPHOCYTES NFR BLD AUTO: 18 % (ref 21–51)
MCH RBC QN AUTO: 30.7 PG (ref 26–34)
MCHC RBC AUTO-ENTMCNC: 34.4 G/DL (ref 31–37)
MCV RBC AUTO: 90 FL (ref 81–99)
MONOCYTES # BLD AUTO: 0.8 THOUSAND/ΜL (ref 0.17–1.22)
MONOCYTES NFR BLD AUTO: 9 % (ref 2–12)
NEUTROPHILS # BLD AUTO: 6.2 THOUSANDS/ΜL (ref 1.4–6.5)
NEUTS SEG NFR BLD AUTO: 71 % (ref 42–75)
PLATELET # BLD AUTO: 197 THOUSANDS/UL (ref 149–390)
PMV BLD AUTO: 8.3 FL (ref 8.6–11.7)
POTASSIUM SERPL-SCNC: 3.8 MMOL/L (ref 3.5–5.5)
RBC # BLD AUTO: 4.58 MILLION/UL (ref 3.9–5.2)
SODIUM SERPL-SCNC: 132 MMOL/L (ref 134–143)
WBC # BLD AUTO: 8.7 THOUSAND/UL (ref 4.8–10.8)

## 2019-04-01 PROCEDURE — 93321 DOPPLER ECHO F-UP/LMTD STD: CPT | Performed by: INTERNAL MEDICINE

## 2019-04-01 PROCEDURE — 93308 TTE F-UP OR LMTD: CPT | Performed by: INTERNAL MEDICINE

## 2019-04-01 PROCEDURE — 93325 DOPPLER ECHO COLOR FLOW MAPG: CPT | Performed by: INTERNAL MEDICINE

## 2019-04-01 PROCEDURE — 80048 BASIC METABOLIC PNL TOTAL CA: CPT | Performed by: HOSPITALIST

## 2019-04-01 PROCEDURE — 93308 TTE F-UP OR LMTD: CPT

## 2019-04-01 PROCEDURE — 85025 COMPLETE CBC W/AUTO DIFF WBC: CPT | Performed by: HOSPITALIST

## 2019-04-01 PROCEDURE — 99232 SBSQ HOSP IP/OBS MODERATE 35: CPT | Performed by: INTERNAL MEDICINE

## 2019-04-01 PROCEDURE — 99222 1ST HOSP IP/OBS MODERATE 55: CPT | Performed by: PSYCHIATRY & NEUROLOGY

## 2019-04-01 PROCEDURE — 70547 MR ANGIOGRAPHY NECK W/O DYE: CPT

## 2019-04-01 PROCEDURE — 70551 MRI BRAIN STEM W/O DYE: CPT

## 2019-04-01 PROCEDURE — 70544 MR ANGIOGRAPHY HEAD W/O DYE: CPT

## 2019-04-01 RX ADMIN — AMLODIPINE BESYLATE 5 MG: 5 TABLET ORAL at 08:43

## 2019-04-01 RX ADMIN — HYDRALAZINE HYDROCHLORIDE 10 MG: 20 INJECTION INTRAMUSCULAR; INTRAVENOUS at 08:53

## 2019-04-01 RX ADMIN — ATORVASTATIN CALCIUM 40 MG: 40 TABLET, FILM COATED ORAL at 17:45

## 2019-04-01 RX ADMIN — ACETAMINOPHEN 650 MG: 325 TABLET ORAL at 08:41

## 2019-04-01 RX ADMIN — DOCUSATE SODIUM 100 MG: 100 CAPSULE, LIQUID FILLED ORAL at 22:23

## 2019-04-01 RX ADMIN — ENOXAPARIN SODIUM 40 MG: 40 INJECTION SUBCUTANEOUS at 08:41

## 2019-04-01 RX ADMIN — ASPIRIN 81 MG 81 MG: 81 TABLET ORAL at 08:42

## 2019-04-01 RX ADMIN — ONDANSETRON 4 MG: 2 INJECTION INTRAMUSCULAR; INTRAVENOUS at 08:41

## 2019-04-02 LAB
ANION GAP SERPL CALCULATED.3IONS-SCNC: 6 MMOL/L (ref 4–13)
BUN SERPL-MCNC: 16 MG/DL (ref 7–25)
CALCIUM SERPL-MCNC: 9.1 MG/DL (ref 8.6–10.5)
CHLORIDE SERPL-SCNC: 99 MMOL/L (ref 98–107)
CO2 SERPL-SCNC: 24 MMOL/L (ref 21–31)
CREAT SERPL-MCNC: 0.94 MG/DL (ref 0.6–1.2)
GFR SERPL CREATININE-BSD FRML MDRD: 54 ML/MIN/1.73SQ M
GLUCOSE SERPL-MCNC: 91 MG/DL (ref 65–99)
POTASSIUM SERPL-SCNC: 4 MMOL/L (ref 3.5–5.5)
SODIUM SERPL-SCNC: 129 MMOL/L (ref 134–143)

## 2019-04-02 PROCEDURE — 97116 GAIT TRAINING THERAPY: CPT

## 2019-04-02 PROCEDURE — 99231 SBSQ HOSP IP/OBS SF/LOW 25: CPT | Performed by: INTERNAL MEDICINE

## 2019-04-02 PROCEDURE — 80048 BASIC METABOLIC PNL TOTAL CA: CPT | Performed by: NURSE PRACTITIONER

## 2019-04-02 PROCEDURE — 97110 THERAPEUTIC EXERCISES: CPT

## 2019-04-02 RX ADMIN — AMLODIPINE BESYLATE 5 MG: 5 TABLET ORAL at 08:22

## 2019-04-02 RX ADMIN — SODIUM CHLORIDE 500 ML: 9 INJECTION, SOLUTION INTRAVENOUS at 14:30

## 2019-04-02 RX ADMIN — ATORVASTATIN CALCIUM 40 MG: 40 TABLET, FILM COATED ORAL at 17:13

## 2019-04-02 RX ADMIN — ACETAMINOPHEN 650 MG: 325 TABLET ORAL at 08:21

## 2019-04-02 RX ADMIN — ENOXAPARIN SODIUM 40 MG: 40 INJECTION SUBCUTANEOUS at 08:22

## 2019-04-02 RX ADMIN — ASPIRIN 81 MG 81 MG: 81 TABLET ORAL at 08:21

## 2019-04-02 RX ADMIN — DOCUSATE SODIUM 100 MG: 100 CAPSULE, LIQUID FILLED ORAL at 23:59

## 2019-04-03 VITALS
OXYGEN SATURATION: 94 % | SYSTOLIC BLOOD PRESSURE: 158 MMHG | HEIGHT: 62 IN | WEIGHT: 151.2 LBS | RESPIRATION RATE: 18 BRPM | DIASTOLIC BLOOD PRESSURE: 87 MMHG | TEMPERATURE: 98.6 F | HEART RATE: 75 BPM | BODY MASS INDEX: 27.82 KG/M2

## 2019-04-03 PROBLEM — I10 ESSENTIAL HYPERTENSION: Status: ACTIVE | Noted: 2019-04-03

## 2019-04-03 LAB
ANION GAP SERPL CALCULATED.3IONS-SCNC: 8 MMOL/L (ref 4–13)
BUN SERPL-MCNC: 19 MG/DL (ref 7–25)
CALCIUM SERPL-MCNC: 8.8 MG/DL (ref 8.6–10.5)
CHLORIDE SERPL-SCNC: 102 MMOL/L (ref 98–107)
CO2 SERPL-SCNC: 22 MMOL/L (ref 21–31)
CREAT SERPL-MCNC: 0.87 MG/DL (ref 0.6–1.2)
GFR SERPL CREATININE-BSD FRML MDRD: 59 ML/MIN/1.73SQ M
GLUCOSE SERPL-MCNC: 88 MG/DL (ref 65–99)
POTASSIUM SERPL-SCNC: 4.3 MMOL/L (ref 3.5–5.5)
SODIUM SERPL-SCNC: 132 MMOL/L (ref 134–143)

## 2019-04-03 PROCEDURE — 97110 THERAPEUTIC EXERCISES: CPT

## 2019-04-03 PROCEDURE — 97530 THERAPEUTIC ACTIVITIES: CPT

## 2019-04-03 PROCEDURE — 97116 GAIT TRAINING THERAPY: CPT

## 2019-04-03 PROCEDURE — 80048 BASIC METABOLIC PNL TOTAL CA: CPT | Performed by: INTERNAL MEDICINE

## 2019-04-03 PROCEDURE — 99239 HOSP IP/OBS DSCHRG MGMT >30: CPT | Performed by: INTERNAL MEDICINE

## 2019-04-03 RX ORDER — ATORVASTATIN CALCIUM 40 MG/1
40 TABLET, FILM COATED ORAL EVERY EVENING
Refills: 0
Start: 2019-04-03 | End: 2019-05-28 | Stop reason: SDUPTHER

## 2019-04-03 RX ORDER — ACETAMINOPHEN 325 MG/1
650 TABLET ORAL EVERY 6 HOURS PRN
Qty: 30 TABLET | Refills: 0
Start: 2019-04-03 | End: 2019-11-20 | Stop reason: SDUPTHER

## 2019-04-03 RX ORDER — DOCUSATE SODIUM 100 MG/1
100 CAPSULE, LIQUID FILLED ORAL 2 TIMES DAILY PRN
Qty: 10 CAPSULE | Refills: 0
Start: 2019-04-03 | End: 2019-04-19

## 2019-04-03 RX ORDER — AMLODIPINE BESYLATE 5 MG/1
5 TABLET ORAL DAILY
Refills: 0
Start: 2019-04-04 | End: 2019-05-28 | Stop reason: SDUPTHER

## 2019-04-03 RX ORDER — ASPIRIN 81 MG/1
81 TABLET, CHEWABLE ORAL DAILY
Refills: 0
Start: 2019-04-04 | End: 2019-05-28 | Stop reason: SDUPTHER

## 2019-04-03 RX ADMIN — ASPIRIN 81 MG 81 MG: 81 TABLET ORAL at 08:01

## 2019-04-03 RX ADMIN — DOCUSATE SODIUM 100 MG: 100 CAPSULE, LIQUID FILLED ORAL at 08:03

## 2019-04-03 RX ADMIN — AMLODIPINE BESYLATE 5 MG: 5 TABLET ORAL at 08:01

## 2019-04-03 RX ADMIN — ACETAMINOPHEN 650 MG: 325 TABLET ORAL at 07:17

## 2019-04-03 RX ADMIN — ENOXAPARIN SODIUM 40 MG: 40 INJECTION SUBCUTANEOUS at 08:01

## 2019-04-06 LAB
25(OH)D2 SERPL-MCNC: <1 NG/ML
25(OH)D3 SERPL-MCNC: 22 NG/ML
25(OH)D3+25(OH)D2 SERPL-MCNC: 22 NG/ML

## 2019-04-08 ENCOUNTER — TELEPHONE (OUTPATIENT)
Dept: INTERNAL MEDICINE CLINIC | Facility: CLINIC | Age: 84
End: 2019-04-08

## 2019-04-10 ENCOUNTER — TRANSITIONAL CARE MANAGEMENT (OUTPATIENT)
Dept: INTERNAL MEDICINE CLINIC | Facility: CLINIC | Age: 84
End: 2019-04-10

## 2019-04-16 ENCOUNTER — TELEPHONE (OUTPATIENT)
Dept: INTERNAL MEDICINE CLINIC | Facility: CLINIC | Age: 84
End: 2019-04-16

## 2019-04-19 ENCOUNTER — OFFICE VISIT (OUTPATIENT)
Dept: INTERNAL MEDICINE CLINIC | Facility: CLINIC | Age: 84
End: 2019-04-19
Payer: MEDICARE

## 2019-04-19 VITALS
RESPIRATION RATE: 16 BRPM | DIASTOLIC BLOOD PRESSURE: 64 MMHG | HEIGHT: 62 IN | TEMPERATURE: 98.1 F | BODY MASS INDEX: 27.79 KG/M2 | HEART RATE: 70 BPM | SYSTOLIC BLOOD PRESSURE: 118 MMHG | WEIGHT: 151 LBS

## 2019-04-19 DIAGNOSIS — J98.59 MEDIASTINAL MASS: ICD-10-CM

## 2019-04-19 DIAGNOSIS — R26.9 GAIT ABNORMALITY: ICD-10-CM
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
## 2019-04-19 DIAGNOSIS — R42 DIZZINESS: Primary | ICD-10-CM

## 2019-04-19 DIAGNOSIS — E87.1 HYPONATREMIA: ICD-10-CM

## 2019-04-19 PROCEDURE — 99495 TRANSJ CARE MGMT MOD F2F 14D: CPT | Performed by: INTERNAL MEDICINE

## 2019-05-26 ENCOUNTER — HOSPITAL ENCOUNTER (EMERGENCY)
Facility: HOSPITAL | Age: 84
Discharge: HOME | End: 2019-05-26
Admitting: EMERGENCY MEDICINE
Payer: MEDICARE

## 2019-05-26 ENCOUNTER — APPOINTMENT (EMERGENCY)
Dept: NON INVASIVE DIAGNOSTICS | Facility: HOSPITAL | Age: 84
End: 2019-05-26
Payer: MEDICARE

## 2019-05-26 VITALS
WEIGHT: 152.12 LBS | HEART RATE: 75 BPM | DIASTOLIC BLOOD PRESSURE: 82 MMHG | BODY MASS INDEX: 27.99 KG/M2 | OXYGEN SATURATION: 95 % | TEMPERATURE: 98 F | HEIGHT: 62 IN | RESPIRATION RATE: 18 BRPM | SYSTOLIC BLOOD PRESSURE: 152 MMHG

## 2019-05-26 DIAGNOSIS — R60.0 BILATERAL LOWER EXTREMITY EDEMA: Primary | ICD-10-CM

## 2019-05-26 DIAGNOSIS — R60.9 PERIPHERAL EDEMA: ICD-10-CM

## 2019-05-26 LAB
ALBUMIN SERPL BCP-MCNC: 4 G/DL (ref 3.5–5.7)
ALP SERPL-CCNC: 72 U/L (ref 55–165)
ALT SERPL W P-5'-P-CCNC: 17 U/L (ref 7–52)
ANION GAP SERPL CALCULATED.3IONS-SCNC: 7 MMOL/L (ref 4–13)
APTT PPP: 31 SECONDS (ref 26–38)
AST SERPL W P-5'-P-CCNC: 20 U/L (ref 13–39)
BASOPHILS # BLD AUTO: 0.1 THOUSANDS/ΜL (ref 0–0.1)
BASOPHILS NFR BLD AUTO: 1 % (ref 0–2)
BILIRUB SERPL-MCNC: 0.5 MG/DL (ref 0.2–1)
BUN SERPL-MCNC: 14 MG/DL (ref 7–25)
CALCIUM SERPL-MCNC: 9.6 MG/DL (ref 8.6–10.5)
CHLORIDE SERPL-SCNC: 101 MMOL/L (ref 98–107)
CO2 SERPL-SCNC: 26 MMOL/L (ref 21–31)
CREAT SERPL-MCNC: 0.98 MG/DL (ref 0.6–1.2)
DEPRECATED D DIMER PPP: 594 NG/ML (FEU)
EOSINOPHIL # BLD AUTO: 0.2 THOUSAND/ΜL (ref 0–0.61)
EOSINOPHIL NFR BLD AUTO: 2 % (ref 0–5)
ERYTHROCYTE [DISTWIDTH] IN BLOOD BY AUTOMATED COUNT: 12.9 % (ref 11.5–14.5)
GFR SERPL CREATININE-BSD FRML MDRD: 51 ML/MIN/1.73SQ M
GLUCOSE SERPL-MCNC: 108 MG/DL (ref 65–99)
HCT VFR BLD AUTO: 39.3 % (ref 42–47)
HGB BLD-MCNC: 13.7 G/DL (ref 12–16)
INR PPP: 1.12 (ref 0.9–1.5)
LYMPHOCYTES # BLD AUTO: 2 THOUSANDS/ΜL (ref 0.6–4.47)
LYMPHOCYTES NFR BLD AUTO: 23 % (ref 21–51)
MCH RBC QN AUTO: 30.2 PG (ref 26–34)
MCHC RBC AUTO-ENTMCNC: 34.9 G/DL (ref 31–37)
MCV RBC AUTO: 87 FL (ref 81–99)
MONOCYTES # BLD AUTO: 0.7 THOUSAND/ΜL (ref 0.17–1.22)
MONOCYTES NFR BLD AUTO: 8 % (ref 2–12)
NEUTROPHILS # BLD AUTO: 5.6 THOUSANDS/ΜL (ref 1.4–6.5)
NEUTS SEG NFR BLD AUTO: 66 % (ref 42–75)
PLATELET # BLD AUTO: 192 THOUSANDS/UL (ref 149–390)
PMV BLD AUTO: 7.6 FL (ref 8.6–11.7)
POTASSIUM SERPL-SCNC: 4.2 MMOL/L (ref 3.5–5.5)
PROT SERPL-MCNC: 6.8 G/DL (ref 6.4–8.9)
PROTHROMBIN TIME: 12.9 SECONDS (ref 10.2–13)
RBC # BLD AUTO: 4.54 MILLION/UL (ref 3.9–5.2)
SODIUM SERPL-SCNC: 134 MMOL/L (ref 134–143)
WBC # BLD AUTO: 8.6 THOUSAND/UL (ref 4.8–10.8)

## 2019-05-26 PROCEDURE — 93970 EXTREMITY STUDY: CPT | Performed by: SURGERY

## 2019-05-26 PROCEDURE — 99284 EMERGENCY DEPT VISIT MOD MDM: CPT

## 2019-05-26 PROCEDURE — 80053 COMPREHEN METABOLIC PANEL: CPT

## 2019-05-26 PROCEDURE — 85730 THROMBOPLASTIN TIME PARTIAL: CPT

## 2019-05-26 PROCEDURE — 93970 EXTREMITY STUDY: CPT

## 2019-05-26 PROCEDURE — 85025 COMPLETE CBC W/AUTO DIFF WBC: CPT

## 2019-05-26 PROCEDURE — 85379 FIBRIN DEGRADATION QUANT: CPT

## 2019-05-26 PROCEDURE — 36415 COLL VENOUS BLD VENIPUNCTURE: CPT

## 2019-05-26 PROCEDURE — 85610 PROTHROMBIN TIME: CPT

## 2019-05-26 RX ORDER — FUROSEMIDE 40 MG/1
20 TABLET ORAL ONCE
Status: COMPLETED | OUTPATIENT
Start: 2019-05-26 | End: 2019-05-26

## 2019-05-26 RX ORDER — HYDROCHLOROTHIAZIDE 25 MG/1
25 TABLET ORAL DAILY
Qty: 20 TABLET | Refills: 0 | Status: SHIPPED | OUTPATIENT
Start: 2019-05-26 | End: 2019-06-14 | Stop reason: SDUPTHER

## 2019-05-26 RX ADMIN — FUROSEMIDE 20 MG: 40 TABLET ORAL at 20:18

## 2019-05-28 DIAGNOSIS — I63.9 THALAMIC INFARCTION (HCC): ICD-10-CM

## 2019-05-28 DIAGNOSIS — I10 HYPERTENSION: ICD-10-CM

## 2019-05-28 RX ORDER — ASPIRIN 81 MG/1
81 TABLET, CHEWABLE ORAL DAILY
Qty: 90 TABLET | Refills: 1 | Status: SHIPPED | OUTPATIENT
Start: 2019-05-28 | End: 2019-11-25 | Stop reason: SDUPTHER

## 2019-05-28 RX ORDER — ATORVASTATIN CALCIUM 40 MG/1
40 TABLET, FILM COATED ORAL EVERY EVENING
Qty: 90 TABLET | Refills: 1 | Status: SHIPPED | OUTPATIENT
Start: 2019-05-28 | End: 2019-11-25 | Stop reason: SDUPTHER

## 2019-05-28 RX ORDER — AMLODIPINE BESYLATE 5 MG/1
5 TABLET ORAL DAILY
Qty: 90 TABLET | Refills: 1 | Status: SHIPPED | OUTPATIENT
Start: 2019-05-28 | End: 2019-11-25 | Stop reason: SDUPTHER

## 2019-06-14 DIAGNOSIS — R60.9 PERIPHERAL EDEMA: ICD-10-CM

## 2019-06-14 RX ORDER — HYDROCHLOROTHIAZIDE 25 MG/1
25 TABLET ORAL DAILY
Qty: 90 TABLET | Refills: 1 | Status: SHIPPED | OUTPATIENT
Start: 2019-06-14 | End: 2019-11-25 | Stop reason: SDUPTHER

## 2019-06-26 ENCOUNTER — OFFICE VISIT (OUTPATIENT)
Dept: INTERNAL MEDICINE CLINIC | Facility: CLINIC | Age: 84
End: 2019-06-26
Payer: MEDICARE

## 2019-06-26 VITALS
BODY MASS INDEX: 27.2 KG/M2 | OXYGEN SATURATION: 96 % | HEART RATE: 69 BPM | SYSTOLIC BLOOD PRESSURE: 118 MMHG | WEIGHT: 147.8 LBS | HEIGHT: 62 IN | TEMPERATURE: 98.3 F | DIASTOLIC BLOOD PRESSURE: 78 MMHG | RESPIRATION RATE: 18 BRPM

## 2019-06-26 DIAGNOSIS — R32 URINARY INCONTINENCE, UNSPECIFIED TYPE: ICD-10-CM

## 2019-06-26 DIAGNOSIS — Z23 ENCOUNTER FOR VACCINATION: ICD-10-CM

## 2019-06-26 DIAGNOSIS — M15.9 PRIMARY OSTEOARTHRITIS INVOLVING MULTIPLE JOINTS: ICD-10-CM

## 2019-06-26 DIAGNOSIS — H81.10 BENIGN PAROXYSMAL POSITIONAL VERTIGO, UNSPECIFIED LATERALITY: ICD-10-CM

## 2019-06-26 DIAGNOSIS — F32.A DEPRESSION, UNSPECIFIED DEPRESSION TYPE: ICD-10-CM

## 2019-06-26 DIAGNOSIS — I10 ESSENTIAL HYPERTENSION: Primary | ICD-10-CM

## 2019-06-26 DIAGNOSIS — E66.3 OVERWEIGHT (BMI 25.0-29.9): ICD-10-CM

## 2019-06-26 PROBLEM — R42 DIZZINESS: Status: RESOLVED | Noted: 2019-03-30 | Resolved: 2019-06-26

## 2019-06-26 PROCEDURE — 99213 OFFICE O/P EST LOW 20 MIN: CPT | Performed by: INTERNAL MEDICINE

## 2019-06-26 PROCEDURE — G0009 ADMIN PNEUMOCOCCAL VACCINE: HCPCS | Performed by: INTERNAL MEDICINE

## 2019-06-26 PROCEDURE — 1123F ACP DISCUSS/DSCN MKR DOCD: CPT | Performed by: INTERNAL MEDICINE

## 2019-06-26 PROCEDURE — 90670 PCV13 VACCINE IM: CPT | Performed by: INTERNAL MEDICINE

## 2019-06-28 ENCOUNTER — APPOINTMENT (OUTPATIENT)
Dept: LAB | Facility: CLINIC | Age: 84
End: 2019-06-28
Payer: MEDICARE

## 2019-06-28 LAB
CREAT UR-MCNC: 28.5 MG/DL
MICROALBUMIN UR-MCNC: <5 MG/L (ref 0–20)
MICROALBUMIN/CREAT 24H UR: <18 MG/G CREATININE (ref 0–30)

## 2019-06-28 PROCEDURE — 82043 UR ALBUMIN QUANTITATIVE: CPT | Performed by: INTERNAL MEDICINE

## 2019-06-28 PROCEDURE — 82570 ASSAY OF URINE CREATININE: CPT | Performed by: INTERNAL MEDICINE

## 2019-10-23 ENCOUNTER — OFFICE VISIT (OUTPATIENT)
Dept: INTERNAL MEDICINE CLINIC | Facility: CLINIC | Age: 84
End: 2019-10-23
Payer: MEDICARE

## 2019-10-23 VITALS
RESPIRATION RATE: 16 BRPM | HEIGHT: 62 IN | HEART RATE: 70 BPM | WEIGHT: 149 LBS | SYSTOLIC BLOOD PRESSURE: 102 MMHG | BODY MASS INDEX: 27.42 KG/M2 | TEMPERATURE: 97.1 F | DIASTOLIC BLOOD PRESSURE: 70 MMHG

## 2019-10-23 DIAGNOSIS — Z00.00 MEDICARE ANNUAL WELLNESS VISIT, SUBSEQUENT: ICD-10-CM

## 2019-10-23 DIAGNOSIS — I10 ESSENTIAL HYPERTENSION: Primary | ICD-10-CM

## 2019-10-23 DIAGNOSIS — R60.0 BILATERAL LOWER EXTREMITY EDEMA: ICD-10-CM

## 2019-10-23 DIAGNOSIS — Z13.220 SCREENING FOR LIPID DISORDERS: ICD-10-CM

## 2019-10-23 DIAGNOSIS — F32.A DEPRESSION, UNSPECIFIED DEPRESSION TYPE: ICD-10-CM

## 2019-10-23 DIAGNOSIS — Z13.31 NEGATIVE DEPRESSION SCREENING: ICD-10-CM

## 2019-10-23 DIAGNOSIS — M15.9 PRIMARY OSTEOARTHRITIS INVOLVING MULTIPLE JOINTS: ICD-10-CM

## 2019-10-23 DIAGNOSIS — H81.10 BENIGN PAROXYSMAL POSITIONAL VERTIGO, UNSPECIFIED LATERALITY: ICD-10-CM

## 2019-10-23 DIAGNOSIS — L30.9 DERMATITIS: ICD-10-CM

## 2019-10-23 PROCEDURE — G0439 PPPS, SUBSEQ VISIT: HCPCS | Performed by: INTERNAL MEDICINE

## 2019-10-23 PROCEDURE — 99214 OFFICE O/P EST MOD 30 MIN: CPT | Performed by: INTERNAL MEDICINE

## 2019-10-23 RX ORDER — TRIAMCINOLONE ACETONIDE 5 MG/G
CREAM TOPICAL 3 TIMES DAILY
Qty: 30 G | Refills: 0 | Status: SHIPPED | OUTPATIENT
Start: 2019-10-23 | End: 2019-12-07 | Stop reason: SDUPTHER

## 2019-10-23 NOTE — PATIENT INSTRUCTIONS
Medicare Preventive Visit Patient Instructions  Thank you for completing your Welcome to Medicare Visit or Medicare Annual Wellness Visit today  Your next wellness visit will be due in one year (10/23/2020)  The screening/preventive services that you may require over the next 5-10 years are detailed below  Some tests may not apply to you based off risk factors and/or age  Screening tests ordered at today's visit but not completed yet may show as past due  Also, please note that scanned in results may not display below  Preventive Screenings:  Service Recommendations Previous Testing/Comments   Colorectal Cancer Screening  * Colonoscopy    * Fecal Occult Blood Test (FOBT)/Fecal Immunochemical Test (FIT)  * Fecal DNA/Cologuard Test  * Flexible Sigmoidoscopy Age: 54-65 years old   Colonoscopy: every 10 years (may be performed more frequently if at higher risk)  OR  FOBT/FIT: every 1 year  OR  Cologuard: every 3 years  OR  Sigmoidoscopy: every 5 years  Screening may be recommended earlier than age 48 if at higher risk for colorectal cancer  Also, an individualized decision between you and your healthcare provider will decide whether screening between the ages of 74-80 would be appropriate  Colonoscopy: Not on file  FOBT/FIT: Not on file  Cologuard: Not on file  Sigmoidoscopy: Not on file    Screening Not Indicated     Breast Cancer Screening Age: 36 years old  Frequency: every 1-2 years  Not required if history of left and right mastectomy Mammogram: Not on file       Cervical Cancer Screening Between the ages of 21-29, pap smear recommended once every 3 years  Between the ages of 33-67, can perform pap smear with HPV co-testing every 5 years     Recommendations may differ for women with a history of total hysterectomy, cervical cancer, or abnormal pap smears in past  Pap Smear: Not on file    Screening Not Indicated   Hepatitis C Screening Once for adults born between 1945 and 1965  More frequently in patients at high risk for Hepatitis C Hep C Antibody: Not on file       Diabetes Screening 1-2 times per year if you're at risk for diabetes or have pre-diabetes Fasting glucose: 99 mg/dL   A1C: 5 6 %    Screening Current   Cholesterol Screening Once every 5 years if you don't have a lipid disorder  May order more often based on risk factors  Lipid panel: 03/31/2019    Screening Not Indicated  History Lipid Disorder     Other Preventive Screenings Covered by Medicare:  1  Abdominal Aortic Aneurysm (AAA) Screening: covered once if your at risk  You're considered to be at risk if you have a family history of AAA  2  Lung Cancer Screening: covers low dose CT scan once per year if you meet all of the following conditions: (1) Age 50-69; (2) No signs or symptoms of lung cancer; (3) Current smoker or have quit smoking within the last 15 years; (4) You have a tobacco smoking history of at least 30 pack years (packs per day multiplied by number of years you smoked); (5) You get a written order from a healthcare provider  3  Glaucoma Screening: covered annually if you're considered high risk: (1) You have diabetes OR (2) Family history of glaucoma OR (3)  aged 48 and older OR (3)  American aged 72 and older  3  Osteoporosis Screening: covered every 2 years if you meet one of the following conditions: (1) You're estrogen deficient and at risk for osteoporosis based off medical history and other findings; (2) Have a vertebral abnormality; (3) On glucocorticoid therapy for more than 3 months; (4) Have primary hyperparathyroidism; (5) On osteoporosis medications and need to assess response to drug therapy  · Last bone density test (DXA Scan): Not on file  5  HIV Screening: covered annually if you're between the age of 12-76  Also covered annually if you are younger than 13 and older than 72 with risk factors for HIV infection   For pregnant patients, it is covered up to 3 times per pregnancy  Immunizations:  Immunization Recommendations   Influenza Vaccine Annual influenza vaccination during flu season is recommended for all persons aged >= 6 months who do not have contraindications   Pneumococcal Vaccine (Prevnar and Pneumovax)  * Prevnar = PCV13  * Pneumovax = PPSV23   Adults 25-60 years old: 1-3 doses may be recommended based on certain risk factors  Adults 72 years old: Prevnar (PCV13) vaccine recommended followed by Pneumovax (PPSV23) vaccine  If already received PPSV23 since turning 65, then PCV13 recommended at least one year after PPSV23 dose  Hepatitis B Vaccine 3 dose series if at intermediate or high risk (ex: diabetes, end stage renal disease, liver disease)   Tetanus (Td) Vaccine - COST NOT COVERED BY MEDICARE PART B Following completion of primary series, a booster dose should be given every 10 years to maintain immunity against tetanus  Td may also be given as tetanus wound prophylaxis  Tdap Vaccine - COST NOT COVERED BY MEDICARE PART B Recommended at least once for all adults  For pregnant patients, recommended with each pregnancy  Shingles Vaccine (Shingrix) - COST NOT COVERED BY MEDICARE PART B  2 shot series recommended in those aged 48 and above     Health Maintenance Due:  There are no preventive care reminders to display for this patient  Immunizations Due:      Topic Date Due    DTaP,Tdap,and Td Vaccines (1 - Tdap) 11/18/2011     Advance Directives   What are advance directives? Advance directives are legal documents that state your wishes and plans for medical care  These plans are made ahead of time in case you lose your ability to make decisions for yourself  Advance directives can apply to any medical decision, such as the treatments you want, and if you want to donate organs  What are the types of advance directives? There are many types of advance directives, and each state has rules about how to use them   You may choose a combination of any of the following:  · Living will: This is a written record of the treatment you want  You can also choose which treatments you do not want, which to limit, and which to stop at a certain time  This includes surgery, medicine, IV fluid, and tube feedings  · Durable power of  for healthcare [facility]): This is a written record that states who you want to make healthcare choices for you when you are unable to make them for yourself  This person, called a proxy, is usually a family member or a friend  You may choose more than 1 proxy  · Do not resuscitate (DNR) order:  A DNR order is used in case your heart stops beating or you stop breathing  It is a request not to have certain forms of treatment, such as CPR  A DNR order may be included in other types of advance directives  · Medical directive: This covers the care that you want if you are in a coma, near death, or unable to make decisions for yourself  You can list the treatments you want for each condition  Treatment may include pain medicine, surgery, blood transfusions, dialysis, IV or tube feedings, and a ventilator (breathing machine)  · Values history: This document has questions about your views, beliefs, and how you feel and think about life  This information can help others choose the care that you would choose  Why are advance directives important? An advance directive helps you control your care  Although spoken wishes may be used, it is better to have your wishes written down  Spoken wishes can be misunderstood, or not followed  Treatments may be given even if you do not want them  An advance directive may make it easier for your family to make difficult choices about your care  Urinary Incontinence   Urinary incontinence (UI)  is when you lose control of your bladder  UI develops because your bladder cannot store or empty urine properly  The 3 most common types of UI are stress incontinence, urge incontinence, or both    Medicines:   · May be given to help strengthen your bladder control  Report any side effects of medication to your healthcare provider  Do pelvic muscle exercises often:  Your pelvic muscles help you stop urinating  Squeeze these muscles tight for 5 seconds, then relax for 5 seconds  Gradually work up to squeezing for 10 seconds  Do 3 sets of 15 repetitions a day, or as directed  This will help strengthen your pelvic muscles and improve bladder control  Train your bladder:  Go to the bathroom at set times, such as every 2 hours, even if you do not feel the urge to go  You can also try to hold your urine when you feel the urge to go  For example, hold your urine for 5 minutes when you feel the urge to go  As that becomes easier, hold your urine for 10 minutes  Self-care:   · Keep a UI record  Write down how often you leak urine and how much you leak  Make a note of what you were doing when you leaked urine  · Drink liquids as directed  You may need to limit the amount of liquid you drink to help control your urine leakage  Do not drink any liquid right before you go to bed  Limit or do not have drinks that contain caffeine or alcohol  · Prevent constipation  Eat a variety of high-fiber foods  Good examples are high-fiber cereals, beans, vegetables, and whole-grain breads  Walking is the best way to trigger your intestines to have a bowel movement  · Exercise regularly and maintain a healthy weight  Weight loss and exercise will decrease pressure on your bladder and help you control your leakage  · Use a catheter as directed  to help empty your bladder  A catheter is a tiny, plastic tube that is put into your bladder to drain your urine  · Go to behavior therapy as directed  Behavior therapy may be used to help you learn to control your urge to urinate      Weight Management   Why it is important to manage your weight:  Being overweight increases your risk of health conditions such as heart disease, high blood pressure, type 2 diabetes, and certain types of cancer  It can also increase your risk for osteoarthritis, sleep apnea, and other respiratory problems  Aim for a slow, steady weight loss  Even a small amount of weight loss can lower your risk of health problems  How to lose weight safely:  A safe and healthy way to lose weight is to eat fewer calories and get regular exercise  You can lose up about 1 pound a week by decreasing the number of calories you eat by 500 calories each day  Healthy meal plan for weight management:  A healthy meal plan includes a variety of foods, contains fewer calories, and helps you stay healthy  A healthy meal plan includes the following:  · Eat whole-grain foods more often  A healthy meal plan should contain fiber  Fiber is the part of grains, fruits, and vegetables that is not broken down by your body  Whole-grain foods are healthy and provide extra fiber in your diet  Some examples of whole-grain foods are whole-wheat breads and pastas, oatmeal, brown rice, and bulgur  · Eat a variety of vegetables every day  Include dark, leafy greens such as spinach, kale, ja greens, and mustard greens  Eat yellow and orange vegetables such as carrots, sweet potatoes, and winter squash  · Eat a variety of fruits every day  Choose fresh or canned fruit (canned in its own juice or light syrup) instead of juice  Fruit juice has very little or no fiber  · Eat low-fat dairy foods  Drink fat-free (skim) milk or 1% milk  Eat fat-free yogurt and low-fat cottage cheese  Try low-fat cheeses such as mozzarella and other reduced-fat cheeses  · Choose meat and other protein foods that are low in fat  Choose beans or other legumes such as split peas or lentils  Choose fish, skinless poultry (chicken or turkey), or lean cuts of red meat (beef or pork)  Before you cook meat or poultry, cut off any visible fat  · Use less fat and oil  Try baking foods instead of frying them   Add less fat, such as margarine, sour cream, regular salad dressing and mayonnaise to foods  Eat fewer high-fat foods  Some examples of high-fat foods include french fries, doughnuts, ice cream, and cakes  · Eat fewer sweets  Limit foods and drinks that are high in sugar  This includes candy, cookies, regular soda, and sweetened drinks  Exercise:  Exercise at least 30 minutes per day on most days of the week  Some examples of exercise include walking, biking, dancing, and swimming  You can also fit in more physical activity by taking the stairs instead of the elevator or parking farther away from stores  Ask your healthcare provider about the best exercise plan for you  © Copyright [employer] 2018 Information is for End User's use only and may not be sold, redistributed or otherwise used for commercial purposes  All illustrations and images included in CareNotes® are the copyrighted property of [employer]  or [facility] Isaiah Alford   Chronic Hypertension   AMBULATORY CARE:   Hypertension  is high blood pressure (BP)  Your BP is the force of your blood moving against the walls of your arteries  Normal BP is less than 120/80  Prehypertension is between 120/80 and 139/89  Hypertension is 140/90 or higher  Hypertension causes your BP to get so high that your heart has to work much harder than normal  This can damage your heart  Chronic hypertension is a long-term condition that you can control with a healthy lifestyle or medicines  A controlled blood pressure helps protect your organs, such as your heart, lungs, brain, and kidneys  Common symptoms include the following:   · Headache     · Blurred vision    · Chest pain     · Dizziness or weakness     · Trouble breathing     · Nosebleeds  Call 911 for any of the following:   · You have discomfort in your chest that feels like squeezing, pressure, fullness, or pain  · You become confused or have difficulty speaking  · You suddenly feel lightheaded or have trouble breathing      · You have pain or discomfort in your back, neck, jaw, stomach, or arm  Seek care immediately if:   · You have a severe headache or vision loss  · You have weakness in an arm or leg  Contact your healthcare provider if:   · You feel faint, dizzy, confused, or drowsy  · You have been taking your BP medicine and your BP is still higher than your healthcare provider says it should be  · You have questions or concerns about your condition or care  Treatment for chronic hypertension  may include medicine to lower your BP and lower your cholesterol level  A low cholesterol level helps prevent heart disease and makes it easier to control your blood pressure  Heart disease can make your blood pressure harder to control  You may also need to make lifestyle changes  Take your medicine exactly as directed  Manage chronic hypertension:  Talk with your healthcare provider about these and other ways to manage hypertension:  · Take your BP at home  Sit and rest for 5 minutes before you take your BP  Extend your arm and support it on a flat surface  Your arm should be at the same level as your heart  Follow the directions that came with your BP monitor  If possible, take at least 2 BP readings each time  Take your BP at least twice a day at the same times each day, such as morning and evening  Keep a record of your BP readings and bring it to your follow-up visits  Ask your healthcare provider what your blood pressure should be  · Limit sodium (salt) as directed  Too much sodium can affect your fluid balance  Check labels to find low-sodium or no-salt-added foods  Some low-sodium foods use potassium salts for flavor  Too much potassium can also cause health problems  Your healthcare provider will tell you how much sodium and potassium are safe for you to have in a day  He or she may recommend that you limit sodium to 2,300 mg a day  · Follow the meal plan recommended by your healthcare provider    A dietitian or your provider can give you more information on low-sodium plans or the DASH (Dietary Approaches to Stop Hypertension) eating plan  The DASH plan is low in sodium, unhealthy fats, and total fat  It is high in potassium, calcium, and fiber  · Exercise to maintain a healthy weight  Exercise at least 30 minutes per day, on most days of the week  This will help decrease your blood pressure  Ask about the best exercise plan for you  · Decrease stress  This may help lower your BP  Learn ways to relax, such as deep breathing or listening to music  · Limit alcohol  Women should limit alcohol to 1 drink a day  Men should limit alcohol to 2 drinks a day  A drink of alcohol is 12 ounces of beer, 5 ounces of wine, or 1½ ounces of liquor  · Do not smoke  Nicotine and other chemicals in cigarettes and cigars can increase your BP and also cause lung damage  Ask your healthcare provider for information if you currently smoke and need help to quit  E-cigarettes or smokeless tobacco still contain nicotine  Talk to your healthcare provider before you use these products  Follow up with your healthcare provider as directed: You will need to return to have your BP checked and to have other lab tests done  Write down your questions so you remember to ask them during your visits  © 2017 [address] Information is for End User's use only and may not be sold, redistributed or otherwise used for commercial purposes  All illustrations and images included in CareNotes® are the copyrighted property of [employer]  or Jason Atkins  The above information is an  only  It is not intended as medical advice for individual conditions or treatments  Talk to your doctor, nurse or pharmacist before following any medical regimen to see if it is safe and effective for you      Medicare Preventive Visit Patient Instructions  Thank you for completing your Welcome to Medicare Visit or Medicare Annual Wellness Visit today  Your next wellness visit will be due in one year (10/23/2020)  The screening/preventive services that you may require over the next 5-10 years are detailed below  Some tests may not apply to you based off risk factors and/or age  Screening tests ordered at today's visit but not completed yet may show as past due  Also, please note that scanned in results may not display below  Preventive Screenings:  Service Recommendations Previous Testing/Comments   Colorectal Cancer Screening  * Colonoscopy    * Fecal Occult Blood Test (FOBT)/Fecal Immunochemical Test (FIT)  * Fecal DNA/Cologuard Test  * Flexible Sigmoidoscopy Age: 54-65 years old   Colonoscopy: every 10 years (may be performed more frequently if at higher risk)  OR  FOBT/FIT: every 1 year  OR  Cologuard: every 3 years  OR  Sigmoidoscopy: every 5 years  Screening may be recommended earlier than age 48 if at higher risk for colorectal cancer  Also, an individualized decision between you and your healthcare provider will decide whether screening between the ages of 74-80 would be appropriate  Colonoscopy: Not on file  FOBT/FIT: Not on file  Cologuard: Not on file  Sigmoidoscopy: Not on file    Screening Not Indicated     Breast Cancer Screening Age: 36 years old  Frequency: every 1-2 years  Not required if history of left and right mastectomy Mammogram: Not on file       Cervical Cancer Screening Between the ages of 21-29, pap smear recommended once every 3 years  Between the ages of 33-67, can perform pap smear with HPV co-testing every 5 years     Recommendations may differ for women with a history of total hysterectomy, cervical cancer, or abnormal pap smears in past  Pap Smear: Not on file    Screening Not Indicated   Hepatitis C Screening Once for adults born between [facility]  More frequently in patients at high risk for Hepatitis C Hep C Antibody: Not on file       Diabetes Screening 1-2 times per year if you're at risk for diabetes or have pre-diabetes Fasting glucose: 99 mg/dL   A1C: 5 6 %    Screening Current   Cholesterol Screening Once every 5 years if you don't have a lipid disorder  May order more often based on risk factors  Lipid panel: 03/31/2019    Screening Not Indicated  History Lipid Disorder     Other Preventive Screenings Covered by Medicare:  6  Abdominal Aortic Aneurysm (AAA) Screening: covered once if your at risk  You're considered to be at risk if you have a family history of AAA  7  Lung Cancer Screening: covers low dose CT scan once per year if you meet all of the following conditions: (1) Age 50-69; (2) No signs or symptoms of lung cancer; (3) Current smoker or have quit smoking within the last 15 years; (4) You have a tobacco smoking history of at least 30 pack years (packs per day multiplied by number of years you smoked); (5) You get a written order from a healthcare provider  8  Glaucoma Screening: covered annually if you're considered high risk: (1) You have diabetes OR (2) Family history of glaucoma OR (3)  aged 48 and older OR (3)  American aged 72 and older  5  Osteoporosis Screening: covered every 2 years if you meet one of the following conditions: (1) You're estrogen deficient and at risk for osteoporosis based off medical history and other findings; (2) Have a vertebral abnormality; (3) On glucocorticoid therapy for more than 3 months; (4) Have primary hyperparathyroidism; (5) On osteoporosis medications and need to assess response to drug therapy  · Last bone density test (DXA Scan): Not on file  10  HIV Screening: covered annually if you're between the age of 12-76  Also covered annually if you are younger than 13 and older than 72 with risk factors for HIV infection  For pregnant patients, it is covered up to 3 times per pregnancy      Immunizations:  Immunization Recommendations   Influenza Vaccine Annual influenza vaccination during flu season is recommended for all persons aged >= 6 months who do not have contraindications   Pneumococcal Vaccine (Prevnar and Pneumovax)  * Prevnar = PCV13  * Pneumovax = PPSV23   Adults 25-60 years old: 1-3 doses may be recommended based on certain risk factors  Adults 72 years old: Prevnar (PCV13) vaccine recommended followed by Pneumovax (PPSV23) vaccine  If already received PPSV23 since turning 65, then PCV13 recommended at least one year after PPSV23 dose  Hepatitis B Vaccine 3 dose series if at intermediate or high risk (ex: diabetes, end stage renal disease, liver disease)   Tetanus (Td) Vaccine - COST NOT COVERED BY MEDICARE PART B Following completion of primary series, a booster dose should be given every 10 years to maintain immunity against tetanus  Td may also be given as tetanus wound prophylaxis  Tdap Vaccine - COST NOT COVERED BY MEDICARE PART B Recommended at least once for all adults  For pregnant patients, recommended with each pregnancy  Shingles Vaccine (Shingrix) - COST NOT COVERED BY MEDICARE PART B  2 shot series recommended in those aged 48 and above     Health Maintenance Due:  There are no preventive care reminders to display for this patient  Immunizations Due:      Topic Date Due    DTaP,Tdap,and Td Vaccines (1 - Tdap) 11/18/2011     Advance Directives   What are advance directives? Advance directives are legal documents that state your wishes and plans for medical care  These plans are made ahead of time in case you lose your ability to make decisions for yourself  Advance directives can apply to any medical decision, such as the treatments you want, and if you want to donate organs  What are the types of advance directives? There are many types of advance directives, and each state has rules about how to use them  You may choose a combination of any of the following:  · Living will: This is a written record of the treatment you want   You can also choose which treatments you do not want, which to limit, and which to stop at a certain time  This includes surgery, medicine, IV fluid, and tube feedings  · Durable power of  for healthcare [place] [facility]): This is a written record that states who you want to make healthcare choices for you when you are unable to make them for yourself  This person, called a proxy, is usually a family member or a friend  You may choose more than 1 proxy  · Do not resuscitate (DNR) order:  A DNR order is used in case your heart stops beating or you stop breathing  It is a request not to have certain forms of treatment, such as CPR  A DNR order may be included in other types of advance directives  · Medical directive: This covers the care that you want if you are in a coma, near death, or unable to make decisions for yourself  You can list the treatments you want for each condition  Treatment may include pain medicine, surgery, blood transfusions, dialysis, IV or tube feedings, and a ventilator (breathing machine)  · Values history: This document has questions about your views, beliefs, and how you feel and think about life  This information can help others choose the care that you would choose  Why are advance directives important? An advance directive helps you control your care  Although spoken wishes may be used, it is better to have your wishes written down  Spoken wishes can be misunderstood, or not followed  Treatments may be given even if you do not want them  An advance directive may make it easier for your family to make difficult choices about your care  Urinary Incontinence   Urinary incontinence (UI)  is when you lose control of your bladder  UI develops because your bladder cannot store or empty urine properly  The 3 most common types of UI are stress incontinence, urge incontinence, or both  Medicines:   · May be given to help strengthen your bladder control  Report any side effects of medication to your healthcare provider    Do pelvic muscle exercises often:  Your pelvic muscles help you stop urinating  Squeeze these muscles tight for 5 seconds, then relax for 5 seconds  Gradually work up to squeezing for 10 seconds  Do 3 sets of 15 repetitions a day, or as directed  This will help strengthen your pelvic muscles and improve bladder control  Train your bladder:  Go to the bathroom at set times, such as every 2 hours, even if you do not feel the urge to go  You can also try to hold your urine when you feel the urge to go  For example, hold your urine for 5 minutes when you feel the urge to go  As that becomes easier, hold your urine for 10 minutes  Self-care:   · Keep a UI record  Write down how often you leak urine and how much you leak  Make a note of what you were doing when you leaked urine  · Drink liquids as directed  You may need to limit the amount of liquid you drink to help control your urine leakage  Do not drink any liquid right before you go to bed  Limit or do not have drinks that contain caffeine or alcohol  · Prevent constipation  Eat a variety of high-fiber foods  Good examples are high-fiber cereals, beans, vegetables, and whole-grain breads  Walking is the best way to trigger your intestines to have a bowel movement  · Exercise regularly and maintain a healthy weight  Weight loss and exercise will decrease pressure on your bladder and help you control your leakage  · Use a catheter as directed  to help empty your bladder  A catheter is a tiny, plastic tube that is put into your bladder to drain your urine  · Go to behavior therapy as directed  Behavior therapy may be used to help you learn to control your urge to urinate  Weight Management   Why it is important to manage your weight:  Being overweight increases your risk of health conditions such as heart disease, high blood pressure, type 2 diabetes, and certain types of cancer   It can also increase your risk for osteoarthritis, sleep apnea, and other respiratory problems  Aim for a slow, steady weight loss  Even a small amount of weight loss can lower your risk of health problems  How to lose weight safely:  A safe and healthy way to lose weight is to eat fewer calories and get regular exercise  You can lose up about 1 pound a week by decreasing the number of calories you eat by 500 calories each day  Healthy meal plan for weight management:  A healthy meal plan includes a variety of foods, contains fewer calories, and helps you stay healthy  A healthy meal plan includes the following:  · Eat whole-grain foods more often  A healthy meal plan should contain fiber  Fiber is the part of grains, fruits, and vegetables that is not broken down by your body  Whole-grain foods are healthy and provide extra fiber in your diet  Some examples of whole-grain foods are whole-wheat breads and pastas, oatmeal, brown rice, and bulgur  · Eat a variety of vegetables every day  Include dark, leafy greens such as spinach, kale, ja greens, and mustard greens  Eat yellow and orange vegetables such as carrots, sweet potatoes, and winter squash  · Eat a variety of fruits every day  Choose fresh or canned fruit (canned in its own juice or light syrup) instead of juice  Fruit juice has very little or no fiber  · Eat low-fat dairy foods  Drink fat-free (skim) milk or 1% milk  Eat fat-free yogurt and low-fat cottage cheese  Try low-fat cheeses such as mozzarella and other reduced-fat cheeses  · Choose meat and other protein foods that are low in fat  Choose beans or other legumes such as split peas or lentils  Choose fish, skinless poultry (chicken or turkey), or lean cuts of red meat (beef or pork)  Before you cook meat or poultry, cut off any visible fat  · Use less fat and oil  Try baking foods instead of frying them  Add less fat, such as margarine, sour cream, regular salad dressing and mayonnaise to foods  Eat fewer high-fat foods   Some examples of high-fat foods include french fries, doughnuts, ice cream, and cakes  · Eat fewer sweets  Limit foods and drinks that are high in sugar  This includes candy, cookies, regular soda, and sweetened drinks  Exercise:  Exercise at least 30 minutes per day on most days of the week  Some examples of exercise include walking, biking, dancing, and swimming  You can also fit in more physical activity by taking the stairs instead of the elevator or parking farther away from stores  Ask your healthcare provider about the best exercise plan for you  © Copyright [employer] 2018 Information is for End User's use only and may not be sold, redistributed or otherwise used for commercial purposes   All illustrations and images included in CareNotes® are the copyrighted property of A [employer]  or [address] [place], [place] [address]svetlana bassam

## 2019-10-23 NOTE — PROGRESS NOTES
Assessment/Plan:  Problem List Items Addressed This Visit        Cardiovascular and Mediastinum    Essential hypertension - Primary    Relevant Orders    Comprehensive metabolic panel       Nervous and Auditory    Benign paroxysmal positional vertigo       Musculoskeletal and Integument    Osteoarthritis       Other    Depression    Bilateral lower extremity edema      Other Visit Diagnoses     Negative depression screening        Medicare annual wellness visit, subsequent        Screening for lipid disorders        Relevant Orders    LDL cholesterol, direct    Triglycerides    Dermatitis        Relevant Medications    triamcinolone (KENALOG) 0 5 % cream           Diagnoses and all orders for this visit:    Essential hypertension  -     Comprehensive metabolic panel; Future    Benign paroxysmal positional vertigo, unspecified laterality    Primary osteoarthritis involving multiple joints    Depression, unspecified depression type    Negative depression screening    Medicare annual wellness visit, subsequent    Screening for lipid disorders  -     LDL cholesterol, direct; Future  -     Triglycerides; Future    Bilateral lower extremity edema    Dermatitis  -     triamcinolone (KENALOG) 0 5 % cream; Apply topically 3 (three) times a day        No problem-specific Assessment & Plan notes found for this encounter  A/P: Doing well and labs and vaccines are up to date  No s/s of CHF and suspect all dependent  Will try topical steroids and OTC moisturizer clean  Continue current treatment and RTC four months for routine  Subjective:      Patient ID: Kimmie Sosa is a 80 y o  female  WF RTC with her relative for f/u HTN, BPV, etc  Doing well and no new c/o 's except for a rash on the chest that is itchy for the past several months  NO treatment and no exposures  NO treatment  Remains active w/o difficulty and goes to the senior center daily  NO falls  Uses a walker  DJD pain is controlled   No BPV s/s recently  Hearing no worse  Edema the same and better upon awakening in the AM  Some depression when she thinks about younger family members that have passed before  Already had her flu shot and labs are up to date  The following portions of the patient's history were reviewed and updated as appropriate:   She has a past medical history of Arthritis, Cataract, Depression, Dislocation of left shoulder joint, Essential hypertension (4/3/2019), Gait disorder, Herpes zoster, HL (hearing loss), Impacted cerumen of both ears, Impacted cerumen of right ear, Lightheadedness, Multiple falls, Near syncope, and Postherpetic polyneuropathy  ,  does not have any pertinent problems on file  ,   has a past surgical history that includes Hernia repair; Tubal ligation; Cataract extraction, bilateral; and Tonsillectomy  ,  family history includes Cirrhosis in her father; Heart disease in her son; Mental illness in her son ,   reports that she has never smoked  She has never used smokeless tobacco  She reports that she does not drink alcohol or use drugs  ,  has No Known Allergies     Current Outpatient Medications   Medication Sig Dispense Refill    acetaminophen (TYLENOL) 325 mg tablet Take 2 tablets (650 mg total) by mouth every 6 (six) hours as needed for mild pain 30 tablet 0    amLODIPine (NORVASC) 5 mg tablet Take 1 tablet (5 mg total) by mouth daily 90 tablet 1    aspirin 81 mg chewable tablet Chew 1 tablet (81 mg total) daily 90 tablet 1    atorvastatin (LIPITOR) 40 mg tablet Take 1 tablet (40 mg total) by mouth every evening 90 tablet 1    hydrochlorothiazide (HYDRODIURIL) 25 mg tablet Take 1 tablet (25 mg total) by mouth daily 90 tablet 1    triamcinolone (KENALOG) 0 5 % cream Apply topically 3 (three) times a day 30 g 0     No current facility-administered medications for this visit  Review of Systems   Constitutional: Negative for activity change, chills, diaphoresis, fatigue and fever  HENT: Negative  Eyes: Negative for visual disturbance  Respiratory: Negative for cough, chest tightness, shortness of breath and wheezing  Cardiovascular: Negative for chest pain, palpitations and leg swelling  Gastrointestinal: Negative for abdominal pain, constipation, diarrhea, nausea and vomiting  Endocrine: Negative for cold intolerance and heat intolerance  Genitourinary: Negative for difficulty urinating, dysuria and frequency  Musculoskeletal: Negative for arthralgias, gait problem and myalgias  Skin: Positive for rash  Neurological: Negative for dizziness, seizures, syncope, weakness, light-headedness and headaches  Psychiatric/Behavioral: Negative for confusion, dysphoric mood, sleep disturbance and suicidal ideas  The patient is not nervous/anxious  PHQ-9 Depression Screening    PHQ-9:    Frequency of the following problems over the past two weeks:       Little interest or pleasure in doing things:  0 - not at all  Feeling down, depressed, or hopeless:  0 - not at all  PHQ-2 Score:  0        Objective:  Vitals:    10/23/19 1416   BP: 102/70   Pulse: 70   Resp: 16   Temp: (!) 97 1 °F (36 2 °C)   TempSrc: Tympanic   Weight: 67 6 kg (149 lb)   Height: 5' 2" (1 575 m)     Body mass index is 27 25 kg/m²  Physical Exam   Constitutional: She is oriented to person, place, and time  She appears well-developed and well-nourished  No distress  HENT:   Head: Normocephalic and atraumatic  Mouth/Throat: Oropharynx is clear and moist    Eyes: Pupils are equal, round, and reactive to light  Conjunctivae and EOM are normal    Neck: Neck supple  No JVD present  Cardiovascular: Normal rate, regular rhythm and normal heart sounds  Pulmonary/Chest: Effort normal and breath sounds normal  No respiratory distress  She has no wheezes  She has no rales  Abdominal: Soft  Bowel sounds are normal  She exhibits no distension  There is no tenderness     Musculoskeletal: She exhibits edema (bilat leg edema 1-2/4  )  Neurological: She is alert and oriented to person, place, and time  Skin: Rash (Dry and scaly patches on the chest  ) noted  Psychiatric: She has a normal mood and affect  Her behavior is normal  Judgment and thought content normal    Nursing note and vitals reviewed

## 2019-10-23 NOTE — PROGRESS NOTES
Assessment and Plan:     Problem List Items Addressed This Visit        Cardiovascular and Mediastinum    Essential hypertension - Primary    Relevant Orders    Comprehensive metabolic panel       Nervous and Auditory    Benign paroxysmal positional vertigo       Musculoskeletal and Integument    Osteoarthritis       Other    Depression    Bilateral lower extremity edema      Other Visit Diagnoses     Negative depression screening        Medicare annual wellness visit, subsequent        Screening for lipid disorders        Relevant Orders    LDL cholesterol, direct    Triglycerides    Dermatitis        Relevant Medications    triamcinolone (KENALOG) 0 5 % cream           Preventive health issues were discussed with patient, and age appropriate screening tests were ordered as noted in patient's After Visit Summary  Personalized health advice and appropriate referrals for health education or preventive services given if needed, as noted in patient's After Visit Summary       History of Present Illness:     Patient presents for Medicare Annual Wellness visit    Patient Care Team:  Christy Canales DO as PCP - General (Internal Medicine)     Problem List:     Patient Active Problem List   Diagnosis    Depression    Hearing loss    Gait abnormality    Osteoarthritis    Mediastinal mass    Spinal stenosis at L4-L5 level    Essential hypertension    Bilateral lower extremity edema    Benign paroxysmal positional vertigo    Urinary incontinence      Past Medical and Surgical History:     Past Medical History:   Diagnosis Date    Arthritis     Cataract     LAST ASSESSED: [id number]    Depression     Dislocation of left shoulder joint     LAST ASSESSSED: [id number]    Essential hypertension 4/3/2019    Gait disorder     LAST ASSESSED: [id number]    Herpes zoster     LAST ASSESSED: [id number]    HL (hearing loss)     Impacted cerumen of both ears     LAST ASSESSED: [id number]    Impacted cerumen of right ear LAST ASSESSED: [id number]    Lightheadedness     MILD AND PT REFUSES EKG ETC  PT PROMISES TO CALL IF WORSENS  LAST ASSESSED: [id number]    Multiple falls     LAST ASSESSED: [id number]    Near syncope     LAST ASSESSED: [id number]    Postherpetic polyneuropathy     LAST ASSESSED: [id number]     Past Surgical History:   Procedure Laterality Date    CATARACT EXTRACTION, BILATERAL      HERNIA REPAIR      TONSILLECTOMY      TUBAL LIGATION        Family History:     Family History   Problem Relation Age of Onset    Cirrhosis Father     Heart disease Son     Mental illness Son       Social History:     Social History     Socioeconomic History    Marital status:       Spouse name: None    Number of children: None    Years of education: None    Highest education level: None   Occupational History    Occupation: retired   Social Needs    Financial resource strain: None    Food insecurity:     Worry: None     Inability: None    Transportation needs:     Medical: None     Non-medical: None   Tobacco Use    Smoking status: Never Smoker    Smokeless tobacco: Never Used   Substance and Sexual Activity    Alcohol use: Never     Frequency: Never    Drug use: No    Sexual activity: Not Currently   Lifestyle    Physical activity:     Days per week: None     Minutes per session: None    Stress: None   Relationships    Social connections:     Talks on phone: None     Gets together: None     Attends Denominational service: None     Active member of club or organization: None     Attends meetings of clubs or organizations: None     Relationship status: None    Intimate partner violence:     Fear of current or ex partner: None     Emotionally abused: None     Physically abused: None     Forced sexual activity: None   Other Topics Concern    None   Social History Narrative    CAFFEINE USE    USES SAFETY EQUIPMENT       Medications and Allergies:     Current Outpatient Medications   Medication Sig Dispense Refill    acetaminophen (TYLENOL) 325 mg tablet Take 2 tablets (650 mg total) by mouth every 6 (six) hours as needed for mild pain 30 tablet 0    amLODIPine (NORVASC) 5 mg tablet Take 1 tablet (5 mg total) by mouth daily 90 tablet 1    aspirin 81 mg chewable tablet Chew 1 tablet (81 mg total) daily 90 tablet 1    atorvastatin (LIPITOR) 40 mg tablet Take 1 tablet (40 mg total) by mouth every evening 90 tablet 1    hydrochlorothiazide (HYDRODIURIL) 25 mg tablet Take 1 tablet (25 mg total) by mouth daily 90 tablet 1    triamcinolone (KENALOG) 0 5 % cream Apply topically 3 (three) times a day 30 g 0     No current facility-administered medications for this visit  No Known Allergies   Immunizations:     Immunization History   Administered Date(s) Administered     Influenza (IM) Preservative Free 09/01/2015    INFLUENZA 10/18/2014, 09/01/2015, 09/15/2016, 10/01/2017, 11/13/2017, 10/06/2018, 10/03/2019    Influenza TIV (IM) 10/01/2017    Influenza, high dose seasonal 0 5 mL 10/06/2018    Pneumococcal Conjugate 13-Valent 06/26/2019    Pneumococcal Polysaccharide PPV23 11/17/2011    TD (adult) Preservative Free 11/17/2011    Zoster 01/17/2014      Health Maintenance: There are no preventive care reminders to display for this patient  Topic Date Due    DTaP,Tdap,and Td Vaccines (1 - Tdap) 11/18/2011      Medicare Health Risk Assessment:     /70   Pulse 70   Temp (!) 97 1 °F (36 2 °C) (Tympanic)   Resp 16   Ht 5' 2" (1 575 m)   Wt 67 6 kg (149 lb)   BMI 27 25 kg/m²      Ada is here for her Subsequent Wellness visit  Health Risk Assessment:   Patient rates overall health as good  Patient feels that their physical health rating is same  Eyesight was rated as same  Hearing was rated as same  Patient feels that their emotional and mental health rating is same  Pain experienced in the last 7 days has been none  Patient states that she has experienced no weight loss or gain in last 6 months  Depression Screening:   PHQ-2 Score: 0      Fall Risk Screening: In the past year, patient has experienced: no history of falling in past year      Urinary Incontinence Screening:   Patient has leaked urine accidently in the last six months  Home Safety:  Patient has trouble with stairs inside or outside of their home  Patient has working smoke alarms and has working carbon monoxide detector  Home safety hazards include: none  Nutrition:   Current diet is Regular and Limited junk food  Medications:   Patient is currently taking over-the-counter supplements  OTC medications include: see medication list  Patient is not able to manage medications  Activities of Daily Living (ADLs)/Instrumental Activities of Daily Living (IADLs):   Walk and transfer into and out of bed and chair?: Yes  Dress and groom yourself?: Yes    Bathe or shower yourself?: No    Feed yourself? Yes  Do your laundry/housekeeping?: Yes  Manage your money, pay your bills and track your expenses?: No  Make your own meals?: Yes    Do your own shopping?: Yes    Previous Hospitalizations:   Any hospitalizations or ED visits within the last 12 months?: No      Advance Care Planning:   Living will: Yes    Durable POA for healthcare:  Yes    Advanced directive: Yes    Advanced directive counseling given: Yes    Five wishes given: No    Patient declined ACP directive: No    End of Life Decisions reviewed with patient: Yes      Cognitive Screening:   Provider or family/friend/caregiver concerned regarding cognition?: No    PREVENTIVE SCREENINGS      Cardiovascular Screening:    General: History Lipid Disorder    Due for: Lipid Panel      Diabetes Screening:     General: Screening Current    Due for: Blood Glucose      Colorectal Cancer Screening:     General: Screening Not Indicated      Breast Cancer Screening:     General: Screening Not Indicated      Cervical Cancer Screening:    General: Screening Not Indicated      Osteoporosis Screening:    General: Patient Declines and Risks and Benefits Discussed      Abdominal Aortic Aneurysm (AAA) Screening:        General: Screening Not Indicated      Lung Cancer Screening:     General: Screening Not Indicated      Hepatitis C Screening:    General: Screening Not Indicated    Other Counseling Topics:   Car/seat belt/driving safety and calcium and vitamin D intake and regular weightbearing exercise  A/P: Doing well and no falls reported  Some depression at times, but nothing she feels she needs meds and feels safe at the assisted living home  Diverse diet  Doesn't drive, but  uses seat belts  Has a living will and POA  No DME or referrals needed today  RTC one year for medicare wellness     Nam Patel DO

## 2019-11-12 ENCOUNTER — TELEPHONE (OUTPATIENT)
Dept: INTERNAL MEDICINE CLINIC | Facility: CLINIC | Age: 84
End: 2019-11-12

## 2019-11-12 NOTE — TELEPHONE ENCOUNTER
P/c from [facility] JACKIE, asking for an order to [place] triamcinolone cream, pt does not need it anymore, pls fax to [phone]

## 2019-11-20 DIAGNOSIS — M15.9 PRIMARY OSTEOARTHRITIS INVOLVING MULTIPLE JOINTS: ICD-10-CM

## 2019-11-20 RX ORDER — ACETAMINOPHEN 325 MG/1
650 TABLET ORAL EVERY 6 HOURS PRN
Qty: 30 TABLET | Refills: 5
Start: 2019-11-20 | End: 2019-11-21 | Stop reason: SDUPTHER

## 2019-11-21 DIAGNOSIS — M15.9 PRIMARY OSTEOARTHRITIS INVOLVING MULTIPLE JOINTS: ICD-10-CM

## 2019-11-21 RX ORDER — ACETAMINOPHEN 325 MG/1
650 TABLET ORAL EVERY 6 HOURS PRN
Qty: 30 TABLET | Refills: 5 | Status: SHIPPED | OUTPATIENT
Start: 2019-11-21 | End: 2021-02-08 | Stop reason: SDUPTHER

## 2019-11-25 DIAGNOSIS — I63.9 THALAMIC INFARCTION (HCC): ICD-10-CM

## 2019-11-25 DIAGNOSIS — R60.9 PERIPHERAL EDEMA: ICD-10-CM

## 2019-11-25 DIAGNOSIS — I10 HYPERTENSION: ICD-10-CM

## 2019-11-25 RX ORDER — HYDROCHLOROTHIAZIDE 25 MG/1
25 TABLET ORAL DAILY
Qty: 90 TABLET | Refills: 1 | Status: SHIPPED | OUTPATIENT
Start: 2019-11-25 | End: 2020-05-07 | Stop reason: SDUPTHER

## 2019-11-25 RX ORDER — AMLODIPINE BESYLATE 5 MG/1
5 TABLET ORAL DAILY
Qty: 90 TABLET | Refills: 1 | Status: SHIPPED | OUTPATIENT
Start: 2019-11-25 | End: 2020-05-07 | Stop reason: SDUPTHER

## 2019-11-25 RX ORDER — ASPIRIN 81 MG/1
81 TABLET, CHEWABLE ORAL DAILY
Qty: 90 TABLET | Refills: 1 | Status: SHIPPED | OUTPATIENT
Start: 2019-11-25 | End: 2020-05-07 | Stop reason: SDUPTHER

## 2019-11-25 RX ORDER — ATORVASTATIN CALCIUM 40 MG/1
40 TABLET, FILM COATED ORAL EVERY EVENING
Qty: 90 TABLET | Refills: 1 | Status: SHIPPED | OUTPATIENT
Start: 2019-11-25 | End: 2020-05-07 | Stop reason: SDUPTHER

## 2019-11-25 NOTE — TELEPHONE ENCOUNTER
pt needs a refill on aspirin 81 mcg, amlodipine 5 mg, hydrochlorathyazide 25 mg, atorvastatin 40 mg  #30 day [place]

## 2019-12-07 ENCOUNTER — APPOINTMENT (EMERGENCY)
Dept: RADIOLOGY | Facility: HOSPITAL | Age: 84
End: 2019-12-07
Payer: MEDICARE

## 2019-12-07 ENCOUNTER — HOSPITAL ENCOUNTER (EMERGENCY)
Facility: HOSPITAL | Age: 84
Discharge: HOME | End: 2019-12-07
Payer: MEDICARE

## 2019-12-07 VITALS
BODY MASS INDEX: 25.61 KG/M2 | RESPIRATION RATE: 19 BRPM | WEIGHT: 140 LBS | DIASTOLIC BLOOD PRESSURE: 59 MMHG | HEART RATE: 93 BPM | SYSTOLIC BLOOD PRESSURE: 111 MMHG | TEMPERATURE: 98 F | OXYGEN SATURATION: 96 %

## 2019-12-07 DIAGNOSIS — R07.0 THROAT DISCOMFORT: ICD-10-CM

## 2019-12-07 DIAGNOSIS — T78.40XA ACUTE ALLERGIC REACTION: ICD-10-CM

## 2019-12-07 DIAGNOSIS — L20.9 ATOPIC DERMATITIS: Primary | ICD-10-CM

## 2019-12-07 DIAGNOSIS — E87.1 HYPONATREMIA: ICD-10-CM

## 2019-12-07 DIAGNOSIS — L30.9 DERMATITIS: ICD-10-CM

## 2019-12-07 LAB
ALBUMIN SERPL BCP-MCNC: 4.1 G/DL (ref 3.5–5.7)
ALP SERPL-CCNC: 68 U/L (ref 55–165)
ALT SERPL W P-5'-P-CCNC: 21 U/L (ref 7–52)
ANION GAP SERPL CALCULATED.3IONS-SCNC: 9 MMOL/L (ref 4–13)
AST SERPL W P-5'-P-CCNC: 21 U/L (ref 13–39)
BASOPHILS # BLD AUTO: 0.1 THOUSANDS/ΜL (ref 0–0.1)
BASOPHILS NFR BLD AUTO: 1 % (ref 0–2)
BILIRUB SERPL-MCNC: 0.6 MG/DL (ref 0.2–1)
BUN SERPL-MCNC: 13 MG/DL (ref 7–25)
CALCIUM SERPL-MCNC: 9.6 MG/DL (ref 8.6–10.5)
CHLORIDE SERPL-SCNC: 91 MMOL/L (ref 98–107)
CO2 SERPL-SCNC: 27 MMOL/L (ref 21–31)
CREAT SERPL-MCNC: 0.94 MG/DL (ref 0.6–1.2)
EOSINOPHIL # BLD AUTO: 0.1 THOUSAND/ΜL (ref 0–0.61)
EOSINOPHIL NFR BLD AUTO: 1 % (ref 0–5)
ERYTHROCYTE [DISTWIDTH] IN BLOOD BY AUTOMATED COUNT: 13.4 % (ref 11.5–14.5)
GFR SERPL CREATININE-BSD FRML MDRD: 54 ML/MIN/1.73SQ M
GLUCOSE SERPL-MCNC: 111 MG/DL (ref 65–99)
HCT VFR BLD AUTO: 41.1 % (ref 42–47)
HGB BLD-MCNC: 14.6 G/DL (ref 12–16)
LYMPHOCYTES # BLD AUTO: 1 THOUSANDS/ΜL (ref 0.6–4.47)
LYMPHOCYTES NFR BLD AUTO: 12 % (ref 21–51)
MCH RBC QN AUTO: 31.1 PG (ref 26–34)
MCHC RBC AUTO-ENTMCNC: 35.5 G/DL (ref 31–37)
MCV RBC AUTO: 88 FL (ref 81–99)
MONOCYTES # BLD AUTO: 0.7 THOUSAND/ΜL (ref 0.17–1.22)
MONOCYTES NFR BLD AUTO: 7 % (ref 2–12)
NEUTROPHILS # BLD AUTO: 7 THOUSANDS/ΜL (ref 1.4–6.5)
NEUTS SEG NFR BLD AUTO: 79 % (ref 42–75)
OSMOLALITY UR/SERPL-RTO: 274 MMOL/KG (ref 282–298)
OSMOLALITY UR: 266 MMOL/KG
PLATELET # BLD AUTO: 248 THOUSANDS/UL (ref 149–390)
PMV BLD AUTO: 7.3 FL (ref 8.6–11.7)
POTASSIUM SERPL-SCNC: 3.3 MMOL/L (ref 3.5–5.5)
PROT SERPL-MCNC: 6.7 G/DL (ref 6.4–8.9)
RBC # BLD AUTO: 4.69 MILLION/UL (ref 3.9–5.2)
SODIUM 24H UR-SCNC: 22 MOL/L
SODIUM SERPL-SCNC: 127 MMOL/L (ref 134–143)
WBC # BLD AUTO: 8.9 THOUSAND/UL (ref 4.8–10.8)

## 2019-12-07 PROCEDURE — 99284 EMERGENCY DEPT VISIT MOD MDM: CPT

## 2019-12-07 PROCEDURE — 96374 THER/PROPH/DIAG INJ IV PUSH: CPT

## 2019-12-07 PROCEDURE — 36415 COLL VENOUS BLD VENIPUNCTURE: CPT | Performed by: PHYSICIAN ASSISTANT

## 2019-12-07 PROCEDURE — 71046 X-RAY EXAM CHEST 2 VIEWS: CPT

## 2019-12-07 PROCEDURE — 80053 COMPREHEN METABOLIC PANEL: CPT | Performed by: PHYSICIAN ASSISTANT

## 2019-12-07 PROCEDURE — 83930 ASSAY OF BLOOD OSMOLALITY: CPT | Performed by: PHYSICIAN ASSISTANT

## 2019-12-07 PROCEDURE — 96361 HYDRATE IV INFUSION ADD-ON: CPT

## 2019-12-07 PROCEDURE — 96372 THER/PROPH/DIAG INJ SC/IM: CPT

## 2019-12-07 PROCEDURE — 83935 ASSAY OF URINE OSMOLALITY: CPT | Performed by: PHYSICIAN ASSISTANT

## 2019-12-07 PROCEDURE — 85025 COMPLETE CBC W/AUTO DIFF WBC: CPT | Performed by: PHYSICIAN ASSISTANT

## 2019-12-07 PROCEDURE — 94760 N-INVAS EAR/PLS OXIMETRY 1: CPT

## 2019-12-07 PROCEDURE — 96375 TX/PRO/DX INJ NEW DRUG ADDON: CPT

## 2019-12-07 PROCEDURE — 84300 ASSAY OF URINE SODIUM: CPT | Performed by: PHYSICIAN ASSISTANT

## 2019-12-07 PROCEDURE — 94640 AIRWAY INHALATION TREATMENT: CPT

## 2019-12-07 PROCEDURE — 99284 EMERGENCY DEPT VISIT MOD MDM: CPT | Performed by: PHYSICIAN ASSISTANT

## 2019-12-07 RX ORDER — DIAPER,BRIEF,INFANT-TODD,DISP
EACH MISCELLANEOUS 4 TIMES DAILY PRN
Status: DISCONTINUED | OUTPATIENT
Start: 2019-12-07 | End: 2019-12-07 | Stop reason: HOSPADM

## 2019-12-07 RX ORDER — METHYLPREDNISOLONE SODIUM SUCCINATE 125 MG/2ML
125 INJECTION, POWDER, LYOPHILIZED, FOR SOLUTION INTRAMUSCULAR; INTRAVENOUS ONCE
Status: COMPLETED | OUTPATIENT
Start: 2019-12-07 | End: 2019-12-07

## 2019-12-07 RX ORDER — PREDNISONE 20 MG/1
60 TABLET ORAL DAILY
Qty: 9 TABLET | Refills: 0 | Status: SHIPPED | OUTPATIENT
Start: 2019-12-07 | End: 2019-12-10

## 2019-12-07 RX ORDER — FAMOTIDINE 20 MG/1
20 TABLET, FILM COATED ORAL DAILY
Qty: 5 TABLET | Refills: 0 | Status: SHIPPED | OUTPATIENT
Start: 2019-12-07 | End: 2020-08-06

## 2019-12-07 RX ORDER — DIPHENHYDRAMINE HCL 25 MG
50 TABLET ORAL EVERY 6 HOURS PRN
Qty: 30 TABLET | Refills: 0 | Status: SHIPPED | OUTPATIENT
Start: 2019-12-07 | End: 2020-08-06

## 2019-12-07 RX ORDER — LIDOCAINE HYDROCHLORIDE 20 MG/ML
15 SOLUTION OROPHARYNGEAL ONCE
Status: COMPLETED | OUTPATIENT
Start: 2019-12-07 | End: 2019-12-07

## 2019-12-07 RX ORDER — EPINEPHRINE 1 MG/ML
0.3 INJECTION, SOLUTION, CONCENTRATE INTRAVENOUS ONCE
Status: COMPLETED | OUTPATIENT
Start: 2019-12-07 | End: 2019-12-07

## 2019-12-07 RX ORDER — TRIAMCINOLONE ACETONIDE 5 MG/G
CREAM TOPICAL 2 TIMES DAILY
Qty: 30 G | Refills: 0 | Status: SHIPPED | OUTPATIENT
Start: 2019-12-07 | End: 2019-12-30 | Stop reason: SDUPTHER

## 2019-12-07 RX ORDER — POTASSIUM CHLORIDE 20 MEQ/1
40 TABLET, EXTENDED RELEASE ORAL ONCE
Status: COMPLETED | OUTPATIENT
Start: 2019-12-07 | End: 2019-12-07

## 2019-12-07 RX ORDER — ONDANSETRON 2 MG/ML
4 INJECTION INTRAMUSCULAR; INTRAVENOUS ONCE
Status: COMPLETED | OUTPATIENT
Start: 2019-12-07 | End: 2019-12-07

## 2019-12-07 RX ADMIN — HYDROCORTISONE: 10 CREAM TOPICAL at 11:28

## 2019-12-07 RX ADMIN — EPINEPHRINE 0.3 MG: 1 INJECTION, SOLUTION, CONCENTRATE INTRAVENOUS at 10:33

## 2019-12-07 RX ADMIN — POTASSIUM CHLORIDE 40 MEQ: 1500 TABLET, EXTENDED RELEASE ORAL at 11:10

## 2019-12-07 RX ADMIN — RACEPINEPHRINE HYDROCHLORIDE 0.5 ML: 11.25 SOLUTION RESPIRATORY (INHALATION) at 10:25

## 2019-12-07 RX ADMIN — METHYLPREDNISOLONE SODIUM SUCCINATE 125 MG: 125 INJECTION, POWDER, FOR SOLUTION INTRAMUSCULAR; INTRAVENOUS at 10:33

## 2019-12-07 RX ADMIN — ONDANSETRON 4 MG: 2 INJECTION INTRAMUSCULAR; INTRAVENOUS at 10:53

## 2019-12-07 RX ADMIN — SODIUM CHLORIDE 1000 ML: 0.9 INJECTION, SOLUTION INTRAVENOUS at 11:59

## 2019-12-07 RX ADMIN — FAMOTIDINE 20 MG: 10 INJECTION INTRAVENOUS at 10:33

## 2019-12-07 RX ADMIN — LIDOCAINE HYDROCHLORIDE 15 ML: 20 SOLUTION ORAL; TOPICAL at 11:10

## 2019-12-07 NOTE — ED NOTES
Pt continues to complain about itching in her throat, despite lidocaine     Tressia Merlin, RN  12/07/19 2794

## 2019-12-07 NOTE — ED PROVIDER NOTES
History  Chief Complaint   Patient presents with    Rash    Allergic Reaction     80-year-old female comes in today complaining of generalized pruritus that began about 4 days ago, however today she began having some shortness of breath and itching and dryness in her throat and feeling like her tongue is swollen  She denies new intake, meds or new exposures  She has a rash on her chest, bilateral upper and bilateral lower extremities for which she would previously been applying triamcinolone  She reports that this has become more itchy over the past 4 days  She was given 50 of Benadryl pre-hospital without relief of her itching  Prior to Admission Medications   Prescriptions Last Dose Informant Patient Reported?  Taking?   acetaminophen (TYLENOL) 325 mg tablet   No No   Sig: Take 2 tablets (650 mg total) by mouth every 6 (six) hours as needed for mild pain   amLODIPine (NORVASC) 5 mg tablet   No No   Sig: Take 1 tablet (5 mg total) by mouth daily   aspirin 81 mg chewable tablet   No No   Sig: Chew 1 tablet (81 mg total) daily   atorvastatin (LIPITOR) 40 mg tablet   No No   Sig: Take 1 tablet (40 mg total) by mouth every evening   hydrochlorothiazide (HYDRODIURIL) 25 mg tablet   No No   Sig: Take 1 tablet (25 mg total) by mouth daily   triamcinolone (KENALOG) 0 5 % cream Not Taking at Unknown time  No No   Sig: Apply topically 3 (three) times a day   Patient not taking: Reported on 12/7/2019   triamcinolone (KENALOG) 0 5 % cream   No No   Sig: Apply topically 2 (two) times a day for 7 days      Facility-Administered Medications: None       Past Medical History:   Diagnosis Date    Arthritis     Cataract     LAST ASSESSED: [id number]    Depression     Dislocation of left shoulder joint     LAST ASSESSSED: [id number]    Essential hypertension 4/3/2019    Gait disorder     LAST ASSESSED: [id number]    Herpes zoster     LAST ASSESSED: [id number]    HL (hearing loss)     Impacted cerumen of both ears LAST ASSESSED: [id number]    Impacted cerumen of right ear     LAST ASSESSED: [id number]    Lightheadedness     MILD AND PT REFUSES EKG ETC  PT PROMISES TO CALL IF WORSENS  LAST ASSESSED: [id number]    Multiple falls     LAST ASSESSED: 22VES4561    Near syncope     LAST ASSESSED: [id number]    Postherpetic polyneuropathy     LAST ASSESSED: [id number]       Past Surgical History:   Procedure Laterality Date    CATARACT EXTRACTION, BILATERAL      HERNIA REPAIR      TONSILLECTOMY      TUBAL LIGATION         Family History   Problem Relation Age of Onset    Cirrhosis Father     Heart disease Son     Mental illness Son      I have reviewed and agree with the history as documented  Social History     Tobacco Use    Smoking status: Never Smoker    Smokeless tobacco: Never Used   Substance Use Topics    Alcohol use: Never     Frequency: Never    Drug use: No        Review of Systems   Constitutional: Negative for activity change  HENT: Negative for facial swelling  Eyes: Positive for itching  Negative for visual disturbance  Respiratory: Positive for shortness of breath  Cardiovascular: Negative for chest pain  Musculoskeletal: Negative for back pain  Skin: Positive for rash  Neurological: Negative for dizziness and headaches  Psychiatric/Behavioral: Negative for behavioral problems  Physical Exam  Physical Exam   Constitutional: She is oriented to person, place, and time  She appears well-developed and well-nourished  No distress  HENT:   Head: Normocephalic and atraumatic  Mouth/Throat: Uvula is midline  Mucous membranes are dry  Posterior oropharyngeal erythema (mild) present     Possible mild swelling of tongue   Eyes: Conjunctivae and EOM are normal    Mild erythema and edema of bilateral eyelids upper and lower   Neck:   Base of neck on the right side extending onto the chest there is a patch of erythema which is well demarcated   Cardiovascular: Normal rate, regular rhythm and normal heart sounds  Pulmonary/Chest: Effort normal  Tachypnea noted  She has rales (faint) in the right upper field  Abdominal: Soft  Bowel sounds are normal  She exhibits no distension  There is no tenderness  Musculoskeletal: Normal range of motion  Neurological: She is alert and oriented to person, place, and time  tremulous   Skin: Skin is warm and dry  Rash noted  Erythematous well-demarcated somewhat excoriated rash to R lower neck/upper chest, bilateral flexural elbow surfaces, bilateral lower breasts, BLE distally  Most pronounced on RLE   Psychiatric: She has a normal mood and affect   Her behavior is normal        Vital Signs  ED Triage Vitals [12/07/19 1004]   Temperature Pulse Respirations Blood Pressure SpO2   98 °F (36 7 °C) 93 (!) 28 141/73 97 %      Temp Source Heart Rate Source Patient Position - Orthostatic VS BP Location FiO2 (%)   Temporal Monitor Lying Left arm --      Pain Score       --           Vitals:    12/07/19 1004 12/07/19 1030 12/07/19 1100 12/07/19 1230   BP: 141/73 118/77 119/66 111/59   Pulse: 93 86 80 92   Patient Position - Orthostatic VS: Lying            Visual Acuity      ED Medications  Medications   diphenhydrAMINE (FOR EMS ONLY) (BENADRYL) injection 50 mg (has no administration in time range)   hydrocortisone 1 % cream ( Topical Given 12/7/19 1128)   methylPREDNISolone sodium succinate (Solu-MEDROL) injection 125 mg (125 mg Intravenous Given 12/7/19 1033)   famotidine (PEPCID) injection 20 mg (20 mg Intravenous Given 12/7/19 1033)   racepinephrine 2 25 % inhalation solution 0 5 mL (0 5 mL Nebulization Given 12/7/19 1025)   EPINEPHrine PF (ADRENALIN) 1 mg/mL injection 0 3 mg (0 3 mg Intramuscular Given 12/7/19 1033)   ondansetron (ZOFRAN) injection 4 mg (4 mg Intravenous Given 12/7/19 1053)   Lidocaine Viscous HCl (XYLOCAINE) 2 % mucosal solution 15 mL (15 mL Swish & Spit Given 12/7/19 1110)   sodium chloride 0 9 % bolus 1,000 mL (1,000 mL Intravenous New Bag 12/7/19 1159)   potassium chloride (K-DUR,KLOR-CON) CR tablet 40 mEq (40 mEq Oral Given 12/7/19 1110)       Diagnostic Studies  Results Reviewed     Procedure Component Value Units Date/Time    Osmolality-"If this is regarding a toxic alcohol, STOP  Test is not routinely indicated  Please consult medical  on call for further guidance " [[id number]] Collected:  12/07/19 1311    Lab Status: In process Specimen:  Blood from Arm, Left Updated:  12/07/19 1313    Osmolality, urine [[id number]] Collected:  12/07/19 1305    Lab Status: In process Specimen:  Urine, Clean Catch Updated:  12/07/19 1308    Sodium, urine, random [[id number]] Collected:  12/07/19 1305    Lab Status:   In process Specimen:  Urine, Clean Catch Updated:  12/07/19 1308    Comprehensive metabolic panel [[id number]]  (Abnormal) Collected:  12/07/19 1032    Lab Status:  Final result Specimen:  Blood from Line, Venous Updated:  12/07/19 1103     Sodium 127 mmol/L      Potassium 3 3 mmol/L      Chloride 91 mmol/L      CO2 27 mmol/L      ANION GAP 9 mmol/L      BUN 13 mg/dL      Creatinine 0 94 mg/dL      Glucose 111 mg/dL      Calcium 9 6 mg/dL      AST 21 U/L      ALT 21 U/L      Alkaline Phosphatase 68 U/L      Total Protein 6 7 g/dL      Albumin 4 1 g/dL      Total Bilirubin 0 60 mg/dL      eGFR 54 ml/min/1 73sq m     Narrative:       [place] guidelines for Chronic Kidney Disease (CKD):     Stage 1 with normal or high GFR (GFR > 90 mL/min/1 73 square meters)    Stage 2 Mild CKD (GFR = 60-89 mL/min/1 73 square meters)    Stage 3A Moderate CKD (GFR = 45-59 mL/min/1 73 square meters)    Stage 3B Moderate CKD (GFR = 30-44 mL/min/1 73 square meters)    Stage 4 Severe CKD (GFR = 15-29 mL/min/1 73 square meters)    Stage 5 End Stage CKD (GFR <15 mL/min/1 73 square meters)  Note: GFR calculation is accurate only with a steady state creatinine    CBC and differential [[id number]]  (Abnormal) Collected:  12/07/19 1032 Lab Status:  Final result Specimen:  Blood from Line, Venous Updated:  12/07/19 1046     WBC 8 90 Thousand/uL      RBC 4 69 Million/uL      Hemoglobin 14 6 g/dL      Hematocrit 41 1 %      MCV 88 fL      MCH 31 1 pg      MCHC 35 5 g/dL      RDW 13 4 %      MPV 7 3 fL      Platelets 207 Thousands/uL      Neutrophils Relative 79 %      Lymphocytes Relative 12 %      Monocytes Relative 7 %      Eosinophils Relative 1 %      Basophils Relative 1 %      Neutrophils Absolute 7 00 Thousands/µL      Lymphocytes Absolute 1 00 Thousands/µL      Monocytes Absolute 0 70 Thousand/µL      Eosinophils Absolute 0 10 Thousand/µL      Basophils Absolute 0 10 Thousands/µL                  XR chest 2 views   Final Result by Uriah Jenkins MD (12/07 1229)      No acute cardiopulmonary disease  Left thyroid mass again seen  Workstation performed: [id number]                    Procedures  Procedures         ED Course  ED Course as of Dec 07 1327   Sat Dec 07, 2019   1315 Patient tolerating p o  MDM      Disposition  Final diagnoses: Atopic dermatitis   Throat discomfort   Hyponatremia   Acute allergic reaction     Time reflects when diagnosis was documented in both MDM as applicable and the Disposition within this note     Time User Action Codes Description Comment    12/7/2019  1:18 PM Royanne Woolford Add [L20 9] Atopic dermatitis     12/7/2019  1:19 PM Sandra Pyo [R07 0] Throat discomfort     12/7/2019  1:20 PM Royanne Woolford Add [E87 1] Hyponatremia     12/7/2019  1:20 PM Royanne Woolford Add [T78 40XA] Acute allergic reaction     12/7/2019  1:24 PM Royanne Woolford Add [L30 9] Dermatitis       ED Disposition     ED Disposition Condition Date/Time Comment    Discharge Stable Sat Dec 7, 2019  1:18 PM Ada Reis discharge to home/self care              Follow-up Information     Follow up With Specialties Details Why Contact Info    Janna Beach DO Internal Medicine [address] [facility]            Patient's Medications   Discharge Prescriptions    DIPHENHYDRAMINE (BENADRYL) 25 MG TABLET    Take 2 tablets (50 mg total) by mouth every 6 (six) hours as needed for itching or allergies for up to 5 days       Start Date: 12/7/2019 End Date: 12/12/2019       Order Dose: 50 mg       Quantity: 30 tablet    Refills: 0    FAMOTIDINE (PEPCID) 20 MG TABLET    Take 1 tablet (20 mg total) by mouth daily for 5 days       Start Date: 12/7/2019 End Date: 12/12/2019       Order Dose: 20 mg       Quantity: 5 tablet    Refills: 0    PREDNISONE 20 MG TABLET    Take 3 tablets (60 mg total) by mouth daily for 3 days       Start Date: 12/7/2019 End Date: 12/10/2019       Order Dose: 60 mg       Quantity: 9 tablet    Refills: 0     No discharge procedures on file      ED Provider  Electronically Signed by           Jennifer Sullivan PA-C  12/07/19 0196

## 2019-12-07 NOTE — ED NOTES
Pt constantly scratching at her chest   Area iritated and reddened  Advised pt not to scratch    Pt stating she cannot help it     Mai Aguilar RN  12/07/19 0393

## 2019-12-08 ENCOUNTER — TELEPHONE (OUTPATIENT)
Dept: EMERGENCY DEPT | Facility: HOSPITAL | Age: 84
End: 2019-12-08

## 2019-12-08 NOTE — RESULT ENCOUNTER NOTE
Pt's Son, who is listed as the POC was notified  Pt resides in a nursing home and was not able to be reached  PT is feeling better and the Son will ensure she has a PCP f/u

## 2019-12-30 DIAGNOSIS — L30.9 DERMATITIS: ICD-10-CM

## 2019-12-30 RX ORDER — TRIAMCINOLONE ACETONIDE 5 MG/G
CREAM TOPICAL 2 TIMES DAILY
Qty: 30 G | Refills: 5 | Status: SHIPPED | OUTPATIENT
Start: 2019-12-30 | End: 2020-08-06

## 2020-03-03 DIAGNOSIS — K59.00 CONSTIPATION, UNSPECIFIED CONSTIPATION TYPE: Primary | ICD-10-CM

## 2020-03-03 RX ORDER — DOCUSATE SODIUM 100 MG/1
100 CAPSULE, LIQUID FILLED ORAL 2 TIMES DAILY
Qty: 60 CAPSULE | Refills: 5 | Status: SHIPPED | OUTPATIENT
Start: 2020-03-03 | End: 2021-03-12 | Stop reason: SDUPTHER

## 2020-03-03 RX ORDER — DOCUSATE SODIUM 100 MG/1
100 CAPSULE, LIQUID FILLED ORAL 2 TIMES DAILY
COMMUNITY
End: 2020-03-03 | Stop reason: SDUPTHER

## 2020-05-07 DIAGNOSIS — R60.9 PERIPHERAL EDEMA: ICD-10-CM

## 2020-05-07 DIAGNOSIS — I10 HYPERTENSION: ICD-10-CM

## 2020-05-07 DIAGNOSIS — I63.9 THALAMIC INFARCTION (HCC): ICD-10-CM

## 2020-05-07 RX ORDER — ASPIRIN 81 MG/1
81 TABLET, CHEWABLE ORAL DAILY
Qty: 90 TABLET | Refills: 1 | Status: ON HOLD | OUTPATIENT
Start: 2020-05-07 | End: 2020-09-02 | Stop reason: SDUPTHER

## 2020-05-07 RX ORDER — AMLODIPINE BESYLATE 5 MG/1
5 TABLET ORAL DAILY
Qty: 90 TABLET | Refills: 1 | Status: ON HOLD | OUTPATIENT
Start: 2020-05-07 | End: 2020-09-02 | Stop reason: SDUPTHER

## 2020-05-07 RX ORDER — HYDROCHLOROTHIAZIDE 25 MG/1
25 TABLET ORAL DAILY
Qty: 90 TABLET | Refills: 1 | Status: ON HOLD | OUTPATIENT
Start: 2020-05-07 | End: 2020-09-02 | Stop reason: SDUPTHER

## 2020-05-07 RX ORDER — ATORVASTATIN CALCIUM 40 MG/1
40 TABLET, FILM COATED ORAL EVERY EVENING
Qty: 90 TABLET | Refills: 1 | Status: ON HOLD | OUTPATIENT
Start: 2020-05-07 | End: 2020-09-02 | Stop reason: SDUPTHER

## 2020-08-06 ENCOUNTER — HOSPITAL ENCOUNTER (EMERGENCY)
Facility: HOSPITAL | Age: 85
Discharge: HOME | End: 2020-08-06
Attending: EMERGENCY MEDICINE
Payer: MEDICARE

## 2020-08-06 ENCOUNTER — APPOINTMENT (EMERGENCY)
Dept: RADIOLOGY | Facility: HOSPITAL | Age: 85
End: 2020-08-06
Payer: MEDICARE

## 2020-08-06 VITALS
OXYGEN SATURATION: 97 % | HEIGHT: 62 IN | DIASTOLIC BLOOD PRESSURE: 76 MMHG | TEMPERATURE: 97.6 F | SYSTOLIC BLOOD PRESSURE: 144 MMHG | WEIGHT: 137.13 LBS | RESPIRATION RATE: 16 BRPM | BODY MASS INDEX: 25.23 KG/M2 | HEART RATE: 68 BPM

## 2020-08-06 DIAGNOSIS — M79.671 PAIN OF RIGHT HEEL: Primary | ICD-10-CM

## 2020-08-06 PROCEDURE — 99284 EMERGENCY DEPT VISIT MOD MDM: CPT | Performed by: EMERGENCY MEDICINE

## 2020-08-06 PROCEDURE — 99285 EMERGENCY DEPT VISIT HI MDM: CPT

## 2020-08-06 PROCEDURE — 73630 X-RAY EXAM OF FOOT: CPT

## 2020-08-06 RX ORDER — ACETAMINOPHEN 325 MG/1
650 TABLET ORAL ONCE
Status: COMPLETED | OUTPATIENT
Start: 2020-08-06 | End: 2020-08-06

## 2020-08-06 RX ADMIN — ACETAMINOPHEN 650 MG: 325 TABLET ORAL at 09:59

## 2020-08-06 NOTE — ED NOTES
Patient fed (provided with boxed lunch consisting of sandwich, fruit cup, bubba crackers) and cup of coffee (requested by patient)  Pt eating meat and cheese from sandwich (as she states she does not eat crackers and bread) and eating fruit cup  Sipping coffee and tolerating at present time while awaiting ride back to [facility] at present time        Jack Bundy RN  08/06/20 1664

## 2020-08-06 NOTE — ED PROVIDER NOTES
History  Chief Complaint   Patient presents with    Foot Pain     right; started this AM     Patient presents via EMS from her nursing home for complaint of right heel pain when she attempted to stand from bed this morning  No known trauma  Patient does have some baseline confusion/dementia and does not recall getting out of bed during the night, but may not  She has no external signs of trauma to that lower extremity  She does have increased varicose veins and mild increased edema on the right compared to the left; she reports this to be chronic  She denies pain elsewhere in the lower extremity  She denies head injury  She is not a diabetic and does not have any evidence of recent wound or foreign body to the right foot  She has full range of motion and sensation and is able to apply pressure onto the mattress with the heel  No recent travel or sick contacts  No report of associated fever, LH/dizziness, CP, SOB, n/v/d  Denies other injury or complaint  History provided by:  Patient and medical records  Ankle Pain   Location:  Foot  Time since incident: Unknown  Lower extremity injury: Unknown  Affected location: Right heel  Pain details:     Quality:  Unable to specify    Radiates to:  Does not radiate    Severity:  Unable to specify    Onset quality:  Unable to specify    Timing:  Constant    Progression:  Unable to specify  Chronicity:  New  Dislocation: no    Foreign body present:  Unable to specify  Tetanus status:  Unknown  Prior injury to area:  Unable to specify  Relieved by:  None tried  Worsened by:  Bearing weight  Ineffective treatments:  None tried  Associated symptoms: no back pain, no decreased ROM, no fever, no neck pain, no numbness, no stiffness and no tingling    Risk factors: no concern for non-accidental trauma, no frequent fractures and no known bone disorder        Prior to Admission Medications   Prescriptions Last Dose Informant Patient Reported? Taking? acetaminophen (TYLENOL) 325 mg tablet Unknown at Unknown time  No No   Sig: Take 2 tablets (650 mg total) by mouth every 6 (six) hours as needed for mild pain   amLODIPine (NORVASC) 5 mg tablet 8/6/2020 at Unknown time  No Yes   Sig: Take 1 tablet (5 mg total) by mouth daily   aspirin 81 mg chewable tablet 8/6/2020 at Unknown time  No Yes   Sig: Chew 1 tablet (81 mg total) daily   atorvastatin (LIPITOR) 40 mg tablet 8/5/2020 at Unknown time  No Yes   Sig: Take 1 tablet (40 mg total) by mouth every evening   docusate sodium (COLACE) 100 mg capsule Unknown at Unknown time  No No   Sig: Take 1 capsule (100 mg total) by mouth 2 (two) times a day   hydrochlorothiazide (HYDRODIURIL) 25 mg tablet 8/6/2020 at Unknown time  No Yes   Sig: Take 1 tablet (25 mg total) by mouth daily      Facility-Administered Medications: None       Past Medical History:   Diagnosis Date    Arthritis     Cataract     LAST ASSESSED: [id number]    Depression     Dislocation of left shoulder joint     LAST ASSESSSED: [id number]    Essential hypertension 4/3/2019    Gait disorder     LAST ASSESSED: [id number]    Herpes zoster     LAST ASSESSED: [id number]    HL (hearing loss)     Impacted cerumen of both ears     LAST ASSESSED: [id number]    Impacted cerumen of right ear     LAST ASSESSED: [id number]    Lightheadedness     MILD AND PT REFUSES EKG ETC  PT PROMISES TO CALL IF WORSENS  LAST ASSESSED: [id number]    Multiple falls     LAST ASSESSED: [id number]    Near syncope     LAST ASSESSED: [id number]    Postherpetic polyneuropathy     LAST ASSESSED: [id number]       Past Surgical History:   Procedure Laterality Date    CATARACT EXTRACTION, BILATERAL      HERNIA REPAIR      TONSILLECTOMY      TUBAL LIGATION         Family History   Problem Relation Age of Onset    Cirrhosis Father     Heart disease Son     Mental illness Son      I have reviewed and agree with the history as documented      E-Cigarette/Vaping    E-Cigarette Use Never User      E-Cigarette/Vaping Substances     Social History     Tobacco Use    Smoking status: Never Smoker    Smokeless tobacco: Never Used   Substance Use Topics    Alcohol use: Never     Frequency: Never    Drug use: No       Review of Systems   Unable to perform ROS: Dementia   Constitutional: Negative for fever  Musculoskeletal: Negative for back pain, neck pain and stiffness  Physical Exam  Physical Exam  Vitals signs reviewed  Constitutional:       General: She is not in acute distress  Appearance: She is well-developed  She is not diaphoretic  HENT:      Head: Normocephalic and atraumatic  Eyes:      General: No scleral icterus  Conjunctiva/sclera: Conjunctivae normal       Pupils: Pupils are equal, round, and reactive to light  Neck:      Musculoskeletal: Normal range of motion and neck supple  Cardiovascular:      Rate and Rhythm: Normal rate and regular rhythm  Heart sounds: Normal heart sounds  No murmur  Pulmonary:      Effort: Pulmonary effort is normal  No respiratory distress  Breath sounds: Normal breath sounds  No wheezing  Chest:      Chest wall: No tenderness  Musculoskeletal: Normal range of motion  General: Tenderness (right heel, vague) present  No deformity or signs of injury  Comments: RLE mildly more edematous than the LLE  Skin:     General: Skin is warm and dry  Findings: No rash  Comments: Multiple varicose veins visible on RLE  Neurological:      General: No focal deficit present  Mental Status: She is alert  Mental status is at baseline  Sensory: No sensory deficit  Psychiatric:         Behavior: Behavior normal          Thought Content:  Thought content normal          Vital Signs  ED Triage Vitals [08/06/20 0933]   Temperature Pulse Respirations Blood Pressure SpO2   97 6 °F (36 4 °C) 68 16 144/76 97 %      Temp Source Heart Rate Source Patient Position - Orthostatic VS BP Location FiO2 (%) Temporal Monitor Sitting Left arm --      Pain Score       8           Vitals:    08/06/20 0933   BP: 144/76   Pulse: 68   Patient Position - Orthostatic VS: Sitting         Visual Acuity      ED Medications  Medications   acetaminophen (TYLENOL) tablet 650 mg (650 mg Oral Given 8/6/20 0959)       Diagnostic Studies  Results Reviewed     None                 XR foot 3+ views RIGHT   Final Result by Luca Ricketts MD (08/06 1024)      No acute osseous abnormality  Workstation performed: [id number]                    Procedures  Procedures         ED Course  ED Course as of Aug 06 1041   Thu Aug 06, 2020   1038 Results reviewed  Will wrap foot and recommend supportive care  US AUDIT      Most Recent Value   Initial Alcohol Screen: US AUDIT-C    1  How often do you have a drink containing alcohol?  0 Filed at: 08/06/2020 0935   2  How many drinks containing alcohol do you have on a typical day you are drinking? 0 Filed at: 08/06/2020 0935   3a  Male UNDER 65: How often do you have five or more drinks on one occasion? 0 Filed at: 08/06/2020 0935   3b  FEMALE Any Age, or MALE 65+: How often do you have 4 or more drinks on one occassion? 0 Filed at: 08/06/2020 0935   Audit-C Score  0 Filed at: 08/06/2020 0935                  IKE/DAST-10      Most Recent Value   How many times in the past year have you    Used an illegal drug or used a prescription medication for non-medical reasons? Never Filed at: 08/06/2020 0935                                MDM  Number of Diagnoses or Management Options  Diagnosis management comments: DDx:  Right heel pain - musculoskeletal sprain/strain, doubt DVT, retained foreign body, fracture  A/P: Will check x-ray, treat symptoms, reevaluate for further work up and disposition         Amount and/or Complexity of Data Reviewed  Tests in the radiology section of CPT®: ordered and reviewed  Obtain history from someone other than the patient: yes (EMS, nursing home documents)  Review and summarize past medical records: yes          Disposition  Final diagnoses:   Pain of right heel     Time reflects when diagnosis was documented in both MDM as applicable and the Disposition within this note     Time User Action Codes Description Comment    8/6/2020 10:39 AM Kisha Danger Add [Q46 187] Pain of right heel       ED Disposition     ED Disposition Condition Date/Time Comment    Discharge Stable Thu Aug 6, 2020 10:39 AM Ada Reis discharge to home/self care  Follow-up Information     Follow up With Specialties Details Why Contact Info    Barbara Osler, DO Internal Medicine Schedule an appointment as soon as possible for a visit  if symptoms do not improve [address]  [phone]            Patient's Medications   Discharge Prescriptions    No medications on file     No discharge procedures on file      PDMP Review     None          ED Provider  Electronically Signed by           Aj Medina DO  08/06/20 0403

## 2020-08-19 ENCOUNTER — APPOINTMENT (EMERGENCY)
Dept: RADIOLOGY | Facility: HOSPITAL | Age: 85
End: 2020-08-19
Payer: MEDICARE

## 2020-08-19 ENCOUNTER — HOSPITAL ENCOUNTER (EMERGENCY)
Facility: HOSPITAL | Age: 85
Discharge: HOME | End: 2020-08-19
Attending: EMERGENCY MEDICINE
Payer: MEDICARE

## 2020-08-19 VITALS
OXYGEN SATURATION: 95 % | WEIGHT: 140 LBS | BODY MASS INDEX: 25.61 KG/M2 | RESPIRATION RATE: 18 BRPM | TEMPERATURE: 97.5 F | HEART RATE: 89 BPM | DIASTOLIC BLOOD PRESSURE: 70 MMHG | SYSTOLIC BLOOD PRESSURE: 120 MMHG

## 2020-08-19 DIAGNOSIS — M79.671 FOOT PAIN, RIGHT: Primary | ICD-10-CM

## 2020-08-19 PROCEDURE — 73610 X-RAY EXAM OF ANKLE: CPT

## 2020-08-19 PROCEDURE — 99284 EMERGENCY DEPT VISIT MOD MDM: CPT

## 2020-08-19 PROCEDURE — 99285 EMERGENCY DEPT VISIT HI MDM: CPT | Performed by: PHYSICIAN ASSISTANT

## 2020-08-19 RX ORDER — ACETAMINOPHEN 325 MG/1
650 TABLET ORAL EVERY 6 HOURS PRN
Qty: 30 TABLET | Refills: 0 | Status: SHIPPED | OUTPATIENT
Start: 2020-08-19 | End: 2020-08-27

## 2020-08-19 RX ORDER — ACETAMINOPHEN 325 MG/1
650 TABLET ORAL ONCE
Status: COMPLETED | OUTPATIENT
Start: 2020-08-19 | End: 2020-08-19

## 2020-08-19 RX ADMIN — ACETAMINOPHEN 650 MG: 325 TABLET ORAL at 09:41

## 2020-08-19 NOTE — ED NOTES
Wheelchair Perez Carson arrived and transported patient back to [facility] and Airam Rutherford RN  08/19/20 7550

## 2020-08-19 NOTE — ED PROVIDER NOTES
History  Chief Complaint   Patient presents with    Foot Pain     27-year-old female history of arthritis presents from [facility] [address] [facility] via EMS complaining of right heel pain  Patient was seen in the ED roughly 2 weeks ago for the same pain and x-rays were negative  She reports that her pain has not improved  She has not taken anything for pain but it was well controlled with Tylenol previously  Patient does have underlying dementia so history is limited but patient adamantly denies recent falls or any new complaints other than her chronic arthritis  Prior to Admission Medications   Prescriptions Last Dose Informant Patient Reported? Taking?   acetaminophen (TYLENOL) 325 mg tablet   No No   Sig: Take 2 tablets (650 mg total) by mouth every 6 (six) hours as needed for mild pain   amLODIPine (NORVASC) 5 mg tablet   No No   Sig: Take 1 tablet (5 mg total) by mouth daily   aspirin 81 mg chewable tablet   No No   Sig: Chew 1 tablet (81 mg total) daily   atorvastatin (LIPITOR) 40 mg tablet   No No   Sig: Take 1 tablet (40 mg total) by mouth every evening   docusate sodium (COLACE) 100 mg capsule   No No   Sig: Take 1 capsule (100 mg total) by mouth 2 (two) times a day   hydrochlorothiazide (HYDRODIURIL) 25 mg tablet   No No   Sig: Take 1 tablet (25 mg total) by mouth daily      Facility-Administered Medications: None       Past Medical History:   Diagnosis Date    Arthritis     Cataract     LAST ASSESSED: [id number]    Depression     Dislocation of left shoulder joint     LAST ASSESSSED: [id number]    Essential hypertension 4/3/2019    Gait disorder     LAST ASSESSED: [id number]    Herpes zoster     LAST ASSESSED: [id number]    HL (hearing loss)     Impacted cerumen of both ears     LAST ASSESSED: [id number]    Impacted cerumen of right ear     LAST ASSESSED: [id number]    Lightheadedness     MILD AND PT REFUSES EKG ETC  PT PROMISES TO CALL IF WORSENS   LAST ASSESSED: [id number]    Multiple falls LAST ASSESSED: [id number]    Near syncope     LAST ASSESSED: [id number]    Postherpetic polyneuropathy     LAST ASSESSED: [id number]       Past Surgical History:   Procedure Laterality Date    CATARACT EXTRACTION, BILATERAL      HERNIA REPAIR      TONSILLECTOMY      TUBAL LIGATION         Family History   Problem Relation Age of Onset    Cirrhosis Father     Heart disease Son     Mental illness Son      I have reviewed and agree with the history as documented  E-Cigarette/Vaping    E-Cigarette Use Never User      E-Cigarette/Vaping Substances     Social History     Tobacco Use    Smoking status: Never Smoker    Smokeless tobacco: Never Used   Substance Use Topics    Alcohol use: Never     Frequency: Never    Drug use: No       Review of Systems   Constitutional: Negative for chills, fatigue and fever  HENT: Negative for ear pain and sore throat  Eyes: Negative for pain  Respiratory: Negative for cough, shortness of breath and wheezing  Cardiovascular: Negative for chest pain, palpitations and leg swelling  Gastrointestinal: Negative for abdominal pain, constipation, diarrhea, nausea and vomiting  Endocrine: Negative for polyuria  Genitourinary: Negative for dysuria and pelvic pain  Musculoskeletal: Positive for arthralgias  Negative for myalgias, neck pain and neck stiffness  Skin: Negative for rash  Neurological: Negative for dizziness, syncope, light-headedness and headaches  All other systems reviewed and are negative  Physical Exam  Physical Exam  Constitutional:       Appearance: She is well-developed  HENT:      Head: Normocephalic and atraumatic  Mouth/Throat:      Pharynx: No oropharyngeal exudate  Neck:      Musculoskeletal: Normal range of motion  Cardiovascular:      Rate and Rhythm: Normal rate and regular rhythm  Heart sounds: Normal heart sounds  Pulmonary:      Effort: Pulmonary effort is normal       Breath sounds: Normal breath sounds  Abdominal:      General: Bowel sounds are normal       Palpations: Abdomen is soft  Tenderness: There is no abdominal tenderness  Musculoskeletal: Normal range of motion  Comments: Tender to palpation right lateral malleolus of the ankle  Mild nonpitting edema  Varicose veins present  No obvious deformity, erythema, excess warmth or ecchymosis  Skin:     General: Skin is warm  Capillary Refill: Capillary refill takes less than 2 seconds  Neurological:      Mental Status: She is alert and oriented to person, place, and time  Vital Signs  ED Triage Vitals [08/19/20 0911]   Temperature Pulse Respirations Blood Pressure SpO2   97 5 °F (36 4 °C) 89 18 120/70 95 %      Temp Source Heart Rate Source Patient Position - Orthostatic VS BP Location FiO2 (%)   Temporal Monitor Lying Left arm --      Pain Score       8           Vitals:    08/19/20 0911   BP: 120/70   Pulse: 89   Patient Position - Orthostatic VS: Lying         Visual Acuity      ED Medications  Medications   acetaminophen (TYLENOL) tablet 650 mg (650 mg Oral Given 8/19/20 0941)       Diagnostic Studies  Results Reviewed     None                 XR ankle 3+ views RIGHT   Final Result by Li Whitehead MD (08/19 0124)      No acute osseous abnormality  Workstation performed: [id number]                    Procedures  Procedures         ED Course       US AUDIT      Most Recent Value   Initial Alcohol Screen: US AUDIT-C    1  How often do you have a drink containing alcohol?  0 Filed at: 08/19/2020 0912   2  How many drinks containing alcohol do you have on a typical day you are drinking? 0 Filed at: 08/19/2020 0912   3a  Male UNDER 65: How often do you have five or more drinks on one occasion? 0 Filed at: 08/19/2020 0912   3b  FEMALE Any Age, or MALE 65+: How often do you have 4 or more drinks on one occassion?   0 Filed at: 08/19/2020 0912   Audit-C Score  0 Filed at: 08/19/2020 0912                  IKE/DAST-10      Most Recent Value   How many times in the past year have you    Used an illegal drug or used a prescription medication for non-medical reasons? Never Filed at: 08/19/2020 0912                                [facility]  Number of Diagnoses or Management Options  Foot pain, right:   Diagnosis management comments: Pain improved with Tylenol  No fracture seen on x-ray  Patient is safe for discharge  Recommend continue Tylenol p r n  Kayla Cassette Disposition  Final diagnoses: Foot pain, right     Time reflects when diagnosis was documented in both MDM as applicable and the Disposition within this note     Time User Action Codes Description Comment    8/19/2020 10:04 AM Blossom Vazquez Add [P95 [id number]] Foot pain, right       ED Disposition     ED Disposition Condition Date/Time Comment    Discharge Stable Wed Aug 19, 2020 10:04 AM Ada Reis discharge to home/self care  Follow-up Information    None         Patient's Medications   Discharge Prescriptions    ACETAMINOPHEN (TYLENOL) 325 MG TABLET    Take 2 tablets (650 mg total) by mouth every 6 (six) hours as needed for mild pain       Start Date: 8/19/2020 End Date: --       Order Dose: 650 mg       Quantity: 30 tablet    Refills: 0     No discharge procedures on file      PDMP Review     None          ED Provider  Electronically Signed by           Philip Xiong PA-C  08/19/20 3669

## 2020-08-27 ENCOUNTER — HOSPITAL ENCOUNTER (EMERGENCY)
Facility: HOSPITAL | Age: 85
Discharge: OTHER INSTITUTION | End: 2020-08-27
Attending: EMERGENCY MEDICINE
Payer: MEDICARE

## 2020-08-27 ENCOUNTER — HOSPITAL ENCOUNTER (INPATIENT)
Facility: HOSPITAL | Age: 85
LOS: 6 days | Discharge: HOME | DRG: 885 | End: 2020-09-02
Attending: PSYCHIATRY & NEUROLOGY | Admitting: STUDENT IN AN ORGANIZED HEALTH CARE EDUCATION/TRAINING PROGRAM
Payer: MEDICARE

## 2020-08-27 VITALS
DIASTOLIC BLOOD PRESSURE: 77 MMHG | BODY MASS INDEX: 25.76 KG/M2 | RESPIRATION RATE: 18 BRPM | HEART RATE: 95 BPM | SYSTOLIC BLOOD PRESSURE: 128 MMHG | WEIGHT: 140 LBS | OXYGEN SATURATION: 93 % | TEMPERATURE: 97 F | HEIGHT: 62 IN

## 2020-08-27 DIAGNOSIS — R60.9 PERIPHERAL EDEMA: ICD-10-CM

## 2020-08-27 DIAGNOSIS — F32.A DEPRESSION, UNSPECIFIED DEPRESSION TYPE: Primary | ICD-10-CM

## 2020-08-27 DIAGNOSIS — I10 ESSENTIAL HYPERTENSION: ICD-10-CM

## 2020-08-27 DIAGNOSIS — M48.061 SPINAL STENOSIS AT L4-L5 LEVEL: Chronic | ICD-10-CM

## 2020-08-27 DIAGNOSIS — I63.9 THALAMIC INFARCTION (HCC): ICD-10-CM

## 2020-08-27 DIAGNOSIS — K59.00 CONSTIPATION, UNSPECIFIED CONSTIPATION TYPE: ICD-10-CM

## 2020-08-27 DIAGNOSIS — F32.A DEPRESSION, UNSPECIFIED DEPRESSION TYPE: ICD-10-CM

## 2020-08-27 DIAGNOSIS — R45.851 SUICIDAL IDEATIONS: ICD-10-CM

## 2020-08-27 DIAGNOSIS — F39 UNSPECIFIED MOOD (AFFECTIVE) DISORDER (HCC): Primary | ICD-10-CM

## 2020-08-27 DIAGNOSIS — I10 HYPERTENSION: ICD-10-CM

## 2020-08-27 LAB
ALBUMIN SERPL BCP-MCNC: 3.8 G/DL (ref 3.5–5.7)
ALBUMIN SERPL BCP-MCNC: 3.9 G/DL (ref 3.5–5.7)
ALP SERPL-CCNC: 84 U/L (ref 55–165)
ALP SERPL-CCNC: 85 U/L (ref 55–165)
ALT SERPL W P-5'-P-CCNC: 17 U/L (ref 7–52)
ALT SERPL W P-5'-P-CCNC: 18 U/L (ref 7–52)
AMPHETAMINES SERPL QL SCN: NEGATIVE
ANION GAP SERPL CALCULATED.3IONS-SCNC: 6 MMOL/L (ref 4–13)
ANION GAP SERPL CALCULATED.3IONS-SCNC: 7 MMOL/L (ref 4–13)
AST SERPL W P-5'-P-CCNC: 18 U/L (ref 13–39)
AST SERPL W P-5'-P-CCNC: 18 U/L (ref 13–39)
BARBITURATES UR QL: NEGATIVE
BASOPHILS # BLD AUTO: 0 THOUSANDS/ΜL (ref 0–0.1)
BASOPHILS NFR BLD AUTO: 1 % (ref 0–2)
BENZODIAZ UR QL: NEGATIVE
BILIRUB SERPL-MCNC: 0.7 MG/DL (ref 0.2–1)
BILIRUB SERPL-MCNC: 0.9 MG/DL (ref 0.2–1)
BILIRUB UR QL STRIP: NEGATIVE
BUN SERPL-MCNC: 7 MG/DL (ref 7–25)
BUN SERPL-MCNC: 9 MG/DL (ref 7–25)
CALCIUM SERPL-MCNC: 9 MG/DL (ref 8.6–10.5)
CALCIUM SERPL-MCNC: 9.2 MG/DL (ref 8.6–10.5)
CHLORIDE SERPL-SCNC: 100 MMOL/L (ref 98–107)
CHLORIDE SERPL-SCNC: 97 MMOL/L (ref 98–107)
CLARITY UR: ABNORMAL
CO2 SERPL-SCNC: 26 MMOL/L (ref 21–31)
CO2 SERPL-SCNC: 28 MMOL/L (ref 21–31)
COCAINE UR QL: NEGATIVE
COLOR UR: YELLOW
CREAT SERPL-MCNC: 0.77 MG/DL (ref 0.6–1.2)
CREAT SERPL-MCNC: 0.92 MG/DL (ref 0.6–1.2)
EOSINOPHIL # BLD AUTO: 0.1 THOUSAND/ΜL (ref 0–0.61)
EOSINOPHIL NFR BLD AUTO: 2 % (ref 0–5)
ERYTHROCYTE [DISTWIDTH] IN BLOOD BY AUTOMATED COUNT: 13.2 % (ref 11.5–14.5)
ETHANOL SERPL-MCNC: <10 MG/DL
GFR SERPL CREATININE-BSD FRML MDRD: 55 ML/MIN/1.73SQ M
GFR SERPL CREATININE-BSD FRML MDRD: 68 ML/MIN/1.73SQ M
GLUCOSE SERPL-MCNC: 102 MG/DL (ref 65–99)
GLUCOSE SERPL-MCNC: 97 MG/DL (ref 65–99)
GLUCOSE UR STRIP-MCNC: NEGATIVE MG/DL
HCT VFR BLD AUTO: 41.5 % (ref 42–47)
HGB BLD-MCNC: 14.5 G/DL (ref 12–16)
HGB UR QL STRIP.AUTO: NEGATIVE
KETONES UR STRIP-MCNC: NEGATIVE MG/DL
LEUKOCYTE ESTERASE UR QL STRIP: NEGATIVE
LYMPHOCYTES # BLD AUTO: 1.2 THOUSANDS/ΜL (ref 0.6–4.47)
LYMPHOCYTES NFR BLD AUTO: 18 % (ref 21–51)
MCH RBC QN AUTO: 31.5 PG (ref 26–34)
MCHC RBC AUTO-ENTMCNC: 34.9 G/DL (ref 31–37)
MCV RBC AUTO: 90 FL (ref 81–99)
METHADONE UR QL: NEGATIVE
MONOCYTES # BLD AUTO: 0.6 THOUSAND/ΜL (ref 0.17–1.22)
MONOCYTES NFR BLD AUTO: 8 % (ref 2–12)
NEUTROPHILS # BLD AUTO: 4.8 THOUSANDS/ΜL (ref 1.4–6.5)
NEUTS SEG NFR BLD AUTO: 71 % (ref 42–75)
NITRITE UR QL STRIP: NEGATIVE
OPIATES UR QL SCN: NEGATIVE
OXYCODONE+OXYMORPHONE UR QL SCN: NEGATIVE
PCP UR QL: NEGATIVE
PH UR STRIP.AUTO: 8 [PH]
PLATELET # BLD AUTO: 208 THOUSANDS/UL (ref 149–390)
PMV BLD AUTO: 7.4 FL (ref 8.6–11.7)
POTASSIUM SERPL-SCNC: 3.6 MMOL/L (ref 3.5–5.5)
POTASSIUM SERPL-SCNC: 3.7 MMOL/L (ref 3.5–5.5)
PROT SERPL-MCNC: 6.3 G/DL (ref 6.4–8.9)
PROT SERPL-MCNC: 6.4 G/DL (ref 6.4–8.9)
PROT UR STRIP-MCNC: NEGATIVE MG/DL
RBC # BLD AUTO: 4.6 MILLION/UL (ref 3.9–5.2)
SARS-COV-2 RNA RESP QL NAA+PROBE: NEGATIVE
SODIUM SERPL-SCNC: 131 MMOL/L (ref 134–143)
SODIUM SERPL-SCNC: 133 MMOL/L (ref 134–143)
SP GR UR STRIP.AUTO: 1.01 (ref 1–1.03)
THC UR QL: NEGATIVE
TSH SERPL DL<=0.05 MIU/L-ACNC: 0.47 UIU/ML (ref 0.45–5.33)
UROBILINOGEN UR QL STRIP.AUTO: 1 E.U./DL
WBC # BLD AUTO: 6.8 THOUSAND/UL (ref 4.8–10.8)

## 2020-08-27 PROCEDURE — 87635 SARS-COV-2 COVID-19 AMP PRB: CPT | Performed by: EMERGENCY MEDICINE

## 2020-08-27 PROCEDURE — 84443 ASSAY THYROID STIM HORMONE: CPT | Performed by: PHYSICIAN ASSISTANT

## 2020-08-27 PROCEDURE — 93005 ELECTROCARDIOGRAM TRACING: CPT

## 2020-08-27 PROCEDURE — 99285 EMERGENCY DEPT VISIT HI MDM: CPT

## 2020-08-27 PROCEDURE — 36415 COLL VENOUS BLD VENIPUNCTURE: CPT | Performed by: PHYSICIAN ASSISTANT

## 2020-08-27 PROCEDURE — 80320 DRUG SCREEN QUANTALCOHOLS: CPT | Performed by: PHYSICIAN ASSISTANT

## 2020-08-27 PROCEDURE — 81003 URINALYSIS AUTO W/O SCOPE: CPT | Performed by: PHYSICIAN ASSISTANT

## 2020-08-27 PROCEDURE — 80053 COMPREHEN METABOLIC PANEL: CPT | Performed by: PHYSICIAN ASSISTANT

## 2020-08-27 PROCEDURE — 80307 DRUG TEST PRSMV CHEM ANLYZR: CPT | Performed by: PHYSICIAN ASSISTANT

## 2020-08-27 PROCEDURE — 96360 HYDRATION IV INFUSION INIT: CPT

## 2020-08-27 PROCEDURE — 85025 COMPLETE CBC W/AUTO DIFF WBC: CPT | Performed by: PHYSICIAN ASSISTANT

## 2020-08-27 PROCEDURE — 99283 EMERGENCY DEPT VISIT LOW MDM: CPT | Performed by: PHYSICIAN ASSISTANT

## 2020-08-27 RX ORDER — BENZTROPINE MESYLATE 1 MG/ML
1 INJECTION INTRAMUSCULAR; INTRAVENOUS
Status: DISCONTINUED | OUTPATIENT
Start: 2020-08-27 | End: 2020-09-02 | Stop reason: HOSPADM

## 2020-08-27 RX ORDER — RISPERIDONE 0.5 MG/1
0.5 TABLET, ORALLY DISINTEGRATING ORAL
Status: DISCONTINUED | OUTPATIENT
Start: 2020-08-27 | End: 2020-09-02 | Stop reason: HOSPADM

## 2020-08-27 RX ORDER — HYDROXYZINE HYDROCHLORIDE 25 MG/1
25 TABLET, FILM COATED ORAL EVERY 4 HOURS PRN
Status: CANCELLED | OUTPATIENT
Start: 2020-08-27

## 2020-08-27 RX ORDER — ACETAMINOPHEN 325 MG/1
975 TABLET ORAL EVERY 6 HOURS PRN
Status: DISCONTINUED | OUTPATIENT
Start: 2020-08-27 | End: 2020-09-02 | Stop reason: HOSPADM

## 2020-08-27 RX ORDER — ACETAMINOPHEN 325 MG/1
650 TABLET ORAL EVERY 4 HOURS PRN
Status: DISCONTINUED | OUTPATIENT
Start: 2020-08-27 | End: 2020-09-02 | Stop reason: HOSPADM

## 2020-08-27 RX ORDER — LORAZEPAM 2 MG/ML
1 INJECTION INTRAMUSCULAR EVERY 4 HOURS PRN
Status: DISCONTINUED | OUTPATIENT
Start: 2020-08-27 | End: 2020-09-02 | Stop reason: HOSPADM

## 2020-08-27 RX ORDER — OLANZAPINE 10 MG/1
5 INJECTION, POWDER, LYOPHILIZED, FOR SOLUTION INTRAMUSCULAR
Status: DISCONTINUED | OUTPATIENT
Start: 2020-08-27 | End: 2020-09-02 | Stop reason: HOSPADM

## 2020-08-27 RX ORDER — OLANZAPINE 5 MG/1
5 TABLET ORAL
Status: DISCONTINUED | OUTPATIENT
Start: 2020-08-27 | End: 2020-09-02 | Stop reason: HOSPADM

## 2020-08-27 RX ORDER — MAGNESIUM HYDROXIDE/ALUMINUM HYDROXICE/SIMETHICONE 120; 1200; 1200 MG/30ML; MG/30ML; MG/30ML
30 SUSPENSION ORAL EVERY 4 HOURS PRN
Status: DISCONTINUED | OUTPATIENT
Start: 2020-08-27 | End: 2020-09-02 | Stop reason: HOSPADM

## 2020-08-27 RX ORDER — ACETAMINOPHEN 325 MG/1
650 TABLET ORAL EVERY 6 HOURS PRN
Status: CANCELLED | OUTPATIENT
Start: 2020-08-27

## 2020-08-27 RX ORDER — BENZTROPINE MESYLATE 1 MG/1
1 TABLET ORAL
Status: DISCONTINUED | OUTPATIENT
Start: 2020-08-27 | End: 2020-09-02 | Stop reason: HOSPADM

## 2020-08-27 RX ORDER — HALOPERIDOL 5 MG
2.5 TABLET ORAL EVERY 6 HOURS PRN
Status: DISCONTINUED | OUTPATIENT
Start: 2020-08-27 | End: 2020-09-02 | Stop reason: HOSPADM

## 2020-08-27 RX ORDER — LORAZEPAM 2 MG/ML
1 INJECTION INTRAMUSCULAR EVERY 4 HOURS PRN
Status: CANCELLED | OUTPATIENT
Start: 2020-08-27

## 2020-08-27 RX ORDER — RISPERIDONE 0.5 MG/1
0.5 TABLET, ORALLY DISINTEGRATING ORAL
Status: CANCELLED | OUTPATIENT
Start: 2020-08-27

## 2020-08-27 RX ORDER — HYDROXYZINE 50 MG/1
50 TABLET, FILM COATED ORAL EVERY 6 HOURS PRN
Status: DISCONTINUED | OUTPATIENT
Start: 2020-08-27 | End: 2020-09-02 | Stop reason: HOSPADM

## 2020-08-27 RX ORDER — HYDROXYZINE HYDROCHLORIDE 25 MG/1
50 TABLET, FILM COATED ORAL EVERY 6 HOURS PRN
Status: CANCELLED | OUTPATIENT
Start: 2020-08-27

## 2020-08-27 RX ORDER — BENZTROPINE MESYLATE 1 MG/ML
1 INJECTION INTRAMUSCULAR; INTRAVENOUS
Status: CANCELLED | OUTPATIENT
Start: 2020-08-27

## 2020-08-27 RX ORDER — ACETAMINOPHEN 325 MG/1
650 TABLET ORAL EVERY 6 HOURS PRN
Status: DISCONTINUED | OUTPATIENT
Start: 2020-08-27 | End: 2020-09-02 | Stop reason: HOSPADM

## 2020-08-27 RX ORDER — OLANZAPINE 10 MG/1
5 INJECTION, POWDER, LYOPHILIZED, FOR SOLUTION INTRAMUSCULAR
Status: CANCELLED | OUTPATIENT
Start: 2020-08-27

## 2020-08-27 RX ORDER — TRAZODONE HYDROCHLORIDE 50 MG/1
50 TABLET ORAL
Status: DISCONTINUED | OUTPATIENT
Start: 2020-08-27 | End: 2020-09-02 | Stop reason: HOSPADM

## 2020-08-27 RX ORDER — BENZTROPINE MESYLATE 1 MG/1
1 TABLET ORAL
Status: CANCELLED | OUTPATIENT
Start: 2020-08-27

## 2020-08-27 RX ORDER — ACETAMINOPHEN 325 MG/1
650 TABLET ORAL EVERY 4 HOURS PRN
Status: CANCELLED | OUTPATIENT
Start: 2020-08-27

## 2020-08-27 RX ORDER — HALOPERIDOL 5 MG/ML
2.5 INJECTION INTRAMUSCULAR EVERY 6 HOURS PRN
Status: DISCONTINUED | OUTPATIENT
Start: 2020-08-27 | End: 2020-09-02 | Stop reason: HOSPADM

## 2020-08-27 RX ORDER — MAGNESIUM HYDROXIDE/ALUMINUM HYDROXICE/SIMETHICONE 120; 1200; 1200 MG/30ML; MG/30ML; MG/30ML
30 SUSPENSION ORAL EVERY 4 HOURS PRN
Status: CANCELLED | OUTPATIENT
Start: 2020-08-27

## 2020-08-27 RX ORDER — ACETAMINOPHEN 325 MG/1
975 TABLET ORAL EVERY 6 HOURS PRN
Status: CANCELLED | OUTPATIENT
Start: 2020-08-27

## 2020-08-27 RX ORDER — OLANZAPINE 5 MG/1
5 TABLET ORAL
Status: CANCELLED | OUTPATIENT
Start: 2020-08-27

## 2020-08-27 RX ORDER — HALOPERIDOL 5 MG/ML
2.5 INJECTION INTRAMUSCULAR EVERY 6 HOURS PRN
Status: CANCELLED | OUTPATIENT
Start: 2020-08-27

## 2020-08-27 RX ORDER — TRAZODONE HYDROCHLORIDE 50 MG/1
50 TABLET ORAL
Status: CANCELLED | OUTPATIENT
Start: 2020-08-27

## 2020-08-27 RX ORDER — HYDROXYZINE HYDROCHLORIDE 25 MG/1
25 TABLET, FILM COATED ORAL EVERY 4 HOURS PRN
Status: DISCONTINUED | OUTPATIENT
Start: 2020-08-27 | End: 2020-09-02 | Stop reason: HOSPADM

## 2020-08-27 RX ADMIN — SODIUM CHLORIDE 500 ML: 0.9 INJECTION, SOLUTION INTRAVENOUS at 11:13

## 2020-08-27 NOTE — ED NOTES
Insurance Authorization for admission:   No precert required Medicare A&B is primary    COB:   No COB required, secondary ins, HOP, is a supplemental plan that runs along w/ Medicare A&B      EVS (Eligibility Verification System) called - [phone]    Automated system indicates: EVS called, pt not on file    Insurance Authorization for Transportation:   No transport auth necessary Medicare A&B is primary

## 2020-08-27 NOTE — ED NOTES
Crisis met with Patient in ED #2  Patient is a 81 y/o female brought to the ED with suicidal ideations and increased depression  Patient denies any homicidal ideations or any visual/auditory hallucinations  Her  and her children have all  and she said her grandchildren aren't in the area  Patient stated she wants to be with her  family members  She has asked the residential staff at her personal care home to give her more meds or give her a gun so she could die  Today she told the staff she would not eat or take any of her meds anymore  She said she would just lay in bed until she   Patient agreed to sign a 201 after her rights and detailed explanation of the 72 hr process were read to and reviewed with her    Crisis to f/u

## 2020-08-27 NOTE — ED NOTES
EMTALA and Medical Necessity paperwork completed and signatures of pt and attending obtained  Pt, RN, attending, and Supervisor at the [facility] where the pt resides are all aware of the pt's disposition and p/u time      All necessary paperwork ready for transfer

## 2020-08-27 NOTE — ED PROVIDER NOTES
History  Chief Complaint   Patient presents with    Psychiatric Evaluation     S/I        80year old female presents emergency department with concern for depression and suicidal ideation from a nursing facility  She states that the rest of her family has passed away and she has few friends  She states that she wishes she were dead so that she could be with her family  She reports planning to throw herself off of a yu in an attempt to kill herself but states that she is unlikely to actually do it  She denies any other complaints or concerns at this time  Overall she is calm and cooperative  Denies HI  Allergies reviewed          Prior to Admission Medications   Prescriptions Last Dose Informant Patient Reported?  Taking?   acetaminophen (TYLENOL) 325 mg tablet 8/26/2020 at Unknown time  No Yes   Sig: Take 2 tablets (650 mg total) by mouth every 6 (six) hours as needed for mild pain   amLODIPine (NORVASC) 5 mg tablet 8/27/2020 at Unknown time  No Yes   Sig: Take 1 tablet (5 mg total) by mouth daily   aspirin 81 mg chewable tablet 8/27/2020 at Unknown time  No Yes   Sig: Chew 1 tablet (81 mg total) daily   atorvastatin (LIPITOR) 40 mg tablet 8/26/2020 at Unknown time  No Yes   Sig: Take 1 tablet (40 mg total) by mouth every evening   docusate sodium (COLACE) 100 mg capsule   No Yes   Sig: Take 1 capsule (100 mg total) by mouth 2 (two) times a day   hydrochlorothiazide (HYDRODIURIL) 25 mg tablet 8/27/2020 at Unknown time  No Yes   Sig: Take 1 tablet (25 mg total) by mouth daily      Facility-Administered Medications: None       Past Medical History:   Diagnosis Date    Arthritis     Cataract     LAST ASSESSED: [id number]    Depression     Dislocation of left shoulder joint     LAST ASSESSSED: [id number]    Essential hypertension 4/3/2019    Gait disorder     LAST ASSESSED: [id number]    Herpes zoster     LAST ASSESSED: [id number]    HL (hearing loss)     Impacted cerumen of both ears     LAST ASSESSED: [id number]    Impacted cerumen of right ear     LAST ASSESSED: [id number]    Lightheadedness     MILD AND PT REFUSES EKG ETC  PT PROMISES TO CALL IF WORSENS  LAST ASSESSED: [id number]    Multiple falls     LAST ASSESSED: [id number]    Near syncope     LAST ASSESSED: [id number]    Postherpetic polyneuropathy     LAST ASSESSED: [id number]       Past Surgical History:   Procedure Laterality Date    CATARACT EXTRACTION, BILATERAL      HERNIA REPAIR      TONSILLECTOMY      TUBAL LIGATION         Family History   Problem Relation Age of Onset    Cirrhosis Father     Heart disease Son     Mental illness Son      I have reviewed and agree with the history as documented  E-Cigarette/Vaping    E-Cigarette Use Never User      E-Cigarette/Vaping Substances     Social History     Tobacco Use    Smoking status: Never Smoker    Smokeless tobacco: Never Used   Substance Use Topics    Alcohol use: Never     Frequency: Never    Drug use: No       Review of Systems   Constitutional: Negative for chills, fatigue and fever  HENT: Negative for congestion, ear pain, rhinorrhea, sinus pressure, sneezing, sore throat and trouble swallowing  Eyes: Negative for discharge and itching  Respiratory: Negative for cough, chest tightness, shortness of breath, wheezing and stridor  Cardiovascular: Negative for chest pain and palpitations  Gastrointestinal: Negative for abdominal pain, diarrhea, nausea and vomiting  Neurological: Negative for dizziness, syncope, numbness and headaches  Psychiatric/Behavioral: Positive for suicidal ideas  All other systems reviewed and are negative  Physical Exam  Physical Exam  Vitals signs and nursing note reviewed  Constitutional:       General: She is not in acute distress  Appearance: Normal appearance  She is well-developed and normal weight  She is not diaphoretic  HENT:      Head: Normocephalic and atraumatic        Right Ear: External ear normal       Left Ear: External ear normal       Nose: Nose normal    Eyes:      Conjunctiva/sclera: Conjunctivae normal       Pupils: Pupils are equal, round, and reactive to light  Neck:      Musculoskeletal: Normal range of motion  Cardiovascular:      Rate and Rhythm: Normal rate and regular rhythm  Heart sounds: Normal heart sounds  No murmur  No friction rub  No gallop  Pulmonary:      Effort: Pulmonary effort is normal  No respiratory distress  Breath sounds: Normal breath sounds  No stridor  No wheezing or rales  Abdominal:      General: Bowel sounds are normal  There is no distension  Palpations: Abdomen is soft  Tenderness: There is no abdominal tenderness  There is no guarding  Musculoskeletal: Normal range of motion  General: No tenderness  Skin:     General: Skin is warm  Capillary Refill: Capillary refill takes less than 2 seconds  Neurological:      Mental Status: She is alert and oriented to person, place, and time  Psychiatric:         Attention and Perception: Attention and perception normal  She is attentive  She does not perceive auditory or visual hallucinations  Mood and Affect: Mood is depressed  Speech: Speech normal          Behavior: Behavior normal  Behavior is cooperative  Thought Content: Thought content includes suicidal ideation  Thought content includes suicidal plan           Vital Signs  ED Triage Vitals   Temperature Pulse Respirations Blood Pressure SpO2   08/27/20 0909 08/27/20 0909 08/27/20 0909 08/27/20 0909 08/27/20 0909   (!) 97 1 °F (36 2 °C) 68 16 140/75 98 %      Temp Source Heart Rate Source Patient Position - Orthostatic VS BP Location FiO2 (%)   08/27/20 1534 08/27/20 1534 08/27/20 1534 08/27/20 1534 --   Tympanic Monitor Lying Left arm       Pain Score       --                  Vitals:    08/27/20 1315 08/27/20 1330 08/27/20 1534 08/27/20 1912   BP:   156/90 128/77   Pulse: 97 101 90 95   Patient Position - Orthostatic VS:   Lying Lying         Visual Acuity      ED Medications  Medications   sodium chloride 0 9 % bolus 500 mL (0 mL Intravenous Stopped 8/27/20 1213)       Diagnostic Studies  Results Reviewed     Procedure Component Value Units Date/Time    Comprehensive metabolic panel [[id number]]  (Abnormal) Collected:  08/27/20 1533    Lab Status:  Final result Specimen:  Blood from Arm, Right Updated:  08/27/20 1607     Sodium 133 mmol/L      Potassium 3 6 mmol/L      Chloride 100 mmol/L      CO2 26 mmol/L      ANION GAP 7 mmol/L      BUN 9 mg/dL      Creatinine 0 92 mg/dL      Glucose 97 mg/dL      Calcium 9 0 mg/dL      AST 18 U/L      ALT 17 U/L      Alkaline Phosphatase 84 U/L      Total Protein 6 3 g/dL      Albumin 3 8 g/dL      Total Bilirubin 0 70 mg/dL      eGFR 55 ml/min/1 73sq m     Narrative:       [facility] guidelines for Chronic Kidney Disease (CKD):     Stage 1 with normal or high GFR (GFR > 90 mL/min/1 73 square meters)    Stage 2 Mild CKD (GFR = 60-89 mL/min/1 73 square meters)    Stage 3A Moderate CKD (GFR = 45-59 mL/min/1 73 square meters)    Stage 3B Moderate CKD (GFR = 30-44 mL/min/1 73 square meters)    Stage 4 Severe CKD (GFR = 15-29 mL/min/1 73 square meters)    Stage 5 End Stage CKD (GFR <15 mL/min/1 73 square meters)  Note: GFR calculation is accurate only with a steady state creatinine    Rapid drug screen, urine [[id number]]  (Normal) Collected:  08/27/20 1203    Lab Status:  Final result Specimen:  Urine, Other Updated:  08/27/20 1230     Amph/Meth UR Negative     Barbiturate Ur Negative     Benzodiazepine Urine Negative     Cocaine Urine Negative     Methadone Urine Negative     Opiate Urine Negative     PCP Ur Negative     THC Urine Negative     Oxycodone Urine Negative    Narrative:       FOR MEDICAL PURPOSES ONLY  IF CONFIRMATION NEEDED PLEASE CONTACT THE LAB WITHIN 5 DAYS      Drug Screen Cutoff Levels:  AMPHETAMINE/METHAMPHETAMINES  1000 ng/mL  BARBITURATES     200 ng/mL  BENZODIAZEPINES     200 ng/mL  COCAINE      300 ng/mL  METHADONE      300 ng/mL  OPIATES      300 ng/mL  PHENCYCLIDINE     25 ng/mL  THC       50 ng/mL  OXYCODONE      100 ng/mL    UA w Reflex to Microscopic w Reflex to Culture [[id number]]  (Abnormal) Collected:  08/27/20 1203    Lab Status:  Final result Specimen:  Urine, Other Updated:  08/27/20 1210     Color, UA Yellow     Clarity, UA Cloudy     Specific [place], UA 1 010     pH, UA 8 0     Leukocytes, UA Negative     Nitrite, UA Negative     Protein, UA Negative mg/dl      Glucose, UA Negative mg/dl      Ketones, UA Negative mg/dl      Urobilinogen, UA 1 0 E U /dl      Bilirubin, UA Negative     Blood, UA Negative    Novel Coronavirus (Covid-19),PCR [facility] [[id number]]  (Normal) Collected:  08/27/20 1021    Lab Status:  Final result Specimen:  Nares from Nose Updated:  08/27/20 1124     SARS-CoV-2 Negative    Narrative: The specimen collection materials, transport medium, and/or testing methodology utilized in the production of these test results have been proven to be reliable in a limited validation with an abbreviated program under the Emergency Utilization Authorization provided by the FDA  Testing reported as "Presumptive positive" will be confirmed with secondary testing with a reference laboratory to ensure result accuracy  Clinical caution and judgement should be used with the interpretation of these results with consideration of the clinical impression and other laboratory testing  Testing reported as "Positive" or "Negative" has been proven to be accurate according to standard laboratory validation requirements  All testing is performed with control materials showing appropriate reactivity at standard intervals        TSH [[id number]]  (Normal) Collected:  08/27/20 1021    Lab Status:  Final result Specimen:  Blood from Arm, Left Updated:  08/27/20 1101     TSH 3RD GENERATON 0 470 uIU/mL     Narrative: Patients undergoing fluorescein dye angiography may retain small amounts of fluorescein in the body for 48-72 hours post procedure  Samples containing fluorescein can produce falsely depressed TSH values  If the patient had this procedure,a specimen should be resubmitted post fluorescein clearance        Comprehensive metabolic panel [[id number]]  (Abnormal) Collected:  08/27/20 1021    Lab Status:  Final result Specimen:  Blood from Arm, Left Updated:  08/27/20 1046     Sodium 131 mmol/L      Potassium 3 7 mmol/L      Chloride 97 mmol/L      CO2 28 mmol/L      ANION GAP 6 mmol/L      BUN 7 mg/dL      Creatinine 0 77 mg/dL      Glucose 102 mg/dL      Calcium 9 2 mg/dL      AST 18 U/L      ALT 18 U/L      Alkaline Phosphatase 85 U/L      Total Protein 6 4 g/dL      Albumin 3 9 g/dL      Total Bilirubin 0 90 mg/dL      eGFR 68 ml/min/1 73sq m     Narrative:       [facility] guidelines for Chronic Kidney Disease (CKD):     Stage 1 with normal or high GFR (GFR > 90 mL/min/1 73 square meters)    Stage 2 Mild CKD (GFR = 60-89 mL/min/1 73 square meters)    Stage 3A Moderate CKD (GFR = 45-59 mL/min/1 73 square meters)    Stage 3B Moderate CKD (GFR = 30-44 mL/min/1 73 square meters)    Stage 4 Severe CKD (GFR = 15-29 mL/min/1 73 square meters)    Stage 5 End Stage CKD (GFR <15 mL/min/1 73 square meters)  Note: GFR calculation is accurate only with a steady state creatinine    Ethanol [[id number]]  (Normal) Collected:  08/27/20 1021    Lab Status:  Final result Specimen:  Blood from Arm, Left Updated:  08/27/20 1044     Ethanol Lvl <10 mg/dL     CBC and differential [[id number]]  (Abnormal) Collected:  08/27/20 1021    Lab Status:  Final result Specimen:  Blood from Arm, Left Updated:  08/27/20 1033     WBC 6 80 Thousand/uL      RBC 4 60 Million/uL      Hemoglobin 14 5 g/dL      Hematocrit 41 5 %      MCV 90 fL      MCH 31 5 pg      MCHC 34 9 g/dL      RDW 13 2 %      MPV 7 4 fL      Platelets 481 Thousands/uL      Neutrophils Relative 71 %      Lymphocytes Relative 18 %      Monocytes Relative 8 %      Eosinophils Relative 2 %      Basophils Relative 1 %      Neutrophils Absolute 4 80 Thousands/µL      Lymphocytes Absolute 1 20 Thousands/µL      Monocytes Absolute 0 60 Thousand/µL      Eosinophils Absolute 0 10 Thousand/µL      Basophils Absolute 0 00 Thousands/µL     POCT alcohol breath test [[id number]]     Lab Status:  No result                  No orders to display              Procedures  Procedures         ED Course  ED Course as of Aug 27 2009   Thu Aug 27, 2020   1637 Patient initially stated that her entire family was dead however her daughter has called the emergency department and is now talking to her on the phone  US AUDIT      Most Recent Value   Initial Alcohol Screen: US AUDIT-C    1  How often do you have a drink containing alcohol?  0 Filed at: 08/27/2020 0910   2  How many drinks containing alcohol do you have on a typical day you are drinking? 0 Filed at: 08/27/2020 0910   3b  FEMALE Any Age, or MALE 65+: How often do you have 4 or more drinks on one occassion? 0 Filed at: 08/27/2020 0910   Audit-C Score  0 Filed at: 08/27/2020 0910                  IKE/DAST-10      Most Recent Value   How many times in the past year have you    Used an illegal drug or used a prescription medication for non-medical reasons? Never Filed at: 08/27/2020 0910                                MDM  Number of Diagnoses or Management Options  Depression, unspecified depression type: established and worsening  Suicidal ideations: established and worsening  Diagnosis management comments: Physical exam benign  Labs unremarkable  Patient is medically clear for crisis evaluation  Patient demonstrates suicidal ideations with plan  May require inpatient psychiatric admission         Amount and/or Complexity of Data Reviewed  Clinical lab tests: ordered and reviewed    Risk of Complications, Morbidity, and/or Mortality  Presenting problems: moderate  Diagnostic procedures: low  Management options: low    Patient Progress  Patient progress: stable        Disposition  Final diagnoses:   Depression, unspecified depression type   Suicidal ideations     Time reflects when diagnosis was documented in both MDM as applicable and the Disposition within this note     Time User Action Codes Description Comment    8/27/2020  4:22 PM Sonda Merritts Add [F32 9] Depression, unspecified depression type     8/27/2020  4:22 PM Sonda Merritts Modify [F32 9] Depression, unspecified depression type     8/27/2020  4:23 PM Sonda Merritts Add [I10] Essential hypertension     8/27/2020  4:23 PM Sonda Merritts Add [Z71 741] Spinal stenosis at L4-L5 level     8/27/2020  4:23 PM Sonda Merritts Modify [C40 759] Spinal stenosis at L4-L5 level     8/27/2020  5:03 PM Kyle Danyelle Modify [F32 9] Depression, unspecified depression type     8/27/2020  5:03 PM [place], [address] Suicidal ideations       ED Disposition     ED Disposition Condition Date/Time Comment    Transfer to Another Facility  Thu Aug 27, 2020  7:16 PM Carmita Villagomez should be transferred out to [address] [place] [address] [place] FULLER Documentation      Most Recent Value   Patient Condition  The patient has been stabilized such that within reasonable medical probability, no material deterioration of the patient condition or the condition of the unborn child(chelsea) is likely to result from the transfer, No noted underlying medical condition requiring transfer to another facility   Transfer is per preference and request of patient and/or family   Reason for Transfer  No bed available at level of patient's needs   Benefits of Transfer  Specialized equipment and/or services available at the receiving facility (Include comment)________________________ [I/P MH tx for older adults]   Risks of Transfer  Potential for delay in receiving treatment   Accepting Physician  Tracee Mccarthy Name, [address] [employer] [address], [facility] [address] [place], [address]    (Name & Tel number)  Suman Lee   [phone]   [facility]  Aundrea Right  [phone]   Sending MD Dr Merline Gaudier, DO   Provider Certification  General risk, such as traffic hazards, adverse weather conditions, rough terrain or turbulence, possible failure of equipment (including vehicle or aircraft), or consequences of actions of persons outside the control of the transport personnel, The patient is stable for psychiatric transfer because they are medically stable, and is protected from harming him/herself or others during transport      RN Documentation      [address] Name, [address] [employer] [address], [facility] , [address] [place], [address]   Bed Assignment  to be assigned upon arrival    (Name & Tel number)  Suman Jesus   [phone]   Report Given to  [phone]   Transport Mode  Ambulance   Transported by [facility] and Unit #)  Aundrea Right  [phone]   Level of Care  Basic life support   Copies of Medical Records Sent  Transfer form   Patient Belongings Disposition  Sent with patient   Transfer Date  08/27/20      Follow-up Information    None         Discharge Medication List as of 8/27/2020  7:16 PM      CONTINUE these medications which have NOT CHANGED    Details   acetaminophen (TYLENOL) 325 mg tablet Take 2 tablets (650 mg total) by mouth every 6 (six) hours as needed for mild pain, Starting Thu 11/21/2019, Normal      amLODIPine (NORVASC) 5 mg tablet Take 1 tablet (5 mg total) by mouth daily, Starting Thu 5/7/2020, Normal      aspirin 81 mg chewable tablet Chew 1 tablet (81 mg total) daily, Starting Thu 5/7/2020, Normal      atorvastatin (LIPITOR) 40 mg tablet Take 1 tablet (40 mg total) by mouth every evening, Starting Thu 5/7/2020, Normal      docusate sodium (COLACE) 100 mg capsule Take 1 capsule (100 mg total) by mouth 2 (two) times a day, Starting Tue 3/3/2020, Normal      hydrochlorothiazide (HYDRODIURIL) 25 mg tablet Take 1 tablet (25 mg total) by mouth daily, Starting Th 5/7/2020, Normal           No discharge procedures on file      PDMP Review     None          ED Provider  Electronically Signed by           Giulia Estes PA-C  08/27/20 2009

## 2020-08-27 NOTE — ED NOTES
Crisis spoke to  Lawanda Doctor @ [phone]  Екатерина Cruz stated Patient has been making suicidal statements for the last 2 weeks  She said she requested staff give her extra medication or a gun so she could be with her  loved ones  Today Patient stated she was going to refuse food and her meds until she  in bed    Crisis f/u

## 2020-08-27 NOTE — ED NOTES
Patient is accepted at [address]  Patient is accepted by [facility]      Transportation is arranged with [facility]  Transportation is scheduled for 18:45  Patient may go to the floor upon arrival w/ EMS  Nurse report is to be called to [phone] prior to patient transfer

## 2020-08-27 NOTE — ED NOTES
Patient placed close to nurses station for close observation by staff   Physician in agreement with plan     Marilyn Burris RN  08/27/20 1139

## 2020-08-27 NOTE — LETTER
[facility]  [address] [address] [phone]  Dept: [phone]      EMTALA TRANSFER CONSENT    NAME Ada Reis                                         1930                              MRN [id number]    I have been informed of my rights regarding examination, treatment, and transfer   by Dr Timothy Boswell DO    Benefits: Specialized equipment and/or services available at the receiving facility (Include comment)________________________(I/P MH tx for older adults)    Risks: Potential for delay in receiving treatment      Consent for Transfer:  I acknowledge that my medical condition has been evaluated and explained to me by the emergency department physician or other qualified medical person and/or my attending physician, who has recommended that I be transferred to the service of  Accepting Physician: ANTONIA Moreno at [address], [address] : [facility] [address] [place], [place], [address]  The above potential benefits of such transfer, the potential risks associated with such transfer, and the probable risks of not being transferred have been explained to me, and I fully understand them  The doctor has explained that, in my case, the benefits of transfer outweigh the risks  I agree to be transferred  I authorize the performance of emergency medical procedures and treatments upon me in both transit and upon arrival at the receiving facility  Additionally, I authorize the release of any and all medical records to the receiving facility and request they be transported with me, if possible  I understand that the safest mode of transportation during a medical emergency is an ambulance and that the Hospital advocates the use of this mode of transport   Risks of traveling to the receiving facility by car, including absence of medical control, life sustaining equipment, such as oxygen, and medical personnel has been explained to me and I fully understand them  (ALLY CORRECT BOX BELOW)  [  ]  I consent to the stated transfer and to be transported by ambulance/helicopter  [  ]  I consent to the stated transfer, but refuse transportation by ambulance and accept full responsibility for my transportation by car  I understand the risks of non-ambulance transfers and I exonerate the Hospital and its staff from any deterioration in my condition that results from this refusal     X___________________________________________    DATE  20  TIME________  Signature of patient or legally responsible individual signing on patient behalf           RELATIONSHIP TO PATIENT_________________________          Provider Certification    NAME Bhavani Reardon                                         1930                              MRN [id number]    A medical screening exam was performed on the above named patient  Based on the examination:    Condition Necessitating Transfer The primary encounter diagnosis was Depression, unspecified depression type  Diagnoses of Essential hypertension, Spinal stenosis at L4-L5 level, and Suicidal ideations were also pertinent to this visit  Patient Condition: The patient has been stabilized such that within reasonable medical probability, no material deterioration of the patient condition or the condition of the unborn child(chelsea) is likely to result from the transfer, No noted underlying medical condition requiring transfer to another facility   Transfer is per preference and request of patient and/or family    Reason for Transfer: No bed available at level of patient's needs    Transfer Requirements: Facility [facility] [address] [place], [place], [address]   · Space available and qualified personnel available for treatment as acknowledged by Corin Vázquez   [phone]  · Agreed to accept transfer and to provide appropriate medical treatment as acknowledged by       Elena Silva Caesar Mojica  · Appropriate medical records of the examination and treatment of the patient are provided at the time of transfer   [address], [address] _______  · Transfer will be performed by qualified personnel from [facility]  [phone]  and appropriate transfer equipment as required, including the use of necessary and appropriate life support measures  Provider Certification: I have examined the patient and explained the following risks and benefits of being transferred/refusing transfer to the patient/family:  General risk, such as traffic hazards, adverse weather conditions, rough terrain or turbulence, possible failure of equipment (including vehicle or aircraft), or consequences of actions of persons outside the control of the transport personnel, The patient is stable for psychiatric transfer because they are medically stable, and is protected from harming him/herself or others during transport      Based on these reasonable risks and benefits to the patient and/or the unborn child(chelsea), and based upon the information available at the time of the patients examination, I certify that the medical benefits reasonably to be expected from the provision of appropriate medical treatments at another medical facility outweigh the increasing risks, if any, to the individuals medical condition, and in the case of labor to the unborn child, from effecting the transfer      X____________________________________________ DATE 08/27/20        TIME_______      ORIGINAL - SEND TO MEDICAL RECORDS   COPY - SEND WITH PATIENT DURING TRANSFER

## 2020-08-28 PROBLEM — Z86.73 HX OF TRANSIENT ISCHEMIC ATTACK (TIA): Status: ACTIVE | Noted: 2020-08-28

## 2020-08-28 PROBLEM — F39 UNSPECIFIED MOOD (AFFECTIVE) DISORDER (HCC): Status: ACTIVE | Noted: 2020-08-28

## 2020-08-28 PROBLEM — S93.401A RIGHT ANKLE SPRAIN: Status: ACTIVE | Noted: 2020-08-28

## 2020-08-28 PROBLEM — G31.84 MILD NEUROCOGNITIVE DISORDER: Status: ACTIVE | Noted: 2020-08-28

## 2020-08-28 PROBLEM — F32.1 DEPRESSION, MAJOR, SINGLE EPISODE, MODERATE (HCC): Status: ACTIVE | Noted: 2020-08-28

## 2020-08-28 PROBLEM — Z00.8 MEDICAL CLEARANCE FOR PSYCHIATRIC ADMISSION: Status: ACTIVE | Noted: 2020-08-28

## 2020-08-28 PROBLEM — I87.2 VENOUS INSUFFICIENCY OF BOTH LOWER EXTREMITIES: Status: ACTIVE | Noted: 2020-08-28

## 2020-08-28 LAB
ATRIAL RATE: 76 BPM
CHOLEST SERPL-MCNC: 99 MG/DL
HDLC SERPL-MCNC: 28 MG/DL
LDLC SERPL CALC-MCNC: 59 MG/DL
NONHDLC SERPL-MCNC: 71 MG/DL
P AXIS: 50 DEGREES
PR INTERVAL: 170 MS
QRS AXIS: -23 DEGREES
QRSD INTERVAL: 84 MS
QT INTERVAL: 384 MS
QTC INTERVAL: 432 MS
T WAVE AXIS: -17 DEGREES
TRIGL SERPL-MCNC: 62 MG/DL
VENTRICULAR RATE: 76 BPM

## 2020-08-28 PROCEDURE — 99222 1ST HOSP IP/OBS MODERATE 55: CPT | Performed by: STUDENT IN AN ORGANIZED HEALTH CARE EDUCATION/TRAINING PROGRAM

## 2020-08-28 PROCEDURE — 93010 ELECTROCARDIOGRAM REPORT: CPT | Performed by: INTERNAL MEDICINE

## 2020-08-28 PROCEDURE — 97163 PT EVAL HIGH COMPLEX 45 MIN: CPT

## 2020-08-28 PROCEDURE — 82652 VIT D 1 25-DIHYDROXY: CPT | Performed by: NURSE PRACTITIONER

## 2020-08-28 PROCEDURE — 86592 SYPHILIS TEST NON-TREP QUAL: CPT | Performed by: NURSE PRACTITIONER

## 2020-08-28 PROCEDURE — 80061 LIPID PANEL: CPT | Performed by: NURSE PRACTITIONER

## 2020-08-28 RX ORDER — AMLODIPINE BESYLATE 5 MG/1
5 TABLET ORAL DAILY
Status: DISCONTINUED | OUTPATIENT
Start: 2020-08-28 | End: 2020-09-02 | Stop reason: HOSPADM

## 2020-08-28 RX ORDER — SERTRALINE HYDROCHLORIDE 25 MG/1
25 TABLET, FILM COATED ORAL
Status: DISCONTINUED | OUTPATIENT
Start: 2020-08-28 | End: 2020-08-31

## 2020-08-28 RX ORDER — HYDROCHLOROTHIAZIDE 25 MG/1
25 TABLET ORAL DAILY
Status: DISCONTINUED | OUTPATIENT
Start: 2020-08-28 | End: 2020-09-02 | Stop reason: HOSPADM

## 2020-08-28 RX ORDER — DOCUSATE SODIUM 100 MG/1
100 CAPSULE, LIQUID FILLED ORAL 2 TIMES DAILY
Status: DISCONTINUED | OUTPATIENT
Start: 2020-08-28 | End: 2020-09-02 | Stop reason: HOSPADM

## 2020-08-28 RX ORDER — ATORVASTATIN CALCIUM 40 MG/1
40 TABLET, FILM COATED ORAL EVERY EVENING
Status: DISCONTINUED | OUTPATIENT
Start: 2020-08-28 | End: 2020-09-02 | Stop reason: HOSPADM

## 2020-08-28 RX ORDER — ASPIRIN 81 MG/1
81 TABLET, CHEWABLE ORAL DAILY
Status: DISCONTINUED | OUTPATIENT
Start: 2020-08-28 | End: 2020-09-02 | Stop reason: HOSPADM

## 2020-08-28 RX ADMIN — DOCUSATE SODIUM 100 MG: 100 CAPSULE, LIQUID FILLED ORAL at 17:24

## 2020-08-28 RX ADMIN — ATORVASTATIN CALCIUM 40 MG: 40 TABLET, FILM COATED ORAL at 17:24

## 2020-08-28 RX ADMIN — ASPIRIN 81 MG 81 MG: 81 TABLET ORAL at 12:21

## 2020-08-28 RX ADMIN — SERTRALINE HYDROCHLORIDE 25 MG: 25 TABLET ORAL at 21:55

## 2020-08-28 RX ADMIN — DOCUSATE SODIUM 100 MG: 100 CAPSULE, LIQUID FILLED ORAL at 12:20

## 2020-08-28 RX ADMIN — HYDROCHLOROTHIAZIDE 25 MG: 25 TABLET ORAL at 12:20

## 2020-08-28 RX ADMIN — AMLODIPINE BESYLATE 5 MG: 5 TABLET ORAL at 12:20

## 2020-08-28 NOTE — PLAN OF CARE
Problem: PHYSICAL THERAPY ADULT  Goal: Performs mobility at highest level of function for planned discharge setting  See evaluation for individualized goals  Description: Treatment/Interventions: Functional transfer training, LE strengthening/ROM, Therapeutic exercise, Endurance training, Patient/family training, Equipment eval/education, Bed mobility, Gait training, Spoke to nursing, Spoke to case management, OT  Equipment Recommended: Consuelo Carbone       See flowsheet documentation for full assessment, interventions and recommendations  Note: Prognosis: Fair  Problem List: Decreased strength, Decreased range of motion, Decreased endurance, Impaired balance, Decreased mobility, Pain, Decreased skin integrity, Decreased coordination     Barriers to Discharge: None     PT Discharge Recommendation: Home with skilled therapy          See flowsheet documentation for full assessment

## 2020-08-28 NOTE — CONSULTS
Consult- Minturnmelva Blandonrich 1/28/1930, 80 y o  female MRN: [id number]    Unit/Bed#: [facility] 239-79 Encounter: [id number]    Primary Care Provider: Dima Castro DO   Date and time admitted to hospital: 8/27/2020  8:28 PM      Inpatient consult for Medical Clearance for [address] patient  Consult performed by: Faye Hammans, PA-C  Consult ordered by: ANTONIA Ivory        Medical clearance for psychiatric admission  Assessment & Plan  · Patient is medically cleared for U treatment  Venous insufficiency of both lower extremities  Assessment & Plan  · Chronic, noted on exam    · Patient states she used to wear compression stockings but finds them burdensome and does not want to wear them  · Recommend outpatient follow up  Right ankle sprain  Assessment & Plan  · Patient complaining of right ankle pain, stating that she had fallen 2 months ago and had injured her right ankle  She is unsure of how she may have twisted her ankle, but thinks that she likely inverted her ankle during the fall  Patient states that her right ankle does still cause her discomfort, however, she states that it has improved since her injury  · Right ankle x-rays performed 08/19/2020 which shows no acute fracture  · Upon exam patient does have some mild tenderness just below the lateral malleolus over soft tissue which does appear mildly swollen, however, patient has bilateral venous insufficiency  Range of motion, distal pulses, motor and sensory are all intact  · Patient does have Ace bandage over ankle, but she does have difficulty with ambulating  Historically has gait issues, will order PT/OT evaluation  · Tylenol p r n  For pain  History of CVA (cerebrovascular accident)  Assessment & Plan  · Old thalamic infarction noted on CT of head 3/2019  · Continue ASA and lipitor  Essential hypertension  Assessment & Plan  · Controlled with Norvasc 5 mg QD and HCTZ 25 mg QD       Mediastinal mass  Assessment & Plan  · Noted in history, patient denies any complaints of chest pain or SOB  · Further review of chart shows patient and daughter were notified and opted out of further imaging/treatment  I confirmed this with the patient during the consult  · CT of chest in 3/2019 shows: Brittany Belgrade left  thyroid  mass  with  substernal  extension  and  tracheal  displacement  to  the  right   This  mass  requires  fine  needle  aspiration/tissue  sampling  for  further  characterization  There  is  focal  opacity  of  the  left  superomedial  aspect  of  the  lung  parenchyma  abutting  the  superior  mediastinum  and  located  directly  above  the  aortic  arch   Although  this  ZPW  represent  pleural-based  scarring  or  atelectatic  lung  tissue,  this  requires  further  evaluation   Either  tissue  sampling  or  PET/CT  recommended    Gait abnormality  Assessment & Plan  · Ordered PT/OT evaluation  Depression  Assessment & Plan  · Plan per psychiatry  ECT Clearance:   History of recent seizure or stroke:  No   History of pheochromocytoma:  No   History of active bleeding (Intracranial hemorrhage, aneurysm or AVM):  No   History of metallic implants in the head or neck:  No   History of increased intracranial pressure with mass effect:  No   Does the patient have a current arrhythmia? No      Based on above criteria, Patient is medically cleared for ECT should it be recommended  Counseling / Coordination of Care Time: 45 minutes  Greater than 50% of total time spent on patient counseling and coordination of care  Collaboration of Care: Were Recommendations Directly Discussed with Primary Treatment Team? - Yes     History of Present Illness:    Jennifer Kerr is a 80 y o  female who is originally admitted to the psychiatry service due to increased depression suicidal ideations  We are consulted for medical clearance for admission to [facility] and treatment of underlying psychiatric illness    Past medical history significant for hypertension, history of thalamic stroke, mediastinal mass, gait disturbance and right ankle sprain  Patient is pleasant and cooperative, states her only medical complaint is right ankle pain which is a result of a fall 2 months ago which states she believe she inverted her ankle  She states pain has improved since her injury, and x-rays on August 19th showed no fracture  Patient denies chest pain, shortness of breath, abdominal pain, headache or dizziness  Review of Systems:    Review of Systems   Constitutional: Negative for chills and fever  Eyes: Negative for visual disturbance  Respiratory: Negative for cough, shortness of breath and wheezing  Cardiovascular: Negative for chest pain and palpitations  Gastrointestinal: Negative for abdominal pain, constipation, diarrhea, nausea and vomiting  Genitourinary: Negative for difficulty urinating, dysuria, frequency and urgency  Musculoskeletal: Positive for arthralgias (right ankle) and gait problem (chronic)  Skin: Negative for wound  Neurological: Negative for dizziness and headaches  Psychiatric/Behavioral: Positive for dysphoric mood  Past Medical and Surgical History:     Past Medical History:   Diagnosis Date    Arthritis     Cataract     LAST ASSESSED: [id number]    Depression     Dislocation of left shoulder joint     LAST ASSESSSED: [id number]    Essential hypertension 4/3/2019    Gait disorder     LAST ASSESSED: [id number]    Herpes zoster     LAST ASSESSED: [id number]    HL (hearing loss)     Impacted cerumen of both ears     LAST ASSESSED: [id number]    Impacted cerumen of right ear     LAST ASSESSED: [id number]    Lightheadedness     MILD AND PT REFUSES EKG ETC  PT PROMISES TO CALL IF WORSENS   LAST ASSESSED: [id number]    Multiple falls     LAST ASSESSED: [id number]    Near syncope     LAST ASSESSED: [id number]    Postherpetic polyneuropathy     LAST ASSESSED: [id number]       Past Surgical History:   Procedure Laterality Date    CATARACT EXTRACTION, BILATERAL      HERNIA REPAIR      TONSILLECTOMY      TUBAL LIGATION         Meds/Allergies:    all medications and allergies reviewed    Allergies: No Known Allergies    Social History:     Marital Status:     Substance Use History:   Social History     Substance and Sexual Activity   Alcohol Use Never    Frequency: Never     Social History     Tobacco Use   Smoking Status Never Smoker   Smokeless Tobacco Never Used     Social History     Substance and Sexual Activity   Drug Use No       Family History:    father cirrhosis, son heart disease, son mental illness    Physical Exam:     Vitals:   Blood Pressure: 117/64 (08/28/20 0609)  Pulse: 91 (08/28/20 0609)  Temperature: 97 5 °F (36 4 °C) (08/28/20 0609)  Temp Source: Temporal (08/28/20 0609)  Respirations: 16 (08/28/20 0609)  Height: 5' (152 4 cm) (08/27/20 2100)  Weight - Scale: 61 1 kg (134 lb 11 2 oz) (08/27/20 2100)  SpO2: 91 % (08/28/20 0609)    Physical Exam  Vitals signs reviewed  Constitutional:       General: She is not in acute distress  Appearance: She is not ill-appearing, toxic-appearing or diaphoretic  HENT:      Head: Normocephalic  Neck:      Musculoskeletal: Normal range of motion  Cardiovascular:      Rate and Rhythm: Normal rate and regular rhythm  Pulses: Normal pulses  Heart sounds: Murmur present  No friction rub  No gallop  Pulmonary:      Effort: Pulmonary effort is normal  No respiratory distress  Breath sounds: Normal breath sounds  No wheezing, rhonchi or rales  Abdominal:      General: Bowel sounds are normal  There is no distension  Palpations: Abdomen is soft  Tenderness: There is no abdominal tenderness  Musculoskeletal:      Right ankle: She exhibits swelling  She exhibits normal range of motion, no ecchymosis, no deformity, no laceration and normal pulse  Tenderness   Lateral malleolus and CF ligament tenderness found  Achilles tendon exhibits no pain  Feet:    Skin:     General: Skin is warm and dry  Findings: No erythema  Comments: BLE skin changes c/w chronic venous insufficiency including varicose veins   Neurological:      Mental Status: She is alert and oriented to person, place, and time  Psychiatric:         Mood and Affect: Mood is depressed  Additional Data:     Lab Results: I have personally reviewed pertinent reports  Results from last 7 days   Lab Units 08/27/20  1021   WBC Thousand/uL 6 80   HEMOGLOBIN g/dL 14 5   HEMATOCRIT % 41 5*   PLATELETS Thousands/uL 208   NEUTROS PCT % 71   LYMPHS PCT % 18*   MONOS PCT % 8   EOS PCT % 2     Results from last 7 days   Lab Units 08/27/20  1533   SODIUM mmol/L 133*   POTASSIUM mmol/L 3 6   CHLORIDE mmol/L 100   CO2 mmol/L 26   BUN mg/dL 9   CREATININE mg/dL 0 92   ANION GAP mmol/L 7   CALCIUM mg/dL 9 0   ALBUMIN g/dL 3 8   TOTAL BILIRUBIN mg/dL 0 70   ALK PHOS U/L 84   ALT U/L 17   AST U/L 18   GLUCOSE RANDOM mg/dL 97             Lab Results   Component Value Date/Time    HGBA1C 5 6 03/30/2019 12:32 PM    HGBA1C 5 7 01/09/2019 08:53 AM    HGBA1C 5 4 08/12/2015 09:33 AM           EKG, Pathology, and Other Studies Reviewed on Admission:   · CT head, neck and chest  · Xray right ankle  · EKG pending     ** Please Note: This note has been constructed using a voice recognition system   **

## 2020-08-28 NOTE — ASSESSMENT & PLAN NOTE
· Noted in history, patient denies any complaints of chest pain or SOB  · Further review of chart shows patient and daughter were notified and opted out of further imaging/treatment  I confirmed this with the patient during the consult  · CT of chest in 3/2019 shows: Maynor Lecher left  thyroid  mass  with  substernal  extension  and  tracheal  displacement  to  the  right   This  mass  requires  fine  needle  aspiration/tissue  sampling  for  further  characterization   There  is  focal  opacity  of  the  left  superomedial  aspect  of  the  lung  parenchyma  abutting  the  superior  mediastinum  and  located  directly  above  the  aortic  arch   Although  this  RJL  represent  pleural-based  scarring  or  atelectatic  lung  tissue,  this  requires  further  evaluation   Either  tissue  sampling  or  PET/CT  recommended

## 2020-08-28 NOTE — TREATMENT TEAM
Pt attended all groups  Pt able to stay for duration  Pt noted I am not sure where I am   Confirmed that she is the hospital and safe  Pt pleasantly confused  Pt social with peers  08/28/20 1000   Activity/Group Checklist   Group Other (Comment)  ( education: feelings)   Attendance Attended   Attendance Duration (min) 31-45   Interactions Other (Comment)  (confused)   Affect/Mood Calm   Goals Achieved Identified feelings; Able to listen to others; Able to engage in interactions

## 2020-08-28 NOTE — PLAN OF CARE
Problem: Risk for Self Injury/Neglect  Goal: Treatment Goal: Remain safe during length of stay, learn and adopt new coping skills, and be free of self-injurious ideation, impulses and acts at the time of discharge  Outcome: Not Progressing  Goal: Verbalize thoughts and feelings  Description: Interventions:  - Assess and re-assess patient's lethality and potential for self-injury  - Engage patient in 1:1 interactions, daily, for a minimum of 15 minutes  - Encourage patient to express feelings, fears, frustrations, hopes  - Establish rapport/trust with patient   Outcome: Not Progressing  Goal: Refrain from harming self  Description: Interventions:  - Monitor patient closely, per order  - Develop a trusting relationship  - Supervise medication ingestion, monitor effects and side effects   Outcome: Not Progressing  Goal: Attend and participate in unit activities, including therapeutic, recreational, and educational groups  Description: Interventions:  - Provide therapeutic and educational activities daily, encourage attendance and participation, and document same in the medical record  - Obtain collateral information, encourage visitation and family involvement in care   Outcome: Not Progressing  Goal: Recognize maladaptive responses and adopt new coping mechanisms  Outcome: Not Progressing  Goal: Complete daily ADLs, including personal hygiene independently, as able  Description: Interventions:  - Observe, teach, and assist patient with ADLS  - Monitor and promote a balance of rest/activity, with adequate nutrition and elimination  Outcome: Not Progressing     Problem: Risk for Self Injury/Neglect  Goal: Treatment Goal: Remain safe during length of stay, learn and adopt new coping skills, and be free of self-injurious ideation, impulses and acts at the time of discharge  Outcome: Not Progressing     Problem: Risk for Self Injury/Neglect  Goal: Verbalize thoughts and feelings  Description: Interventions:  - Assess and re-assess patient's lethality and potential for self-injury  - Engage patient in 1:1 interactions, daily, for a minimum of 15 minutes  - Encourage patient to express feelings, fears, frustrations, hopes  - Establish rapport/trust with patient   Outcome: Not Progressing     Problem: Risk for Self Injury/Neglect  Goal: Refrain from harming self  Description: Interventions:  - Monitor patient closely, per order  - Develop a trusting relationship  - Supervise medication ingestion, monitor effects and side effects   Outcome: Not Progressing     Problem: Risk for Self Injury/Neglect  Goal: Attend and participate in unit activities, including therapeutic, recreational, and educational groups  Description: Interventions:  - Provide therapeutic and educational activities daily, encourage attendance and participation, and document same in the medical record  - Obtain collateral information, encourage visitation and family involvement in care   Outcome: Not Progressing     Problem: Risk for Self Injury/Neglect  Goal: Recognize maladaptive responses and adopt new coping mechanisms  Outcome: Not Progressing     Problem: Risk for Self Injury/Neglect  Goal: Complete daily ADLs, including personal hygiene independently, as able  Description: Interventions:  - Observe, teach, and assist patient with ADLS  - Monitor and promote a balance of rest/activity, with adequate nutrition and elimination  Outcome: Not Progressing     Problem: Depression  Goal: Treatment Goal: Demonstrate behavioral control of depressive symptoms, verbalize feelings of improved mood/affect, and adopt new coping skills prior to discharge  Outcome: Not Progressing  Goal: Verbalize thoughts and feelings  Description: Interventions:  - Assess and re-assess patient's level of risk   - Engage patient in 1:1 interactions, daily, for a minimum of 15 minutes   - Encourage patient to express feelings, fears, frustrations, hopes   Outcome: Not Progressing  Goal: Refrain from harming self  Description: Interventions:  - Monitor patient closely, per order   - Supervise medication ingestion, monitor effects and side effects   Outcome: Not Progressing  Goal: Refrain from isolation  Description: Interventions:  - Develop a trusting relationship   - Encourage socialization   Outcome: Not Progressing  Goal: Refrain from self-neglect  Outcome: Not Progressing  Goal: Attend and participate in unit activities, including therapeutic, recreational, and educational groups  Description: Interventions:  - Provide therapeutic and educational activities daily, encourage attendance and participation, and document same in the medical record   Outcome: Not Progressing  Goal: Complete daily ADLs, including personal hygiene independently, as able  Description: Interventions:  - Observe, teach, and assist patient with ADLS  -  Monitor and promote a balance of rest/activity, with adequate nutrition and elimination   Outcome: Not Progressing     Problem: Anxiety  Goal: Anxiety is at manageable level  Description: Interventions:  - Assess and monitor patient's anxiety level  - Monitor for signs and symptoms (heart palpitations, chest pain, shortness of breath, headaches, nausea, feeling jumpy, restlessness, irritable, apprehensive)  - Collaborate with interdisciplinary team and initiate plan and interventions as ordered    - [place] patient to unit/surroundings  - Explain treatment plan  - Encourage participation in care  - Encourage verbalization of concerns/fears  - Identify coping mechanisms  - Assist in developing anxiety-reducing skills  - Administer/offer alternative therapies  - Limit or eliminate stimulants  Outcome: Not Progressing

## 2020-08-28 NOTE — PHYSICAL THERAPY NOTE
Physical Therapy Evaluation    Patient's Name: Jessenia Reyes    Admitting Diagnosis  Depression [F32 9]    Problem List  Patient Active Problem List   Diagnosis    Hearing loss    Gait abnormality    Osteoarthritis    Mediastinal mass    Spinal stenosis at L4-L5 level    Essential hypertension    Bilateral lower extremity edema    Benign paroxysmal positional vertigo    Urinary incontinence    Medical clearance for psychiatric admission    History of CVA (cerebrovascular accident)    Right ankle sprain    Venous insufficiency of both lower extremities    Unspecified mood (affective) disorder, r/o major depressive disorder (single episode)    Mild neurocognitive disorder       Past Medical History  Past Medical History:   Diagnosis Date    Arthritis     Cataract     LAST ASSESSED: [id number]    Depression     Dislocation of left shoulder joint     LAST ASSESSSED: [id number]    Essential hypertension 4/3/2019    Gait disorder     LAST ASSESSED: [id number]    Herpes zoster     LAST ASSESSED: [id number]    HL (hearing loss)     Impacted cerumen of both ears     LAST ASSESSED: [id number]    Impacted cerumen of right ear     LAST ASSESSED: [id number]    Lightheadedness     MILD AND PT REFUSES EKG ETC  PT PROMISES TO CALL IF WORSENS   LAST ASSESSED: [id number]    Multiple falls     LAST ASSESSED: [id number]    Near syncope     LAST ASSESSED: [id number]    Postherpetic polyneuropathy     LAST ASSESSED: [id number]       Past Surgical History  Past Surgical History:   Procedure Laterality Date    CATARACT EXTRACTION, BILATERAL      HERNIA REPAIR      TONSILLECTOMY      TUBAL LIGATION         Recent Imaging  No orders to display       Recent Vital Signs  Vitals:    08/27/20 2100 08/28/20 0609 08/28/20 1503   BP: 100/58 117/64 129/60   BP Location: Right arm Right arm Right arm   Pulse: 90 91 68   Resp: 16 16 16   Temp: 98 4 °F (36 9 °C) 97 5 °F (36 4 °C) 97 9 °F (36 6 °C)   TempSrc: Temporal Temporal Temporal   SpO2: 95% 91% 96%   Weight: 61 1 kg (134 lb 11 2 oz)     Height: 5' (1 524 m)          08/28/20 1530   Note Type   Note type Eval only   Pain Assessment   Pain Assessment Tool 0-10   Pain Score 3   Home Living   Type of Home SNF   Prior Function   Level of [place] Needs assistance with IADLs; Needs assistance with ADLs and functional mobility   Lives With Facility staff   Receives Help From   (staff)   ADL Assistance Needs assistance   IADLs Needs assistance   Restrictions/Precautions   Weight Bearing Precautions Per Order No   Other Precautions Fall Risk;Pain   General   Family/Caregiver Present No   Cognition   Arousal/Participation Alert   Memory Within functional limits   Following Commands Follows all commands and directions without difficulty   RLE Assessment   RLE Assessment WFL   LLE Assessment   LLE Assessment WFL   Coordination   Movements are Fluid and Coordinated 1   Sensation WFL   Light Touch   RLE Light Touch Grossly intact   LLE Light Touch Grossly intact   Bed Mobility   Additional Comments found and left in common room   Transfers   Sit to Stand 4  Minimal assistance   Additional items Assist x 1; Armrests; Increased time required;Verbal cues   Stand to Sit 4  Minimal assistance   Additional items Assist x 1; Armrests; Increased time required;Verbal cues   Additional Comments with RW   Ambulation/Elevation   Gait pattern Excessively slow; Short stride;Decreased foot clearance;Narrow ERLINDA; Improper Weight shift   Gait Assistance 4  Minimal assist   Additional items Assist x 1;Verbal cues; Tactile cues   Assistive Device Rolling walker   Distance 15ft x2   Balance   Static Sitting Fair +   Dynamic Sitting Fair +   Static Standing Fair   Dynamic Standing Fair -   Ambulatory Fair -   Endurance Deficit   Endurance Deficit Yes   Activity Tolerance   Activity Tolerance Patient tolerated treatment well   Nurse Made Aware spoke to RN   Assessment   Prognosis Fair   Problem List Decreased strength;Decreased range of motion;Decreased endurance; Impaired balance;Decreased mobility;Pain;Decreased skin integrity; Decreased coordination   Barriers to Discharge None   Goals   Patient Goals to get back home   STG Expiration Date 09/07/20   Short Term Goal #1 1) Pt will complete bed mobility supervision to decrease caregiver burden  2) Pt will complete transfers supervision with RW to decrease caregiver burden  3) Pt will complete ambulation 50ft with RW supevision to move in home enviornment  4) Pt will improve balance to fair to reduce risk of falls   PT Treatment Day 0   Plan   Treatment/Interventions Functional transfer training;LE strengthening/ROM; Therapeutic exercise; Endurance training;Patient/family training;Equipment eval/education; Bed mobility;Gait training;Spoke to nursing;Spoke to case management;OT   PT Frequency 2-3x/wk   Recommendation   PT Discharge Recommendation Home with skilled therapy   Equipment Recommended Selby Bence is a 80 y o  female admitted to [facility] on [facility]/27/2020 for Unspecified mood (affective) disorder ([facility] [address] )  Pt  has a past medical history of Arthritis, Cataract, Depression, Dislocation of left shoulder joint, Essential hypertension (4/3/2019), Gait disorder, Herpes zoster, HL (hearing loss), Impacted cerumen of both ears, Impacted cerumen of right ear, Lightheadedness, Multiple falls, Near syncope, and Postherpetic polyneuropathy  PT was consulted and pt was seen on 8/28/2020 for mobility assessment and d/c planning  Pt presents seated in w/c in common room alert and agreeable to therapy  She is having some pain in the R foot, intact sensation, mild weakness but functional strength, decreased balance    Pt is currently functioning at a minimum assistance x1 level for transfers, minimum assistance x1 level for ambulation with Rolling Walker  Pt will benefit from continued skilled IP PT to address the above mentioned impairments  in order to maximize recovery and increase functional independence when completing mobility and ADLs  Currently PT recommendations for DME include RW for transfers and ambulation  At this time PT recommendations for d/c are home with skilled therapy              Eliseo Simons PT, DPT

## 2020-08-28 NOTE — ASSESSMENT & PLAN NOTE
· Chronic, noted on exam    · Patient states she used to wear compression stockings but finds them burdensome and does not want to wear them  · Recommend outpatient follow up

## 2020-08-28 NOTE — PROGRESS NOTES
08/28/20 1100   Activity/Group Checklist   Group Life Skills  (therapeutic password)   Attendance Attended   Attendance Duration (min) 31-45   Interactions Interacted appropriately  (yet scant in conversation )   Affect/Mood Calm   Goals Achieved Able to listen to others; Identified feelings; Identified triggers; Discussed coping strategies

## 2020-08-28 NOTE — PLAN OF CARE
Pt  Has been cooperative in attending groups and welcomed sitting near peers yet scant in conversaton    Problem: Ineffective Coping  Goal: Participates in unit activities  Description: Interventions:  - Provide therapeutic environment   - Provide required programming   - Redirect inappropriate behaviors   Outcome: Progressing

## 2020-08-28 NOTE — PLAN OF CARE
Problem: Risk for Self Injury/Neglect  Goal: Treatment Goal: Remain safe during length of stay, learn and adopt new coping skills, and be free of self-injurious ideation, impulses and acts at the time of discharge  8/28/2020 0432 by Charisma Malik RN  Outcome: Not Progressing  8/27/2020 2200 by Charisma Malik RN  Outcome: Not Progressing  Goal: Verbalize thoughts and feelings  Description: Interventions:  - Assess and re-assess patient's lethality and potential for self-injury  - Engage patient in 1:1 interactions, daily, for a minimum of 15 minutes  - Encourage patient to express feelings, fears, frustrations, hopes  - Establish rapport/trust with patient   8/28/2020 0432 by Charisma Malik RN  Outcome: Not Progressing  8/27/2020 2200 by Charisma Malik RN  Outcome: Not Progressing  Goal: Refrain from harming self  Description: Interventions:  - Monitor patient closely, per order  - Develop a trusting relationship  - Supervise medication ingestion, monitor effects and side effects   8/28/2020 0432 by Charisma Malik RN  Outcome: Not Progressing  8/27/2020 2200 by Charisma Malik RN  Outcome: Not Progressing  Goal: Attend and participate in unit activities, including therapeutic, recreational, and educational groups  Description: Interventions:  - Provide therapeutic and educational activities daily, encourage attendance and participation, and document same in the medical record  - Obtain collateral information, encourage visitation and family involvement in care   8/28/2020 0432 by Charisma Malik RN  Outcome: Not Progressing  8/27/2020 2200 by Charisma Malik RN  Outcome: Not Progressing  Goal: Recognize maladaptive responses and adopt new coping mechanisms  8/28/2020 0432 by Charisma Malik RN  Outcome: Not Progressing  8/27/2020 2200 by Charisma Malik RN  Outcome: Not Progressing  Goal: Complete daily ADLs, including personal hygiene independently, as able  Description: Interventions:  - Observe, teach, and assist patient with ADLS  - Monitor and promote a balance of rest/activity, with adequate nutrition and elimination  8/28/2020 0432 by Anna Arce RN  Outcome: Not Progressing  8/27/2020 2200 by Anna Arce RN  Outcome: Not Progressing     Problem: Depression  Goal: Treatment Goal: Demonstrate behavioral control of depressive symptoms, verbalize feelings of improved mood/affect, and adopt new coping skills prior to discharge  8/28/2020 0432 by Anna Arce RN  Outcome: Not Progressing  8/27/2020 2200 by Anna Arce RN  Outcome: Not Progressing  Goal: Verbalize thoughts and feelings  Description: Interventions:  - Assess and re-assess patient's level of risk   - Engage patient in 1:1 interactions, daily, for a minimum of 15 minutes   - Encourage patient to express feelings, fears, frustrations, hopes   Outcome: Not Progressing  Goal: Refrain from harming self  Description: Interventions:  - Monitor patient closely, per order   - Supervise medication ingestion, monitor effects and side effects   Outcome: Not Progressing  Goal: Refrain from isolation  Description: Interventions:  - Develop a trusting relationship   - Encourage socialization   Outcome: Not Progressing  Goal: Refrain from self-neglect  Outcome: Not Progressing  Goal: Attend and participate in unit activities, including therapeutic, recreational, and educational groups  Description: Interventions:  - Provide therapeutic and educational activities daily, encourage attendance and participation, and document same in the medical record   Outcome: Not Progressing  Goal: Complete daily ADLs, including personal hygiene independently, as able  Description: Interventions:  - Observe, teach, and assist patient with ADLS  -  Monitor and promote a balance of rest/activity, with adequate nutrition and elimination   Outcome: Not Progressing     Problem: Anxiety  Goal: Anxiety is at manageable level  Description: Interventions:  - Assess and monitor patient's anxiety level  - Monitor for signs and symptoms (heart palpitations, chest pain, shortness of breath, headaches, nausea, feeling jumpy, restlessness, irritable, apprehensive)  - Collaborate with interdisciplinary team and initiate plan and interventions as ordered  - [place] patient to unit/surroundings  - Explain treatment plan  - Encourage participation in care  - Encourage verbalization of concerns/fears  - Identify coping mechanisms  - Assist in developing anxiety-reducing skills  - Administer/offer alternative therapies  - Limit or eliminate stimulants  8/28/2020 0432 by Mireya Stearns RN  Outcome: Not Progressing  8/27/2020 2200 by Mireya Stearns RN  Outcome: Not Progressing     Problem: Prexisting or High Potential for Compromised Skin Integrity  Goal: Skin integrity is maintained or improved  Description: INTERVENTIONS:  - Identify patients at risk for skin breakdown  - Assess and monitor skin integrity  - Assess and monitor nutrition and hydration status  - Monitor labs   - Assess for incontinence   - Turn and reposition patient  - Assist with mobility/ambulation  - Relieve pressure over bony prominences  - Avoid friction and shearing  - Provide appropriate hygiene as needed including keeping skin clean and dry  - Evaluate need for skin moisturizer/barrier cream  - Collaborate with interdisciplinary team   - Patient/family teaching  - Consider wound care consult   Outcome: Not Progressing     Problem: Potential for Falls  Goal: Patient will remain free of falls  Description: INTERVENTIONS:  - Assess patient frequently for physical needs  -  Identify cognitive and physical deficits and behaviors that affect risk of falls    -  [place] fall precautions as indicated by assessment   - Educate patient/family on patient safety including physical limitations  - Instruct patient to call for assistance with activity based on assessment  - Modify environment to reduce risk of injury  - Consider OT/PT consult to assist with strengthening/mobility  Outcome: Not Progressing

## 2020-08-28 NOTE — NURSING NOTE
Pt visible on unit, social with select peers  Med and meal compliant  Rates depression 5/10 and anxiety 2/4  Attended both groups this morning  Offers no other complaints at this time

## 2020-08-28 NOTE — ASSESSMENT & PLAN NOTE
· Patient complaining of right ankle pain, stating that she had fallen 2 months ago and had injured her right ankle  She is unsure of how she may have twisted her ankle, but thinks that she likely inverted her ankle during the fall  Patient states that her right ankle does still cause her discomfort, however, she states that it has improved since her injury  · Right ankle x-rays performed 08/19/2020 which shows no acute fracture  · Upon exam patient does have some mild tenderness just below the lateral malleolus over soft tissue which does appear mildly swollen, however, patient has bilateral venous insufficiency  Range of motion, distal pulses, motor and sensory are all intact  · Patient does have Ace bandage over ankle, but she does have difficulty with ambulating  Historically has gait issues, will order PT/OT evaluation  · Tylenol p r n  For pain

## 2020-08-28 NOTE — PROGRESS NOTES
08/28/20 1713   Team Meeting   Meeting Type Tx Team Meeting   Initial Conference Date 08/28/20   Next Conference Date 09/28/20   Team Members Present   Team Members Present Physician;;Nurse   [facility]   [facility]ing Team Member [facility] Team Member [place]   Patient/Family Present   Patient Present Yes   Patient's Family Present No     Reviewed treatment plan with patient  Patient in agreement

## 2020-08-28 NOTE — H&P
Psychiatric Evaluation - Behavioral Health     Identification Data:Ada Reis 80 y o  female MRN: [id number]  Unit/Bed#: [facility] 055-46 Encounter: [id number]    Chief Complaint: "I say things I don't mean, I don't want to actually die"    History of present illness:    Quirino Patrick is a 61-year-old  female with no known past psychiatric history but past medical history significant for CVA, hypertension, spinal stenosis, hyperlipidemia, and mild neuroognitive disorder who was admitted to the inpatient psychiatric unit as a transfer from [facility] where she presented on 08/27 secondary to nursing facility staff's recommendation given worsening mood and intermittent thoughts of suicide over the last 2 weeks  Nursing home staff reported that patient has been making passive death threats and suggesting that she would "shoot herself with a gun" or jump off a yu secondary to missing her children and  who have passed away  Ada also mentioned a plan of possibly refusing her medications or not consuming meals/liquids in an attempt to accelerate her death  She was admitted to the inpatient psychiatric unit and started on her outpatient medication regimen, which does not consist of any psychotropic medications  She has remained in good behavioral control and is not engaged in any self-destructive or self-injurious behaviors  I personally evaluated Ada and spoke to treatment team regarding her care  The patient is a pleasant female with obvious neurocognitive deficits  She is able to engage reasonably but is noticeably forgetful and shares that her memory has been poor for the last few years  The patient is able to orient to place and person but not time  A review of past documentation from medical hospitalizations in 2019 reveals a diagnosis of dementia but no neurologic medications (i e   Donepezil) were started   Ada shares that she lost her  and 3 children several years ago and often internalizes survivor's guilt, questioning why she is still alive but younger generations are now   She cannot attest to why she has been thinking about this more recently and does admit to making provacative and inflammatory statements to SNF staff that she misses these individuals so much that she rather be with them in heaven  The patient adamantly denies ever engaging in suicidal acts/gestures/attempts and vehemently states that she does not currently harbor thoughts of self-harm  She denies any thoughts of harming others  She states that her sleep has been [place] well yet her appetite has been erratic  She blames her poor appetite on her age and denies it is secondary to a unipolar depressive episode  She denies volitional refusal of medications or meals at her nursing home  She describes herself as spiritual and states that suicide is completely against her values  She does not internalize persistent hopelessness, worthlessness, or despair  Her energy is low and she admits to difficulty with concentration  She denies anhedonia, stating that she still engages in group activities with her peers that she finds pleasurable  She denies frequent crying spells and does not appear grossly dysphoric on examination  She has no history of signs/symptoms suggestive of herberth or hypomania  She is not elevated/expansive in mood, grandiose, psychomotorically restless, nor does she endorse lengthy periods without sleep  She denies perceptual disturbances, including auditory/visual hallucinations, paranoia, delusional thoughts, or referential ideation  She does not appear internally preoccupied or easily distracted throughout the examination  [place] denies any history of psychiatric treatment and has no previous psychotropic medication trials    Psychoeducation was provided to [facility] regarding the use of antidepressant agents to manage episodic changes in mood and sadness  She was pleasantly receptive  She requests such medications, stating, "well, it could help I guess"  The patient has limited familial support in the area and he gives a verbal permission to speak with her family, however, she is unable to recall their numbers  Psychiatric Review Of Systems:    Change in sleep: no  Appetite changes: yes  Weight changes: no  Change in energy/anergy: yes  Change in interest/pleasure/anhedonia: no  Somatic symptoms: no  Anxiety/panic: no  Manic symptoms: no  Guilt feelings:yes  Hopeless: no  Self injurious behavior/risky behavior: no    Historical Information     Past Psychiatric History:     The patient denies any past psychiatric history  She has no prior inpatient psychiatric admissions  She has no previous psychotropic medication trials  She denies a history of suicidal gestures/attempts  She also denies prior self-injurious/self mutilative behaviors  Substance Abuse History:    She denies acute and chronic history of alcohol, illicit substances, or nicotine abuse  She has no prior inpatient or outpatient rehabilitation admissions  Urine toxicology port on admission was negative for all substances  She does not appear in active withdrawal     Family Psychiatric History:     She denies a history of familial suicide attempts/gestures nor does she states that anyone in her family was diagnosed with a primary affective or psychotic illness  Social History:    Developmental: Denies a history of milestone/developmental delay  Denies a history of in-utero exposure to toxins/illicit substances  There is no documented history of IEP or need for special education  Education: high school diploma/GED  Marital history:   Living arrangement, social support: daughter and son  Occupational History: Retired , collects social security  Access to firearms: Denies direct access to weapons/firearms   Geo Mendez has no history of arrests or violence with a deadly weapon  Traumatic History:     Abuse:none is reported  Other Traumatic Events: Denies    Past Medical History:   Diagnosis Date    Arthritis     Cataract     LAST ASSESSED: [id number]    Depression     Dislocation of left shoulder joint     LAST ASSESSSED: [id number]    Essential hypertension 4/3/2019    Gait disorder     LAST ASSESSED: [id number]    Herpes zoster     LAST ASSESSED: [id number]    HL (hearing loss)     Impacted cerumen of both ears     LAST ASSESSED: [id number]    Impacted cerumen of right ear     LAST ASSESSED: [id number]    Lightheadedness     MILD AND PT REFUSES EKG ETC  PT PROMISES TO CALL IF WORSENS  LAST ASSESSED: [id number]    Multiple falls     LAST ASSESSED: 59GGI5030    Near syncope     LAST ASSESSED: [id number]    Postherpetic polyneuropathy     LAST ASSESSED: [id number]       Medical Review Of Systems:    Pertinent items are noted in HPI  All other systems negative       Meds/Allergies   all current active meds have been reviewed and current meds:   Current Facility-Administered Medications   Medication Dose Route Frequency    acetaminophen (TYLENOL) tablet 650 mg  650 mg Oral Q6H PRN    acetaminophen (TYLENOL) tablet 650 mg  650 mg Oral Q4H PRN    acetaminophen (TYLENOL) tablet 975 mg  975 mg Oral Q6H PRN    aluminum-magnesium hydroxide-simethicone (MYLANTA) 200-200-20 mg/5 mL oral suspension 30 mL  30 mL Oral Q4H PRN    amLODIPine (NORVASC) tablet 5 mg  5 mg Oral Daily    aspirin chewable tablet 81 mg  81 mg Oral Daily    atorvastatin (LIPITOR) tablet 40 mg  40 mg Oral QPM    benztropine (COGENTIN) injection 1 mg  1 mg Intramuscular Q1H PRN    benztropine (COGENTIN) tablet 1 mg  1 mg Oral Q1H PRN    docusate sodium (COLACE) capsule 100 mg  100 mg Oral BID    haloperidol (HALDOL) tablet 2 5 mg  2 5 mg Oral Q6H PRN    haloperidol lactate (HALDOL) injection 2 5 mg  2 5 mg Intramuscular Q6H PRN    hydrochlorothiazide (HYDRODIURIL) tablet 25 mg  25 mg Oral Daily  hydrOXYzine HCL (ATARAX) tablet 25 mg  25 mg Oral Q4H PRN    hydrOXYzine HCL (ATARAX) tablet 50 mg  50 mg Oral Q6H PRN    LORazepam (ATIVAN) injection 1 mg  1 mg Intramuscular Q4H PRN    magnesium hydroxide (MILK OF MAGNESIA) 400 mg/5 mL oral suspension 30 mL  30 mL Oral Daily PRN    OLANZapine (ZyPREXA) IM injection 5 mg  5 mg Intramuscular Q3H PRN    OLANZapine (ZyPREXA) tablet 5 mg  5 mg Oral Q3H PRN    risperiDONE (RisperDAL M-TABS) dispersible tablet 0 5 mg  0 5 mg Oral Q3H PRN    traZODone (DESYREL) tablet 50 mg  50 mg Oral HS PRN     No Known Allergies  Objective      Mental Status Evaluation:  Appearance:  {Adequate hygiene and grooming, younger than stated age and Good eye contact   Behavior:  calm, cooperative and friendly, forgetful at times   Speech:   Language Normal rate and Normal volume  Able to name objects and Able to repeat phrases   Mood:  "I'm fine"   Affect: Thought process constricted  Tangential   Associations: Tightly connected   Thought Content:  Does not verbalize delusional material   Perceptual Disturbances: Denies hallucinations and does not appear to be responding to internal stimuli   Risk Potential: No suicidal or homicidal ideation   Orientation  Oriented to place and person   Memory Short term impaired and Long term impaired   Attention/Concentration attention span appeared shorter than expected for age   Fund of knowledge Diminished   Insight:  Limited insight   Judgment: Good judgment   Gait/Station: Requires assistance via staff, wheelchair bound   Motor Activity: No abnormal movement noted         Lab Results: I have personally reviewed pertinent lab results       Imaging Studies: None ordered    EKG, Pathology, and Other Studies: Pending    Code Status:Full code    Patient Strengths/Assets: cooperative, communication skills, compliant with medication, financially secure, good physical health, motivated, negotiates basic needs, patient is on a voluntary commitment, sense of humor, special hobby/interest, strong elo    Patient Barriers/Limitations: impaired cognition, poor insight    Assessment:    Farrah Chin is a 27-year-old  female with no known past psychiatric history but past medical history significant for CVA, hypertension, spinal stenosis, hyperlipidemia, and mild neuroognitive disorder who was admitted to the inpatient psychiatric unit as a transfer from [facility] where she presented on 08/27 secondary to nursing facility staff's recommendation given worsening mood and intermittent thoughts of suicide over the last 2 weeks  Alba Powers continues to endorse episodic sadness but this appears to be secondary to an internal strife, specifically, the conflict of Ego Integrity vs Despair  Alba Powers has been engaged in self-reflection and endorses feelings of guilt related to the death of her children  Treatment team will be careful not to pathologize this behavior as it is part of normal psychosocial development (ie Stallings's stages)  However, given that her mood symptoms have persisted, she would likely benefit from a psychotropic medication trial and thus, will be started on Sertraline 25mg  Principal Problem:    Unspecified mood (affective) disorder, r/o major depressive disorder (single episode)  Active Problems:    Gait abnormality    Mediastinal mass    Essential hypertension    Medical clearance for psychiatric admission    History of CVA (cerebrovascular accident)    Right ankle sprain    Venous insufficiency of both lower extremities    Mild neurocognitive disorder      Plan:     - Engage Ada Reis in pharmacotherapy, group therapy, milieu therapy and occupational therapy      - Risks/benefits/alternatives to treatment discussed and Ada Reis verbalizes understanding and consents to treatment     - Start Sertraline 25mg PO Daily to target depressive symptomatology   - Restart outpatient medication regimen (Norvasc, Docusate, HCTZ, Aspirin)  - Assess for adverse medication side effects   - Encourage Ada Axeluchsprich to participate in nonverbal forms of therapy including journaling and art/music therapy  - Start precautionary Q15-minute safety checks  - Engage case management/social work to assist with collateral information, discharge planning, and the creation of an individualized, patient-centered plan of care

## 2020-08-28 NOTE — TREATMENT TEAM
08/28/20 0900   Team Meeting   Meeting Type Daily Rounds   Team Members Present   Team Members Present Physician;Nurse;; Other (Discipline and Name)   Physician Team Member Braydon Jones   Nursing Team Member Kathia JIANG  Care Management Team Member [place] [address] [id number]   Other (Discipline and Name) [place]   Pt discussed during treatment team,medication reviewed,new admission

## 2020-08-28 NOTE — TREATMENT PLAN
TREATMENT PLAN REVIEW - Levi Lopez 80 y o  1/28/1930 female MRN: [id number]    [address] [address] [facility]in 6B [facility] Room / Bed: [place] [address]/[facility] 744-31 Encounter: [id number]          Admit Date/Time:  8/27/2020  8:28 PM    Treatment Team: Attending Provider: Mariajose Badillo MD; Consulting Physician: Toshia Tejada MD; Patient Care Assistant: Iggy Card; Registered Nurse: Annalee Gusman, JULIETA; Patient Care Assistant: Chanelle Keyes; Registered Nurse: Elisabet Tarango, JULIETA; Occupational Therapy Assistant: Carrie Carrillo, Elton Davila; : Gokul Felder; Patient Care Assistant: Hima Dorantes; Physician Assistant: Silvia Horn PA-C    Diagnosis: Principal Problem:    Unspecified mood (affective) disorder, r/o major depressive disorder (single episode)  Active Problems:    Gait abnormality    Mediastinal mass    Essential hypertension    Medical clearance for psychiatric admission    History of CVA (cerebrovascular accident)    Right ankle sprain    Venous insufficiency of both lower extremities    Mild neurocognitive disorder      Patient Strengths/Assets: cooperative, communication skills, compliant with medication, financially secure, good physical health, good support system, interpersonal skills, [employer] affiliation, special hobby/interest, strong elo    Patient Barriers/Limitations: impaired cognition, poor insight    Short Term Goals: decrease in depressive symptoms, ability to stay safe on the unit, improvement in ability to express basic needs, improvement in insight, acceptance of psychiatric medications    Long Term Goals: resolution of depressive symptoms, free of suicidal thoughts, adequate self care, adequate sleep, adequate appetite, adequate oral intake, appropriate interaction with peers    Progress Towards Goals: starting psychiatric medications as prescribed, attends groups, working on coping skills    Recommended Treatment: medication management, patient medication education, group therapy, milieu therapy, continued Behavioral Health psychiatric evaluation/assessment process    Treatment Frequency: daily medication monitoring, group and milieu therapy daily, monitoring through interdisciplinary rounds, monitoring through weekly patient care conferences    Expected Discharge Date:  10 days    Discharge Plan: placement in nursing home    Treatment Plan Created/Updated By: Clemencia Frias MD

## 2020-08-28 NOTE — NURSING NOTE
Patient was admitted to 6B as a 201 transferred from [place] [address] ED  Per report, patient is a 81 y/o female brought to the ED with suicidal ideations and increased depression  Pt denies any homicidal ideations or any visual/auditory hallucinations  Her  and children have  and her grandchildren do not live in the area  Patient states she wants to be with her  family members  She had asked the staff at her nursing facility to give her more meds or give her a gun so she could die  She had told staff she will not eat or take any of her medications anymore  She said she would like just to lay in bed until she   Per report, patient also stated her plan is to throw herself off of a yu in an attempt to kill herself, but states that she is unlikely to actually do it  Patient has a history of arthritis, cataract, depression, essential hypertension, gait disorder, herpes zoster, multiple falls, near syncope  Pt was pleasant and cooperative during the admission process  She answered all questions asked of her to the best of her ability  Pt stated "I had 7 children and I just have a son and daughter left and my  is dead"  "I said I wanted to die, but my elo will not let me"  Patient stated her depression is 5/10 and her anxiety is 2/4  Acclimated patient to her surroundings and took patient to her room

## 2020-08-29 PROCEDURE — 99232 SBSQ HOSP IP/OBS MODERATE 35: CPT | Performed by: PSYCHIATRY & NEUROLOGY

## 2020-08-29 RX ADMIN — HYDROCHLOROTHIAZIDE 25 MG: 25 TABLET ORAL at 08:45

## 2020-08-29 RX ADMIN — ATORVASTATIN CALCIUM 40 MG: 40 TABLET, FILM COATED ORAL at 17:15

## 2020-08-29 RX ADMIN — AMLODIPINE BESYLATE 5 MG: 5 TABLET ORAL at 08:44

## 2020-08-29 RX ADMIN — SERTRALINE HYDROCHLORIDE 25 MG: 25 TABLET ORAL at 21:15

## 2020-08-29 RX ADMIN — DOCUSATE SODIUM 100 MG: 100 CAPSULE, LIQUID FILLED ORAL at 08:44

## 2020-08-29 RX ADMIN — DOCUSATE SODIUM 100 MG: 100 CAPSULE, LIQUID FILLED ORAL at 17:15

## 2020-08-29 RX ADMIN — ASPIRIN 81 MG 81 MG: 81 TABLET ORAL at 08:44

## 2020-08-29 NOTE — NURSING NOTE
Patient was visible and social on the unit  She is pleasant and cooperative  Alert and oriented to place and person  Medication compliant  VSS  Bed alarm for safety  Woke twice during the night for the bathroom  Denied any needs   Monitored on q 7 minute checks

## 2020-08-29 NOTE — PROGRESS NOTES
C/O" I donot want to die, they took it wrong "    Report from staff regarding this patient received and record reviewed  prior to seeing this patient   This patient was sitting in the min mckeon coloring and doing some spoke, sitting on a wheelchair, stated that she is not suicidal, she stated that she did made a statement saying that she wants to be with her low 1s does not mean that she wants to die and kill herself, she feels volume is big misunderstanding, and she was brought here in the hospital, she stated she is feeling well, the nurse found a DNR issue that the patient has signed that she does not want it any CPR any artificial means to keep are alive, she is currently full code, and as per this, she should be DNR so I discussed with the patient as well as colon nurse did that to her and we will change the status to DNR  Behavior over the last 24 hours:    Sleep:  Slept  Appetite:  Okay  Medication side effects:  Denied  ROS:  Depression is getting  Mental Status Evaluation:  Appearance:  Dressed appropraitely   Behavior:  cooperative   Speech:  normal   Mood: This for   Affect:  appropriate     Thought Process:  Goal directed   Thought Content:  normal   Perceptual Disturbances: Denied AV hallucination   Risk Potential: NO SONALI    Sensorium:  normal   Cognition:  intact   Consciousness:  Alert, OX3   Attention: Fair   Insight:  fair   Judgment: fair   Gait/Station: This patient is in a wheelchair, did not see her father   Motor Activity: She is in a wheelchair     Progress Toward Goals: working on current treatment goals, no changes  Made in treatment plan   Recommended Treatment: Continue with group therapy, milieu therapy and occupational therapy  Risks, benefits and possible side effects of Medications:   Risks, benefits, and possible side effects of medications explained to patient and patient verbalizes understanding        Medications:   current meds:   Current Facility-Administered Medications Medication Dose Route Frequency    acetaminophen (TYLENOL) tablet 650 mg  650 mg Oral Q6H PRN    acetaminophen (TYLENOL) tablet 650 mg  650 mg Oral Q4H PRN    acetaminophen (TYLENOL) tablet 975 mg  975 mg Oral Q6H PRN    aluminum-magnesium hydroxide-simethicone (MYLANTA) 200-200-20 mg/5 mL oral suspension 30 mL  30 mL Oral Q4H PRN    amLODIPine (NORVASC) tablet 5 mg  5 mg Oral Daily    aspirin chewable tablet 81 mg  81 mg Oral Daily    atorvastatin (LIPITOR) tablet 40 mg  40 mg Oral QPM    benztropine (COGENTIN) injection 1 mg  1 mg Intramuscular Q1H PRN    benztropine (COGENTIN) tablet 1 mg  1 mg Oral Q1H PRN    docusate sodium (COLACE) capsule 100 mg  100 mg Oral BID    haloperidol (HALDOL) tablet 2 5 mg  2 5 mg Oral Q6H PRN    haloperidol lactate (HALDOL) injection 2 5 mg  2 5 mg Intramuscular Q6H PRN    hydrochlorothiazide (HYDRODIURIL) tablet 25 mg  25 mg Oral Daily    hydrOXYzine HCL (ATARAX) tablet 25 mg  25 mg Oral Q4H PRN    hydrOXYzine HCL (ATARAX) tablet 50 mg  50 mg Oral Q6H PRN    LORazepam (ATIVAN) injection 1 mg  1 mg Intramuscular Q4H PRN    magnesium hydroxide (MILK OF MAGNESIA) 400 mg/5 mL oral suspension 30 mL  30 mL Oral Daily PRN    OLANZapine (ZyPREXA) IM injection 5 mg  5 mg Intramuscular Q3H PRN    OLANZapine (ZyPREXA) tablet 5 mg  5 mg Oral Q3H PRN    risperiDONE (RisperDAL M-TABS) dispersible tablet 0 5 mg  0 5 mg Oral Q3H PRN    sertraline (ZOLOFT) tablet 25 mg  25 mg Oral HS    traZODone (DESYREL) tablet 50 mg  50 mg Oral HS PRN     Labs: NA    Assessment, Diagnosis  and Plan: continue with current meds and goals, F/U tomorrow    Counseling / Coordination of Care  Total floor / unit time spent today30 minutes  minutes  Greater than 50% of total time was spent with the patient and / or family counseling and / or coordination of care   A description of the counseling / coordination of care: , entered DNR order, CT  With zoloft 25 mg day     Joy Gibson MD

## 2020-08-29 NOTE — NURSING NOTE
Patient is medication and meal compliant  Patient reports depression at 8/10 and no anxiety symptoms 0/4  When asked about recent events regarding suicidal ideation, patient stated " we all say things we don't mean"  Patient denies current suicidal ideation and reports she does not know why she is here  Patient reoriented to her situation and appeared to understand same  No PRN medication requested or needed this shift  Patient interacted with peers and attended group this afternoon without difficulty  Will continue to monitor patient for changes in mood or behavior

## 2020-08-30 PROCEDURE — 99232 SBSQ HOSP IP/OBS MODERATE 35: CPT | Performed by: PSYCHIATRY & NEUROLOGY

## 2020-08-30 RX ADMIN — DOCUSATE SODIUM 100 MG: 100 CAPSULE, LIQUID FILLED ORAL at 17:15

## 2020-08-30 RX ADMIN — ACETAMINOPHEN 650 MG: 325 TABLET ORAL at 00:24

## 2020-08-30 RX ADMIN — ATORVASTATIN CALCIUM 40 MG: 40 TABLET, FILM COATED ORAL at 17:15

## 2020-08-30 RX ADMIN — HYDROCHLOROTHIAZIDE 25 MG: 25 TABLET ORAL at 08:38

## 2020-08-30 RX ADMIN — ASPIRIN 81 MG 81 MG: 81 TABLET ORAL at 08:38

## 2020-08-30 RX ADMIN — SERTRALINE HYDROCHLORIDE 25 MG: 25 TABLET ORAL at 21:23

## 2020-08-30 RX ADMIN — AMLODIPINE BESYLATE 5 MG: 5 TABLET ORAL at 08:38

## 2020-08-30 RX ADMIN — DOCUSATE SODIUM 100 MG: 100 CAPSULE, LIQUID FILLED ORAL at 08:38

## 2020-08-30 NOTE — NURSING NOTE
Patient slept most of the night  Woke twice to use the bathroom and then was able to fall back to sleep   Bed alarm on for safety

## 2020-08-30 NOTE — NURSING NOTE
Patient was isolative to her room this evening  she stated she she was tired  Patient said she regrets making the comment "about dying " she has been depressed for years due to her 's death at the age of 64 and her son's death at the age of 12  She stated her  was sick with diabetes and her son  at the age of 12 from a heart attack " she didn't elaborate on how her other children passed just that they "didn't suffer " patient was given po tylenol at 0024 for 6/10 right foot pain  Patient is in bed with alarm on for safety  Monitored on q 7 minute checks

## 2020-08-30 NOTE — NURSING NOTE
Pt visible on unit, social with select peers  Med and meal compliant  Pt rates depression 8/10 and anxiety 2/4  Patient participated in music group this morning  Offers no complaints at this time

## 2020-08-30 NOTE — PROGRESS NOTES
C/O"  Depressed '    Report from staff regarding this patient received and record reviewed  prior to seeing this patient   Behavior over the last 24 hours:  Reports she took medication still feel down and depressed but hopeful to get better  Sleep:  Okay  Appetite:  Okay  Medication side effects:  None  ROS:  Depressed  Mental Status Evaluation:  Appearance:  Dressed appropraitely   Behavior:  cooperative   Speech:  normal   Mood:  Depressed   Affect:  appropriate     Thought Process:  Goal directed   Thought Content:  normal   Perceptual Disturbances: Denied AV hallucination   Risk Potential: NO SONALI    Sensorium:  normal   Cognition:  intact   Consciousness:  Alert, OX3   Attention: Fair   Insight:  fair   Judgment: fair   Gait/Station: With in normal range   Motor Activity: With in normal range     Progress Toward Goals: working on current treatment goals, no changes  Made in treatment plan   Recommended Treatment: Continue with group therapy, milieu therapy and occupational therapy  Risks, benefits and possible side effects of Medications:   Risks, benefits, and possible side effects of medications explained to patient and patient verbalizes understanding        Medications:   current meds:   Current Facility-Administered Medications   Medication Dose Route Frequency    acetaminophen (TYLENOL) tablet 650 mg  650 mg Oral Q6H PRN    acetaminophen (TYLENOL) tablet 650 mg  650 mg Oral Q4H PRN    acetaminophen (TYLENOL) tablet 975 mg  975 mg Oral Q6H PRN    aluminum-magnesium hydroxide-simethicone (MYLANTA) 200-200-20 mg/5 mL oral suspension 30 mL  30 mL Oral Q4H PRN    amLODIPine (NORVASC) tablet 5 mg  5 mg Oral Daily    aspirin chewable tablet 81 mg  81 mg Oral Daily    atorvastatin (LIPITOR) tablet 40 mg  40 mg Oral QPM    benztropine (COGENTIN) injection 1 mg  1 mg Intramuscular Q1H PRN    benztropine (COGENTIN) tablet 1 mg  1 mg Oral Q1H PRN    docusate sodium (COLACE) capsule 100 mg  100 mg Oral BID    haloperidol (HALDOL) tablet 2 5 mg  2 5 mg Oral Q6H PRN    haloperidol lactate (HALDOL) injection 2 5 mg  2 5 mg Intramuscular Q6H PRN    hydrochlorothiazide (HYDRODIURIL) tablet 25 mg  25 mg Oral Daily    hydrOXYzine HCL (ATARAX) tablet 25 mg  25 mg Oral Q4H PRN    hydrOXYzine HCL (ATARAX) tablet 50 mg  50 mg Oral Q6H PRN    LORazepam (ATIVAN) injection 1 mg  1 mg Intramuscular Q4H PRN    magnesium hydroxide (MILK OF MAGNESIA) 400 mg/5 mL oral suspension 30 mL  30 mL Oral Daily PRN    OLANZapine (ZyPREXA) IM injection 5 mg  5 mg Intramuscular Q3H PRN    OLANZapine (ZyPREXA) tablet 5 mg  5 mg Oral Q3H PRN    risperiDONE (RisperDAL M-TABS) dispersible tablet 0 5 mg  0 5 mg Oral Q3H PRN    sertraline (ZOLOFT) tablet 25 mg  25 mg Oral HS    traZODone (DESYREL) tablet 50 mg  50 mg Oral HS PRN     Labs: NA    Assessment, Diagnosis  and Plan: continue with current meds and goals, F/U tomorrow    Counseling / Coordination of Care  Total floor / unit time spent today20 minutes  minutes  Greater than 50% of total time was spent with the patient and / or family counseling and / or coordination of care  A description of the counseling / coordination of care:      Violeta Garrett MD

## 2020-08-31 LAB
1,25(OH)2D3 SERPL-MCNC: 39.8 PG/ML (ref 19.9–79.3)
RPR SER QL: NORMAL

## 2020-08-31 PROCEDURE — 99232 SBSQ HOSP IP/OBS MODERATE 35: CPT | Performed by: STUDENT IN AN ORGANIZED HEALTH CARE EDUCATION/TRAINING PROGRAM

## 2020-08-31 PROCEDURE — 97167 OT EVAL HIGH COMPLEX 60 MIN: CPT

## 2020-08-31 RX ADMIN — DOCUSATE SODIUM 100 MG: 100 CAPSULE, LIQUID FILLED ORAL at 08:47

## 2020-08-31 RX ADMIN — DOCUSATE SODIUM 100 MG: 100 CAPSULE, LIQUID FILLED ORAL at 17:14

## 2020-08-31 RX ADMIN — HYDROCHLOROTHIAZIDE 25 MG: 25 TABLET ORAL at 08:47

## 2020-08-31 RX ADMIN — SERTRALINE HYDROCHLORIDE 50 MG: 50 TABLET ORAL at 21:33

## 2020-08-31 RX ADMIN — AMLODIPINE BESYLATE 5 MG: 5 TABLET ORAL at 08:46

## 2020-08-31 RX ADMIN — ASPIRIN 81 MG 81 MG: 81 TABLET ORAL at 08:47

## 2020-08-31 RX ADMIN — ATORVASTATIN CALCIUM 40 MG: 40 TABLET, FILM COATED ORAL at 17:14

## 2020-08-31 NOTE — PROGRESS NOTES
08/31/20 0900   Team Members Present   Team Members Present Physician;;Nurse;   Physician Team Member [facility]   Nursing Team Member [facility] Team Member [address] [id number]   Social Work Team Member Alcon Sosa   Patient/Family Present   Patient Present No   Patient's Family Present No     Denies Depression  Signed 72 hrs notice on Sunday  Pleasantly forgetful   D/C on Wednesday

## 2020-08-31 NOTE — NURSING NOTE
Patient is medication and meal compliant  Patient reports depression at 5/10 and anxiety at 2/4  No complaints of pain or discomfort  Patient attended group without difficulty  Patient denies suicidal ideation and no attempts at self harm noted  Patient was able to discuss her children and how she had seven children in total and only has two left that are in their 63's  Patient was not tearful and happy to discuss her current status  Patient is calm and cooperative with staff and interacts with several peers without issues  Current plan is to discharge patient back to [facility] on Wednesday 9/2/2020  Will continue to monitor patient for change in mood or behavior

## 2020-08-31 NOTE — PROGRESS NOTES
Progress Note - Behavioral Health   Ada Reis 80 y o  female MRN: [id number]  Unit/Bed#[facility] 932-65 Encounter: [id number]    All documentation, nursing notes, labs, and vitals reviewed  The patient's medication reconciliation chart was also analyzed for medication adherence  I personally evaluated Julia Bloom and discussed current care with treatment team   No acute events over the weekend  Staff reports that patient signed a 72 hour notice on 2020 at [id number]  On examination today, Ada endorses mild/generalized sadness but denies most neurovegetative symptoms of depression  Her sleep is restorative and her appetite is adequate  She endorses chronic fatigue but denies worsening energy or concentration  She is future oriented, detail in plans to return to her assisted living facility where she likes to engage in activities with her friends  She speaks at length about playing "pinochle" and "knock-pocker" with her friends upon discharge from the hospital   She vehemently denies any acute thoughts of suicide or self-harm  She states that she has made previous comments about wanting to be with her family and have been as she misses her  children but does not actively wants to end her life  Again, she speaks to her spirituality and how her Gnosticism frowns upon self-harm  Ada is tolerating current psychotropic medication regimen well and denies adverse side effects  She started low-dose sertraline on Friday and denies gastrointestinal disturbances or new onset headache  She is agreeable to further titrate sertraline this evening to 50 mg and monitor for side effects  Patient will likely be discharged on 50 mg on Wednesday, 2020  There is no current objective evidence of new onset herberth or hypomania  She is not elevated/expansive mood, grandiose, or psycho motorically restless    She denies acute perceptual disturbances and does not appear grossly psychotic or behaviorally disinhibited  The patient is visible on the unit and socializing with her peers  She offers no further complaints  Given that discharge is planned for Wednesday, patient will likely require CVODI testing to return to her assisted living facility  We will order COVID-19 testing for tomorrow, 09/01/2020  Current Mental Status Evaluation:    Appearance:  Adequate hygiene and grooming and Good eye contact, dressed in personal attire   Behavior:  calm, cooperative and friendly   Mood:  "I don't really belong here"   Affect: broad, full-range, euthymic   Speech: Normal rate and Normal volume, spontaneous   Thought Process:  Goal directed and coherent   Thought Content:  Does not verbalize delusional material   Perceptual Disturbances: Denies hallucinations and does not appear to be responding to internal stimuli   Risk Potential: No suicidal or homicidal ideation   Orientation:   AAOx2     Assessment:     Principal Problem:    Unspecified mood (affective) disorder, r/o major depressive disorder (single episode)  Active Problems:    Gait abnormality    Mediastinal mass    Essential hypertension    Medical clearance for psychiatric admission    History of CVA (cerebrovascular accident)    Right ankle sprain    Venous insufficiency of both lower extremities    Mild neurocognitive disorder      Plan/Recommended Treatment:     - Continue with pharmacotherapy, group therapy, milieu therapy and occupational therapy  - Risks/benefits/alternatives to treatment discussed and Jessenia Reyes continues to verbalize understanding   - Titrate Sertraline 25mg PO QHS to 50mg QHS   - Will consider further optimization of psychotropic medication regimen as hospital course progresses   - Continue to assess for adverse medication side effects   - Encourage Ada Reis to participate in nonverbal forms of therapy including journaling and art/music therapy  - Continue precautionary Q15-minute safety checks    - Continue to engage case management/SW to assist with collateral information, discharge planning, and the implementation of an individualized, patient-centered plan of care  - 72 hr notice signed 8/30 at 1726   Will discharge patient Wednesday, 9/2/2020    - COVID-19 testing scheduled for tomorrow in anticipation of discharge    - The patient will be maintained on the following medications:    Current Facility-Administered Medications   Medication Dose Route Frequency Provider Last Rate    acetaminophen  650 mg Oral Q6H PRN Artice Hillock, CRNP      acetaminophen  650 mg Oral Q4H PRN Artice Hillock, CRNP      acetaminophen  975 mg Oral Q6H PRN Artice Hillock, CRNP      aluminum-magnesium hydroxide-simethicone  30 mL Oral Q4H PRN Artice Hillock, CRNP      amLODIPine  5 mg Oral Daily Leeroy Jasso MD      aspirin  81 mg Oral Daily Leeroy Jasso MD      atorvastatin  40 mg Oral QPM Leeroy Jasso MD      benztropine  1 mg Intramuscular Q1H PRN Artice Hillock, CRNP      benztropine  1 mg Oral Q1H PRN Artice Hillock, CRNP      docusate sodium  100 mg Oral BID Leeroy Jasso MD      haloperidol  2 5 mg Oral Q6H PRN Artice Hillock, CRNP      haloperidol lactate  2 5 mg Intramuscular Q6H PRN Artice Hillock, CRNP      hydrochlorothiazide  25 mg Oral Daily Leeroy Jasso MD      hydrOXYzine HCL  25 mg Oral Q4H PRN Artice Hillock, CRNP      hydrOXYzine HCL  50 mg Oral Q6H PRN Artice Hillock, CRNP      LORazepam  1 mg Intramuscular Q4H PRN Artice Hillock, CRNP      magnesium hydroxide  30 mL Oral Daily PRN Artice Hillock, CRNP      OLANZapine  5 mg Intramuscular Q3H PRN Artice Hillock, CRNP      OLANZapine  5 mg Oral Q3H PRN Artice Hillock, CRNP      risperiDONE  0 5 mg Oral Q3H PRN Artice Hillock, CRNP      sertraline  50 mg Oral HS Leeroy Jasso MD      traZODone  50 mg Oral HS PRN Artice Hillock, CRNP

## 2020-08-31 NOTE — PROGRESS NOTES
Pt attended all groups  In am group  Pt noted she did not know why she is here  Pt calm and cooperative  Pt able to read prompt on conflict resolution and participate  08/31/20 1000   Activity/Group Checklist   Group Other (Comment)  ( open discussion)   Attendance Attended   Attendance Duration (min) 46-60   Interactions Interacted appropriately   Affect/Mood Calm   Goals Achieved Identified feelings; Discussed self-esteem issues; Able to listen to others; Able to engage in interactions

## 2020-08-31 NOTE — PLAN OF CARE
Problem: Risk for Self Injury/Neglect  Goal: Treatment Goal: Remain safe during length of stay, learn and adopt new coping skills, and be free of self-injurious ideation, impulses and acts at the time of discharge  Outcome: Progressing  Goal: Verbalize thoughts and feelings  Description: Interventions:  - Assess and re-assess patient's lethality and potential for self-injury  - Engage patient in 1:1 interactions, daily, for a minimum of 15 minutes  - Encourage patient to express feelings, fears, frustrations, hopes  - Establish rapport/trust with patient   Outcome: Progressing  Goal: Refrain from harming self  Description: Interventions:  - Monitor patient closely, per order  - Develop a trusting relationship  - Supervise medication ingestion, monitor effects and side effects   Outcome: Completed  Goal: Attend and participate in unit activities, including therapeutic, recreational, and educational groups  Description: Interventions:  - Provide therapeutic and educational activities daily, encourage attendance and participation, and document same in the medical record  - Obtain collateral information, encourage visitation and family involvement in care   Outcome: Progressing  Goal: Recognize maladaptive responses and adopt new coping mechanisms  Outcome: Not Progressing  Goal: Complete daily ADLs, including personal hygiene independently, as able  Description: Interventions:  - Observe, teach, and assist patient with ADLS  - Monitor and promote a balance of rest/activity, with adequate nutrition and elimination  Outcome: Progressing     Problem: Depression  Goal: Treatment Goal: Demonstrate behavioral control of depressive symptoms, verbalize feelings of improved mood/affect, and adopt new coping skills prior to discharge  Outcome: Progressing  Goal: Verbalize thoughts and feelings  Description: Interventions:  - Assess and re-assess patient's level of risk   - Engage patient in 1:1 interactions, daily, for a minimum of 15 minutes   - Encourage patient to express feelings, fears, frustrations, hopes   Outcome: Progressing  Goal: Refrain from harming self  Description: Interventions:  - Monitor patient closely, per order   - Supervise medication ingestion, monitor effects and side effects   Outcome: Progressing  Goal: Refrain from isolation  Description: Interventions:  - Develop a trusting relationship   - Encourage socialization   Outcome: Progressing  Goal: Refrain from self-neglect  Outcome: Progressing  Goal: Attend and participate in unit activities, including therapeutic, recreational, and educational groups  Description: Interventions:  - Provide therapeutic and educational activities daily, encourage attendance and participation, and document same in the medical record   Outcome: Progressing  Goal: Complete daily ADLs, including personal hygiene independently, as able  Description: Interventions:  - Observe, teach, and assist patient with ADLS  -  Monitor and promote a balance of rest/activity, with adequate nutrition and elimination   Outcome: Progressing

## 2020-08-31 NOTE — PLAN OF CARE
Problem: Risk for Self Injury/Neglect  Goal: Treatment Goal: Remain safe during length of stay, learn and adopt new coping skills, and be free of self-injurious ideation, impulses and acts at the time of discharge  Outcome: Progressing  Goal: Verbalize thoughts and feelings  Description: Interventions:  - Assess and re-assess patient's lethality and potential for self-injury  - Engage patient in 1:1 interactions, daily, for a minimum of 15 minutes  - Encourage patient to express feelings, fears, frustrations, hopes  - Establish rapport/trust with patient   Outcome: Progressing  Goal: Refrain from harming self  Description: Interventions:  - Monitor patient closely, per order  - Develop a trusting relationship  - Supervise medication ingestion, monitor effects and side effects   Outcome: Progressing  Goal: Attend and participate in unit activities, including therapeutic, recreational, and educational groups  Description: Interventions:  - Provide therapeutic and educational activities daily, encourage attendance and participation, and document same in the medical record  - Obtain collateral information, encourage visitation and family involvement in care   Outcome: Progressing  Goal: Recognize maladaptive responses and adopt new coping mechanisms  Outcome: Progressing     Problem: Depression  Goal: Treatment Goal: Demonstrate behavioral control of depressive symptoms, verbalize feelings of improved mood/affect, and adopt new coping skills prior to discharge  Outcome: Progressing  Goal: Verbalize thoughts and feelings  Description: Interventions:  - Assess and re-assess patient's level of risk   - Engage patient in 1:1 interactions, daily, for a minimum of 15 minutes   - Encourage patient to express feelings, fears, frustrations, hopes   Outcome: Progressing  Goal: Refrain from harming self  Description: Interventions:  - Monitor patient closely, per order   - Supervise medication ingestion, monitor effects and side effects   Outcome: Progressing     Problem: Anxiety  Goal: Anxiety is at manageable level  Description: Interventions:  - Assess and monitor patient's anxiety level  - Monitor for signs and symptoms (heart palpitations, chest pain, shortness of breath, headaches, nausea, feeling jumpy, restlessness, irritable, apprehensive)  - Collaborate with interdisciplinary team and initiate plan and interventions as ordered  - [place] patient to unit/surroundings  - Explain treatment plan  - Encourage participation in care  - Encourage verbalization of concerns/fears  - Identify coping mechanisms  - Assist in developing anxiety-reducing skills  - Administer/offer alternative therapies  - Limit or eliminate stimulants  Outcome: Progressing     Problem: Potential for Falls  Goal: Patient will remain free of falls  Description: INTERVENTIONS:  - Assess patient frequently for physical needs  -  Identify cognitive and physical deficits and behaviors that affect risk of falls    -  [place] fall precautions as indicated by assessment   - Educate patient/family on patient safety including physical limitations  - Instruct patient to call for assistance with activity based on assessment  - Modify environment to reduce risk of injury  - Consider OT/PT consult to assist with strengthening/mobility  Outcome: Progressing

## 2020-09-01 ENCOUNTER — TRANSITIONAL CARE MANAGEMENT (OUTPATIENT)
Dept: INTERNAL MEDICINE CLINIC | Facility: CLINIC | Age: 85
End: 2020-09-01

## 2020-09-01 LAB — SARS-COV-2 RNA RESP QL NAA+PROBE: NEGATIVE

## 2020-09-01 PROCEDURE — 97530 THERAPEUTIC ACTIVITIES: CPT

## 2020-09-01 PROCEDURE — 99232 SBSQ HOSP IP/OBS MODERATE 35: CPT | Performed by: STUDENT IN AN ORGANIZED HEALTH CARE EDUCATION/TRAINING PROGRAM

## 2020-09-01 PROCEDURE — 87635 SARS-COV-2 COVID-19 AMP PRB: CPT | Performed by: STUDENT IN AN ORGANIZED HEALTH CARE EDUCATION/TRAINING PROGRAM

## 2020-09-01 RX ADMIN — DOCUSATE SODIUM 100 MG: 100 CAPSULE, LIQUID FILLED ORAL at 08:43

## 2020-09-01 RX ADMIN — SERTRALINE HYDROCHLORIDE 50 MG: 50 TABLET ORAL at 21:19

## 2020-09-01 RX ADMIN — ASPIRIN 81 MG 81 MG: 81 TABLET ORAL at 08:42

## 2020-09-01 RX ADMIN — DOCUSATE SODIUM 100 MG: 100 CAPSULE, LIQUID FILLED ORAL at 17:05

## 2020-09-01 RX ADMIN — ATORVASTATIN CALCIUM 40 MG: 40 TABLET, FILM COATED ORAL at 17:05

## 2020-09-01 NOTE — PLAN OF CARE
Problem: OCCUPATIONAL THERAPY ADULT  Goal: Performs self-care activities at highest level of function for planned discharge setting  See evaluation for individualized goals  Description: Treatment Interventions: ADL retraining, UE strengthening/ROM, Endurance training, Continued evaluation, Activityengagement          See flowsheet documentation for full assessment, interventions and recommendations  Note: Limitation: Decreased high-level ADLs, Decreased ADL status, Decreased UE strength, Decreased cognition  Prognosis: Good  Assessment: Pt is a 80 y o  female seen for OT evaluation s/p admit to [address] on 8/27/2020 w/ Unspecified mood (affective) disorder ([facility] [address] )  See above for extensive list of comorbidities affecting Pt's functional performance at time of assessment  Personal factors affecting Pt at time of IE include:limited home support, behavioral pattern, difficulty performing ADLS, difficulty performing IADLS  and health management   Prior to admission, Pt was living in an DARY, receives assist with ADLs PRN and IADLs including medication management  Upon evaluation: Pt requires CGA for transfers and brief ambulation  Pt able to manage hygiene following urination while seated  The following deficits impact occupational performance: decreased balance, decreased tolerance, impaired memory, impaired problem solving and decreased safety awareness  Pt to benefit from continued skilled OT services while in the hospital to address deficits as defined above and maximize level of functional independence w ADL's and functional mobility  Occupational performance areas to address include: bathing/shower, toilet hygiene, dressing, health maintenance, functional mobility and clothing management  From OT standpoint, recommendation at time of d/c would be return to [facility]         OT Discharge Recommendation: Other (Comment)(return to [facility] )

## 2020-09-01 NOTE — PROGRESS NOTES
09/01/20 0840   Team Meeting   Meeting Type Daily Rounds   Team Members Present   Team Members Present Physician;;Nurse;   Physician Team Member [facility]   Nursing Team Member [facility] Management Team Member Tristan Wilson   Social Work Team Member HELENA   Patient/Family Present   Patient Present No   Patient's Family Present No       Visible, attends groups, compliant  Denies SI,HI,AH,VH  D/C tomorrow

## 2020-09-01 NOTE — OCCUPATIONAL THERAPY NOTE
Occupational Therapy Evaluation & Treatment Note      Patient Name: Luis Manuel Knowles  WSDOU'P Date: 9/1/2020  Problem List  Principal Problem:    Unspecified mood (affective) disorder, r/o major depressive disorder (single episode)  Active Problems:    Gait abnormality    Mediastinal mass    Essential hypertension    Medical clearance for psychiatric admission    History of CVA (cerebrovascular accident)    Right ankle sprain    Venous insufficiency of both lower extremities    Mild neurocognitive disorder    Past Medical History  Past Medical History:   Diagnosis Date    Arthritis     Cataract     LAST ASSESSED: [id number]    Depression     Dislocation of left shoulder joint     LAST ASSESSSED: [id number]    Essential hypertension 4/3/2019    Gait disorder     LAST ASSESSED: [id number]    Herpes zoster     LAST ASSESSED: [id number]    HL (hearing loss)     Impacted cerumen of both ears     LAST ASSESSED: 57NKQ2035    Impacted cerumen of right ear     LAST ASSESSED: [id number]    Lightheadedness     MILD AND PT REFUSES EKG ETC  PT PROMISES TO CALL IF WORSENS  LAST ASSESSED: 21LUQ6450    Multiple falls     LAST ASSESSED: [id number]    Near syncope     LAST ASSESSED: [id number]    Postherpetic polyneuropathy     LAST ASSESSED: 40QWM0220     Past Surgical History  Past Surgical History:   Procedure Laterality Date    CATARACT EXTRACTION, BILATERAL      HERNIA REPAIR      TONSILLECTOMY      TUBAL LIGATION          09/01/20 1101 --> 23 min txt    Note Type   Note type Eval/Treat   Restrictions/Precautions   Weight Bearing Precautions Per Order No   Other Precautions Cognitive; Fall Risk   Pain Assessment   Pain Assessment Tool Pain Assessment not indicated - pt denies pain   Home Living   Type of Home Assisted living   Home Layout One level   Bathroom Shower/Tub Walk-in shower   Bathroom Toilet Raised   Bathroom Accessibility Accessible via walker   [address] [place] [address],[address]   Prior Function   Level of [place] Needs assistance with IADLs; Needs assistance with ADLs and functional mobility   Lives With Facility staff   Receives Help From Personal care attendant   ADL Assistance Needs assistance   IADLs Needs assistance   Falls in the last 6 months 1 to 4   Subjective   Subjective "I wish I knew my purpose"   ADL   Eating Assistance 6  Modified independent   Grooming Assistance 5  Supervision/Setup   UB Bathing Assistance 5  Supervision/Setup   LB Bathing Assistance 4  Minimal Assistance   [address] 5  Supervision/Setup   LB Dressing Assistance 4  Minimal [address]  4  Minimal Assistance   Transfers   Sit to Stand   [facility])   Additional items Verbal cues; Increased time required   Stand to Sit   (CGA)   Additional items Increased time required;Verbal cues   Stand pivot   (CGA)   Additional items Verbal cues; Increased time required   Functional Mobility   Functional Mobility   (CGA)   Additional Comments short distances    Additional items Rolling walker   Balance   Static Sitting Good   Dynamic Sitting Fair +   Static Standing Fair   Dynamic Standing Fair -   Ambulatory Fair -   Activity Tolerance   Activity Tolerance Patient tolerated treatment well   RUE Assessment   RUE Assessment WFL   LUE Assessment   LUE Assessment WFL   Hand Function   Gross Motor Coordination Functional   Fine Motor Coordination Functional   Sensation   Light Touch No apparent deficits   Proprioception   Proprioception No apparent deficits   Perception   Inattention/Neglect Appears intact   Cognition   Arousal/Participation Alert; Cooperative   Attention Within functional limits   Orientation Level Oriented to person;Oriented to place;Oriented to time  (stated [facility] as place, however stated [place] )   Memory Decreased short term memory;Decreased recall of precautions   Following Commands Follows all commands and directions without difficulty   Cognition Assessment Tools MOCA   Score   (17/30) Assessment   Limitation Decreased high-level ADLs; Decreased ADL status; Decreased UE strength;Decreased cognition   Prognosis Good   Assessment   Pt is a 80 y o  female seen for OT evaluation s/p admit to [address] on 8/27/2020 w/ Unspecified mood (affective) disorder ([facility] [address] )  See above for extensive list of comorbidities affecting Pt's functional performance at time of assessment  Personal factors affecting Pt at time of IE include:limited home support, behavioral pattern, difficulty performing ADLS, difficulty performing IADLS  and health management   Prior to admission, Pt was living in an [facility], receives assist with ADLs PRN and IADLs including medication management  Upon evaluation: Pt requires CGA for transfers and brief ambulation  Pt able to manage hygiene following urination while seated  The following deficits impact occupational performance: decreased balance, decreased tolerance, impaired memory, impaired problem solving and decreased safety awareness  Pt to benefit from continued skilled OT services while in the hospital to address deficits as defined above and maximize level of functional independence w ADL's and functional mobility  Occupational performance areas to address include: bathing/shower, toilet hygiene, dressing, health maintenance, functional mobility and clothing management  From OT standpoint, recommendation at time of d/c would be return to [facility]  Goals   STG Time Frame   (1 week)   Short Term Goals 1  Pt will complete bathroom mobility with supervision and a RW  2   Pt will complete LB dressing with set-up  3   Pt will identify 1-3 leisure activities to promote healthy coping strategies  Plan   Treatment Interventions ADL retraining;UE strengthening/ROM; Endurance training;Continued evaluation; Activityengagement   Goal Expiration Date 09/08/20   OT Treatment Day 1   OT Frequency 1-2x/wk   Additional Treatment Session   Start Time 1117   End Time 1140   Treatment Assessment Pt seen for OT txt following initial evaluation  Pt completed MOCA assessment, noted deficits with memory and recall which Pt was able to identify as a deficit  Pt reports feeling a lack of purpose- time spent re: leisure exploration and coping strategies  Pt with fair insight into cognitive impairments, able to state when she should call for help at there facility  Pt would benefit from continued OT services while in the hospital- recommend return to [facility] with assist for ADLs/IADLs once medically stable      Recommendation   OT Discharge Recommendation Other (Comment)  (return to [facility] )

## 2020-09-01 NOTE — PROGRESS NOTES
09/01/20 1000   Activity/Group Checklist   Group Community meeting  (Sept  purposefulness)   Attendance Attended   Attendance Duration (min) 16-30   Interactions Interacted appropriately  (superficial yet social and redirectable )   Affect/Mood Calm   Goals Achieved Able to engage in interactions; Discussed coping strategies; Identified feelings

## 2020-09-01 NOTE — PROGRESS NOTES
Pt attended afternoon group  Pt was with staff in am   Pt was calm and cooperative  Pt noted she enjoys spiritual music as a coping skill  09/01/20 1330   Activity/Group Checklist   Group Other (Comment)  (positive coping: art and music)   Attendance Attended   Attendance Duration (min) 46-60   Interactions Interacted appropriately   Affect/Mood Calm   Goals Achieved Identified feelings; Discussed coping strategies; Able to listen to others; Able to engage in interactions; Able to self-disclose; Able to recieve feedback

## 2020-09-01 NOTE — CASE MANAGEMENT
This writer spoke with Ernie Diez from [facility] ([phone])  She reports patient became depressed a few weeks, however her symptoms increased over the last week  She reports patient can return to facility however they are not able to provide transportation  This writer informed her, patient will be discharged tomorrow  This writer scheduled RENÉ appointment with PCP for 9/11/20 @0930  This is the earliest appointment with PCP  Patient and facility are in agreement with discharge  Patient will be transported via [facility] at 11:00am  Ernie Diez notified of discharge time

## 2020-09-01 NOTE — DISCHARGE INSTR - OTHER ORDERS
Patient is being discharged, no SI/HI, no psychosis or A/V  Patient is in agreement with discharge  No questions verbalized  Patient provided with after care appointment, discharge instructions and prescriptions  IMM, quality core measures, transition of care, discharge instructions, and treatment plan complete  Patient will be transported by   Abimbola Rodrgíuez will continue to follow up as needed  Patient's discharge was faxed to outpatient providers  What you need to know aboutcoronavirus disease 2019 (COVID-19)     What is coronavirus disease 2019 (COVID-19)? Coronavirus disease 2019 (COVID-19) is a respiratory illness that can spread from person to person  The virus that causes COVID-19 is a novel coronavirus that was first identified during an investigation into an outbreak in [place], [place]  Can people in the [place]  get COVID-19? Yes  COVID-19 is spreading from person to person in parts of the [place]  Risk of infection with COVID-19 is higher for people who are close contacts of someone known to have COVID-19, for example healthcare workers, or household members  Other people at higher risk for infection are those who live in or have recently been in an area with ongoing spread of COVID-19  Learn more about places with ongoing spread at   [facility] tn  html#geographic  Have there been cases of COVID-19 in the [place] ?   Yes  The first case of COVID-19 in the [place] was reported on January 21, 2020  The current count of cases of COVID-19 in the [place] is available on [employer] at [facility]  How does COVID-19 spread? The virus that causes COVID-19 probably emerged from an animal source, but is now spreading from person to person   The virus is thought to spread mainly between people who are in close contact with one another (within about 6 feet) through respiratory droplets produced when an infected person coughs or sneezes  It also may be possible that a person can get COVID-19 by touching a surface or object that has the virus on it and then touching their own mouth, nose, or possibly their eyes, but this is not thought to be the main way the virus spreads  Learn what is known about the spread of newly emerged coronaviruses at [facility]  What are the symptoms of COVID-19? Patients with COVID-19 have had mild to severe respiratory illness with symptoms of   fever   cough   shortness of breath  What are severe complications from this virus? Some patients have pneumonia in both lungs, multi-organ failure and in some cases death  How can I help protect myself? People can help protect themselves from respiratory illness with everyday preventive actions  Avoid close contact with people who are sick  Avoid touching your eyes, nose, and mouth withunwashed hands  Wash your hands often with soap and water for at least 20 seconds  Use an alcohol-based hand  that contains at least 60% alcohol if soap and water are not available  If you are sick, to keep from spreading respiratory illness to others, you should   Stay home when you are sick  Cover your cough or sneeze with a tissue, then throw the tissue in the trash  Clean and disinfect frequently touched objectsand surfaces  What should I do if I recently traveled from an area with ongoing spread of COVID-19? If you have traveled from an affected area, there may be restrictions on your movements for up to 2 weeks  If you develop symptoms during that period (fever, cough, trouble breathing), seek medical advice  Call the office of your health care provider before you go, and tell them about your travel and your symptoms  They will give you instructions on how to get care without exposing other people to your illness   While sick, avoid contact with people, don't go out and delay any travel to reduce the possibility of spreading illness to others  Is there a vaccine? There is currently no vaccine to protect against COVID-19  The best way to prevent infection is to take everyday preventive actions, like avoiding close contact with people who are sick and washing your hands often  Is there a treatment? There is no specific antiviral treatment for COVID-19  People with COVID-19 can seek medical care to helprelieve symptoms  For more information: www cdc gov/WVCPN53HW 290866-C 03/03/2020       What to do if you are sick withcoronavirus disease 2019 (COVID-19)     If you are sick with COVID-19 or suspect you are infected with the virus that causes COVID-19, follow the steps below to help prevent the disease from spreading to people in your home and community  Stay home except to get medical care   You should restrict activities outside your home, except for getting medical care  Do not go to work, school, or public areas  Avoid using public transportation, ride-sharing, or taxis  Separate yourself from other people and animals inyour home  People: As much as possible, you should stay in a specific room and away from other people in your home  Also, you should use a separate bathroom, if available  Animals: Do not handle pets or other animals while sick  See COVID-19 and Animals for more information  Call ahead before visiting your doctor   If you have a medical appointment, call the healthcare provider and tell them that you have or may have COVID-19  This will help the healthcare provider's office take steps to keep other people from getting infected or exposed  Wear a facemask  You should wear a facemask when you are around other people (e g , sharing a room or vehicle) or pets and before you enter a healthcare provider's office   If you are not able to wear a facemask (for example, because it causes trouble breathing), then people who live with you should not stay in the same room with you, or they should wear a facemask if they enteryour room  Cover your coughs and sneezes   Cover your mouth and nose with a tissue when you cough or sneeze  Throw used tissues in a lined trash can; immediately wash your hands with soap and water for at least 20 seconds or clean your hands with an alcohol-based hand  that contains at least 60 to 95% alcohol, covering all surfaces of your hands and rubbing them together until they feel dry  Soap and water should be used preferentially if hands are visibly dirty  Avoid sharing personal household items   You should not share dishes, drinking glasses, cups, eating utensils, towels, or bedding with other people or pets in your home  After using these items, they should be washed thoroughly with soap and water  Clean your hands often  Wash your hands often with soap and water for at least 20 seconds  If soap and water are not available, clean your hands with an alcohol-based hand  that contains at least 60% alcohol, covering all surfaces of your hands and rubbing them together until they feel dry  Soap and water should be used preferentially if hands are visibly dirty  Avoid touching your eyes, nose, and mouth with unwashed hands  Clean all "high-touch" surfaces every day  High touch surfaces include counters, tabletops, doorknobs, bathroom fixtures, toilets, phones, keyboards, tablets, and bedside tables  Also, clean any surfaces that may have blood, stool, or body fluids on them  Use a household cleaning spray or wipe, according to the label instructions  Labels contain instructions for safe and effective use of the cleaning product including precautions you should take when applying the product, such as wearing gloves and making sure you have good ventilation during use of the product  Monitor your symptoms  Seek prompt medical attention if your illness is worsening (e g , difficulty breathing)   Before seeking care, call your healthcare provider and tell them that you have, or are being evaluated for, COVID-19  Put on a facemask before you enter the facility  These steps will help the healthcare provider's office to keep other people in the office or waiting room from getting infectedor exposed  Ask your healthcare provider to call the local or state health department  Persons who are placed under active monitoring or facilitated self-monitoring should follow instructions provided by their local health department or occupational health professionals, as appropriate  If you have a medical emergency and need to call 911, notify the dispatch personnel that you have, or are being evaluated for COVID-19  If possible, put on a facemask before emergency medical services arrive  Discontinuing home isolation  Patients with confirmed COVID-19 should remain under home isolation precautions until the risk of secondary transmission to others is thought to be low  The decision to discontinue home isolation precautions should be made on a case-by-case basis, in consultation with healthcare providers and [facility] and [facility] health departments  For more information: www cdc gov/DYHED58PP 080653-O 02/24/2020       Stay home when you are sick,except to get medical care  Wash your hands often with soap and water for at least 20 seconds  Cover your cough or sneeze with a tissue, then throw the tissue in the trash  Clean and disinfect frequently touched objects and surfaces  Avoid touching your eyes, nose, and mouth  STOP THE SPREAD OF GERMS  For more information: www cdc gov/COVID19 Avoid close contact with people who are sick  Help prevent the spread of respiratory diseases like COVID-19  Abeba Reid RN, our Jag.ag, will be calling you after your discharge, on the phone number that you provided  She will be available as an additional support, if needed  If you wish to speak with her, you may contact Lizzy Calderón at [phone]  [address]  [phone]    [facility] Toll Free: [phone]

## 2020-09-01 NOTE — NURSING NOTE
Patient awake alert   Denies anxiety /depression/suicidal ideation  Reported she is ready to be discharged  pleasant cooperative   No issues reported   Med and meal compliant  Will continue to monitor

## 2020-09-01 NOTE — PROGRESS NOTES
Progress Note - Behavioral Health   Ada Reis 80 y o  female MRN: [id number]  Unit/Bed#[facility] 142-39 Encounter: [id number]    All documentation, nursing notes, labs, and vitals reviewed  The patient's medication reconciliation chart was also analyzed for medication adherence  I personally evaluated Kathy Headings and discussed current care with treatment team   No acute events overnight  The patient remains pleasant, calm, and cooperative  She is participating in unit activities without difficulty and is observed socializing with peers  Case management spoke to [facility] JACKIE who provided insight into Ada's behavior over the of weeks prior to her admission  They are agreeable to discharge tomorrow and transportation will be provided by [facility]  On examination today, the patient is pleasant and organized in thought  She continues to reiterate that she does not belong here and that she is a [place] woman that would never hurt herself  She is future oriented and able to make her needs known  She is anticipating return to her facility where she will engage in activities with her friends  She vehemently denies thoughts of self-harm or suicide today  She has not acted in a manner to suggest she is a threat to herself or others while on the inpatient unit  Her sleep is restorative and her appetite is at baseline  She denies worsening concentration or energy over the last week  She denies new onset anhedonia  There is no objective evidence of herberth or fela psychosis  She does not endorse any symptomatology suggestive of overwhelming anxiety nor does she appear grossly perturbed during our evaluation today  Given psychiatric stability, patient will be discharged tomorrow (09/02/2020)  She is adherent to current psychotropic medication regimen and tolerated increased dose of sertraline last evening    She denies adverse side effects such as headache or gastrointestinal abnormalities  Will continue to monitor  The patient offers no further complaints  Of note, anticipation of discharge tomorrow, patient will undergo COVID-19 testing today  She currently does not exhibit any physical symptomatology suggestive active illness  Current Mental Status Evaluation:    Appearance:  Adequate hygiene and grooming and Good eye contact   Behavior:  calm and cooperative, pleasant, appropriately engaging   Mood:  "I'm fine, I don't need to be here"   Affect: appropriate and broad, cheerful   Speech: Normal rate and Soft, normal rhythm   Thought Process:  Goal directed and coherent   Thought Content:  Does not verbalize delusional material   Perceptual Disturbances: Denies hallucinations and does not appear to be responding to internal stimuli   Risk Potential: No suicidal or homicidal ideation   Orientation:   AAOx3     Assessment:     Principal Problem:    Unspecified mood (affective) disorder, r/o major depressive disorder (single episode)  Active Problems:    Gait abnormality    Mediastinal mass    Essential hypertension    Medical clearance for psychiatric admission    History of CVA (cerebrovascular accident)    Right ankle sprain    Venous insufficiency of both lower extremities    Mild neurocognitive disorder      Plan/Recommended Treatment:     - Continue with pharmacotherapy, group therapy, milieu therapy and occupational therapy  - Risks/benefits/alternatives to treatment discussed and Shama De Santiago continues to verbalize understanding   - No psychopharmacologic changes necessary at this juncture   - Will consider further optimization of psychotropic medication regimen as hospital course progresses   - Continue to assess for adverse medication side effects   - Encourage Rugby Axeldavidfrantz to participate in nonverbal forms of therapy including journaling and art/music therapy  - Continue precautionary Q15-minute safety checks    - Continue to engage case management/SW to assist with collateral information, discharge planning, and the implementation of an individualized, patient-centered plan of care     - Discharge to [facility] tomorrow (9/2/2020)   - COVID-19 testing today (9/1/2020)    - The patient will be maintained on the following medications:    Current Facility-Administered Medications   Medication Dose Route Frequency Provider Last Rate    acetaminophen  650 mg Oral Q6H PRN Meneses Nickels, CRNP      acetaminophen  650 mg Oral Q4H PRN Meneses Nickels, CRNP      acetaminophen  975 mg Oral Q6H PRN Meneses Nickels, CRNP      aluminum-magnesium hydroxide-simethicone  30 mL Oral Q4H PRN Meneses Nickels, CRNP      amLODIPine  5 mg Oral Daily Georgette Hyatt MD      aspirin  81 mg Oral Daily Georgette Hyatt MD      atorvastatin  40 mg Oral QPM Georgette Hyatt MD      benztropine  1 mg Intramuscular Q1H PRN Meneses Nickels, CRNP      benztropine  1 mg Oral Q1H PRN Meneses Nickels, CRNP      docusate sodium  100 mg Oral BID Georgette Hyatt MD      haloperidol  2 5 mg Oral Q6H PRN Meneses Nickels, CRNP      haloperidol lactate  2 5 mg Intramuscular Q6H PRN Meneses Nickels, CRNP      hydrochlorothiazide  25 mg Oral Daily Georgette Hyatt MD      hydrOXYzine HCL  25 mg Oral Q4H PRN Meneses Nickels, CRNP      hydrOXYzine HCL  50 mg Oral Q6H PRN Meneses Nickels, CRNP      LORazepam  1 mg Intramuscular Q4H PRN Meneses Nickels, CRNP      magnesium hydroxide  30 mL Oral Daily PRN Meneses Nickels, CRNP      OLANZapine  5 mg Intramuscular Q3H PRN Meneses Nickels, CRNP      OLANZapine  5 mg Oral Q3H PRN Meneses Nickels, CRNP      risperiDONE  0 5 mg Oral Q3H PRN Meneses Nickels, CRNP      sertraline  50 mg Oral HS Georgette Hyatt MD      traZODone  50 mg Oral HS PRN Meneses Nickels, CRNP

## 2020-09-01 NOTE — NURSING NOTE
Patient was visible on the milieu  Patient was seen socializing with her female peers in the dayroom  Patient denied having any depression, anxiety, hallucinations, or suicidal/homicidal thoughts  Patient was cooperative with taking her evening medications  Safety plan was reviewed with the patient and staff availability was reinforced

## 2020-09-01 NOTE — PLAN OF CARE
Pt  Engaged in 2 of 3 groups reporting enjoyment in relaxation session    Problem: Ineffective Coping  Goal: Participates in unit activities  Description: Interventions:  - Provide therapeutic environment   - Provide required programming   - Redirect inappropriate behaviors   Outcome: Progressing

## 2020-09-01 NOTE — NURSING NOTE
Patient slept all night  Patient with more than 6 hours of sleep  Patient free of falls  Q7 safety checks maintained

## 2020-09-01 NOTE — CASE MANAGEMENT
This writer completed patient's intake  Patient denies thoughts of suicide  She reports "I'm a [place]  I wouldn't do that"  Patient reports she was sad because she has experienced significant loss  She birthed seven children and two are alive  Her  passed away and most of her friends  She reports she resides at [facility] which is an assisted living facility  She denies prior psych history or treatment  Patient signed her 72 hrs notice  Patient will be discharged on Wednesday       Patient signed NADEEM for  Timothy Chávezcarmine [facility]

## 2020-09-02 VITALS
TEMPERATURE: 96.5 F | HEIGHT: 60 IN | WEIGHT: 134.7 LBS | BODY MASS INDEX: 26.45 KG/M2 | SYSTOLIC BLOOD PRESSURE: 120 MMHG | HEART RATE: 70 BPM | RESPIRATION RATE: 18 BRPM | OXYGEN SATURATION: 93 % | DIASTOLIC BLOOD PRESSURE: 65 MMHG

## 2020-09-02 PROCEDURE — 99238 HOSP IP/OBS DSCHRG MGMT 30/<: CPT | Performed by: STUDENT IN AN ORGANIZED HEALTH CARE EDUCATION/TRAINING PROGRAM

## 2020-09-02 RX ORDER — ASPIRIN 81 MG/1
81 TABLET, CHEWABLE ORAL DAILY
Qty: 30 TABLET | Refills: 0 | Status: SHIPPED | OUTPATIENT
Start: 2020-09-02 | End: 2020-09-02 | Stop reason: SDUPTHER

## 2020-09-02 RX ORDER — ATORVASTATIN CALCIUM 40 MG/1
40 TABLET, FILM COATED ORAL EVERY EVENING
Qty: 30 TABLET | Refills: 1 | Status: SHIPPED | OUTPATIENT
Start: 2020-09-02 | End: 2021-01-07 | Stop reason: SDUPTHER

## 2020-09-02 RX ORDER — ASPIRIN 81 MG/1
81 TABLET, CHEWABLE ORAL DAILY
Qty: 30 TABLET | Refills: 1 | Status: SHIPPED | OUTPATIENT
Start: 2020-09-02 | End: 2021-01-07 | Stop reason: SDUPTHER

## 2020-09-02 RX ORDER — HYDROCHLOROTHIAZIDE 25 MG/1
25 TABLET ORAL DAILY
Qty: 30 TABLET | Refills: 1 | Status: SHIPPED | OUTPATIENT
Start: 2020-09-02 | End: 2021-01-07 | Stop reason: SDUPTHER

## 2020-09-02 RX ORDER — ATORVASTATIN CALCIUM 40 MG/1
40 TABLET, FILM COATED ORAL EVERY EVENING
Qty: 30 TABLET | Refills: 0 | Status: SHIPPED | OUTPATIENT
Start: 2020-09-02 | End: 2020-09-02 | Stop reason: SDUPTHER

## 2020-09-02 RX ORDER — AMLODIPINE BESYLATE 5 MG/1
5 TABLET ORAL DAILY
Qty: 30 TABLET | Refills: 0 | Status: SHIPPED | OUTPATIENT
Start: 2020-09-02 | End: 2020-12-07 | Stop reason: SDUPTHER

## 2020-09-02 RX ORDER — HYDROCHLOROTHIAZIDE 25 MG/1
25 TABLET ORAL DAILY
Qty: 30 TABLET | Refills: 0 | Status: SHIPPED | OUTPATIENT
Start: 2020-09-02 | End: 2020-09-02 | Stop reason: SDUPTHER

## 2020-09-02 RX ADMIN — ASPIRIN 81 MG 81 MG: 81 TABLET ORAL at 08:35

## 2020-09-02 RX ADMIN — HYDROCHLOROTHIAZIDE 25 MG: 25 TABLET ORAL at 08:35

## 2020-09-02 RX ADMIN — AMLODIPINE BESYLATE 5 MG: 5 TABLET ORAL at 08:35

## 2020-09-02 RX ADMIN — DOCUSATE SODIUM 100 MG: 100 CAPSULE, LIQUID FILLED ORAL at 08:35

## 2020-09-02 NOTE — NURSING NOTE
Patient left via ambulance transport  Belongings returned to patient  Receiving facility given AVS  Scripts sent electronically to [facility]

## 2020-09-02 NOTE — NURSING NOTE
Patient was visible on the unit  She is pleasant and cooperative  She denies depression and anxiety  She states "I am 89 " she denied any needs  VSS  Complaint with medications  Patient is in her bed  Patient did wake to use the bathroom however falls back to sleep  Bed alarm on for safety  Monitored on q 7 minute checks

## 2020-09-02 NOTE — PLAN OF CARE
Problem: Risk for Self Injury/Neglect  Goal: Verbalize thoughts and feelings  Description: Interventions:  - Assess and re-assess patient's lethality and potential for self-injury  - Engage patient in 1:1 interactions, daily, for a minimum of 15 minutes  - Encourage patient to express feelings, fears, frustrations, hopes  - Establish rapport/trust with patient   Outcome: Progressing     Problem: Depression  Goal: Verbalize thoughts and feelings  Description: Interventions:  - Assess and re-assess patient's level of risk   - Engage patient in 1:1 interactions, daily, for a minimum of 15 minutes   - Encourage patient to express feelings, fears, frustrations, hopes   Outcome: Progressing  Goal: Refrain from harming self  Description: Interventions:  - Monitor patient closely, per order   - Supervise medication ingestion, monitor effects and side effects   Outcome: Progressing  Goal: Refrain from isolation  Description: Interventions:  - Develop a trusting relationship   - Encourage socialization   Outcome: Progressing  Goal: Refrain from self-neglect  Outcome: Progressing     Problem: Anxiety  Goal: Anxiety is at manageable level  Description: Interventions:  - Assess and monitor patient's anxiety level  - Monitor for signs and symptoms (heart palpitations, chest pain, shortness of breath, headaches, nausea, feeling jumpy, restlessness, irritable, apprehensive)  - Collaborate with interdisciplinary team and initiate plan and interventions as ordered    - [place] patient to unit/surroundings  - Explain treatment plan  - Encourage participation in care  - Encourage verbalization of concerns/fears  - Identify coping mechanisms  - Assist in developing anxiety-reducing skills  - Administer/offer alternative therapies  - Limit or eliminate stimulants  Outcome: Progressing

## 2020-09-02 NOTE — PROGRESS NOTES
09/02/20 0900   Team Meeting   Meeting Type Daily Rounds   Team Members Present   Team Members Present Physician;Nurse;;   Physician Team Member [facility]   Nursing Team Member [id number] [facility] Team Member [place] [id number]   Social Work Team Member HELENA   Patient/Family Present   Patient Present No   Patient's Family Present No     D/C today at 11am  Calm pleasant  Denies all symptoms

## 2020-09-02 NOTE — PLAN OF CARE
Problem: Risk for Self Injury/Neglect  Goal: Treatment Goal: Remain safe during length of stay, learn and adopt new coping skills, and be free of self-injurious ideation, impulses and acts at the time of discharge  Outcome: Adequate for Discharge  Goal: Verbalize thoughts and feelings  Description: Interventions:  - Assess and re-assess patient's lethality and potential for self-injury  - Engage patient in 1:1 interactions, daily, for a minimum of 15 minutes  - Encourage patient to express feelings, fears, frustrations, hopes  - Establish rapport/trust with patient   Outcome: Adequate for Discharge  Goal: Attend and participate in unit activities, including therapeutic, recreational, and educational groups  Description: Interventions:  - Provide therapeutic and educational activities daily, encourage attendance and participation, and document same in the medical record  - Obtain collateral information, encourage visitation and family involvement in care   Outcome: Adequate for Discharge  Goal: Recognize maladaptive responses and adopt new coping mechanisms  Outcome: Adequate for Discharge  Goal: Complete daily ADLs, including personal hygiene independently, as able  Description: Interventions:  - Observe, teach, and assist patient with ADLS  - Monitor and promote a balance of rest/activity, with adequate nutrition and elimination  Outcome: Adequate for Discharge     Problem: Depression  Goal: Treatment Goal: Demonstrate behavioral control of depressive symptoms, verbalize feelings of improved mood/affect, and adopt new coping skills prior to discharge  Outcome: Adequate for Discharge  Goal: Verbalize thoughts and feelings  Description: Interventions:  - Assess and re-assess patient's level of risk   - Engage patient in 1:1 interactions, daily, for a minimum of 15 minutes   - Encourage patient to express feelings, fears, frustrations, hopes   Outcome: Adequate for Discharge  Goal: Refrain from harming self  Description: Interventions:  - Monitor patient closely, per order   - Supervise medication ingestion, monitor effects and side effects   Outcome: Adequate for Discharge  Goal: Refrain from isolation  Description: Interventions:  - Develop a trusting relationship   - Encourage socialization   Outcome: Adequate for Discharge  Goal: Refrain from self-neglect  Outcome: Adequate for Discharge  Goal: Attend and participate in unit activities, including therapeutic, recreational, and educational groups  Description: Interventions:  - Provide therapeutic and educational activities daily, encourage attendance and participation, and document same in the medical record   Outcome: Adequate for Discharge  Goal: Complete daily ADLs, including personal hygiene independently, as able  Description: Interventions:  - Observe, teach, and assist patient with ADLS  -  Monitor and promote a balance of rest/activity, with adequate nutrition and elimination   Outcome: Adequate for Discharge     Problem: Anxiety  Goal: Anxiety is at manageable level  Description: Interventions:  - Assess and monitor patient's anxiety level  - Monitor for signs and symptoms (heart palpitations, chest pain, shortness of breath, headaches, nausea, feeling jumpy, restlessness, irritable, apprehensive)  - Collaborate with interdisciplinary team and initiate plan and interventions as ordered    - [place] patient to unit/surroundings  - Explain treatment plan  - Encourage participation in care  - Encourage verbalization of concerns/fears  - Identify coping mechanisms  - Assist in developing anxiety-reducing skills  - Administer/offer alternative therapies  - Limit or eliminate stimulants  Outcome: Adequate for Discharge     Problem: Prexisting or High Potential for Compromised Skin Integrity  Goal: Skin integrity is maintained or improved  Description: INTERVENTIONS:  - Identify patients at risk for skin breakdown  - Assess and monitor skin integrity  - Assess and monitor nutrition and hydration status  - Monitor labs   - Assess for incontinence   - Turn and reposition patient  - Assist with mobility/ambulation  - Relieve pressure over bony prominences  - Avoid friction and shearing  - Provide appropriate hygiene as needed including keeping skin clean and dry  - Evaluate need for skin moisturizer/barrier cream  - Collaborate with interdisciplinary team   - Patient/family teaching  - Consider wound care consult   Outcome: Adequate for Discharge     Problem: Potential for Falls  Goal: Patient will remain free of falls  Description: INTERVENTIONS:  - Assess patient frequently for physical needs  -  Identify cognitive and physical deficits and behaviors that affect risk of falls    -  [place] fall precautions as indicated by assessment   - Educate patient/family on patient safety including physical limitations  - Instruct patient to call for assistance with activity based on assessment  - Modify environment to reduce risk of injury  - Consider OT/PT consult to assist with strengthening/mobility  Outcome: Adequate for Discharge     Problem: DISCHARGE PLANNING  Goal: Discharge to home or other facility with appropriate resources  Description: INTERVENTIONS:  - Identify barriers to discharge w/patient and caregiver  - Arrange for needed discharge resources and transportation as appropriate  - Identify discharge learning needs (meds, wound care, etc )  - Refer to Case Management Department for coordinating discharge planning if the patient needs post-hospital services based on physician/advanced practitioner order or complex needs related to functional status, cognitive ability, or social support system  Outcome: Adequate for Discharge     Problem: Ineffective Coping  Goal: Participates in unit activities  Description: Interventions:  - Provide therapeutic environment   - Provide required programming   - Redirect inappropriate behaviors   Outcome: Adequate for Discharge

## 2020-09-02 NOTE — DISCHARGE SUMMARY
Discharge Summary - [address] 80 y o  female MRN: [id number]  Unit/Bed#: [facility] 164-61 Encounter: [id number]     Admission Date: 8/27/2020         Discharge Date: 9/2/2020    Attending Psychiatrist: Nicolasa Mandujano MD    Reason for Admission/HPI:     As per Dr Iggy Fuchs initial evaluation on 8/27/2020:    "Cecile Richards is a 80-year-old  female with no known past psychiatric history but past medical history significant for CVA, hypertension, spinal stenosis, hyperlipidemia, and mild neuroognitive disorder who was admitted to the inpatient psychiatric unit as a transfer from [facility] where she presented on 08/27 secondary to nursing facility staff's recommendation given worsening mood and intermittent thoughts of suicide over the last 2 weeks  Nursing home staff reported that patient has been making passive death threats and suggesting that she would "shoot herself with a gun" or jump off a yu secondary to missing her children and  who have passed away  Ada also mentioned a plan of possibly refusing her medications or not consuming meals/liquids in an attempt to accelerate her death  She was admitted to the inpatient psychiatric unit and started on her outpatient medication regimen, which does not consist of any psychotropic medications  She has remained in good behavioral control and is not engaged in any self-destructive or self-injurious behaviors      I personally evaluated Ada and spoke to treatment team regarding her care  The patient is a pleasant female with obvious neurocognitive deficits  She is able to engage reasonably but is noticeably forgetful and shares that her memory has been poor for the last few years  The patient is able to orient to place and person but not time    A review of past documentation from medical hospitalizations in 2019 reveals a diagnosis of dementia but no neurologic medications (i e   Donepezil) were started  Ada shares that she lost her  and 3 children several years ago and often internalizes survivor's guilt, questioning why she is still alive but younger generations are now   She cannot attest to why she has been thinking about this more recently and does admit to making provacative and inflammatory statements to SNF staff that she misses these individuals so much that she rather be with them in heaven  The patient adamantly denies ever engaging in suicidal acts/gestures/attempts and vehemently states that she does not currently harbor thoughts of self-harm  She denies any thoughts of harming others  She states that her sleep has been [place] well yet her appetite has been erratic  She blames her poor appetite on her age and denies it is secondary to a unipolar depressive episode  She denies volitional refusal of medications or meals at her nursing home  She describes herself as spiritual and states that suicide is completely against her values  She does not internalize persistent hopelessness, worthlessness, or despair  Her energy is low and she admits to difficulty with concentration  She denies anhedonia, stating that she still engages in group activities with her peers that she finds pleasurable  She denies frequent crying spells and does not appear grossly dysphoric on examination  She has no history of signs/symptoms suggestive of herberth or hypomania  She is not elevated/expansive in mood, grandiose, psychomotorically restless, nor does she endorse lengthy periods without sleep  She denies perceptual disturbances, including auditory/visual hallucinations, paranoia, delusional thoughts, or referential ideation  She does not appear internally preoccupied or easily distracted throughout the examination      Ada denies any history of psychiatric treatment and has no previous psychotropic medication trials    Psychoeducation was provided to [facility] regarding the use of antidepressant agents to manage episodic changes in mood and sadness  She was pleasantly receptive  She requests such medications, stating, "well, it could help I guess"    The patient has limited familial support in the area and he gives a verbal permission to speak with her family, however, she is unable to recall their numbers "      Meds/Allergies     all current active meds have been reviewed and current meds:   Current Facility-Administered Medications   Medication Dose Route Frequency    acetaminophen (TYLENOL) tablet 650 mg  650 mg Oral Q6H PRN    acetaminophen (TYLENOL) tablet 650 mg  650 mg Oral Q4H PRN    acetaminophen (TYLENOL) tablet 975 mg  975 mg Oral Q6H PRN    aluminum-magnesium hydroxide-simethicone (MYLANTA) 200-200-20 mg/5 mL oral suspension 30 mL  30 mL Oral Q4H PRN    amLODIPine (NORVASC) tablet 5 mg  5 mg Oral Daily    aspirin chewable tablet 81 mg  81 mg Oral Daily    atorvastatin (LIPITOR) tablet 40 mg  40 mg Oral QPM    benztropine (COGENTIN) injection 1 mg  1 mg Intramuscular Q1H PRN    benztropine (COGENTIN) tablet 1 mg  1 mg Oral Q1H PRN    docusate sodium (COLACE) capsule 100 mg  100 mg Oral BID    haloperidol (HALDOL) tablet 2 5 mg  2 5 mg Oral Q6H PRN    haloperidol lactate (HALDOL) injection 2 5 mg  2 5 mg Intramuscular Q6H PRN    hydrochlorothiazide (HYDRODIURIL) tablet 25 mg  25 mg Oral Daily    hydrOXYzine HCL (ATARAX) tablet 25 mg  25 mg Oral Q4H PRN    hydrOXYzine HCL (ATARAX) tablet 50 mg  50 mg Oral Q6H PRN    LORazepam (ATIVAN) injection 1 mg  1 mg Intramuscular Q4H PRN    magnesium hydroxide (MILK OF MAGNESIA) 400 mg/5 mL oral suspension 30 mL  30 mL Oral Daily PRN    OLANZapine (ZyPREXA) IM injection 5 mg  5 mg Intramuscular Q3H PRN    OLANZapine (ZyPREXA) tablet 5 mg  5 mg Oral Q3H PRN    risperiDONE (RisperDAL M-TABS) dispersible tablet 0 5 mg  0 5 mg Oral Q3H PRN    sertraline (ZOLOFT) tablet 50 mg  50 mg Oral HS    traZODone (DESYREL) tablet 50 mg 50 mg Oral HS PRN       No Known Allergies    Objective     Vital signs in last 24 hours:  Temp:  [96 5 °F (35 8 °C)-97 8 °F (36 6 °C)] 96 5 °F (35 8 °C)  HR:  [66-85] 70  Resp:  [15-18] 18  BP: (110-120)/(56-86) 120/65      Intake/Output Summary (Last 24 hours) at 9/2/2020 1004  Last data filed at 9/2/2020 0820  Gross per 24 hour   Intake 1440 ml   Output    Net 1440 ml       Hospital Course:     Tressa Theodore was admitted to the inpatient psychiatric unit and started on Q15-minute safety precautions  The patient was oriented to the rules/regulations of the unit and a treatment plan was formulated that emphasized pharmacotherapy, milieu therapy, group therapy and individual psychotherapy, when indicated  At the onset of patient's admission, case management was actively involved to assist with collateral information, discharge planning, and the creation of an individualized, patient-centered plan of care  Given patient's psychiatric instability upon admission, psychotropic medications were started and milieu therapy was utilized  To address intermittent depressive symptomatology and internalized guilt related to the death of several of her children, Ada was started on Zoloft 25mg PO QHS  Risks/benefits/alternatives to treatment were discussed and Tressa Theodore was appropriately receptive  Informed consent was provided and the patient adhered to treatment recommendations  As hospital course progressed, Zoloft was gradually optimized to 50mg PO QHS  With increased dose, the patient denied adverse medication side effects such as GI disturbance or headache  The patient's condition improved and Ada Smiley denied neurovegetative symptomatology on the day of discharge  Sleep was adequate and appetite was at baseline  Tressa Theodore denied acute worsening of energy/concentration    She was able to engage in both group and individual therapy spoke at length about guilt that she internalizes and ways she can cognitively restructure her thinking  There is no evidence of new-onset anhedonia and the patient vehemently denied any acute thoughts of suicide or self-harm  She repeatedly stated that she never attempted to harm herself prior to admission and would make comments about missing her family who had passed which were taken out of context  The patient currently does not harbor any plans to harm others  The patient denies any signs/symptoms suggestive of herberth or hypomania  There is no objective evidence of elevated/expansive mood, grandiosity, psychomotor restlessness, sexual promiscuity, or lengthy periods without sleep  Jennifer Kerr denies any acute perceptual disturbances including auditory/visual hallucinations, paranoia, referential ideation, or delusional thoughts  On the day of discharge, the patient has been observed engaging in appropriate behavior and does not appear disorganized, internally preoccupied, or easily distracted  Since Jennifer Kerr is functioning well and appears to be psychiatrically stable, treatment team believes that the patient has achieved maximum benefit from inpatient hospitalization  As such, Jennifer Kerr is suitable for discharge and linkage with outpatient care  At the time of discharge, patient denies adverse medication side effects  Lethality Risk at discharge: At the moment, the patient's acute lethality risk in the community is LOW  Jennifer Kerr denies neurovegetative symptoms of depression and does not harbor intense thoughts of suicide or self-harm  The patient denies persistent thoughts of hopelessness, worthlessness, or guilt  Jennifer Kerr has no recent history of suicidal gestures/attempts and does not currently have access to weapons/firearms  The patient is future-oriented and eager awaiting her return to [facility] where she will engage in card games with her friends   She demonstrates ability to act in a self-preserving manner and is able to make her needs known  Patient lists protective factors against suicide as her family and her strong Buddhist elo, stating that she is "a good Congregational woman" and would never harm herself  Solange Odonnell does not currently exhibit any objective evidence of herberth or hypomania  Patient denies any perceptual disturbances and does not appear grossly psychotic  At the time of this documentation, patient's thoughts are linear, goal-directed, and reality-based  Patient denies history of polysubstance abuse and is receptive to psychoeducation regarding the detrimental effects of alcohol and illicit substances on mental and physical health  Patient has achieved maximum benefit of inpatient unit and thus, will be discharged with appropriate, outpatient linkage  Although the patient's acute lethality risk is LOW, long-term/chronic lethality risk is mildly elevated given her mild cognitive impairment and limited insight  These chronic risk factors cannot be mitigated with further inpatient hospitalization which is not currently warranted  To mitigate future risk, patient should adhere to treatment recommendations set forth above, avoid alcohol/illicit substance use (which can be disinhibiting), and prioritize mental health treatment          Mental Status at Time of Discharge:     Appearance:  Adequate hygiene and grooming and Good eye contact, appears stated age, dressed in personal attire   Behavior:  calm, cooperative and friendly   Speech:   Language: Normal rate and Normal volume, coherent and spontaneous  Able to name objects and Able to repeat phrases   Mood:  euthymic   Affect:   Associations: broad and mood-congruent  Tightly connected   Thought Process:  Goal directed and coherent   Thought Content:  Does not verbalize delusional material   Perceptual Disturbances: Denies hallucinations and does not appear to be responding to internal stimuli     Risk Potential: No suicidal or homicidal ideation   Orientation   Language Oriented to self only  anomia No   Memory  Fund of knowledge Short term impaired and Long term intact  Diminished   Attention/Concentration attention span and concentration were age appropriate   Insight:  limited   Judgment: Good judgment   Gait/Station:  needs assistive device   Motor Activity: No abnormal movement noted       Admission Diagnosis:    Principal Problem:    Unspecified mood (affective) disorder, r/o major depressive disorder (single episode)  Active Problems:    Gait abnormality    Mediastinal mass    Essential hypertension    Medical clearance for psychiatric admission    History of CVA (cerebrovascular accident)    Right ankle sprain    Venous insufficiency of both lower extremities    Mild neurocognitive disorder      Discharge Diagnosis:     Principal Problem:    Unspecified mood (affective) disorder, r/o major depressive disorder (single episode)  Active Problems:    Gait abnormality    Mediastinal mass    Essential hypertension    Medical clearance for psychiatric admission    History of CVA (cerebrovascular accident)    Right ankle sprain    Venous insufficiency of both lower extremities    Mild neurocognitive disorder  Resolved Problems:    * No resolved hospital problems  *      Lab results:    Admission on 08/27/2020   Component Date Value    Cholesterol 08/28/2020 99     Triglycerides 08/28/2020 62     HDL, Direct 08/28/2020 28*    LDL Calculated 08/28/2020 59     Non-HDL-Chol (CHOL-HDL) 08/28/2020 71     RPR 08/28/2020 Non-Reactive     Vit D, 1,25-Dihydroxy 08/28/2020 39 8     SARS-CoV-2 09/01/2020 Negative        Discharge Medications:    See after visit summary for reconciled discharge medications provided to patient and family  Discharge instructions/Information to patient and family:     See after visit summary for information provided to patient and family        Provisions for Follow-Up Care:    See after visit summary for information related to follow-up care and any pertinent home health orders  Discharge Statement     I spent 30 minutes discharging the patient  This time was spent on the day of discharge  I had direct contact with the patient on the day of discharge  Additional documentation is required if more than 30 minutes were spent on discharge:    I reviewed with Ada importance of compliance with medications and outpatient treatment after discharge  I discussed the medication regimen and possible side effects of the medications with Ada prior to discharge  At the time of discharge she was tolerating psychiatric medications  I discussed outpatient follow up with Ada

## 2020-09-02 NOTE — TREATMENT TEAM
Dentures upper/lower, night gown, gray sweater, 3 pair white socks, blue and silver t-shirt, red and white t-shirt, 1 pair jeans, 2 pair black pants

## 2020-09-02 NOTE — NURSING NOTE
Pt is calm, cooperative with care and medication compliant  Pt is visible on the unit and social with select peers  Pt denies s/s including SI and HI and does not rate anxiety or depression  Pt is currently sitting in the dayroom with peers  Will continue to monitor

## 2020-09-29 ENCOUNTER — OFFICE VISIT (OUTPATIENT)
Dept: INTERNAL MEDICINE CLINIC | Facility: CLINIC | Age: 85
End: 2020-09-29
Payer: MEDICARE

## 2020-09-29 VITALS
WEIGHT: 134.25 LBS | HEIGHT: 62 IN | BODY MASS INDEX: 24.7 KG/M2 | TEMPERATURE: 97.5 F | DIASTOLIC BLOOD PRESSURE: 76 MMHG | HEART RATE: 73 BPM | SYSTOLIC BLOOD PRESSURE: 134 MMHG | OXYGEN SATURATION: 95 %

## 2020-09-29 DIAGNOSIS — Z86.59 HISTORY OF SUICIDAL IDEATION: ICD-10-CM

## 2020-09-29 DIAGNOSIS — M15.9 PRIMARY OSTEOARTHRITIS INVOLVING MULTIPLE JOINTS: ICD-10-CM

## 2020-09-29 DIAGNOSIS — Z23 ENCOUNTER FOR VACCINATION: ICD-10-CM

## 2020-09-29 DIAGNOSIS — I10 ESSENTIAL HYPERTENSION: ICD-10-CM

## 2020-09-29 DIAGNOSIS — F32.1 CURRENT MODERATE EPISODE OF MAJOR DEPRESSIVE DISORDER WITHOUT PRIOR EPISODE (HCC): Primary | ICD-10-CM

## 2020-09-29 DIAGNOSIS — Z86.73 HISTORY OF CVA (CEREBROVASCULAR ACCIDENT): ICD-10-CM

## 2020-09-29 PROBLEM — Z00.8 MEDICAL CLEARANCE FOR PSYCHIATRIC ADMISSION: Status: RESOLVED | Noted: 2020-08-28 | Resolved: 2020-09-29

## 2020-09-29 PROCEDURE — 99214 OFFICE O/P EST MOD 30 MIN: CPT | Performed by: INTERNAL MEDICINE

## 2020-09-29 NOTE — PATIENT INSTRUCTIONS

## 2020-09-29 NOTE — PROGRESS NOTES
Assessment/Plan:  Problem List Items Addressed This Visit        Cardiovascular and Mediastinum    Essential hypertension       Musculoskeletal and Integument    Osteoarthritis       Other    History of CVA (cerebrovascular accident)    Current moderate episode of major depressive disorder without prior episode ([facility] [address] ) - Primary    History of suicidal ideation      Other Visit Diagnoses     Encounter for vaccination               Diagnoses and all orders for this visit:    Current moderate episode of major depressive disorder without prior episode ([facility] [address] )    History of suicidal ideation    Essential hypertension    Primary osteoarthritis involving multiple joints    History of CVA (cerebrovascular accident)    Encounter for vaccination        No problem-specific Assessment & Plan notes found for this encounter  A/P: Caretaker with her and both pt and caretaker reports she is doing better  Tolerating the meds  Defers flu shot for now and will be getting them at her facility  Labs good  Continue current treatment and RTC four weeks for f/u since she has no f/u with psych  Pt to call if she feel depression worsening  Subjective:      Patient ID: Amanda Kendrick is a 80 y o  female  [facility] RTC for f/u and routine  Pt recently admitted to behavioral med due to worsening depression and suicidal ideations  Pt depressed about loss of her  and family along with her house  Started on meds and no has assistance at home  No more suicidal ideations  Depression a little better  Tolerating the meds  Appetite is ok  Active w/o falls  Memory continues to slowly decline  Labs were good in the hospital  Due for vaccines  No c/o's         The following portions of the patient's history were reviewed and updated as appropriate:   She has a past medical history of Arthritis, Cataract, Depression, Dislocation of left shoulder joint, Essential hypertension (4/3/2019), Gait disorder, Herpes zoster, HL (hearing loss), Impacted cerumen of both ears, Impacted cerumen of right ear, Lightheadedness, Multiple falls, Near syncope, and Postherpetic polyneuropathy  ,  does not have any pertinent problems on file  ,   has a past surgical history that includes Hernia repair; Tubal ligation; Cataract extraction, bilateral; and Tonsillectomy  ,  family history includes Cirrhosis in her father; Heart disease in her son; Mental illness in her son ,   reports that she has never smoked  She has never used smokeless tobacco  She reports that she does not drink alcohol or use drugs  ,  has No Known Allergies     Current Outpatient Medications   Medication Sig Dispense Refill    acetaminophen (TYLENOL) 325 mg tablet Take 2 tablets (650 mg total) by mouth every 6 (six) hours as needed for mild pain 30 tablet 5    amLODIPine (NORVASC) 5 mg tablet Take 1 tablet (5 mg total) by mouth daily 30 tablet 0    aspirin 81 mg chewable tablet Chew 1 tablet (81 mg total) daily 30 tablet 1    atorvastatin (LIPITOR) 40 mg tablet Take 1 tablet (40 mg total) by mouth every evening 30 tablet 1    docusate sodium (COLACE) 100 mg capsule Take 1 capsule (100 mg total) by mouth 2 (two) times a day (Patient not taking: Reported on 8/27/2020) 60 capsule 5    hydrochlorothiazide (HYDRODIURIL) 25 mg tablet Take 1 tablet (25 mg total) by mouth daily 30 tablet 1    sertraline (ZOLOFT) 50 mg tablet Take 1 tablet (50 mg total) by mouth daily at bedtime 30 tablet 1     No current facility-administered medications for this visit  Review of Systems   Constitutional: Negative for activity change, chills, diaphoresis, fatigue and fever  HENT: Negative  Eyes: Negative for visual disturbance  Respiratory: Negative for cough, chest tightness, shortness of breath and wheezing  Cardiovascular: Negative for chest pain, palpitations and leg swelling  Gastrointestinal: Negative for abdominal pain, constipation, diarrhea, nausea and vomiting     Endocrine: Negative for cold intolerance and heat intolerance  Genitourinary: Negative for difficulty urinating, dysuria and frequency  Musculoskeletal: Negative for arthralgias, gait problem and myalgias  Neurological: Negative for dizziness, seizures, syncope, weakness, light-headedness and headaches  Psychiatric/Behavioral: Positive for confusion and dysphoric mood  Negative for agitation, behavioral problems, decreased concentration, hallucinations, self-injury, sleep disturbance and suicidal ideas  The patient is not nervous/anxious and is not hyperactive  PHQ-9 Depression Screening    PHQ-9:    Frequency of the following problems over the past two weeks:       Little interest or pleasure in doing things:  0 - not at all  Feeling down, depressed, or hopeless:  0 - not at all  PHQ-2 Score:  0        Objective:  Vitals:    09/29/20 1336   BP: 134/76   Pulse: 73   Temp: 97 5 °F (36 4 °C)   SpO2: 95%   Weight: 60 9 kg (134 lb 4 oz)   Height: 5' 2" (1 575 m)     Body mass index is 24 55 kg/m²  Physical Exam  Vitals signs and nursing note reviewed  Constitutional:       General: She is not in acute distress  Appearance: Normal appearance  HENT:      Head: Normocephalic and atraumatic  Nose: Nose normal       Mouth/Throat:      Mouth: Mucous membranes are moist    Eyes:      Extraocular Movements: Extraocular movements intact  Conjunctiva/sclera: Conjunctivae normal       Pupils: Pupils are equal, round, and reactive to light  Neck:      Musculoskeletal: Neck supple  No muscular tenderness  Cardiovascular:      Rate and Rhythm: Normal rate and regular rhythm  Heart sounds: Normal heart sounds  Pulmonary:      Effort: Pulmonary effort is normal  No respiratory distress  Breath sounds: Normal breath sounds  No wheezing or rales  Abdominal:      General: Bowel sounds are normal  There is no distension  Palpations: Abdomen is soft  Tenderness: There is no abdominal tenderness  Musculoskeletal:      Right lower leg: No edema  Left lower leg: No edema  Neurological:      General: No focal deficit present  Mental Status: She is alert and oriented to person, place, and time  Mental status is at baseline  Psychiatric:         Mood and Affect: Mood normal          Behavior: Behavior normal          Thought Content:  Thought content normal          Judgment: Judgment normal

## 2020-10-12 DIAGNOSIS — F39 UNSPECIFIED MOOD (AFFECTIVE) DISORDER (HCC): ICD-10-CM

## 2020-12-07 DIAGNOSIS — I10 HYPERTENSION: ICD-10-CM

## 2020-12-07 RX ORDER — AMLODIPINE BESYLATE 5 MG/1
5 TABLET ORAL DAILY
Qty: 30 TABLET | Refills: 5 | Status: SHIPPED | OUTPATIENT
Start: 2020-12-07 | End: 2021-06-10 | Stop reason: SDUPTHER

## 2021-01-07 DIAGNOSIS — I63.9 THALAMIC INFARCTION (HCC): ICD-10-CM

## 2021-01-07 DIAGNOSIS — R60.9 PERIPHERAL EDEMA: ICD-10-CM

## 2021-01-07 RX ORDER — ATORVASTATIN CALCIUM 40 MG/1
40 TABLET, FILM COATED ORAL EVERY EVENING
Qty: 30 TABLET | Refills: 5 | Status: SHIPPED | OUTPATIENT
Start: 2021-01-07 | End: 2021-08-09 | Stop reason: SDUPTHER

## 2021-01-07 RX ORDER — HYDROCHLOROTHIAZIDE 25 MG/1
25 TABLET ORAL DAILY
Qty: 30 TABLET | Refills: 5 | Status: SHIPPED | OUTPATIENT
Start: 2021-01-07 | End: 2021-08-09 | Stop reason: SDUPTHER

## 2021-01-07 RX ORDER — ASPIRIN 81 MG/1
81 TABLET, CHEWABLE ORAL DAILY
Qty: 30 TABLET | Refills: 5 | Status: SHIPPED | OUTPATIENT
Start: 2021-01-07 | End: 2021-08-09 | Stop reason: SDUPTHER

## 2021-02-08 DIAGNOSIS — M15.9 PRIMARY OSTEOARTHRITIS INVOLVING MULTIPLE JOINTS: ICD-10-CM

## 2021-02-08 RX ORDER — ACETAMINOPHEN 325 MG/1
650 TABLET ORAL EVERY 6 HOURS PRN
Qty: 30 TABLET | Refills: 5 | Status: SHIPPED | OUTPATIENT
Start: 2021-02-08 | End: 2021-08-02 | Stop reason: SDUPTHER

## 2021-03-12 DIAGNOSIS — K59.00 CONSTIPATION, UNSPECIFIED CONSTIPATION TYPE: ICD-10-CM

## 2021-03-12 RX ORDER — DOCUSATE SODIUM 100 MG/1
100 CAPSULE, LIQUID FILLED ORAL 2 TIMES DAILY
Qty: 60 CAPSULE | Refills: 5 | Status: SHIPPED | OUTPATIENT
Start: 2021-03-12 | End: 2021-08-02 | Stop reason: SDUPTHER

## 2021-04-19 ENCOUNTER — OFFICE VISIT (OUTPATIENT)
Dept: INTERNAL MEDICINE CLINIC | Facility: CLINIC | Age: 86
End: 2021-04-19
Payer: MEDICARE

## 2021-04-19 VITALS
DIASTOLIC BLOOD PRESSURE: 60 MMHG | OXYGEN SATURATION: 96 % | WEIGHT: 131 LBS | HEIGHT: 62 IN | HEART RATE: 76 BPM | BODY MASS INDEX: 24.11 KG/M2 | RESPIRATION RATE: 18 BRPM | TEMPERATURE: 97.9 F | SYSTOLIC BLOOD PRESSURE: 100 MMHG

## 2021-04-19 DIAGNOSIS — M15.9 PRIMARY OSTEOARTHRITIS INVOLVING MULTIPLE JOINTS: ICD-10-CM

## 2021-04-19 DIAGNOSIS — Z86.73 HISTORY OF CVA (CEREBROVASCULAR ACCIDENT): ICD-10-CM

## 2021-04-19 DIAGNOSIS — G31.84 MILD NEUROCOGNITIVE DISORDER: ICD-10-CM

## 2021-04-19 DIAGNOSIS — F32.1 CURRENT MODERATE EPISODE OF MAJOR DEPRESSIVE DISORDER WITHOUT PRIOR EPISODE (HCC): ICD-10-CM

## 2021-04-19 DIAGNOSIS — Z00.00 MEDICARE ANNUAL WELLNESS VISIT, SUBSEQUENT: ICD-10-CM

## 2021-04-19 DIAGNOSIS — Z13.31 NEGATIVE DEPRESSION SCREENING: ICD-10-CM

## 2021-04-19 DIAGNOSIS — I10 ESSENTIAL HYPERTENSION: Primary | ICD-10-CM

## 2021-04-19 PROCEDURE — G0439 PPPS, SUBSEQ VISIT: HCPCS | Performed by: INTERNAL MEDICINE

## 2021-04-19 PROCEDURE — 1123F ACP DISCUSS/DSCN MKR DOCD: CPT | Performed by: INTERNAL MEDICINE

## 2021-04-19 PROCEDURE — 99214 OFFICE O/P EST MOD 30 MIN: CPT | Performed by: INTERNAL MEDICINE

## 2021-04-19 NOTE — PATIENT INSTRUCTIONS
Chronic Hypertension   AMBULATORY CARE:   Hypertension  is high blood pressure  Your blood pressure is the force of your blood moving against the walls of your arteries  Hypertension causes your blood pressure to get so high that your heart has to work much harder than normal  This can damage your heart  Even if you have hypertension for years, lifestyle changes, medicines, or both can help bring your blood pressure to normal   Call 911 for any of the following:   · You have chest pain  · You have any of the following signs of a heart attack:      ? Squeezing, pressure, or pain in your chest    ? You may  also have any of the following:     § Discomfort or pain in your back, neck, jaw, stomach, or arm    § Shortness of breath    § Nausea or vomiting    § Lightheadedness or a sudden cold sweat    · You become confused or have difficulty speaking  · You suddenly feel lightheaded or have trouble breathing  Seek care immediately if:   · You have a severe headache or vision loss  · You have weakness in an arm or leg  Contact your healthcare provider if:   · You feel faint, dizzy, confused, or drowsy  · You have been taking your blood pressure medicine but your pressure is higher than your provider says it should be  · You have questions or concerns about your condition or care  Treatment for chronic hypertension  may include medicine to lower your blood pressure and cholesterol levels  A low cholesterol level helps prevent heart disease and makes it easier to control your blood pressure  Heart disease can make your blood pressure harder to control  You may also need to make lifestyle changes  What you need to know about the stages of hypertension:       · Normal blood pressure is 119/79 or lower   Your healthcare provider may only check your blood pressure each year if it stays at a normal level  · Elevated blood pressure is 120/79 to 129/79   This is sometimes called prehypertension   Your healthcare provider may suggest lifestyle changes to help lower your blood pressure to a normal level  He or she may then check it again in 3 to 6 months  · Stage 1 hypertension is 130/80  to 139/89   Your provider may recommend lifestyle changes, medication, and checks every 3 to 6 months until your blood pressure is controlled  · Stage 2 hypertension is 140/90 or higher   Your provider will recommend lifestyle changes and have you take 2 kinds of hypertension medicines  You will also need to have your blood pressure checked monthly until it is controlled  Manage chronic hypertension:   · Check your blood pressure at home  Avoid smoking, caffeine, and exercise at least 30 minutes before checking your blood pressure  Sit and rest for 5 minutes before you take your blood pressure  Extend your arm and support it on a flat surface  Your arm should be at the same level as your heart  Follow the directions that came with your blood pressure monitor  Check your blood pressure 2 times, 1 minute apart, before you take your medicine in the morning  Also check your blood pressure before your evening meal  Keep a record of your readings and bring it to your follow-up visits  Ask your healthcare provider what your blood pressure should be  · Manage any other health conditions you have  Health conditions such as diabetes can increase your risk for hypertension  Follow your healthcare provider's instructions and take all your medicines as directed  Talk to your healthcare provider about any new health conditions you have recently developed  · Ask about all medicines  Certain medicines can increase your blood pressure  Examples include oral birth control pills, decongestants, herbal supplements, and NSAIDs, such as ibuprofen  Your healthcare provider can tell you which medicines are safe for you to take  This includes prescription and over-the-counter medicines      Lifestyle changes you can make to lower your blood pressure: Your provider may want you to make more lifestyle changes if you are having trouble controlling your blood pressure  This may feel difficult over time, especially if you think you are making good changes but your pressure is still high  It might help to focus on one new change at a time  For example, try to add 1 more day of exercise, or exercise for an extra 10 minutes on 2 days  Small changes can make a big difference  Your healthcare provider can also refer you to specialists such as a dietitian who can help you make small changes  · Limit sodium (salt) as directed  Too much sodium can affect your fluid balance  Check labels to find low-sodium or no-salt-added foods  Some low-sodium foods use potassium salts for flavor  Too much potassium can also cause health problems  Your healthcare provider will tell you how much sodium and potassium are safe for you to have in a day  He or she may recommend that you limit sodium to 2,300 mg a day  · Follow the meal plan recommended by your healthcare provider  A dietitian or your provider can give you more information on low-sodium plans or the DASH (Dietary Approaches to Stop Hypertension) eating plan  The DASH plan is low in sodium, unhealthy fats, and total fat  It is high in potassium, calcium, and fiber  · Exercise to maintain a healthy weight  Exercise at least 30 minutes per day, on most days of the week  This will help decrease your blood pressure  Ask your healthcare provider about the best exercise plan for you  · Decrease stress  This may help lower your blood pressure  Learn ways to relax, such as deep breathing or listening to music  · Limit alcohol as directed  Alcohol can increase your blood pressure  A drink of alcohol is 12 ounces of beer, 5 ounces of wine, or 1½ ounces of liquor  · Do not smoke    Nicotine and other chemicals in cigarettes and cigars can increase your blood pressure and also cause lung damage  Ask your healthcare provider for information if you currently smoke and need help to quit  E-cigarettes or smokeless tobacco still contain nicotine  Talk to your healthcare provider before you use these products  Follow up with your healthcare provider as directed: You will need to return to have your blood pressure checked and to have other lab tests done  Write down your questions so you remember to ask them during your visits  © Copyright [address] Information is for End User's use only and may not be sold, redistributed or otherwise used for commercial purposes  All illustrations and images included in CareNotes® are the copyrighted property of [employer]  or [employer]   The above information is an  only  It is not intended as medical advice for individual conditions or treatments  Talk to your doctor, nurse or pharmacist before following any medical regimen to see if it is safe and effective for you  Medicare Preventive Visit Patient Instructions  Thank you for completing your Welcome to Medicare Visit or Medicare Annual Wellness Visit today  Your next wellness visit will be due in one year (4/20/2022)  The screening/preventive services that you may require over the next 5-10 years are detailed below  Some tests may not apply to you based off risk factors and/or age  Screening tests ordered at today's visit but not completed yet may show as past due  Also, please note that scanned in results may not display below    Preventive Screenings:  Service Recommendations Previous Testing/Comments   Colorectal Cancer Screening  * Colonoscopy    * Fecal Occult Blood Test (FOBT)/Fecal Immunochemical Test (FIT)  * Fecal DNA/Cologuard Test  * Flexible Sigmoidoscopy Age: 54-65 years old   Colonoscopy: every 10 years (may be performed more frequently if at higher risk)  OR  FOBT/FIT: every 1 year  OR  Cologuard: every 3 years  OR  Sigmoidoscopy: every 5 years  Screening may be recommended earlier than age 48 if at higher risk for colorectal cancer  Also, an individualized decision between you and your healthcare provider will decide whether screening between the ages of 74-80 would be appropriate  Colonoscopy: Not on file  FOBT/FIT: Not on file  Cologuard: Not on file  Sigmoidoscopy: Not on file          Breast Cancer Screening Age: 36 years old  Frequency: every 1-2 years  Not required if history of left and right mastectomy Mammogram: Not on file        Cervical Cancer Screening Between the ages of 21-29, pap smear recommended once every 3 years  Between the ages of 33-67, can perform pap smear with HPV co-testing every 5 years  Recommendations may differ for women with a history of total hysterectomy, cervical cancer, or abnormal pap smears in past  Pap Smear: Not on file        Hepatitis C Screening Once for adults born between 1945 and 1965  More frequently in patients at high risk for Hepatitis C Hep C Antibody: Not on file        Diabetes Screening 1-2 times per year if you're at risk for diabetes or have pre-diabetes Fasting glucose: 99 mg/dL   A1C: 5 6 %        Cholesterol Screening Once every 5 years if you don't have a lipid disorder  May order more often based on risk factors  Lipid panel: 08/28/2020          Other Preventive Screenings Covered by Medicare:  1  Abdominal Aortic Aneurysm (AAA) Screening: covered once if your at risk  You're considered to be at risk if you have a family history of AAA  2  Lung Cancer Screening: covers low dose CT scan once per year if you meet all of the following conditions: (1) Age 50-69; (2) No signs or symptoms of lung cancer; (3) Current smoker or have quit smoking within the last 15 years; (4) You have a tobacco smoking history of at least 30 pack years (packs per day multiplied by number of years you smoked); (5) You get a written order from a healthcare provider    3  Glaucoma Screening: covered annually if you're considered high risk: (1) You have diabetes OR (2) Family history of glaucoma OR (3)  aged 48 and older OR (4)  American aged 72 and older  3  Osteoporosis Screening: covered every 2 years if you meet one of the following conditions: (1) You're estrogen deficient and at risk for osteoporosis based off medical history and other findings; (2) Have a vertebral abnormality; (3) On glucocorticoid therapy for more than 3 months; (4) Have primary hyperparathyroidism; (5) On osteoporosis medications and need to assess response to drug therapy  · Last bone density test (DXA Scan): Not on file  5  HIV Screening: covered annually if you're between the age of 12-76  Also covered annually if you are younger than 13 and older than 72 with risk factors for HIV infection  For pregnant patients, it is covered up to 3 times per pregnancy  Immunizations:  Immunization Recommendations   Influenza Vaccine Annual influenza vaccination during flu season is recommended for all persons aged >= 6 months who do not have contraindications   Pneumococcal Vaccine (Prevnar and Pneumovax)  * Prevnar = PCV13  * Pneumovax = PPSV23   Adults 25-60 years old: 1-3 doses may be recommended based on certain risk factors  Adults 72 years old: Prevnar (PCV13) vaccine recommended followed by Pneumovax (PPSV23) vaccine  If already received PPSV23 since turning 65, then PCV13 recommended at least one year after PPSV23 dose  Hepatitis B Vaccine 3 dose series if at intermediate or high risk (ex: diabetes, end stage renal disease, liver disease)   Tetanus (Td) Vaccine - COST NOT COVERED BY MEDICARE PART B Following completion of primary series, a booster dose should be given every 10 years to maintain immunity against tetanus  Td may also be given as tetanus wound prophylaxis  Tdap Vaccine - COST NOT COVERED BY MEDICARE PART B Recommended at least once for all adults  For pregnant patients, recommended with each pregnancy     Shingles Vaccine (Shingrix) - COST NOT COVERED BY MEDICARE PART B  2 shot series recommended in those aged 48 and above     Health Maintenance Due:  There are no preventive care reminders to display for this patient  Immunizations Due:      Topic Date Due    COVID-19 Vaccine (1) Never done    DTaP,Tdap,and Td Vaccines (1 - Tdap) 01/28/1951    Influenza Vaccine (1) 09/01/2020     Advance Directives   What are advance directives? Advance directives are legal documents that state your wishes and plans for medical care  These plans are made ahead of time in case you lose your ability to make decisions for yourself  Advance directives can apply to any medical decision, such as the treatments you want, and if you want to donate organs  What are the types of advance directives? There are many types of advance directives, and each state has rules about how to use them  You may choose a combination of any of the following:  · Living will: This is a written record of the treatment you want  You can also choose which treatments you do not want, which to limit, and which to stop at a certain time  This includes surgery, medicine, IV fluid, and tube feedings  · Durable power of  for healthcare [place] [facility]): This is a written record that states who you want to make healthcare choices for you when you are unable to make them for yourself  This person, called a proxy, is usually a family member or a friend  You may choose more than 1 proxy  · Do not resuscitate (DNR) order:  A DNR order is used in case your heart stops beating or you stop breathing  It is a request not to have certain forms of treatment, such as CPR  A DNR order may be included in other types of advance directives  · Medical directive: This covers the care that you want if you are in a coma, near death, or unable to make decisions for yourself  You can list the treatments you want for each condition   Treatment may include pain medicine, surgery, blood transfusions, dialysis, IV or tube feedings, and a ventilator (breathing machine)  · Values history: This document has questions about your views, beliefs, and how you feel and think about life  This information can help others choose the care that you would choose  Why are advance directives important? An advance directive helps you control your care  Although spoken wishes may be used, it is better to have your wishes written down  Spoken wishes can be misunderstood, or not followed  Treatments may be given even if you do not want them  An advance directive may make it easier for your family to make difficult choices about your care  Urinary Incontinence   Urinary incontinence (UI)  is when you lose control of your bladder  UI develops because your bladder cannot store or empty urine properly  The 3 most common types of UI are stress incontinence, urge incontinence, or both  Medicines:   · May be given to help strengthen your bladder control  Report any side effects of medication to your healthcare provider  Do pelvic muscle exercises often:  Your pelvic muscles help you stop urinating  Squeeze these muscles tight for 5 seconds, then relax for 5 seconds  Gradually work up to squeezing for 10 seconds  Do 3 sets of 15 repetitions a day, or as directed  This will help strengthen your pelvic muscles and improve bladder control  Train your bladder:  Go to the bathroom at set times, such as every 2 hours, even if you do not feel the urge to go  You can also try to hold your urine when you feel the urge to go  For example, hold your urine for 5 minutes when you feel the urge to go  As that becomes easier, hold your urine for 10 minutes  Self-care:   · Keep a UI record  Write down how often you leak urine and how much you leak  Make a note of what you were doing when you leaked urine  · Drink liquids as directed  You may need to limit the amount of liquid you drink to help control your urine leakage   Do not drink any liquid right before you go to bed  Limit or do not have drinks that contain caffeine or alcohol  · Prevent constipation  Eat a variety of high-fiber foods  Good examples are high-fiber cereals, beans, vegetables, and whole-grain breads  Walking is the best way to trigger your intestines to have a bowel movement  · Exercise regularly and maintain a healthy weight  Weight loss and exercise will decrease pressure on your bladder and help you control your leakage  · Use a catheter as directed  to help empty your bladder  A catheter is a tiny, plastic tube that is put into your bladder to drain your urine  · Go to behavior therapy as directed  Behavior therapy may be used to help you learn to control your urge to urinate  © Copyright [employer] 2018 Information is for End User's use only and may not be sold, redistributed or otherwise used for commercial purposes  All illustrations and images included in CareNotes® are the copyrighted property of [employer]  or [facility] Preventive Visit Patient Instructions  Thank you for completing your Welcome to Medicare Visit or Medicare Annual Wellness Visit today  Your next wellness visit will be due in one year (4/20/2022)  The screening/preventive services that you may require over the next 5-10 years are detailed below  Some tests may not apply to you based off risk factors and/or age  Screening tests ordered at today's visit but not completed yet may show as past due  Also, please note that scanned in results may not display below    Preventive Screenings:  Service Recommendations Previous Testing/Comments   Colorectal Cancer Screening  * Colonoscopy    * Fecal Occult Blood Test (FOBT)/Fecal Immunochemical Test (FIT)  * Fecal DNA/Cologuard Test  * Flexible Sigmoidoscopy Age: 54-65 years old   Colonoscopy: every 10 years (may be performed more frequently if at higher risk)  OR  FOBT/FIT: every 1 year  OR  Cologuard: every 3 years  OR  Sigmoidoscopy: every 5 years  Screening may be recommended earlier than age 48 if at higher risk for colorectal cancer  Also, an individualized decision between you and your healthcare provider will decide whether screening between the ages of 74-80 would be appropriate  Colonoscopy: Not on file  FOBT/FIT: Not on file  Cologuard: Not on file  Sigmoidoscopy: Not on file    Screening Not Indicated     Breast Cancer Screening Age: 36 years old  Frequency: every 1-2 years  Not required if history of left and right mastectomy Mammogram: Not on file        Cervical Cancer Screening Between the ages of 21-29, pap smear recommended once every 3 years  Between the ages of 33-67, can perform pap smear with HPV co-testing every 5 years  Recommendations may differ for women with a history of total hysterectomy, cervical cancer, or abnormal pap smears in past  Pap Smear: Not on file    Screening Not Indicated   Hepatitis C Screening Once for adults born between 1945 and 1965  More frequently in patients at high risk for Hepatitis C Hep C Antibody: Not on file        Diabetes Screening 1-2 times per year if you're at risk for diabetes or have pre-diabetes Fasting glucose: 99 mg/dL   A1C: 5 6 %    Screening Current   Cholesterol Screening Once every 5 years if you don't have a lipid disorder  May order more often based on risk factors  Lipid panel: 08/28/2020    Screening Current     Other Preventive Screenings Covered by Medicare:  6  Abdominal Aortic Aneurysm (AAA) Screening: covered once if your at risk  You're considered to be at risk if you have a family history of AAA    7  Lung Cancer Screening: covers low dose CT scan once per year if you meet all of the following conditions: (1) Age 50-69; (2) No signs or symptoms of lung cancer; (3) Current smoker or have quit smoking within the last 15 years; (4) You have a tobacco smoking history of at least 30 pack years (packs per day multiplied by number of years you smoked); (5) You get a written order from a healthcare provider  8  Glaucoma Screening: covered annually if you're considered high risk: (1) You have diabetes OR (2) Family history of glaucoma OR (3)  aged 48 and older OR (3)  American aged 72 and older  5  Osteoporosis Screening: covered every 2 years if you meet one of the following conditions: (1) You're estrogen deficient and at risk for osteoporosis based off medical history and other findings; (2) Have a vertebral abnormality; (3) On glucocorticoid therapy for more than 3 months; (4) Have primary hyperparathyroidism; (5) On osteoporosis medications and need to assess response to drug therapy  · Last bone density test (DXA Scan): Not on file  10  HIV Screening: covered annually if you're between the age of 12-76  Also covered annually if you are younger than 13 and older than 72 with risk factors for HIV infection  For pregnant patients, it is covered up to 3 times per pregnancy  Immunizations:  Immunization Recommendations   Influenza Vaccine Annual influenza vaccination during flu season is recommended for all persons aged >= 6 months who do not have contraindications   Pneumococcal Vaccine (Prevnar and Pneumovax)  * Prevnar = PCV13  * Pneumovax = PPSV23   Adults 25-60 years old: 1-3 doses may be recommended based on certain risk factors  Adults 72 years old: Prevnar (PCV13) vaccine recommended followed by Pneumovax (PPSV23) vaccine  If already received PPSV23 since turning 65, then PCV13 recommended at least one year after PPSV23 dose  Hepatitis B Vaccine 3 dose series if at intermediate or high risk (ex: diabetes, end stage renal disease, liver disease)   Tetanus (Td) Vaccine - COST NOT COVERED BY MEDICARE PART B Following completion of primary series, a booster dose should be given every 10 years to maintain immunity against tetanus  Td may also be given as tetanus wound prophylaxis     Tdap Vaccine - COST NOT COVERED BY MEDICARE PART B Recommended at least once for all adults  For pregnant patients, recommended with each pregnancy  Shingles Vaccine (Shingrix) - COST NOT COVERED BY MEDICARE PART B  2 shot series recommended in those aged 48 and above     Health Maintenance Due:  There are no preventive care reminders to display for this patient  Immunizations Due:      Topic Date Due    COVID-19 Vaccine (1) Never done    DTaP,Tdap,and Td Vaccines (1 - Tdap) 01/28/1951    Influenza Vaccine (1) 09/01/2020     Advance Directives   What are advance directives? Advance directives are legal documents that state your wishes and plans for medical care  These plans are made ahead of time in case you lose your ability to make decisions for yourself  Advance directives can apply to any medical decision, such as the treatments you want, and if you want to donate organs  What are the types of advance directives? There are many types of advance directives, and each state has rules about how to use them  You may choose a combination of any of the following:  · Living will: This is a written record of the treatment you want  You can also choose which treatments you do not want, which to limit, and which to stop at a certain time  This includes surgery, medicine, IV fluid, and tube feedings  · Durable power of  for healthcare [place] [facility]): This is a written record that states who you want to make healthcare choices for you when you are unable to make them for yourself  This person, called a proxy, is usually a family member or a friend  You may choose more than 1 proxy  · Do not resuscitate (DNR) order:  A DNR order is used in case your heart stops beating or you stop breathing  It is a request not to have certain forms of treatment, such as CPR  A DNR order may be included in other types of advance directives  · Medical directive:   This covers the care that you want if you are in a coma, near death, or unable to make decisions for yourself  You can list the treatments you want for each condition  Treatment may include pain medicine, surgery, blood transfusions, dialysis, IV or tube feedings, and a ventilator (breathing machine)  · Values history: This document has questions about your views, beliefs, and how you feel and think about life  This information can help others choose the care that you would choose  Why are advance directives important? An advance directive helps you control your care  Although spoken wishes may be used, it is better to have your wishes written down  Spoken wishes can be misunderstood, or not followed  Treatments may be given even if you do not want them  An advance directive may make it easier for your family to make difficult choices about your care  © Copyright [employer] 2018 Information is for End User's use only and may not be sold, redistributed or otherwise used for commercial purposes  All illustrations and images included in CareNotes® are the copyrighted property of [employer]  or [facility] Preventive Visit Patient Instructions  Thank you for completing your Welcome to Medicare Visit or Medicare Annual Wellness Visit today  Your next wellness visit will be due in one year (4/20/2022)  The screening/preventive services that you may require over the next 5-10 years are detailed below  Some tests may not apply to you based off risk factors and/or age  Screening tests ordered at today's visit but not completed yet may show as past due  Also, please note that scanned in results may not display below    Preventive Screenings:  Service Recommendations Previous Testing/Comments   Colorectal Cancer Screening  * Colonoscopy    * Fecal Occult Blood Test (FOBT)/Fecal Immunochemical Test (FIT)  * Fecal DNA/Cologuard Test  * Flexible Sigmoidoscopy Age: 54-65 years old   Colonoscopy: every 10 years (may be performed more frequently if at higher risk)  OR  FOBT/FIT: every 1 year OR  Cologuard: every 3 years  OR  Sigmoidoscopy: every 5 years  Screening may be recommended earlier than age 48 if at higher risk for colorectal cancer  Also, an individualized decision between you and your healthcare provider will decide whether screening between the ages of 74-80 would be appropriate  Colonoscopy: Not on file  FOBT/FIT: Not on file  Cologuard: Not on file  Sigmoidoscopy: Not on file    Screening Not Indicated     Breast Cancer Screening Age: 36 years old  Frequency: every 1-2 years  Not required if history of left and right mastectomy Mammogram: Not on file        Cervical Cancer Screening Between the ages of 21-29, pap smear recommended once every 3 years  Between the ages of 33-67, can perform pap smear with HPV co-testing every 5 years  Recommendations may differ for women with a history of total hysterectomy, cervical cancer, or abnormal pap smears in past  Pap Smear: Not on file    Screening Not Indicated   Hepatitis C Screening Once for adults born between 1945 and 1965  More frequently in patients at high risk for Hepatitis C Hep C Antibody: Not on file        Diabetes Screening 1-2 times per year if you're at risk for diabetes or have pre-diabetes Fasting glucose: 99 mg/dL   A1C: 5 6 %    Screening Current   Cholesterol Screening Once every 5 years if you don't have a lipid disorder  May order more often based on risk factors  Lipid panel: 08/28/2020    Screening Current     Other Preventive Screenings Covered by Medicare:  11  Abdominal Aortic Aneurysm (AAA) Screening: covered once if your at risk  You're considered to be at risk if you have a family history of AAA    12  Lung Cancer Screening: covers low dose CT scan once per year if you meet all of the following conditions: (1) Age 50-69; (2) No signs or symptoms of lung cancer; (3) Current smoker or have quit smoking within the last 15 years; (4) You have a tobacco smoking history of at least 30 pack years (packs per day multiplied by number of years you smoked); (5) You get a written order from a healthcare provider  15  Glaucoma Screening: covered annually if you're considered high risk: (1) You have diabetes OR (2) Family history of glaucoma OR (3)  aged 48 and older OR (3)  American aged 72 and older  15  Osteoporosis Screening: covered every 2 years if you meet one of the following conditions: (1) You're estrogen deficient and at risk for osteoporosis based off medical history and other findings; (2) Have a vertebral abnormality; (3) On glucocorticoid therapy for more than 3 months; (4) Have primary hyperparathyroidism; (5) On osteoporosis medications and need to assess response to drug therapy  · Last bone density test (DXA Scan): Not on file  15  HIV Screening: covered annually if you're between the age of 12-76  Also covered annually if you are younger than 13 and older than 72 with risk factors for HIV infection  For pregnant patients, it is covered up to 3 times per pregnancy  Immunizations:  Immunization Recommendations   Influenza Vaccine Annual influenza vaccination during flu season is recommended for all persons aged >= 6 months who do not have contraindications   Pneumococcal Vaccine (Prevnar and Pneumovax)  * Prevnar = PCV13  * Pneumovax = PPSV23   Adults 25-60 years old: 1-3 doses may be recommended based on certain risk factors  Adults 72 years old: Prevnar (PCV13) vaccine recommended followed by Pneumovax (PPSV23) vaccine  If already received PPSV23 since turning 65, then PCV13 recommended at least one year after PPSV23 dose  Hepatitis B Vaccine 3 dose series if at intermediate or high risk (ex: diabetes, end stage renal disease, liver disease)   Tetanus (Td) Vaccine - COST NOT COVERED BY MEDICARE PART B Following completion of primary series, a booster dose should be given every 10 years to maintain immunity against tetanus  Td may also be given as tetanus wound prophylaxis  Tdap Vaccine - COST NOT COVERED BY MEDICARE PART B Recommended at least once for all adults  For pregnant patients, recommended with each pregnancy  Shingles Vaccine (Shingrix) - COST NOT COVERED BY MEDICARE PART B  2 shot series recommended in those aged 48 and above     Health Maintenance Due:  There are no preventive care reminders to display for this patient  Immunizations Due:      Topic Date Due    COVID-19 Vaccine (1) Never done    DTaP,Tdap,and Td Vaccines (1 - Tdap) 01/28/1951    Influenza Vaccine (1) 09/01/2020     Advance Directives   What are advance directives? Advance directives are legal documents that state your wishes and plans for medical care  These plans are made ahead of time in case you lose your ability to make decisions for yourself  Advance directives can apply to any medical decision, such as the treatments you want, and if you want to donate organs  What are the types of advance directives? There are many types of advance directives, and each state has rules about how to use them  You may choose a combination of any of the following:  · Living will: This is a written record of the treatment you want  You can also choose which treatments you do not want, which to limit, and which to stop at a certain time  This includes surgery, medicine, IV fluid, and tube feedings  · Durable power of  for healthcare [facility]): This is a written record that states who you want to make healthcare choices for you when you are unable to make them for yourself  This person, called a proxy, is usually a family member or a friend  You may choose more than 1 proxy  · Do not resuscitate (DNR) order:  A DNR order is used in case your heart stops beating or you stop breathing  It is a request not to have certain forms of treatment, such as CPR  A DNR order may be included in other types of advance directives  · Medical directive:   This covers the care that you want if you are in a coma, near death, or unable to make decisions for yourself  You can list the treatments you want for each condition  Treatment may include pain medicine, surgery, blood transfusions, dialysis, IV or tube feedings, and a ventilator (breathing machine)  · Values history: This document has questions about your views, beliefs, and how you feel and think about life  This information can help others choose the care that you would choose  Why are advance directives important? An advance directive helps you control your care  Although spoken wishes may be used, it is better to have your wishes written down  Spoken wishes can be misunderstood, or not followed  Treatments may be given even if you do not want them  An advance directive may make it easier for your family to make difficult choices about your care  © Copyright [employer] 2018 Information is for End User's use only and may not be sold, redistributed or otherwise used for commercial purposes   All illustrations and images included in CareNotes® are the copyrighted property of [employer]  or [address] [place] [address]

## 2021-04-19 NOTE — PROGRESS NOTES
Assessment/Plan:  Problem List Items Addressed This Visit        Cardiovascular and Mediastinum    Essential hypertension - Primary    Relevant Orders    Comprehensive metabolic panel    LDL cholesterol, direct    Triglycerides    Microalbumin / creatinine urine ratio       Musculoskeletal and Integument    Osteoarthritis       Other    Mild neurocognitive disorder    History of CVA (cerebrovascular accident)    Relevant Orders    LDL cholesterol, direct    Triglycerides    Current moderate episode of major depressive disorder without prior episode ([facility] [address] )      Other Visit Diagnoses     Medicare annual wellness visit, subsequent        Negative depression screening               Diagnoses and all orders for this visit:    Essential hypertension  -     Comprehensive metabolic panel; Future  -     LDL cholesterol, direct; Future  -     Triglycerides; Future  -     Microalbumin / creatinine urine ratio    Primary osteoarthritis involving multiple joints    History of CVA (cerebrovascular accident)  -     LDL cholesterol, direct; Future  -     Triglycerides; Future    Mild neurocognitive disorder    Current moderate episode of major depressive disorder without prior episode ([facility] )    Medicare annual wellness visit, subsequent    Negative depression screening        No problem-specific Assessment & Plan notes found for this encounter  A/P: Doing well and will check labs  Continue current treatment and RTC four months for routine  Subjective:      Patient ID: Coy Streeter is a 80 y o  female  WF RTC for f/u htn, DJD, etc  Doing well and no new issues  Remains active w/o difficulty and no falls  MDD/AMANDA is controlled  CHronic pain is manageable  No stroke-like events  Due for labs         The following portions of the patient's history were reviewed and updated as appropriate:   She has a past medical history of Arthritis, Cataract, Depression, Dislocation of left shoulder joint, Essential hypertension (4/3/2019), Gait disorder, Herpes zoster, HL (hearing loss), Impacted cerumen of both ears, Impacted cerumen of right ear, Lightheadedness, Multiple falls, Near syncope, and Postherpetic polyneuropathy  ,  does not have any pertinent problems on file  ,   has a past surgical history that includes Hernia repair; Tubal ligation; Cataract extraction, bilateral; and Tonsillectomy  ,  family history includes Cirrhosis in her father; Heart disease in her son; Mental illness in her son ,   reports that she has never smoked  She has never used smokeless tobacco  She reports that she does not drink alcohol or use drugs  ,  has No Known Allergies     Current Outpatient Medications   Medication Sig Dispense Refill    acetaminophen (TYLENOL) 325 mg tablet Take 2 tablets (650 mg total) by mouth every 6 (six) hours as needed for mild pain 30 tablet 5    amLODIPine (NORVASC) 5 mg tablet Take 1 tablet (5 mg total) by mouth daily 30 tablet 5    aspirin 81 mg chewable tablet Chew 1 tablet (81 mg total) daily 30 tablet 5    atorvastatin (LIPITOR) 40 mg tablet Take 1 tablet (40 mg total) by mouth every evening 30 tablet 5    docusate sodium (COLACE) 100 mg capsule Take 1 capsule (100 mg total) by mouth 2 (two) times a day 60 capsule 5    hydrochlorothiazide (HYDRODIURIL) 25 mg tablet Take 1 tablet (25 mg total) by mouth daily 30 tablet 5    sertraline (ZOLOFT) 50 mg tablet Take 1 tablet (50 mg total) by mouth daily at bedtime 30 tablet 5     No current facility-administered medications for this visit  Review of Systems   Constitutional: Negative for activity change, chills, diaphoresis, fatigue and fever  HENT: Negative  Eyes: Negative for visual disturbance  Respiratory: Negative for cough, chest tightness, shortness of breath and wheezing  Cardiovascular: Negative for chest pain, palpitations and leg swelling  Gastrointestinal: Negative for abdominal pain, constipation, diarrhea, nausea and vomiting     Endocrine: Negative for cold intolerance and heat intolerance  Genitourinary: Negative for difficulty urinating, dysuria and frequency  Musculoskeletal: Negative for arthralgias, gait problem and myalgias  Neurological: Negative for dizziness, seizures, syncope, weakness, light-headedness and headaches  Psychiatric/Behavioral: Negative for confusion, dysphoric mood, sleep disturbance and suicidal ideas  The patient is not nervous/anxious  PHQ-9 Depression Screening    PHQ-9:   Frequency of the following problems over the past two weeks:      Little interest or pleasure in doing things: 0 - not at all  Feeling down, depressed, or hopeless: 0 - not at all  Trouble falling or staying asleep, or sleeping too much: 1 - several days  Feeling tired or having little energy: 0 - not at all  Poor appetite or overeatin - not at all  Feeling bad about yourself - or that you are a failure or have let yourself or your family down: 0 - not at all  Trouble concentrating on things, such as reading the newspaper or watching television: 1 - several days  Moving or speaking so slowly that other people could have noticed  Or the opposite - being so fidgety or restless that you have been moving around a lot more than usual: 0 - not at all  Thoughts that you would be better off dead, or of hurting yourself in some way: 0 - not at all  PHQ-2 Score: 0  PHQ-9 Score: 2        Objective:  Vitals:    21 1234   BP: 100/60   Pulse: 76   Resp: 18   Temp: 97 9 °F (36 6 °C)   TempSrc: Tympanic   SpO2: 96%   Weight: 59 4 kg (131 lb)   Height: 5' 2" (1 575 m)     Body mass index is 23 96 kg/m²  Physical Exam  Vitals signs and nursing note reviewed  Constitutional:       General: She is not in acute distress  Appearance: Normal appearance  She is not ill-appearing  HENT:      Head: Normocephalic and atraumatic        Mouth/Throat:      Mouth: Mucous membranes are moist    Eyes:      Extraocular Movements: Extraocular movements intact  Conjunctiva/sclera: Conjunctivae normal       Pupils: Pupils are equal, round, and reactive to light  Neck:      Musculoskeletal: Neck supple  Vascular: No carotid bruit  Cardiovascular:      Rate and Rhythm: Normal rate and regular rhythm  Heart sounds: Normal heart sounds  Pulmonary:      Effort: Pulmonary effort is normal       Breath sounds: Normal breath sounds  Abdominal:      General: Bowel sounds are normal  There is no distension  Palpations: Abdomen is soft  Tenderness: There is no abdominal tenderness  Musculoskeletal:      Right lower leg: No edema  Left lower leg: No edema  Neurological:      General: No focal deficit present  Mental Status: She is alert and oriented to person, place, and time  Mental status is at baseline  Psychiatric:         Mood and Affect: Mood normal          Behavior: Behavior normal          Thought Content:  Thought content normal          Judgment: Judgment normal

## 2021-04-19 NOTE — PROGRESS NOTES
Assessment and Plan:     Problem List Items Addressed This Visit        Cardiovascular and Mediastinum    Essential hypertension - Primary    Relevant Orders    Comprehensive metabolic panel    LDL cholesterol, direct    Triglycerides    Microalbumin / creatinine urine ratio       Musculoskeletal and Integument    Osteoarthritis       Other    Mild neurocognitive disorder    History of CVA (cerebrovascular accident)    Relevant Orders    LDL cholesterol, direct    Triglycerides    Current moderate episode of major depressive disorder without prior episode ([facility] )      Other Visit Diagnoses     Medicare annual wellness visit, subsequent        Negative depression screening               Preventive health issues were discussed with patient, and age appropriate screening tests were ordered as noted in patient's After Visit Summary  Personalized health advice and appropriate referrals for health education or preventive services given if needed, as noted in patient's After Visit Summary       History of Present Illness:     Patient presents for Medicare Annual Wellness visit    Patient Care Team:  Harman Sanches DO as PCP - General (Internal Medicine)     Problem List:     Patient Active Problem List   Diagnosis    Hearing loss    Gait abnormality    Osteoarthritis    Mediastinal mass    Spinal stenosis at L4-L5 level    Essential hypertension    Bilateral lower extremity edema    Benign paroxysmal positional vertigo    Urinary incontinence    History of CVA (cerebrovascular accident)    Right ankle sprain    Venous insufficiency of both lower extremities    Unspecified mood (affective) disorder, r/o major depressive disorder (single episode)    Mild neurocognitive disorder    Current moderate episode of major depressive disorder without prior episode ([facility] )    History of suicidal ideation      Past Medical and Surgical History:     Past Medical History:   Diagnosis Date    Arthritis     Cataract     LAST ASSESSED: [id number]    Depression     Dislocation of left shoulder joint     LAST ASSESSSED: [id number]    Essential hypertension 4/3/2019    Gait disorder     LAST ASSESSED: [id number]    Herpes zoster     LAST ASSESSED: [id number]    HL (hearing loss)     Impacted cerumen of both ears     LAST ASSESSED: [id number]    Impacted cerumen of right ear     LAST ASSESSED: [id number]    Lightheadedness     MILD AND PT REFUSES EKG ETC  PT PROMISES TO CALL IF WORSENS  LAST ASSESSED: [id number]    Multiple falls     LAST ASSESSED: [id number]    Near syncope     LAST ASSESSED: [id number]    Postherpetic polyneuropathy     LAST ASSESSED: [id number]     Past Surgical History:   Procedure Laterality Date    CATARACT EXTRACTION, BILATERAL      HERNIA REPAIR      TONSILLECTOMY      TUBAL LIGATION        Family History:     Family History   Problem Relation Age of Onset    Cirrhosis Father     Heart disease Son     Mental illness Son       Social History:        Social History     Socioeconomic History    Marital status:       Spouse name: None    Number of children: None    Years of education: None    Highest education level: None   Occupational History    Occupation: retired   Social Needs    Financial resource strain: None    Food insecurity     Worry: None     Inability: None    Transportation needs     Medical: None     Non-medical: None   Tobacco Use    Smoking status: Never Smoker    Smokeless tobacco: Never Used   Substance and Sexual Activity    Alcohol use: Never     Frequency: Never    Drug use: No    Sexual activity: Not Currently   Lifestyle    Physical activity     Days per week: None     Minutes per session: None    Stress: None   Relationships    Social connections     Talks on phone: None     Gets together: None     Attends Latter day service: None     Active member of club or organization: None     Attends meetings of clubs or organizations: None     Relationship status: None    Intimate partner violence     Fear of current or ex partner: None     Emotionally abused: None     Physically abused: None     Forced sexual activity: None   Other Topics Concern    None   Social History Narrative    CAFFEINE USE    USES SAFETY EQUIPMENT      Medications and Allergies:     Current Outpatient Medications   Medication Sig Dispense Refill    acetaminophen (TYLENOL) 325 mg tablet Take 2 tablets (650 mg total) by mouth every 6 (six) hours as needed for mild pain 30 tablet 5    amLODIPine (NORVASC) 5 mg tablet Take 1 tablet (5 mg total) by mouth daily 30 tablet 5    aspirin 81 mg chewable tablet Chew 1 tablet (81 mg total) daily 30 tablet 5    atorvastatin (LIPITOR) 40 mg tablet Take 1 tablet (40 mg total) by mouth every evening 30 tablet 5    docusate sodium (COLACE) 100 mg capsule Take 1 capsule (100 mg total) by mouth 2 (two) times a day 60 capsule 5    hydrochlorothiazide (HYDRODIURIL) 25 mg tablet Take 1 tablet (25 mg total) by mouth daily 30 tablet 5    sertraline (ZOLOFT) 50 mg tablet Take 1 tablet (50 mg total) by mouth daily at bedtime 30 tablet 5     No current facility-administered medications for this visit  No Known Allergies   Immunizations:     Immunization History   Administered Date(s) Administered    INFLUENZA 10/18/2014, 09/01/2015, 09/15/2016, 10/01/2017, 11/13/2017, 10/06/2018, 10/03/2019    Influenza, high dose seasonal 0 7 mL 10/06/2018    Influenza, seasonal, injectable 10/01/2017    Influenza, seasonal, injectable, preservative free 09/01/2015    Pneumococcal Conjugate 13-Valent 06/26/2019    Pneumococcal Polysaccharide PPV23 11/17/2011    SARS-CoV-2 / COVID-19 mRNA IM (Moderna) 01/29/2021, 03/06/2021    TD (adult) Preservative Free 11/17/2011    Zoster 01/17/2014    influenza, trivalent, adjuvanted 10/03/2019      Health Maintenance: There are no preventive care reminders to display for this patient        Topic Date Due    COVID-19 Vaccine (1) Never done    DTaP,Tdap,and Td Vaccines (1 - Tdap) 01/28/1951    Influenza Vaccine (1) 09/01/2020      Medicare Health Risk Assessment:     /60   Pulse 76   Temp 97 9 °F (36 6 °C) (Tympanic)   Resp 18   Ht 5' 2" (1 575 m)   Wt 59 4 kg (131 lb)   SpO2 96%   BMI 23 96 kg/m²      Ada is here for her Subsequent Wellness visit  Last Medicare Wellness visit information reviewed, patient interviewed and updates made to the record today  Health Risk Assessment:   Patient rates overall health as good  Patient feels that their physical health rating is same  Patient is satisfied with their life  Eyesight was rated as same  Hearing was rated as same  Patient feels that their emotional and mental health rating is same  Patients states they are never, rarely angry  Patient states they are never, rarely unusually tired/fatigued  Pain experienced in the last 7 days has been none  Depression Screening:   PHQ-2 Score: 0  PHQ-9 Score: 2      Fall Risk Screening: In the past year, patient has experienced: no history of falling in past year      Urinary Incontinence Screening:   Patient has not leaked urine accidently in the last six months  Home Safety:  Patient has trouble with stairs inside or outside of their home  Patient has working smoke alarms and has working carbon monoxide detector  Home safety hazards include: none  Nutrition:   Current diet is Regular and Limited junk food  Medications:   Patient is currently taking over-the-counter supplements  OTC medications include: see medication list  Patient is not able to manage medications  Activities of Daily Living (ADLs)/Instrumental Activities of Daily Living (IADLs):   Walk and transfer into and out of bed and chair?: Yes  Dress and groom yourself?: Yes    Bathe or shower yourself?: Yes    Feed yourself?  Yes  Do your laundry/housekeeping?: No  Manage your money, pay your bills and track your expenses?: No  Make your own meals?: No    Do your own shopping?: No    Previous Hospitalizations:   Any hospitalizations or ED visits within the last 12 months?: No      Advance Care Planning:   Living will: No    Durable POA for healthcare: Yes    Advanced directive: No    Advanced directive counseling given: Yes    Five wishes given: Yes    Patient declined ACP directive: No    End of Life Decisions reviewed with patient: Yes    Provider agrees with end of life decisions: Yes      Comments: Will review further once pt reviews information given today  Cognitive Screening:   Provider or family/friend/caregiver concerned regarding cognition?: No    PREVENTIVE SCREENINGS      Cardiovascular Screening:    General: Screening Current    Due for: Lipid Panel      Diabetes Screening:     General: Screening Current    Due for: Blood Glucose      Colorectal Cancer Screening:     General: Screening Not Indicated      Cervical Cancer Screening:    General: Screening Not Indicated      Osteoporosis Screening:    General: Risks and Benefits Discussed and Patient Declines      Abdominal Aortic Aneurysm (AAA) Screening:        General: Screening Not Indicated      Lung Cancer Screening:     General: Screening Not Indicated      Hepatitis C Screening:    General: Screening Not Indicated    Screening, Brief Intervention, and Referral to Treatment (SBIRT)    Screening  Typical number of drinks in a day: 0  Typical number of drinks in a week: 0  Interpretation: Low risk drinking behavior  Single Item Drug Screening:  How often have you used an illegal drug (including marijuana) or a prescription medication for non-medical reasons in the past year? never    Single Item Drug Screen Score: 0  Interpretation: Negative screen for possible drug use disorder    Other Counseling Topics:   Car/seat belt/driving safety, sunscreen and calcium and vitamin D intake and regular weightbearing exercise  A/P: Doing well and no falls  Denies depression and feels safe at home   Diverse diet  Doesn't drive, but  uses seat belts  No living will or POA  Information give for pt to review  No DME or referrals needed today  RTC in one year for medicare wellness       Cristopher George, DO

## 2021-04-26 ENCOUNTER — APPOINTMENT (OUTPATIENT)
Dept: LAB | Facility: CLINIC | Age: 86
End: 2021-04-26
Payer: MEDICARE

## 2021-04-26 DIAGNOSIS — Z86.73 HISTORY OF CVA (CEREBROVASCULAR ACCIDENT): ICD-10-CM

## 2021-04-26 DIAGNOSIS — I10 ESSENTIAL HYPERTENSION: ICD-10-CM

## 2021-04-26 LAB
ALBUMIN SERPL BCP-MCNC: 3.9 G/DL (ref 3.5–5)
ALP SERPL-CCNC: 99 U/L (ref 46–116)
ALT SERPL W P-5'-P-CCNC: 27 U/L (ref 12–78)
ANION GAP SERPL CALCULATED.3IONS-SCNC: 9 MMOL/L (ref 4–13)
AST SERPL W P-5'-P-CCNC: 23 U/L (ref 5–45)
BILIRUB SERPL-MCNC: 1.05 MG/DL (ref 0.2–1)
BUN SERPL-MCNC: 9 MG/DL (ref 5–25)
CALCIUM SERPL-MCNC: 9.5 MG/DL (ref 8.3–10.1)
CHLORIDE SERPL-SCNC: 96 MMOL/L (ref 100–108)
CO2 SERPL-SCNC: 27 MMOL/L (ref 21–32)
CREAT SERPL-MCNC: 0.83 MG/DL (ref 0.6–1.3)
CREAT UR-MCNC: 57.9 MG/DL
GFR SERPL CREATININE-BSD FRML MDRD: 62 ML/MIN/1.73SQ M
GLUCOSE P FAST SERPL-MCNC: 95 MG/DL (ref 65–99)
LDLC SERPL DIRECT ASSAY-MCNC: 48 MG/DL (ref 0–100)
MICROALBUMIN UR-MCNC: <5 MG/L (ref 0–20)
MICROALBUMIN/CREAT 24H UR: <9 MG/G CREATININE (ref 0–30)
POTASSIUM SERPL-SCNC: 3.5 MMOL/L (ref 3.5–5.3)
PROT SERPL-MCNC: 7.1 G/DL (ref 6.4–8.2)
SODIUM SERPL-SCNC: 132 MMOL/L (ref 136–145)
TRIGL SERPL-MCNC: 58 MG/DL

## 2021-04-26 PROCEDURE — 80053 COMPREHEN METABOLIC PANEL: CPT

## 2021-04-26 PROCEDURE — 84478 ASSAY OF TRIGLYCERIDES: CPT

## 2021-04-26 PROCEDURE — 83721 ASSAY OF BLOOD LIPOPROTEIN: CPT

## 2021-04-26 PROCEDURE — 82570 ASSAY OF URINE CREATININE: CPT | Performed by: INTERNAL MEDICINE

## 2021-04-26 PROCEDURE — 82043 UR ALBUMIN QUANTITATIVE: CPT | Performed by: INTERNAL MEDICINE

## 2021-04-26 PROCEDURE — 36415 COLL VENOUS BLD VENIPUNCTURE: CPT

## 2021-06-10 DIAGNOSIS — I10 HYPERTENSION: ICD-10-CM

## 2021-06-10 RX ORDER — AMLODIPINE BESYLATE 5 MG/1
5 TABLET ORAL DAILY
Qty: 30 TABLET | Refills: 5 | Status: SHIPPED | OUTPATIENT
Start: 2021-06-10 | End: 2021-12-08 | Stop reason: SDUPTHER

## 2021-06-15 DIAGNOSIS — F39 UNSPECIFIED MOOD (AFFECTIVE) DISORDER (HCC): ICD-10-CM

## 2021-06-21 ENCOUNTER — TELEPHONE (OUTPATIENT)
Dept: INTERNAL MEDICINE CLINIC | Facility: CLINIC | Age: 86
End: 2021-06-21

## 2021-06-21 DIAGNOSIS — L30.9 DERMATITIS: Primary | ICD-10-CM

## 2021-06-21 RX ORDER — CLOTRIMAZOLE AND BETAMETHASONE DIPROPIONATE 10; .64 MG/G; MG/G
CREAM TOPICAL 2 TIMES DAILY
Qty: 45 G | Refills: 0 | Status: SHIPPED | OUTPATIENT
Start: 2021-06-21 | End: 2021-07-13 | Stop reason: SDUPTHER

## 2021-06-21 NOTE — TELEPHONE ENCOUNTER
Pt has a rash under her breasts, she's in [employer], care giver states that she was prescribed triamcinolone prior and was wondering if you would write her an rx for it or if you would need an office visit

## 2021-07-13 ENCOUNTER — TELEPHONE (OUTPATIENT)
Dept: INTERNAL MEDICINE CLINIC | Facility: CLINIC | Age: 86
End: 2021-07-13

## 2021-07-13 DIAGNOSIS — L30.9 DERMATITIS: ICD-10-CM

## 2021-07-13 RX ORDER — CLOTRIMAZOLE AND BETAMETHASONE DIPROPIONATE 10; .64 MG/G; MG/G
CREAM TOPICAL 2 TIMES DAILY
Qty: 45 G | Refills: 3 | Status: SHIPPED | OUTPATIENT
Start: 2021-07-13 | End: 2021-07-13 | Stop reason: SDUPTHER

## 2021-07-13 RX ORDER — CLOTRIMAZOLE AND BETAMETHASONE DIPROPIONATE 10; .64 MG/G; MG/G
CREAM TOPICAL 2 TIMES DAILY
Qty: 45 G | Refills: 3 | Status: SHIPPED | OUTPATIENT
Start: 2021-07-13 | End: 2022-05-05 | Stop reason: HOSPADM

## 2021-08-02 ENCOUNTER — TELEPHONE (OUTPATIENT)
Dept: INTERNAL MEDICINE CLINIC | Facility: CLINIC | Age: 86
End: 2021-08-02

## 2021-08-02 DIAGNOSIS — K59.00 CONSTIPATION, UNSPECIFIED CONSTIPATION TYPE: ICD-10-CM

## 2021-08-02 DIAGNOSIS — M15.9 PRIMARY OSTEOARTHRITIS INVOLVING MULTIPLE JOINTS: ICD-10-CM

## 2021-08-02 RX ORDER — DOCUSATE SODIUM 100 MG/1
100 CAPSULE, LIQUID FILLED ORAL 2 TIMES DAILY
Qty: 60 CAPSULE | Refills: 5 | Status: SHIPPED | OUTPATIENT
Start: 2021-08-02

## 2021-08-02 RX ORDER — ACETAMINOPHEN 325 MG/1
650 TABLET ORAL EVERY 6 HOURS PRN
Qty: 30 TABLET | Refills: 5 | Status: SHIPPED | OUTPATIENT
Start: 2021-08-02

## 2021-08-06 DIAGNOSIS — I63.9 THALAMIC INFARCTION (HCC): ICD-10-CM

## 2021-08-06 DIAGNOSIS — R60.9 PERIPHERAL EDEMA: ICD-10-CM

## 2021-08-09 RX ORDER — ATORVASTATIN CALCIUM 40 MG/1
40 TABLET, FILM COATED ORAL EVERY EVENING
Qty: 30 TABLET | Refills: 5 | Status: SHIPPED | OUTPATIENT
Start: 2021-08-09 | End: 2022-02-04 | Stop reason: SDUPTHER

## 2021-08-09 RX ORDER — HYDROCHLOROTHIAZIDE 25 MG/1
25 TABLET ORAL DAILY
Qty: 30 TABLET | Refills: 5 | Status: SHIPPED | OUTPATIENT
Start: 2021-08-09 | End: 2022-02-04 | Stop reason: SDUPTHER

## 2021-08-09 RX ORDER — ASPIRIN 81 MG/1
81 TABLET, CHEWABLE ORAL DAILY
Qty: 30 TABLET | Refills: 5 | Status: SHIPPED | OUTPATIENT
Start: 2021-08-09 | End: 2022-02-04 | Stop reason: SDUPTHER

## 2021-08-19 ENCOUNTER — OFFICE VISIT (OUTPATIENT)
Dept: INTERNAL MEDICINE CLINIC | Facility: CLINIC | Age: 86
End: 2021-08-19
Payer: MEDICARE

## 2021-08-19 VITALS
HEART RATE: 77 BPM | WEIGHT: 131.13 LBS | BODY MASS INDEX: 24.13 KG/M2 | TEMPERATURE: 96.9 F | HEIGHT: 62 IN | OXYGEN SATURATION: 96 % | DIASTOLIC BLOOD PRESSURE: 78 MMHG | SYSTOLIC BLOOD PRESSURE: 126 MMHG

## 2021-08-19 DIAGNOSIS — F39 UNSPECIFIED MOOD (AFFECTIVE) DISORDER (HCC): ICD-10-CM

## 2021-08-19 DIAGNOSIS — M15.9 PRIMARY OSTEOARTHRITIS INVOLVING MULTIPLE JOINTS: ICD-10-CM

## 2021-08-19 DIAGNOSIS — Z13.0 SCREENING FOR DEFICIENCY ANEMIA: ICD-10-CM

## 2021-08-19 DIAGNOSIS — I10 ESSENTIAL HYPERTENSION: Primary | ICD-10-CM

## 2021-08-19 DIAGNOSIS — Z13.29 SCREENING FOR THYROID DISORDER: ICD-10-CM

## 2021-08-19 DIAGNOSIS — H81.10 BENIGN PAROXYSMAL POSITIONAL VERTIGO, UNSPECIFIED LATERALITY: ICD-10-CM

## 2021-08-19 DIAGNOSIS — Z13.1 SCREENING FOR DIABETES MELLITUS (DM): ICD-10-CM

## 2021-08-19 DIAGNOSIS — Z13.220 SCREENING FOR LIPID DISORDERS: ICD-10-CM

## 2021-08-19 DIAGNOSIS — G31.84 MILD NEUROCOGNITIVE DISORDER: ICD-10-CM

## 2021-08-19 DIAGNOSIS — F32.1 CURRENT MODERATE EPISODE OF MAJOR DEPRESSIVE DISORDER WITHOUT PRIOR EPISODE (HCC): ICD-10-CM

## 2021-08-19 PROCEDURE — 99214 OFFICE O/P EST MOD 30 MIN: CPT | Performed by: INTERNAL MEDICINE

## 2021-08-19 NOTE — PROGRESS NOTES
Assessment/Plan:  Problem List Items Addressed This Visit        Cardiovascular and Mediastinum    Essential hypertension - Primary    Relevant Orders    CBC and differential    Comprehensive metabolic panel    Lipid Panel with Direct LDL reflex    TSH, 3rd generation with Free T4 reflex    Microalbumin / creatinine urine ratio       Nervous and Auditory    Benign paroxysmal positional vertigo       Musculoskeletal and Integument    Osteoarthritis       Other    Unspecified mood (affective) disorder, r/o major depressive disorder (single episode)    Mild neurocognitive disorder    Relevant Orders    TSH, 3rd generation with Free T4 reflex    Current moderate episode of major depressive disorder without prior episode ([facility] )      Other Visit Diagnoses     Screening for lipid disorders        Relevant Orders    Lipid Panel with Direct LDL reflex    Screening for diabetes mellitus (DM)        Screening for thyroid disorder        Screening for deficiency anemia               Diagnoses and all orders for this visit:    Essential hypertension  -     CBC and differential; Future  -     Comprehensive metabolic panel; Future  -     Lipid Panel with Direct LDL reflex; Future  -     TSH, 3rd generation with Free T4 reflex; Future  -     Microalbumin / creatinine urine ratio    Benign paroxysmal positional vertigo, unspecified laterality    Primary osteoarthritis involving multiple joints    Unspecified mood (affective) disorder, r/o major depressive disorder (single episode)    Mild neurocognitive disorder  -     TSH, 3rd generation with Free T4 reflex; Future    Current moderate episode of major depressive disorder without prior episode ([facility] )    Screening for lipid disorders  -     Lipid Panel with Direct LDL reflex; Future    Screening for diabetes mellitus (DM)    Screening for thyroid disorder    Screening for deficiency anemia        No problem-specific Assessment & Plan notes found for this encounter      A/P: Doing well and will check labs  Continue current treatment and RTC four months for routine  Subjective:      Patient ID: Uriah Knapp is a 80 y o  female  WF RTC for f/u htn, BPV, etc  Doing well and no new issues  Remains active and no falls  Memory no worse and no safety concerns  Chronic pain is controlled  No stroke like events  MDD/AMANDA is controlled  Due for labs  The following portions of the patient's history were reviewed and updated as appropriate:   She has a past medical history of Arthritis, Cataract, Depression, Dislocation of left shoulder joint, Essential hypertension (4/3/2019), Gait disorder, Herpes zoster, HL (hearing loss), Impacted cerumen of both ears, Impacted cerumen of right ear, Lightheadedness, Multiple falls, Near syncope, and Postherpetic polyneuropathy  ,  does not have any pertinent problems on file  ,   has a past surgical history that includes Hernia repair; Tubal ligation; Cataract extraction, bilateral; and Tonsillectomy  ,  family history includes Cirrhosis in her father; Heart disease in her son; Mental illness in her son ,   reports that she has never smoked  She has never used smokeless tobacco  She reports that she does not drink alcohol and does not use drugs  ,  has No Known Allergies     Current Outpatient Medications   Medication Sig Dispense Refill    amLODIPine (NORVASC) 5 mg tablet Take 1 tablet (5 mg total) by mouth daily 30 tablet 5    atorvastatin (LIPITOR) 40 mg tablet Take 1 tablet (40 mg total) by mouth every evening 30 tablet 5    hydrochlorothiazide (HYDRODIURIL) 25 mg tablet Take 1 tablet (25 mg total) by mouth daily 30 tablet 5    sertraline (ZOLOFT) 50 mg tablet Take 1 tablet (50 mg total) by mouth daily at bedtime 30 tablet 5    acetaminophen (TYLENOL) 325 mg tablet Take 2 tablets (650 mg total) by mouth every 6 (six) hours as needed for mild pain 30 tablet 5    aspirin 81 mg chewable tablet Chew 1 tablet (81 mg total) daily 30 tablet 5    clotrimazole-betamethasone (LOTRISONE) 1-0 05 % cream Apply topically 2 (two) times a day 45 g 3    docusate sodium (COLACE) 100 mg capsule Take 1 capsule (100 mg total) by mouth 2 (two) times a day 60 capsule 5     No current facility-administered medications for this visit  Review of Systems   Constitutional: Negative for activity change, chills, diaphoresis, fatigue and fever  HENT: Negative  Eyes: Negative for visual disturbance  Respiratory: Negative for cough, chest tightness, shortness of breath and wheezing  Cardiovascular: Negative for chest pain, palpitations and leg swelling  Gastrointestinal: Negative for abdominal pain, constipation, diarrhea, nausea and vomiting  Endocrine: Negative for cold intolerance and heat intolerance  Genitourinary: Negative for difficulty urinating, dysuria and frequency  Musculoskeletal: Negative for arthralgias, gait problem and myalgias  Neurological: Negative for dizziness, seizures, syncope, weakness, light-headedness and headaches  Psychiatric/Behavioral: Negative for confusion and sleep disturbance  The patient is not nervous/anxious  PHQ-9 Depression Screening    PHQ-9:   Frequency of the following problems over the past two weeks:            Objective:  Vitals:    08/19/21 1051   BP: 126/78   Pulse: 77   Temp: (!) 96 9 °F (36 1 °C)   SpO2: 96%   Weight: 59 5 kg (131 lb 2 oz)   Height: 5' 2" (1 575 m)     Body mass index is 23 98 kg/m²  Physical Exam  Vitals and nursing note reviewed  Constitutional:       General: She is not in acute distress  Appearance: Normal appearance  She is not ill-appearing  HENT:      Head: Normocephalic and atraumatic  Mouth/Throat:      Mouth: Mucous membranes are moist    Eyes:      Extraocular Movements: Extraocular movements intact  Conjunctiva/sclera: Conjunctivae normal       Pupils: Pupils are equal, round, and reactive to light  Neck:      Vascular: No carotid bruit  Cardiovascular:      Rate and Rhythm: Normal rate and regular rhythm  Heart sounds: Normal heart sounds  Pulmonary:      Effort: Pulmonary effort is normal  No respiratory distress  Breath sounds: Normal breath sounds  No wheezing or rales  Abdominal:      General: Bowel sounds are normal  There is no distension  Palpations: Abdomen is soft  Tenderness: There is no abdominal tenderness  Musculoskeletal:      Cervical back: Neck supple  Right lower leg: No edema  Left lower leg: No edema  Neurological:      General: No focal deficit present  Mental Status: She is alert and oriented to person, place, and time  Mental status is at baseline  Psychiatric:         Mood and Affect: Mood normal          Behavior: Behavior normal          Thought Content:  Thought content normal          Judgment: Judgment normal

## 2021-08-19 NOTE — PATIENT INSTRUCTIONS
Chronic Hypertension   AMBULATORY CARE:   Hypertension  is high blood pressure  Your blood pressure is the force of your blood moving against the walls of your arteries  Hypertension causes your blood pressure to get so high that your heart has to work much harder than normal  This can damage your heart  Even if you have hypertension for years, lifestyle changes, medicines, or both can help bring your blood pressure to normal   Call your local emergency number (911 in the [address] [facility] [address]) or have someone call if:   · You have chest pain  · You have any of the following signs of a heart attack:      ? Squeezing, pressure, or pain in your chest    ? You may  also have any of the following:     § Discomfort or pain in your back, neck, jaw, stomach, or arm    § Shortness of breath    § Nausea or vomiting    § Lightheadedness or a sudden cold sweat    · You become confused or have difficulty speaking  · You suddenly feel lightheaded or have trouble breathing  Seek care immediately if:   · You have a severe headache or vision loss  · You have weakness in an arm or leg  Call your doctor if:   · You feel faint, dizzy, confused, or drowsy  · You have been taking your blood pressure medicine but your pressure is higher than your provider says it should be  · You have questions or concerns about your condition or care  Treatment for chronic hypertension  may include medicine to lower your blood pressure and cholesterol levels  A low cholesterol level helps prevent heart disease and makes it easier to control your blood pressure  Heart disease can make your blood pressure harder to control  You may also need to make lifestyle changes  What you need to know about the stages of hypertension:       · Normal blood pressure is 119/79 or lower   Your healthcare provider may only check your blood pressure each year if it stays at a normal level  · Elevated blood pressure is 120/79 to 129/79    This is sometimes called prehypertension  Your healthcare provider may suggest lifestyle changes to help lower your blood pressure to a normal level  He or she may then check it again in 3 to 6 months  · Stage 1 hypertension is 130/80  to 139/89   Your provider may recommend lifestyle changes, medication, and checks every 3 to 6 months until your blood pressure is controlled  · Stage 2 hypertension is 140/90 or higher   Your provider will recommend lifestyle changes and have you take 2 kinds of hypertension medicines  You will also need to have your blood pressure checked monthly until it is controlled  Manage chronic hypertension:   · Check your blood pressure at home  Avoid smoking, caffeine, and exercise at least 30 minutes before checking your blood pressure  Sit and rest for 5 minutes before you take your blood pressure  Extend your arm and support it on a flat surface  Your arm should be at the same level as your heart  Follow the directions that came with your blood pressure monitor  Check your blood pressure 2 times, 1 minute apart, before you take your medicine in the morning  Also check your blood pressure before your evening meal  Keep a record of your readings and bring it to your follow-up visits  Ask your healthcare provider what your blood pressure should be  · Manage any other health conditions you have  Health conditions such as diabetes can increase your risk for hypertension  Follow your healthcare provider's instructions and take all your medicines as directed  Talk to your healthcare provider about any new health conditions you have recently developed  · Ask about all medicines  Certain medicines can increase your blood pressure  Examples include oral birth control pills, decongestants, herbal supplements, and NSAIDs, such as ibuprofen  Your healthcare provider can tell you which medicines are safe for you to take  This includes prescription and over-the-counter medicines      Lifestyle changes you can make to lower your blood pressure: Your provider may want you to make more lifestyle changes if you are having trouble controlling your blood pressure  This may feel difficult over time, especially if you think you are making good changes but your pressure is still high  It might help to focus on one new change at a time  For example, try to add 1 more day of exercise, or exercise for an extra 10 minutes on 2 days  Small changes can make a big difference  Your healthcare provider can also refer you to specialists such as a dietitian who can help you make small changes  Your family members may be included in helping you learn to create lifestyle changes, such as the following:  · Limit sodium (salt) as directed  Too much sodium can affect your fluid balance  Check labels to find low-sodium or no-salt-added foods  Some low-sodium foods use potassium salts for flavor  Too much potassium can also cause health problems  Your healthcare provider will tell you how much sodium and potassium are safe for you to have in a day  He or she may recommend that you limit sodium to 2,300 mg a day  · Follow the meal plan recommended by your healthcare provider  A dietitian or your provider can give you more information on low-sodium plans or the DASH (Dietary Approaches to Stop Hypertension) eating plan  The DASH plan is low in sodium, processed sugar, unhealthy fats, and total fat  It is high in potassium, calcium, and fiber  These can be found in vegetables, fruit, and whole-grain foods  · Be physically active throughout the day  Physical activity, such as exercise, can help control your blood pressure and your weight  Be physically active for at least 30 minutes per day, on most days of the week  Include aerobic activity, such as walking or riding a bicycle  Also include strength training at least 2 times each week  Your healthcare providers can help you create a physical activity plan  · Decrease stress  This may help lower your blood pressure  Learn ways to relax, such as deep breathing or listening to music  · Limit alcohol as directed  Alcohol can increase your blood pressure  A drink of alcohol is 12 ounces of beer, 5 ounces of wine, or 1½ ounces of liquor  · Do not smoke  Nicotine and other chemicals in cigarettes and cigars can increase your blood pressure and also cause lung damage  Ask your healthcare provider for information if you currently smoke and need help to quit  E-cigarettes or smokeless tobacco still contain nicotine  Talk to your healthcare provider before you use these products  Follow up with your doctor as directed: You will need to return to have your blood pressure checked and to have other lab tests done  Write down your questions so you remember to ask them during your visits  © Copyright [employer] 2021 Information is for End User's use only and may not be sold, redistributed or otherwise used for commercial purposes  All illustrations and images included in CareNotes® are the copyrighted property of [employer]  or [facility]   The above information is an  only  It is not intended as medical advice for individual conditions or treatments  Talk to your doctor, nurse or pharmacist before following any medical regimen to see if it is safe and effective for you

## 2021-12-08 DIAGNOSIS — F39 UNSPECIFIED MOOD (AFFECTIVE) DISORDER (HCC): ICD-10-CM

## 2021-12-08 DIAGNOSIS — I10 HYPERTENSION: ICD-10-CM

## 2021-12-08 RX ORDER — AMLODIPINE BESYLATE 5 MG/1
5 TABLET ORAL DAILY
Qty: 30 TABLET | Refills: 5 | Status: SHIPPED | OUTPATIENT
Start: 2021-12-08 | End: 2022-05-05 | Stop reason: HOSPADM

## 2022-01-19 ENCOUNTER — OFFICE VISIT (OUTPATIENT)
Dept: INTERNAL MEDICINE CLINIC | Facility: CLINIC | Age: 87
End: 2022-01-19
Payer: MEDICARE

## 2022-01-19 VITALS
WEIGHT: 135 LBS | HEART RATE: 71 BPM | DIASTOLIC BLOOD PRESSURE: 60 MMHG | SYSTOLIC BLOOD PRESSURE: 124 MMHG | OXYGEN SATURATION: 95 % | HEIGHT: 62 IN | BODY MASS INDEX: 24.84 KG/M2 | TEMPERATURE: 97.1 F

## 2022-01-19 DIAGNOSIS — F39 UNSPECIFIED MOOD (AFFECTIVE) DISORDER (HCC): ICD-10-CM

## 2022-01-19 DIAGNOSIS — I10 ESSENTIAL HYPERTENSION: Primary | ICD-10-CM

## 2022-01-19 DIAGNOSIS — M15.9 PRIMARY OSTEOARTHRITIS INVOLVING MULTIPLE JOINTS: ICD-10-CM

## 2022-01-19 DIAGNOSIS — Z13.220 SCREENING FOR LIPID DISORDERS: ICD-10-CM

## 2022-01-19 DIAGNOSIS — G31.84 MILD NEUROCOGNITIVE DISORDER: ICD-10-CM

## 2022-01-19 DIAGNOSIS — F32.1 CURRENT MODERATE EPISODE OF MAJOR DEPRESSIVE DISORDER WITHOUT PRIOR EPISODE (HCC): ICD-10-CM

## 2022-01-19 DIAGNOSIS — M48.061 SPINAL STENOSIS AT L4-L5 LEVEL: Chronic | ICD-10-CM

## 2022-01-19 PROCEDURE — 99214 OFFICE O/P EST MOD 30 MIN: CPT | Performed by: INTERNAL MEDICINE

## 2022-01-19 NOTE — PATIENT INSTRUCTIONS
Chronic Hypertension   AMBULATORY CARE:   Hypertension  is high blood pressure  Your blood pressure is the force of your blood moving against the walls of your arteries  Hypertension causes your blood pressure to get so high that your heart has to work much harder than normal  This can damage your heart  Even if you have hypertension for years, lifestyle changes, medicines, or both can help bring your blood pressure to normal   Call your local emergency number (911 in the [address] [facility]) or have someone call if:   · You have chest pain  · You have any of the following signs of a heart attack:      ? Squeezing, pressure, or pain in your chest    ? You may  also have any of the following:     § Discomfort or pain in your back, neck, jaw, stomach, or arm    § Shortness of breath    § Nausea or vomiting    § Lightheadedness or a sudden cold sweat    · You become confused or have difficulty speaking  · You suddenly feel lightheaded or have trouble breathing  Seek care immediately if:   · You have a severe headache or vision loss  · You have weakness in an arm or leg  Call your doctor or cardiologist if:   · You feel faint, dizzy, confused, or drowsy  · You have been taking your blood pressure medicine but your pressure is higher than your provider says it should be  · You have questions or concerns about your condition or care  Treatment for chronic hypertension  may include medicine to lower your blood pressure and cholesterol levels  A low cholesterol level helps prevent heart disease and makes it easier to control your blood pressure  Heart disease can make your blood pressure harder to control  You may also need to make lifestyle changes  What you need to know about the stages of hypertension:       · Normal blood pressure is 119/79 or lower   Your healthcare provider may only check your blood pressure each year if it stays at a normal level  · Elevated blood pressure is 120/79 to 129/79    This is sometimes called prehypertension  Your healthcare provider may suggest lifestyle changes to help lower your blood pressure to a normal level  He or she may then check it again in 3 to 6 months  · Stage 1 hypertension is 130/80  to 139/89   Your provider may recommend lifestyle changes, medication, and checks every 3 to 6 months until your blood pressure is controlled  · Stage 2 hypertension is 140/90 or higher   Your provider will recommend lifestyle changes and have you take 2 kinds of hypertension medicines  You will also need to have your blood pressure checked monthly until it is controlled  Manage chronic hypertension:   · Check your blood pressure at home  Avoid smoking, caffeine, and exercise at least 30 minutes before checking your blood pressure  Sit and rest for 5 minutes before you take your blood pressure  Extend your arm and support it on a flat surface  Your arm should be at the same level as your heart  Follow the directions that came with your blood pressure monitor  Check your blood pressure 2 times, 1 minute apart, before you take your medicine in the morning  Also check your blood pressure before your evening meal  Keep a record of your readings and bring it to your follow-up visits  Ask your healthcare provider what your blood pressure should be  · Manage any other health conditions you have  Health conditions such as diabetes can increase your risk for hypertension  Follow your healthcare provider's instructions and take all your medicines as directed  Talk to your healthcare provider about any new health conditions you have recently developed  · Ask about all medicines  Certain medicines can increase your blood pressure  Examples include oral birth control pills, decongestants, herbal supplements, and NSAIDs, such as ibuprofen  Your healthcare provider can tell you which medicines are safe for you to take   This includes prescription and over-the-counter medicines  Lifestyle changes you can make to lower your blood pressure: Your provider may want you to make more lifestyle changes if you are having trouble controlling your blood pressure  This may feel difficult over time, especially if you think you are making good changes but your pressure is still high  It might help to focus on one new change at a time  For example, try to add 1 more day of exercise, or exercise for an extra 10 minutes on 2 days  Small changes can make a big difference  Your healthcare provider can also refer you to specialists such as a dietitian who can help you make small changes  Your family members may be included in helping you learn to create lifestyle changes, such as the following:     · Limit sodium (salt) as directed  Too much sodium can affect your fluid balance  Check labels to find low-sodium or no-salt-added foods  Some low-sodium foods use potassium salts for flavor  Too much potassium can also cause health problems  Your healthcare provider will tell you how much sodium and potassium are safe for you to have in a day  He or she may recommend that you limit sodium to 2,300 mg a day  · Follow the meal plan recommended by your healthcare provider  A dietitian or your provider can give you more information on low-sodium plans or the DASH (Dietary Approaches to Stop Hypertension) eating plan  The DASH plan is low in sodium, processed sugar, unhealthy fats, and total fat  It is high in potassium, calcium, and fiber  These can be found in vegetables, fruit, and whole-grain foods  · Be physically active throughout the day  Physical activity, such as exercise, can help control your blood pressure and your weight  Be physically active for at least 30 minutes per day, on most days of the week  Include aerobic activity, such as walking or riding a bicycle  Also include strength training at least 2 times each week   Your healthcare providers can help you create a physical activity plan  · Decrease stress  This may help lower your blood pressure  Learn ways to relax, such as deep breathing or listening to music  · Limit alcohol as directed  Alcohol can increase your blood pressure  A drink of alcohol is 12 ounces of beer, 5 ounces of wine, or 1½ ounces of liquor  · Do not smoke  Nicotine and other chemicals in cigarettes and cigars can increase your blood pressure and also cause lung damage  Ask your healthcare provider for information if you currently smoke and need help to quit  E-cigarettes or smokeless tobacco still contain nicotine  Talk to your healthcare provider before you use these products  Follow up with your doctor or cardiologist as directed: You will need to return to have your blood pressure checked and to have other lab tests done  Write down your questions so you remember to ask them during your visits  © Copyright [employer] 2021 Information is for End User's use only and may not be sold, redistributed or otherwise used for commercial purposes  All illustrations and images included in CareNotes® are the copyrighted property of [employer]  or [facility]   The above information is an  only  It is not intended as medical advice for individual conditions or treatments  Talk to your doctor, nurse or pharmacist before following any medical regimen to see if it is safe and effective for you

## 2022-01-19 NOTE — PROGRESS NOTES
Assessment/Plan:  Problem List Items Addressed This Visit        Cardiovascular and Mediastinum    Essential hypertension - Primary    Relevant Orders    CBC and differential    Comprehensive metabolic panel    Microalbumin / creatinine urine ratio    TSH, 3rd generation with Free T4 reflex       Musculoskeletal and Integument    Osteoarthritis       Other    Unspecified mood (affective) disorder, r/o major depressive disorder (single episode)    Relevant Orders    TSH, 3rd generation with Free T4 reflex    Spinal stenosis at L4-L5 level (Chronic)    Mild neurocognitive disorder    Relevant Orders    TSH, 3rd generation with Free T4 reflex    Current moderate episode of major depressive disorder without prior episode ([facility] [address] )      Other Visit Diagnoses     Screening for lipid disorders        Relevant Orders    Lipid Panel with Direct LDL reflex           Diagnoses and all orders for this visit:    Essential hypertension  -     CBC and differential; Future  -     Comprehensive metabolic panel; Future  -     Microalbumin / creatinine urine ratio  -     TSH, 3rd generation with Free T4 reflex; Future    Primary osteoarthritis involving multiple joints    Unspecified mood (affective) disorder, r/o major depressive disorder (single episode)  -     TSH, 3rd generation with Free T4 reflex; Future    Mild neurocognitive disorder  -     TSH, 3rd generation with Free T4 reflex; Future    Current moderate episode of major depressive disorder without prior episode ([facility] [address] )    Spinal stenosis at L4-L5 level    Screening for lipid disorders  -     Lipid Panel with Direct LDL reflex; Future        No problem-specific Assessment & Plan notes found for this encounter  A/P: Doing ok and will check labs  BMI is good and will continue current program  Continue current treatment and RTC four months for routine  Subjective:      Patient ID: Mane Kang is a 80 y o  female      WF RTC with a caretaker for f/u htn, BPV, etc  DOing ok and no new issues  Remains active w/o difficulty and no falls  Chronic pain is controlled  No stroke like events  MDD/AMANDA is controlled  Due for labs  The following portions of the patient's history were reviewed and updated as appropriate:   She has a past medical history of Arthritis, Cataract, Depression, Dislocation of left shoulder joint, Essential hypertension (4/3/2019), Gait disorder, Herpes zoster, HL (hearing loss), Impacted cerumen of both ears, Impacted cerumen of right ear, Lightheadedness, Multiple falls, Near syncope, and Postherpetic polyneuropathy  ,  does not have any pertinent problems on file  ,   has a past surgical history that includes Hernia repair; Tubal ligation; Cataract extraction, bilateral; and Tonsillectomy  ,  family history includes Cirrhosis in her father; Heart disease in her son; Mental illness in her son ,   reports that she has never smoked  She has never used smokeless tobacco  She reports that she does not drink alcohol and does not use drugs  ,  has No Known Allergies     Current Outpatient Medications   Medication Sig Dispense Refill    acetaminophen (TYLENOL) 325 mg tablet Take 2 tablets (650 mg total) by mouth every 6 (six) hours as needed for mild pain 30 tablet 5    amLODIPine (NORVASC) 5 mg tablet Take 1 tablet (5 mg total) by mouth daily 30 tablet 5    aspirin 81 mg chewable tablet Chew 1 tablet (81 mg total) daily 30 tablet 5    atorvastatin (LIPITOR) 40 mg tablet Take 1 tablet (40 mg total) by mouth every evening 30 tablet 5    clotrimazole-betamethasone (LOTRISONE) 1-0 05 % cream Apply topically 2 (two) times a day 45 g 3    docusate sodium (COLACE) 100 mg capsule Take 1 capsule (100 mg total) by mouth 2 (two) times a day 60 capsule 5    hydrochlorothiazide (HYDRODIURIL) 25 mg tablet Take 1 tablet (25 mg total) by mouth daily 30 tablet 5    sertraline (ZOLOFT) 50 mg tablet Take 1 tablet (50 mg total) by mouth daily at bedtime 30 tablet 5     No current facility-administered medications for this visit  Review of Systems   Constitutional: Negative for activity change, chills, diaphoresis, fatigue and fever  HENT: Negative  Eyes: Negative for visual disturbance  Respiratory: Negative for cough, chest tightness, shortness of breath and wheezing  Cardiovascular: Negative for chest pain, palpitations and leg swelling  Gastrointestinal: Negative for abdominal pain, constipation, diarrhea, nausea and vomiting  Endocrine: Negative for cold intolerance and heat intolerance  Genitourinary: Negative for difficulty urinating, dysuria and frequency  Musculoskeletal: Positive for gait problem  Negative for arthralgias and myalgias  Neurological: Negative for dizziness, seizures, syncope, weakness, light-headedness and headaches  Psychiatric/Behavioral: Negative for confusion, dysphoric mood and sleep disturbance  The patient is not nervous/anxious  PHQ-2/9 Depression Screening          Objective:  Vitals:    01/19/22 1447   BP: 124/60   Pulse: 71   Temp: (!) 97 1 °F (36 2 °C)   SpO2: 95%   Weight: 61 2 kg (135 lb)   Height: 5' 2" (1 575 m)     Body mass index is 24 69 kg/m²  Physical Exam  Vitals and nursing note reviewed  Constitutional:       General: She is not in acute distress  Appearance: Normal appearance  She is not ill-appearing  HENT:      Head: Normocephalic and atraumatic  Mouth/Throat:      Mouth: Mucous membranes are moist    Eyes:      Extraocular Movements: Extraocular movements intact  Conjunctiva/sclera: Conjunctivae normal       Pupils: Pupils are equal, round, and reactive to light  Neck:      Vascular: No carotid bruit  Cardiovascular:      Rate and Rhythm: Normal rate and regular rhythm  Heart sounds: Normal heart sounds  Pulmonary:      Effort: Pulmonary effort is normal  No respiratory distress  Breath sounds: Normal breath sounds  No wheezing or rales     Abdominal:      General: Bowel sounds are normal  There is no distension  Palpations: Abdomen is soft  Tenderness: There is no abdominal tenderness  Musculoskeletal:      Cervical back: Neck supple  Right lower leg: No edema  Left lower leg: No edema  Neurological:      General: No focal deficit present  Mental Status: She is alert and oriented to person, place, and time  Mental status is at baseline  Psychiatric:         Mood and Affect: Mood normal          Behavior: Behavior normal          Thought Content:  Thought content normal          Judgment: Judgment normal

## 2022-02-04 DIAGNOSIS — R60.9 PERIPHERAL EDEMA: ICD-10-CM

## 2022-02-04 DIAGNOSIS — I63.9 THALAMIC INFARCTION (HCC): ICD-10-CM

## 2022-02-07 RX ORDER — ATORVASTATIN CALCIUM 40 MG/1
40 TABLET, FILM COATED ORAL EVERY EVENING
Qty: 30 TABLET | Refills: 5 | Status: SHIPPED | OUTPATIENT
Start: 2022-02-07 | End: 2022-08-04 | Stop reason: SDUPTHER

## 2022-02-07 RX ORDER — ASPIRIN 81 MG/1
81 TABLET, CHEWABLE ORAL DAILY
Qty: 30 TABLET | Refills: 5 | Status: SHIPPED | OUTPATIENT
Start: 2022-02-07 | End: 2022-08-04 | Stop reason: SDUPTHER

## 2022-02-07 RX ORDER — HYDROCHLOROTHIAZIDE 25 MG/1
25 TABLET ORAL DAILY
Qty: 30 TABLET | Refills: 5 | Status: SHIPPED | OUTPATIENT
Start: 2022-02-07 | End: 2022-05-05 | Stop reason: HOSPADM

## 2022-04-27 ENCOUNTER — HOSPITAL ENCOUNTER (EMERGENCY)
Facility: HOSPITAL | Age: 87
Discharge: HOME | DRG: 640 | End: 2022-04-27
Attending: EMERGENCY MEDICINE
Payer: MEDICARE

## 2022-04-27 ENCOUNTER — APPOINTMENT (EMERGENCY)
Dept: RADIOLOGY | Facility: HOSPITAL | Age: 87
DRG: 640 | End: 2022-04-27
Payer: MEDICARE

## 2022-04-27 VITALS
SYSTOLIC BLOOD PRESSURE: 129 MMHG | RESPIRATION RATE: 16 BRPM | OXYGEN SATURATION: 93 % | HEART RATE: 75 BPM | TEMPERATURE: 97.8 F | DIASTOLIC BLOOD PRESSURE: 81 MMHG

## 2022-04-27 DIAGNOSIS — R05.9 COUGH: Primary | ICD-10-CM

## 2022-04-27 LAB
FLUAV RNA RESP QL NAA+PROBE: NEGATIVE
FLUBV RNA RESP QL NAA+PROBE: NEGATIVE
RSV RNA RESP QL NAA+PROBE: NEGATIVE
SARS-COV-2 RNA RESP QL NAA+PROBE: NEGATIVE

## 2022-04-27 PROCEDURE — 0241U HB NFCT DS VIR RESP RNA 4 TRGT: CPT | Performed by: PHYSICIAN ASSISTANT

## 2022-04-27 PROCEDURE — 99284 EMERGENCY DEPT VISIT MOD MDM: CPT

## 2022-04-27 PROCEDURE — 71045 X-RAY EXAM CHEST 1 VIEW: CPT

## 2022-04-27 PROCEDURE — 99282 EMERGENCY DEPT VISIT SF MDM: CPT | Performed by: PHYSICIAN ASSISTANT

## 2022-04-27 NOTE — DISCHARGE INSTRUCTIONS
You may use a tbsp of honey to help with your cough or over the counter cough drops  Your chest xray and Covid/Flu test here today are negative

## 2022-04-27 NOTE — ED PROVIDER NOTES
History  Chief Complaint   Patient presents with    Cough     EMS reports patient had a cough this morning  Patient is not sure why she was sent here to be seen  77-year-old female presents the emergency department from personal care home seeking evaluation for episode of coughing  Patient is pleasant at baseline and does have some mild dementia  She appears to be a reliable historian  She is awake alert and oriented  Patient states she feels well and denies any complaints  She states that she did in fact have a coughing fit this morning which she believes prompted her to come to the ED for evaluation  She denies nausea vomiting weakness fatigue fevers chills chest pain shortness of breath  Prior to Admission Medications   Prescriptions Last Dose Informant Patient Reported?  Taking?   acetaminophen (TYLENOL) 325 mg tablet   No No   Sig: Take 2 tablets (650 mg total) by mouth every 6 (six) hours as needed for mild pain   amLODIPine (NORVASC) 5 mg tablet   No No   Sig: Take 1 tablet (5 mg total) by mouth daily   aspirin 81 mg chewable tablet   No No   Sig: Chew 1 tablet (81 mg total) daily   atorvastatin (LIPITOR) 40 mg tablet   No No   Sig: Take 1 tablet (40 mg total) by mouth every evening   clotrimazole-betamethasone (LOTRISONE) 1-0 05 % cream   No No   Sig: Apply topically 2 (two) times a day   docusate sodium (COLACE) 100 mg capsule   No No   Sig: Take 1 capsule (100 mg total) by mouth 2 (two) times a day   hydrochlorothiazide (HYDRODIURIL) 25 mg tablet   No No   Sig: Take 1 tablet (25 mg total) by mouth daily   sertraline (ZOLOFT) 50 mg tablet   No No   Sig: Take 1 tablet (50 mg total) by mouth daily at bedtime      Facility-Administered Medications: None       Past Medical History:   Diagnosis Date    Arthritis     Cataract     LAST ASSESSED: [id number]    Depression     Dislocation of left shoulder joint     LAST ASSESSSED: [id number]    Essential hypertension 4/3/2019    Gait disorder     LAST ASSESSED: [id number]    Herpes zoster     LAST ASSESSED: [id number]    HL (hearing loss)     Impacted cerumen of both ears     LAST ASSESSED: [id number]    Impacted cerumen of right ear     LAST ASSESSED: 90VUD3431    Lightheadedness     MILD AND PT REFUSES EKG ETC  PT PROMISES TO CALL IF WORSENS  LAST ASSESSED: [id number]    Multiple falls     LAST ASSESSED: [id number]    Near syncope     LAST ASSESSED: 83STY9455    Postherpetic polyneuropathy     LAST ASSESSED: 17AUQ6638       Past Surgical History:   Procedure Laterality Date    CATARACT EXTRACTION, BILATERAL      HERNIA REPAIR      TONSILLECTOMY      TUBAL LIGATION         Family History   Problem Relation Age of Onset    Cirrhosis Father     Heart disease Son     Mental illness Son      I have reviewed and agree with the history as documented  E-Cigarette/Vaping    E-Cigarette Use Never User      E-Cigarette/Vaping Substances     Social History     Tobacco Use    Smoking status: Never Smoker    Smokeless tobacco: Never Used   Vaping Use    Vaping Use: Never used   Substance Use Topics    Alcohol use: Never    Drug use: No       Review of Systems   Constitutional: Negative for chills, fatigue and fever  HENT: Negative for congestion, dental problem, ear pain, facial swelling, rhinorrhea, sinus pressure, sneezing, sore throat and trouble swallowing  Eyes: Negative for discharge, itching and visual disturbance  Respiratory: Positive for cough  Negative for chest tightness, shortness of breath, wheezing and stridor  Cardiovascular: Negative for chest pain and palpitations  Gastrointestinal: Negative for abdominal pain, diarrhea, nausea and vomiting  Musculoskeletal: Negative for arthralgias and neck pain  Skin: Negative for wound  Neurological: Negative for dizziness, syncope, weakness, light-headedness, numbness and headaches  All other systems reviewed and are negative        Physical Exam  Physical Exam  Vitals and nursing note reviewed  Constitutional:       General: She is not in acute distress  Appearance: Normal appearance  She is well-developed  She is not ill-appearing or diaphoretic  HENT:      Head: Normocephalic and atraumatic  Right Ear: External ear normal       Left Ear: External ear normal       Nose: Nose normal    Eyes:      Conjunctiva/sclera: Conjunctivae normal       Pupils: Pupils are equal, round, and reactive to light  Cardiovascular:      Rate and Rhythm: Normal rate and regular rhythm  Heart sounds: Normal heart sounds  No murmur heard  No friction rub  No gallop  Pulmonary:      Effort: Pulmonary effort is normal  No respiratory distress  Breath sounds: Normal breath sounds  No stridor  No wheezing or rales  Abdominal:      General: Bowel sounds are normal  There is no distension  Palpations: Abdomen is soft  Tenderness: There is no abdominal tenderness  There is no guarding  Musculoskeletal:         General: No tenderness  Normal range of motion  Cervical back: Normal range of motion  Skin:     General: Skin is warm  Capillary Refill: Capillary refill takes less than 2 seconds  Neurological:      Mental Status: She is alert and oriented to person, place, and time           Vital Signs  ED Triage Vitals [04/27/22 0818]   Temperature Pulse Respirations Blood Pressure SpO2   97 8 °F (36 6 °C) 75 16 129/81 93 %      Temp Source Heart Rate Source Patient Position - Orthostatic VS BP Location FiO2 (%)   Temporal -- -- -- --      Pain Score       No Pain           Vitals:    04/27/22 0818   BP: 129/81   Pulse: 75         Visual Acuity      ED Medications  Medications - No data to display    Diagnostic Studies  Results Reviewed     Procedure Component Value Units Date/Time    COVID/FLU/RSV - 2 hour TAT [[id number]]  (Normal) Collected: 04/27/22 0836    Lab Status: Final result Specimen: Nares from Nose Updated: 04/27/22 0917     SARS-CoV-2 Negative     INFLUENZA A PCR Negative     INFLUENZA B PCR Negative     RSV PCR Negative    Narrative:      FOR PEDIATRIC PATIENTS - copy/paste COVID Guidelines URL to browser: [email or web address]  [email or web address]    SARS-CoV-2 assay is a Nucleic Acid Amplification assay intended for the  qualitative detection of nucleic acid from SARS-CoV-2 in nasopharyngeal  swabs  Results are for the presumptive identification of SARS-CoV-2 RNA  Positive results are indicative of infection with SARS-CoV-2, the virus  causing COVID-19, but do not rule out bacterial infection or co-infection  with other viruses  Laboratories within the [place] and its  territories are required to report all positive results to the appropriate  public health authorities  Negative results do not preclude SARS-CoV-2  infection and should not be used as the sole basis for treatment or other  patient management decisions  Negative results must be combined with  clinical observations, patient history, and epidemiological information  This test has not been FDA cleared or approved  This test has been authorized by FDA under an Emergency Use Authorization  (EUA)  This test is only authorized for the duration of time the  declaration that circumstances exist justifying the authorization of the  emergency use of an in vitro diagnostic tests for detection of SARS-CoV-2  virus and/or diagnosis of COVID-19 infection under section 564(b)(1) of  the Act, 21 U  S C  200RTY-7(B)(3), unless the authorization is terminated  or revoked sooner  The test has been validated but independent review by FDA  and CLIA is pending  Test performed using [employer] GeneXpert: This RT-PCR assay targets N2,  a region unique to SARS-CoV-2  A conserved region in the E-gene was chosen  for pan-Sarbecovirus detection which includes SARS-CoV-2                   XR chest 1 view portable   Final Result by Jef Sneed DO (04/27 3331) No acute cardiopulmonary disease  Workstation performed: [id number]                    Procedures  Procedures         ED Course                               SBIRT 22yo+      Most Recent Value   SBIRT (22 yo +)    In order to provide better care to our patients, we are screening all of our patients for alcohol and drug use  Would it be okay to ask you these screening questions? Yes Filed at: 04/27/2022 6143   Initial Alcohol Screen: US AUDIT-C     1  How often do you have a drink containing alcohol? 0 Filed at: 04/27/2022 0821   2  How many drinks containing alcohol do you have on a typical day you are drinking? 0 Filed at: 04/27/2022 0821   3a  Male UNDER 65: How often do you have five or more drinks on one occasion? 0 Filed at: 04/27/2022 0821   3b  FEMALE Any Age, or MALE 65+: How often do you have 4 or more drinks on one occassion? 0 Filed at: 04/27/2022 0862   Audit-C Score 0 Filed at: 04/27/2022 9029   IKE: How many times in the past year have you    Used an illegal drug or used a prescription medication for non-medical reasons? Never Filed at: 04/27/2022 2447                    MDM  Number of Diagnoses or Management Options  Cough  Diagnosis management comments: Patient well-appearing in no acute distress  No episodes of coughing noted while the patient was in the ED  COVID flu testing as well as RSV are negative  Chest x-ray unremarkable for acute findings  Recommend supportive care at home  Patient educated regarding their diagnosis and given return and follow-up instructions  Patient was advised to returned to the ED with worsening symptoms or concerns  Patient is understanding of and in agreement with the treatment plan  There are no questions at the time of discharge         Amount and/or Complexity of Data Reviewed  Clinical lab tests: ordered and reviewed  Tests in the radiology section of CPT®: ordered and reviewed    Risk of Complications, Morbidity, and/or Mortality  Presenting problems: low  Diagnostic procedures: low  Management options: low    Patient Progress  Patient progress: stable      Disposition  Final diagnoses:   Cough     Time reflects when diagnosis was documented in both MDM as applicable and the Disposition within this note     Time User Action Codes Description Comment    4/27/2022  9:24 AM Darlene Ortega Add [R05 9] Cough       ED Disposition     ED Disposition Condition Date/Time Comment    Discharge Stable Wed Apr 27, 2022  9:24 AM Ada Reis discharge to home/self care  Follow-up Information     Follow up With Specialties Details Why Contact Info    Mya Bean, DO Internal Medicine   [address] [place] [address]  [phone]            Discharge Medication List as of 4/27/2022  9:25 AM      CONTINUE these medications which have NOT CHANGED    Details   acetaminophen (TYLENOL) 325 mg tablet Take 2 tablets (650 mg total) by mouth every 6 (six) hours as needed for mild pain, Starting Mon 8/2/2021, Normal      amLODIPine (NORVASC) 5 mg tablet Take 1 tablet (5 mg total) by mouth daily, Starting Wed 12/8/2021, Normal      aspirin 81 mg chewable tablet Chew 1 tablet (81 mg total) daily, Starting Mon 2/7/2022, Normal      atorvastatin (LIPITOR) 40 mg tablet Take 1 tablet (40 mg total) by mouth every evening, Starting Mon 2/7/2022, Normal      clotrimazole-betamethasone (LOTRISONE) 1-0 05 % cream Apply topically 2 (two) times a day, Starting Tue 7/13/2021, Print      docusate sodium (COLACE) 100 mg capsule Take 1 capsule (100 mg total) by mouth 2 (two) times a day, Starting Mon 8/2/2021, Normal      hydrochlorothiazide (HYDRODIURIL) 25 mg tablet Take 1 tablet (25 mg total) by mouth daily, Starting Mon 2/7/2022, Normal      sertraline (ZOLOFT) 50 mg tablet Take 1 tablet (50 mg total) by mouth daily at bedtime, Starting Wed 12/8/2021, Normal             No discharge procedures on file      PDMP Review     None ED Provider  Electronically Signed by           Bryson Jara PA-C  04/27/22 0811

## 2022-04-27 NOTE — ED NOTES
Call made to [facility] for patient transport back to facility      Michelle Valdez RN  04/27/22 1938

## 2022-04-29 ENCOUNTER — HOSPITAL ENCOUNTER (INPATIENT)
Facility: HOSPITAL | Age: 87
LOS: 5 days | Discharge: HOME HEALTH | DRG: 640 | End: 2022-05-05
Attending: EMERGENCY MEDICINE | Admitting: INTERNAL MEDICINE
Payer: MEDICARE

## 2022-04-29 ENCOUNTER — APPOINTMENT (EMERGENCY)
Dept: CT IMAGING | Facility: HOSPITAL | Age: 87
DRG: 640 | End: 2022-04-29
Payer: MEDICARE

## 2022-04-29 ENCOUNTER — APPOINTMENT (EMERGENCY)
Dept: RADIOLOGY | Facility: HOSPITAL | Age: 87
DRG: 640 | End: 2022-04-29
Payer: MEDICARE

## 2022-04-29 DIAGNOSIS — R41.82 CHANGE IN MENTAL STATUS: ICD-10-CM

## 2022-04-29 DIAGNOSIS — Z86.59 HISTORY OF SUICIDAL IDEATION: ICD-10-CM

## 2022-04-29 DIAGNOSIS — R53.83 FATIGUE: ICD-10-CM

## 2022-04-29 DIAGNOSIS — R53.1 WEAKNESS: Primary | ICD-10-CM

## 2022-04-29 DIAGNOSIS — F39 UNSPECIFIED MOOD (AFFECTIVE) DISORDER (HCC): ICD-10-CM

## 2022-04-29 PROBLEM — E87.1 HYPONATREMIA: Status: ACTIVE | Noted: 2022-04-29

## 2022-04-29 LAB
2HR DELTA HS TROPONIN: 0 NG/L
4HR DELTA HS TROPONIN: -2 NG/L
ALBUMIN SERPL BCP-MCNC: 3.8 G/DL (ref 3.5–5)
ALP SERPL-CCNC: 96 U/L (ref 34–104)
ALT SERPL W P-5'-P-CCNC: 33 U/L (ref 7–52)
AMORPH PHOS CRY URNS QL MICRO: ABNORMAL /HPF
ANION GAP SERPL CALCULATED.3IONS-SCNC: 11 MMOL/L (ref 4–13)
AST SERPL W P-5'-P-CCNC: 38 U/L (ref 13–39)
BACTERIA UR QL AUTO: ABNORMAL /HPF
BASOPHILS # BLD AUTO: 0.04 THOUSANDS/ΜL (ref 0–0.1)
BASOPHILS NFR BLD AUTO: 0 % (ref 0–1)
BILIRUB SERPL-MCNC: 1.15 MG/DL (ref 0.2–1)
BILIRUB UR QL STRIP: ABNORMAL
BILIRUB UR QL STRIP: ABNORMAL
BUN SERPL-MCNC: 13 MG/DL (ref 5–25)
CALCIUM SERPL-MCNC: 9.5 MG/DL (ref 8.4–10.2)
CARDIAC TROPONIN I PNL SERPL HS: 5 NG/L
CARDIAC TROPONIN I PNL SERPL HS: 7 NG/L
CARDIAC TROPONIN I PNL SERPL HS: 7 NG/L
CHLORIDE SERPL-SCNC: 91 MMOL/L (ref 96–108)
CLARITY UR: ABNORMAL
CLARITY UR: ABNORMAL
CO2 SERPL-SCNC: 25 MMOL/L (ref 21–32)
COLOR UR: YELLOW
COLOR UR: YELLOW
CREAT SERPL-MCNC: 0.8 MG/DL (ref 0.6–1.3)
D DIMER PPP FEU-MCNC: 1.85 UG/ML FEU
EOSINOPHIL # BLD AUTO: 0.11 THOUSAND/ΜL (ref 0–0.61)
EOSINOPHIL NFR BLD AUTO: 1 % (ref 0–6)
ERYTHROCYTE [DISTWIDTH] IN BLOOD BY AUTOMATED COUNT: 12.5 % (ref 11.6–15.1)
FLUAV RNA RESP QL NAA+PROBE: NEGATIVE
FLUBV RNA RESP QL NAA+PROBE: NEGATIVE
GFR SERPL CREATININE-BSD FRML MDRD: 64 ML/MIN/1.73SQ M
GLUCOSE SERPL-MCNC: 76 MG/DL (ref 65–140)
GLUCOSE UR STRIP-MCNC: NEGATIVE MG/DL
GLUCOSE UR STRIP-MCNC: NEGATIVE MG/DL
HCT VFR BLD AUTO: 43.8 % (ref 34.8–46.1)
HGB BLD-MCNC: 14.8 G/DL (ref 11.5–15.4)
HGB UR QL STRIP.AUTO: NEGATIVE
HGB UR QL STRIP.AUTO: NEGATIVE
HOLD SPECIMEN: NORMAL
IMM GRANULOCYTES # BLD AUTO: 0.1 THOUSAND/UL (ref 0–0.2)
IMM GRANULOCYTES NFR BLD AUTO: 1 % (ref 0–2)
KETONES UR STRIP-MCNC: ABNORMAL MG/DL
KETONES UR STRIP-MCNC: ABNORMAL MG/DL
LEUKOCYTE ESTERASE UR QL STRIP: NEGATIVE
LEUKOCYTE ESTERASE UR QL STRIP: NEGATIVE
LIPASE SERPL-CCNC: 16 U/L (ref 11–82)
LYMPHOCYTES # BLD AUTO: 1.34 THOUSANDS/ΜL (ref 0.6–4.47)
LYMPHOCYTES NFR BLD AUTO: 10 % (ref 14–44)
MAGNESIUM SERPL-MCNC: 1.8 MG/DL (ref 1.9–2.7)
MCH RBC QN AUTO: 30.3 PG (ref 26.8–34.3)
MCHC RBC AUTO-ENTMCNC: 33.8 G/DL (ref 31.4–37.4)
MCV RBC AUTO: 90 FL (ref 82–98)
MONOCYTES # BLD AUTO: 1.2 THOUSAND/ΜL (ref 0.17–1.22)
MONOCYTES NFR BLD AUTO: 9 % (ref 4–12)
NEUTROPHILS # BLD AUTO: 11.04 THOUSANDS/ΜL (ref 1.85–7.62)
NEUTS SEG NFR BLD AUTO: 79 % (ref 43–75)
NITRITE UR QL STRIP: NEGATIVE
NITRITE UR QL STRIP: NEGATIVE
NON-SQ EPI CELLS URNS QL MICRO: ABNORMAL /HPF
NRBC BLD AUTO-RTO: 0 /100 WBCS
PH UR STRIP.AUTO: 7 [PH]
PH UR STRIP.AUTO: 7 [PH]
PLATELET # BLD AUTO: 245 THOUSANDS/UL (ref 149–390)
PMV BLD AUTO: 8.9 FL (ref 8.9–12.7)
POTASSIUM SERPL-SCNC: 3.4 MMOL/L (ref 3.5–5.3)
PROT SERPL-MCNC: 6.9 G/DL (ref 6.4–8.4)
PROT UR STRIP-MCNC: NEGATIVE MG/DL
PROT UR STRIP-MCNC: NEGATIVE MG/DL
RBC # BLD AUTO: 4.89 MILLION/UL (ref 3.81–5.12)
RBC #/AREA URNS AUTO: ABNORMAL /HPF
RSV RNA RESP QL NAA+PROBE: NEGATIVE
S PYO DNA THROAT QL NAA+PROBE: NOT DETECTED
SARS-COV-2 RNA RESP QL NAA+PROBE: NEGATIVE
SODIUM SERPL-SCNC: 127 MMOL/L (ref 135–147)
SP GR UR STRIP.AUTO: 1.02 (ref 1–1.03)
SP GR UR STRIP.AUTO: 1.02 (ref 1–1.03)
TSH SERPL DL<=0.05 MIU/L-ACNC: 0.47 UIU/ML (ref 0.45–4.5)
UROBILINOGEN UR QL STRIP.AUTO: 2 E.U./DL
UROBILINOGEN UR QL STRIP.AUTO: 2 E.U./DL
WBC # BLD AUTO: 13.83 THOUSAND/UL (ref 4.31–10.16)
WBC #/AREA URNS AUTO: ABNORMAL /HPF

## 2022-04-29 PROCEDURE — 0241U HB NFCT DS VIR RESP RNA 4 TRGT: CPT | Performed by: STUDENT IN AN ORGANIZED HEALTH CARE EDUCATION/TRAINING PROGRAM

## 2022-04-29 PROCEDURE — 80053 COMPREHEN METABOLIC PANEL: CPT | Performed by: PHYSICIAN ASSISTANT

## 2022-04-29 PROCEDURE — 87651 STREP A DNA AMP PROBE: CPT | Performed by: PHYSICIAN ASSISTANT

## 2022-04-29 PROCEDURE — 36415 COLL VENOUS BLD VENIPUNCTURE: CPT | Performed by: PHYSICIAN ASSISTANT

## 2022-04-29 PROCEDURE — 71045 X-RAY EXAM CHEST 1 VIEW: CPT

## 2022-04-29 PROCEDURE — 83690 ASSAY OF LIPASE: CPT | Performed by: PHYSICIAN ASSISTANT

## 2022-04-29 PROCEDURE — 85379 FIBRIN DEGRADATION QUANT: CPT | Performed by: STUDENT IN AN ORGANIZED HEALTH CARE EDUCATION/TRAINING PROGRAM

## 2022-04-29 PROCEDURE — 84484 ASSAY OF TROPONIN QUANT: CPT | Performed by: STUDENT IN AN ORGANIZED HEALTH CARE EDUCATION/TRAINING PROGRAM

## 2022-04-29 PROCEDURE — 96360 HYDRATION IV INFUSION INIT: CPT

## 2022-04-29 PROCEDURE — 70450 CT HEAD/BRAIN W/O DYE: CPT

## 2022-04-29 PROCEDURE — 83735 ASSAY OF MAGNESIUM: CPT | Performed by: PHYSICIAN ASSISTANT

## 2022-04-29 PROCEDURE — 1123F ACP DISCUSS/DSCN MKR DOCD: CPT | Performed by: PHYSICIAN ASSISTANT

## 2022-04-29 PROCEDURE — 99285 EMERGENCY DEPT VISIT HI MDM: CPT | Performed by: PHYSICIAN ASSISTANT

## 2022-04-29 PROCEDURE — 81003 URINALYSIS AUTO W/O SCOPE: CPT | Performed by: PHYSICIAN ASSISTANT

## 2022-04-29 PROCEDURE — 85025 COMPLETE CBC W/AUTO DIFF WBC: CPT | Performed by: PHYSICIAN ASSISTANT

## 2022-04-29 PROCEDURE — 87040 BLOOD CULTURE FOR BACTERIA: CPT | Performed by: STUDENT IN AN ORGANIZED HEALTH CARE EDUCATION/TRAINING PROGRAM

## 2022-04-29 PROCEDURE — 99220 PR INITIAL OBSERVATION CARE/DAY 70 MINUTES: CPT | Performed by: STUDENT IN AN ORGANIZED HEALTH CARE EDUCATION/TRAINING PROGRAM

## 2022-04-29 PROCEDURE — 96361 HYDRATE IV INFUSION ADD-ON: CPT

## 2022-04-29 PROCEDURE — 81001 URINALYSIS AUTO W/SCOPE: CPT | Performed by: PHYSICIAN ASSISTANT

## 2022-04-29 PROCEDURE — 99285 EMERGENCY DEPT VISIT HI MDM: CPT

## 2022-04-29 PROCEDURE — 84443 ASSAY THYROID STIM HORMONE: CPT | Performed by: STUDENT IN AN ORGANIZED HEALTH CARE EDUCATION/TRAINING PROGRAM

## 2022-04-29 PROCEDURE — 93005 ELECTROCARDIOGRAM TRACING: CPT

## 2022-04-29 RX ORDER — POTASSIUM CHLORIDE 20 MEQ/1
40 TABLET, EXTENDED RELEASE ORAL ONCE
Status: COMPLETED | OUTPATIENT
Start: 2022-04-29 | End: 2022-04-29

## 2022-04-29 RX ORDER — CEFTRIAXONE 1 G/50ML
1000 INJECTION, SOLUTION INTRAVENOUS EVERY 24 HOURS
Status: COMPLETED | OUTPATIENT
Start: 2022-04-29 | End: 2022-05-01

## 2022-04-29 RX ORDER — MAGNESIUM SULFATE HEPTAHYDRATE 40 MG/ML
2 INJECTION, SOLUTION INTRAVENOUS ONCE
Status: COMPLETED | OUTPATIENT
Start: 2022-04-29 | End: 2022-04-29

## 2022-04-29 RX ADMIN — SODIUM CHLORIDE 1000 ML: 0.9 INJECTION, SOLUTION INTRAVENOUS at 17:24

## 2022-04-29 RX ADMIN — POTASSIUM CHLORIDE 40 MEQ: 1500 TABLET, EXTENDED RELEASE ORAL at 17:22

## 2022-04-29 RX ADMIN — MAGNESIUM SULFATE 2 G: 2 INJECTION INTRAVENOUS at 18:29

## 2022-04-29 RX ADMIN — SODIUM CHLORIDE 500 ML: 0.9 INJECTION, SOLUTION INTRAVENOUS at 12:00

## 2022-04-29 RX ADMIN — CEFTRIAXONE 1000 MG: 1 INJECTION, SOLUTION INTRAVENOUS at 17:22

## 2022-04-29 RX ADMIN — SODIUM CHLORIDE 500 ML: 0.9 INJECTION, SOLUTION INTRAVENOUS at 15:28

## 2022-04-29 NOTE — H&P
[facility] 1/28/1930, 80 y o  female MRN: [id number]  Unit/Bed#: Z1 H1 Encounter: [id number]  Primary Care Provider: Christy Canales DO   Date and time admitted to hospital: 4/29/2022 10:45 AM    * Generalized weakness  Assessment & Plan  Patient sent to ED for evaluation generalized weakness and possible altered mental status  CT head negative for any acute intracranial processes  Lab work remarkable for hyponatremia and leukocytosis  CXR revealed no acute cardiopulmonary processes  UA with WBCs 0-1 and occasional bacteria    -procalcitonin  -troponins  -check TSH, D-dimer  -blood cultures  -normal saline bolus  -PT/OT consulted, appreciate recommendations  -repeat labs in a m    -Rocephin for now, UA not overtly suggestive of UTI however given leukocytosis and functional decline will treat empirically          Hyponatremia  Assessment & Plan  Sodium of 127 on admission  Suspect likely secondary to poor oral intake over past several days    -IV fluids  -BMP    AMS (altered mental status)  Assessment & Plan  Patient was staffed also reports patient's mood is different than normal  CT head negative for any acute intracranial processes  Leukocytosis noted on CBC    -continue to monitor    Current moderate episode of major depressive disorder without prior episode ([facility] [address] )  Assessment & Plan  -continue home Zoloft 50 mg p o  daily    Essential hypertension  Assessment & Plan  Patient's blood pressure soft on admission,  - continue to hold home amlodipine and HCTZ    VTE Pharmacologic Prophylaxis: VTE Score: 6 High Risk (Score >/= 5) - Pharmacological DVT Prophylaxis Ordered: enoxaparin (Lovenox)  Sequential Compression Devices Ordered  Code Status:  Level 1 full code  Discussion with family:  Attempted to reach patient's listed contact in chart, unable to reach at this time      Anticipated Length of Stay: Patient will be admitted on an observation basis with an anticipated length of stay of less than 2 midnights secondary to Generalized weakness  Total Time for Visit, including Counseling / Coordination of Care: 45 minutes Greater than 50% of this total time spent on direct patient counseling and coordination of care  Chief Complaint:  Generalized weakness    History of Present Illness:  Sharon Watson is a 80 y o  female with a PMH of hypertension, mild cognitive decline, depression who presents with generalized weakness  As per sign out be EMS patient presenting with generalized weakness and questionable altered mental status  Patient is poor historian, however does states she feels weak and has had difficulty walking  Denies any other symptoms at this time  Patient was seen in ED by same provider that she saw 2 days ago for evaluation of cough  States there is a clear difference in patient's ability to ambulate and overall     ED course:  CT head negative for any acute intracranial processes  Chest x-ray unremarkable for any acute cardiopulmonary processes  Lab work revealed leukocytosis and hyponatremia  Review of Systems:  Review of Systems   Constitutional: Positive for fatigue  HENT: Negative  Respiratory: Negative  Cardiovascular: Negative  Gastrointestinal: Negative  Genitourinary: Negative  Musculoskeletal: Negative  Skin: Negative  Neurological: Negative  Hematological: Negative  Psychiatric/Behavioral: Negative          Past Medical and Surgical History:   Past Medical History:   Diagnosis Date    Arthritis     Cataract     LAST ASSESSED: [id number]    Depression     Dislocation of left shoulder joint     LAST ASSESSSED: [id number]    Essential hypertension 4/3/2019    Gait disorder     LAST ASSESSED: [id number]    Herpes zoster     LAST ASSESSED: [id number]    HL (hearing loss)     Impacted cerumen of both ears     LAST ASSESSED: 40HHI0399    Impacted cerumen of right ear     LAST ASSESSED: [id number]    Lightheadedness     MILD AND PT REFUSES EKG ETC  PT PROMISES TO CALL IF WORSENS  LAST ASSESSED: 82TSI4364    Multiple falls     LAST ASSESSED: 78CVQ0614    Near syncope     LAST ASSESSED: [id number]    Postherpetic polyneuropathy     LAST ASSESSED: 97COZ0567       Past Surgical History:   Procedure Laterality Date    CATARACT EXTRACTION, BILATERAL      HERNIA REPAIR      TONSILLECTOMY      TUBAL LIGATION         Meds/Allergies:  Prior to Admission medications    Medication Sig Start Date End Date Taking? Authorizing Provider   acetaminophen (TYLENOL) 325 mg tablet Take 2 tablets (650 mg total) by mouth every 6 (six) hours as needed for mild pain 8/2/21   Reyes Ganser, DO   amLODIPine (NORVASC) 5 mg tablet Take 1 tablet (5 mg total) by mouth daily 12/8/21   Reyes Ganser, DO   aspirin 81 mg chewable tablet Chew 1 tablet (81 mg total) daily 2/7/22   Reyes Ganser, DO   atorvastatin (LIPITOR) 40 mg tablet Take 1 tablet (40 mg total) by mouth every evening 2/7/22   Reyes Ganser, DO   clotrimazole-betamethasone (LOTRISONE) 1-0 05 % cream Apply topically 2 (two) times a day 7/13/21   Reyes Ganser, DO   docusate sodium (COLACE) 100 mg capsule Take 1 capsule (100 mg total) by mouth 2 (two) times a day 8/2/21   Reyes Ganser, DO   hydrochlorothiazide (HYDRODIURIL) 25 mg tablet Take 1 tablet (25 mg total) by mouth daily 2/7/22   Reyes Ganser, DO   sertraline (ZOLOFT) 50 mg tablet Take 1 tablet (50 mg total) by mouth daily at bedtime 12/8/21   Reyes Ganser, DO     I have reviewed home medications using recent Epic encounter  Allergies: No Known Allergies    Social History:  Marital Status:     Occupation:  Retired  Patient Pre-hospital Living Situation:  Personal care facility  Patient Pre-hospital Level of Mobility:  Walker  Patient Pre-hospital Diet Restrictions:  None  Substance Use History:   Social History     Substance and Sexual Activity   Alcohol Use Never     Social History     Tobacco Use Smoking Status Never Smoker   Smokeless Tobacco Never Used     Social History     Substance and Sexual Activity   Drug Use No       Family History:  Family History   Problem Relation Age of Onset    Cirrhosis Father     Heart disease Son     Mental illness Son        Physical Exam:     Vitals:   Blood Pressure: 100/66 (04/29/22 1300)  Pulse: 67 (04/29/22 1300)  Temperature: 97 6 °F (36 4 °C) (04/29/22 1056)  Temp Source: Tympanic (04/29/22 1056)  Respirations: 18 (04/29/22 1300)  Height: 5' 2" (157 5 cm) (04/29/22 1056)  Weight - Scale: 61 2 kg (135 lb) (04/29/22 1056)  SpO2: 95 % (04/29/22 1300)    Physical Exam  Constitutional:       Appearance: She is ill-appearing  HENT:      Head: Normocephalic  Cardiovascular:      Rate and Rhythm: Normal rate and regular rhythm  Pulses: Normal pulses  Heart sounds: Normal heart sounds  Pulmonary:      Effort: Pulmonary effort is normal       Breath sounds: Normal breath sounds  Abdominal:      General: Abdomen is flat  Bowel sounds are normal       Palpations: Abdomen is soft  Musculoskeletal:         General: Normal range of motion  Skin:     General: Skin is warm and dry  Neurological:      General: No focal deficit present  Mental Status: She is alert and oriented to person, place, and time  Mental status is at baseline     Psychiatric:         Mood and Affect: Mood normal           Additional Data:     Lab Results:  Results from last 7 days   Lab Units 04/29/22  1137   WBC Thousand/uL 13 83*   HEMOGLOBIN g/dL 14 8   HEMATOCRIT % 43 8   PLATELETS Thousands/uL 245   NEUTROS PCT % 79*   LYMPHS PCT % 10*   MONOS PCT % 9   EOS PCT % 1     Results from last 7 days   Lab Units 04/29/22  1137   SODIUM mmol/L 127*   POTASSIUM mmol/L 3 4*   CHLORIDE mmol/L 91*   CO2 mmol/L 25   BUN mg/dL 13   CREATININE mg/dL 0 80   ANION GAP mmol/L 11   CALCIUM mg/dL 9 5   ALBUMIN g/dL 3 8   TOTAL BILIRUBIN mg/dL 1 15*   ALK PHOS U/L 96   ALT U/L 33   AST U/L 38 GLUCOSE RANDOM mg/dL 76                       Imaging: No pertinent imaging reviewed  CT head without contrast   Final Result by Carlos Roberts MD (04/29 1518)      No acute intracranial abnormality  Workstation performed: [id number]         XR chest 1 view portable   Final Result by Peng Ambrose MD (04/29 1505)      No acute cardiopulmonary disease  Workstation performed: [id number]             EKG and Other Studies Reviewed on Admission:   · EKG:  Pending    ** Please Note: This note has been constructed using a voice recognition system   **

## 2022-04-29 NOTE — ASSESSMENT & PLAN NOTE
Patient sent to ED for evaluation generalized weakness and possible altered mental status  CT head negative for any acute intracranial processes    Lab work remarkable for hyponatremia and leukocytosis  CXR revealed no acute cardiopulmonary processes  UA with WBCs 0-1 and occasional bacteria    -procalcitonin  -troponins  -check TSH, D-dimer  -blood cultures  -normal saline bolus  -PT/OT consulted, appreciate recommendations  -repeat labs in a m    -Rochin for now, UA not overtly suggestive of UTI however given leukocytosis and functional decline will treat empirically

## 2022-04-29 NOTE — ED NOTES
Pt attempting to ambulate  Reports dizziness, had to stop  Pa aware        Eufemia Gambino RN  04/29/22 3128

## 2022-04-29 NOTE — ASSESSMENT & PLAN NOTE
Sodium of 127 on admission  Suspect likely secondary to poor oral intake over past several days    -IV fluids  -BMP

## 2022-04-29 NOTE — ASSESSMENT & PLAN NOTE
Patient was staffed also reports patient's mood is different than normal  CT head negative for any acute intracranial processes  Leukocytosis noted on CBC    -continue to monitor

## 2022-04-29 NOTE — ED PROVIDER NOTES
History  Chief Complaint   Patient presents with    Weakness - Generalized     55-year-old female presents to the emergency department seeking evaluation for generalized weakness and fatigue  She was sent to the ER from a personal care facility  Patient overall is well-appearing in no acute distress at rest   Patient does appear fatigued  Patient was recently seen and evaluated at this facility for cough few days ago  Patient was well at that time and subsequently return to her facility  Patient is pleasantly confused  At this time she does not appear to be a reliable historian  I was able to call the patient's assisted living facility in speak with staff they stated they expressed their concern for worsening weakness and fatigue and it was their observation that she has worsening confusion  Facility staff expressed concern for potentially undiagnosed underlying illness  They states that she needs significantly more assistance ambulating that she used to that she was largely independent  Allergies reviewed          Prior to Admission Medications   Prescriptions Last Dose Informant Patient Reported?  Taking?   acetaminophen (TYLENOL) 325 mg tablet   No No   Sig: Take 2 tablets (650 mg total) by mouth every 6 (six) hours as needed for mild pain   amLODIPine (NORVASC) 5 mg tablet   No No   Sig: Take 1 tablet (5 mg total) by mouth daily   aspirin 81 mg chewable tablet   No No   Sig: Chew 1 tablet (81 mg total) daily   atorvastatin (LIPITOR) 40 mg tablet   No No   Sig: Take 1 tablet (40 mg total) by mouth every evening   clotrimazole-betamethasone (LOTRISONE) 1-0 05 % cream   No No   Sig: Apply topically 2 (two) times a day   docusate sodium (COLACE) 100 mg capsule   No No   Sig: Take 1 capsule (100 mg total) by mouth 2 (two) times a day   hydrochlorothiazide (HYDRODIURIL) 25 mg tablet   No No   Sig: Take 1 tablet (25 mg total) by mouth daily   sertraline (ZOLOFT) 50 mg tablet   No No   Sig: Take 1 tablet (50 mg total) by mouth daily at bedtime      Facility-Administered Medications: None       Past Medical History:   Diagnosis Date    Arthritis     Cataract     LAST ASSESSED: [id number]    Depression     Dislocation of left shoulder joint     LAST ASSESSSED: [id number]    Essential hypertension 4/3/2019    Gait disorder     LAST ASSESSED: [id number]    Herpes zoster     LAST ASSESSED: [id number]    HL (hearing loss)     Impacted cerumen of both ears     LAST ASSESSED: [id number]    Impacted cerumen of right ear     LAST ASSESSED: [id number]    Lightheadedness     MILD AND PT REFUSES EKG ETC  PT PROMISES TO CALL IF WORSENS  LAST ASSESSED: [id number]    Multiple falls     LAST ASSESSED: [id number]    Near syncope     LAST ASSESSED: [id number]    Postherpetic polyneuropathy     LAST ASSESSED: [id number]       Past Surgical History:   Procedure Laterality Date    CATARACT EXTRACTION, BILATERAL      HERNIA REPAIR      TONSILLECTOMY      TUBAL LIGATION         Family History   Problem Relation Age of Onset    Cirrhosis Father     Heart disease Son     Mental illness Son      I have reviewed and agree with the history as documented  E-Cigarette/Vaping    E-Cigarette Use Never User      E-Cigarette/Vaping Substances     Social History     Tobacco Use    Smoking status: Never Smoker    Smokeless tobacco: Never Used   Vaping Use    Vaping Use: Never used   Substance Use Topics    Alcohol use: Never    Drug use: No       Review of Systems   Unable to perform ROS: Dementia   Constitutional: Positive for fatigue  Neurological: Positive for weakness (Generalized)  Physical Exam  Physical Exam  Vitals and nursing note reviewed  Constitutional:       General: She is not in acute distress  Appearance: She is well-developed  She is not diaphoretic  HENT:      Head: Normocephalic and atraumatic        Right Ear: External ear normal       Left Ear: External ear normal       Nose: Nose normal    Eyes: Conjunctiva/sclera: Conjunctivae normal       Pupils: Pupils are equal, round, and reactive to light  Cardiovascular:      Rate and Rhythm: Normal rate and regular rhythm  Heart sounds: Normal heart sounds  No murmur heard  No friction rub  No gallop  Pulmonary:      Effort: Pulmonary effort is normal  No respiratory distress  Breath sounds: Normal breath sounds  No stridor  No wheezing or rales  Abdominal:      General: Bowel sounds are normal  There is no distension  Palpations: Abdomen is soft  Tenderness: There is no abdominal tenderness  There is no guarding  Musculoskeletal:         General: No tenderness  Normal range of motion  Cervical back: Normal range of motion  Skin:     General: Skin is warm  Capillary Refill: Capillary refill takes less than 2 seconds  Neurological:      Mental Status: She is alert and oriented to person, place, and time  Vital Signs  ED Triage Vitals [04/29/22 1056]   Temperature Pulse Respirations Blood Pressure SpO2   97 6 °F (36 4 °C) 77 16 107/72 96 %      Temp Source Heart Rate Source Patient Position - Orthostatic VS BP Location FiO2 (%)   Tympanic Monitor Lying Left arm --      Pain Score       No Pain           Vitals:    04/29/22 1056 04/29/22 1300   BP: 107/72 100/66   Pulse: 77 67   Patient Position - Orthostatic VS: Lying          Visual Acuity      ED Medications  Medications   sodium chloride 0 9 % bolus 500 mL (500 mL Intravenous New Bag 4/29/22 1528)   sodium chloride 0 9 % bolus 500 mL (0 mL Intravenous Stopped 4/29/22 1335)       Diagnostic Studies  Results Reviewed     Procedure Component Value Units Date/Time    Strep A PCR [[id number]] Collected: 04/29/22 1529    Lab Status:  In process Specimen: Throat Updated: 04/29/22 1534    Urinalysis with microscopic [[id number]]  (Abnormal) Collected: 04/29/22 1231    Lab Status: Final result Specimen: Urine, Straight Cath Updated: 04/29/22 1433     Clarity, UA Hazy Color, UA Yellow     Specific Gravity, UA 1 020     pH, UA 7 0     Glucose, UA Negative mg/dl      Ketones, UA 15 (1+) mg/dl      Blood, UA Negative     Protein, UA Negative mg/dl      Nitrite, UA Negative     Bilirubin, UA 1+     Urobilinogen, UA 2 0 E U /dl      Leukocytes, UA Negative     WBC, UA 0-1 /hpf      RBC, UA 0-1 /hpf      Bacteria, UA Occasional /hpf      AMORPH PHOSPATES Occasional /hpf      Epithelial Cells Moderate /hpf     [place] draw [[id number]] Collected: 04/29/22 1137    Lab Status: Final result Specimen: Blood Updated: 04/29/22 1301    Narrative: The following orders were created for panel order [place] draw  Procedure                               Abnormality         Status                     ---------                               -----------         ------                     Tarik Deidra Top on TRQX[[id number]]                           Final result               Gold top on IPAC[[id number]]                                 Final result               Green / Yellow tube on BZHQ[[id number]]                      Final result               Green / Black tube on DANB[[id number]]                       Final result               Lavender Top 3 ml on hold[[id number]]                        Final result               Lavender Top 7ml on PLBY[[id number]]                         Final result                 Please view results for these tests on the individual orders      UA (URINE) with reflex to Scope [[id number]]  (Abnormal) Collected: 04/29/22 1231    Lab Status: Final result Specimen: Urine, Straight Cath Updated: 04/29/22 1255     Color, UA Yellow     Clarity, UA Hazy     Specific [place], UA 1 020     pH, UA 7 0     Leukocytes, UA Negative     Nitrite, UA Negative     Protein, UA Negative mg/dl      Glucose, UA Negative mg/dl      Ketones, UA 15 (1+) mg/dl      Urobilinogen, UA 2 0 E U /dl      Bilirubin, UA 1+     Blood, UA Negative    Comprehensive metabolic panel [[id number]]  (Abnormal) Collected: 04/29/22 1137    Lab Status: Final result Specimen: Blood from Line, Venous Updated: 04/29/22 1206     Sodium 127 mmol/L      Potassium 3 4 mmol/L      Chloride 91 mmol/L      CO2 25 mmol/L      ANION GAP 11 mmol/L      BUN 13 mg/dL      Creatinine 0 80 mg/dL      Glucose 76 mg/dL      Calcium 9 5 mg/dL      AST 38 U/L      ALT 33 U/L      Alkaline Phosphatase 96 U/L      Total Protein 6 9 g/dL      Albumin 3 8 g/dL      Total Bilirubin 1 15 mg/dL      eGFR 64 ml/min/1 73sq m     Narrative:      [facility] guidelines for Chronic Kidney Disease (CKD):     Stage 1 with normal or high GFR (GFR > 90 mL/min/1 73 square meters)    Stage 2 Mild CKD (GFR = 60-89 mL/min/1 73 square meters)    Stage 3A Moderate CKD (GFR = 45-59 mL/min/1 73 square meters)    Stage 3B Moderate CKD (GFR = 30-44 mL/min/1 73 square meters)    Stage 4 Severe CKD (GFR = 15-29 mL/min/1 73 square meters)    Stage 5 End Stage CKD (GFR <15 mL/min/1 73 square meters)  Note: GFR calculation is accurate only with a steady state creatinine    Magnesium [[id number]]  (Abnormal) Collected: 04/29/22 1137    Lab Status: Final result Specimen: Blood from Line, Venous Updated: 04/29/22 1206     Magnesium 1 8 mg/dL     Lipase [[id number]]  (Normal) Collected: 04/29/22 1137    Lab Status: Final result Specimen: Blood from Line, Venous Updated: 04/29/22 1206     Lipase 16 u/L     CBC and differential [[id number]]  (Abnormal) Collected: 04/29/22 1137    Lab Status: Final result Specimen: Blood from Line, Venous Updated: 04/29/22 1145     WBC 13 83 Thousand/uL      RBC 4 89 Million/uL      Hemoglobin 14 8 g/dL      Hematocrit 43 8 %      MCV 90 fL      MCH 30 3 pg      MCHC 33 8 g/dL      RDW 12 5 %      MPV 8 9 fL      Platelets 464 Thousands/uL      nRBC 0 /100 WBCs      Neutrophils Relative 79 %      Immat GRANS % 1 %      Lymphocytes Relative 10 %      Monocytes Relative 9 %      Eosinophils Relative 1 %      Basophils Relative 0 %      Neutrophils Absolute 11 04 Thousands/µL      Immature Grans Absolute 0 10 Thousand/uL      Lymphocytes Absolute 1 34 Thousands/µL      Monocytes Absolute 1 20 Thousand/µL      Eosinophils Absolute 0 11 Thousand/µL      Basophils Absolute 0 04 Thousands/µL                  CT head without contrast   Final Result by Jose Rodarte MD (04/29 1518)      No acute intracranial abnormality  Workstation performed: [id number]         XR chest 1 view portable   Final Result by Fransico Love MD (04/29 1505)      No acute cardiopulmonary disease  Workstation performed: [id number]                    Procedures  Procedures         ED Course  ED Course as of 04/29/22 1557   Fri Apr 29, 2022   1150 WBC(!): 13 83                                             MDM  Number of Diagnoses or Management Options  Change in mental status  Fatigue  Weakness  Diagnosis management comments: Patient appears well on physical exam however has significant difficulty ambulating and cannot go more than a few steps that becoming very dizzy needing to stop  Labs and imaging reviewed  Suspect some element of mild dehydration  Patient hydrate in the emergency department  Will admit for observation         Amount and/or Complexity of Data Reviewed  Clinical lab tests: ordered and reviewed  Tests in the radiology section of CPT®: ordered and reviewed    Risk of Complications, Morbidity, and/or Mortality  Presenting problems: moderate  Diagnostic procedures: low  Management options: low    Patient Progress  Patient progress: stable      Disposition  Final diagnoses:   Weakness   Fatigue   Change in mental status     Time reflects when diagnosis was documented in both MDM as applicable and the Disposition within this note     Time User Action Codes Description Comment    4/29/2022  3:39 PM Prudence Norse Add [R53 1] Weakness     4/29/2022  3:39 PM Prudence Norse Add [R53 83] Fatigue     4/29/2022 3:39 PM Mary Gibson Add [R41 28] Change in mental status       ED Disposition     ED Disposition Condition Date/Time Comment    Admit Stable Fri Apr 29, 2022  3:40 PM Case was discussed with Jacobo Freeman and the patient's admission status was agreed to be Admission Status: observation status to the service of Dr Alec Cavazos   Follow-up Information    None         Patient's Medications   Discharge Prescriptions    No medications on file       No discharge procedures on file      PDMP Review     None          ED Provider  Electronically Signed by           Lalit Love PA-C  04/29/22 155

## 2022-04-30 ENCOUNTER — APPOINTMENT (OUTPATIENT)
Dept: CT IMAGING | Facility: HOSPITAL | Age: 87
DRG: 640 | End: 2022-04-30
Payer: MEDICARE

## 2022-04-30 LAB
ANION GAP SERPL CALCULATED.3IONS-SCNC: 14 MMOL/L (ref 4–13)
ATRIAL RATE: 73 BPM
BUN SERPL-MCNC: 13 MG/DL (ref 5–25)
CALCIUM SERPL-MCNC: 9.4 MG/DL (ref 8.4–10.2)
CHLORIDE SERPL-SCNC: 94 MMOL/L (ref 96–108)
CO2 SERPL-SCNC: 22 MMOL/L (ref 21–32)
CREAT SERPL-MCNC: 0.62 MG/DL (ref 0.6–1.3)
ERYTHROCYTE [DISTWIDTH] IN BLOOD BY AUTOMATED COUNT: 12.8 % (ref 11.6–15.1)
GFR SERPL CREATININE-BSD FRML MDRD: 78 ML/MIN/1.73SQ M
GLUCOSE P FAST SERPL-MCNC: 62 MG/DL (ref 65–99)
GLUCOSE SERPL-MCNC: 62 MG/DL (ref 65–140)
HCT VFR BLD AUTO: 41.1 % (ref 34.8–46.1)
HGB BLD-MCNC: 14.1 G/DL (ref 11.5–15.4)
MAGNESIUM SERPL-MCNC: 1.9 MG/DL (ref 1.9–2.7)
MCH RBC QN AUTO: 30.6 PG (ref 26.8–34.3)
MCHC RBC AUTO-ENTMCNC: 34.3 G/DL (ref 31.4–37.4)
MCV RBC AUTO: 89 FL (ref 82–98)
P AXIS: 58 DEGREES
PLATELET # BLD AUTO: 224 THOUSANDS/UL (ref 149–390)
PMV BLD AUTO: 8.9 FL (ref 8.9–12.7)
POTASSIUM SERPL-SCNC: 3.3 MMOL/L (ref 3.5–5.3)
PR INTERVAL: 156 MS
QRS AXIS: -17 DEGREES
QRSD INTERVAL: 84 MS
QT INTERVAL: 416 MS
QTC INTERVAL: 458 MS
RBC # BLD AUTO: 4.61 MILLION/UL (ref 3.81–5.12)
SODIUM SERPL-SCNC: 130 MMOL/L (ref 135–147)
T WAVE AXIS: 16 DEGREES
TSH SERPL DL<=0.05 MIU/L-ACNC: 0.24 UIU/ML (ref 0.45–4.5)
VENTRICULAR RATE: 73 BPM
WBC # BLD AUTO: 13.38 THOUSAND/UL (ref 4.31–10.16)

## 2022-04-30 PROCEDURE — 99232 SBSQ HOSP IP/OBS MODERATE 35: CPT | Performed by: PHYSICIAN ASSISTANT

## 2022-04-30 PROCEDURE — G1004 CDSM NDSC: HCPCS

## 2022-04-30 PROCEDURE — 84443 ASSAY THYROID STIM HORMONE: CPT | Performed by: STUDENT IN AN ORGANIZED HEALTH CARE EDUCATION/TRAINING PROGRAM

## 2022-04-30 PROCEDURE — 70450 CT HEAD/BRAIN W/O DYE: CPT

## 2022-04-30 PROCEDURE — 85027 COMPLETE CBC AUTOMATED: CPT | Performed by: STUDENT IN AN ORGANIZED HEALTH CARE EDUCATION/TRAINING PROGRAM

## 2022-04-30 PROCEDURE — 36415 COLL VENOUS BLD VENIPUNCTURE: CPT | Performed by: STUDENT IN AN ORGANIZED HEALTH CARE EDUCATION/TRAINING PROGRAM

## 2022-04-30 PROCEDURE — 97110 THERAPEUTIC EXERCISES: CPT

## 2022-04-30 PROCEDURE — 83735 ASSAY OF MAGNESIUM: CPT | Performed by: STUDENT IN AN ORGANIZED HEALTH CARE EDUCATION/TRAINING PROGRAM

## 2022-04-30 PROCEDURE — 71275 CT ANGIOGRAPHY CHEST: CPT

## 2022-04-30 PROCEDURE — 97163 PT EVAL HIGH COMPLEX 45 MIN: CPT

## 2022-04-30 PROCEDURE — 80048 BASIC METABOLIC PNL TOTAL CA: CPT | Performed by: STUDENT IN AN ORGANIZED HEALTH CARE EDUCATION/TRAINING PROGRAM

## 2022-04-30 PROCEDURE — 93010 ELECTROCARDIOGRAM REPORT: CPT | Performed by: INTERNAL MEDICINE

## 2022-04-30 RX ORDER — ATORVASTATIN CALCIUM 40 MG/1
40 TABLET, FILM COATED ORAL EVERY EVENING
Status: DISCONTINUED | OUTPATIENT
Start: 2022-04-30 | End: 2022-05-05 | Stop reason: HOSPADM

## 2022-04-30 RX ORDER — DOCUSATE SODIUM 100 MG/1
100 CAPSULE, LIQUID FILLED ORAL 2 TIMES DAILY
Status: DISCONTINUED | OUTPATIENT
Start: 2022-04-30 | End: 2022-05-05 | Stop reason: HOSPADM

## 2022-04-30 RX ORDER — ENOXAPARIN SODIUM 100 MG/ML
40 INJECTION SUBCUTANEOUS DAILY
Status: DISCONTINUED | OUTPATIENT
Start: 2022-04-30 | End: 2022-05-05 | Stop reason: HOSPADM

## 2022-04-30 RX ORDER — ASPIRIN 81 MG/1
81 TABLET, CHEWABLE ORAL DAILY
Status: DISCONTINUED | OUTPATIENT
Start: 2022-04-30 | End: 2022-05-05 | Stop reason: HOSPADM

## 2022-04-30 RX ORDER — ACETAMINOPHEN 325 MG/1
650 TABLET ORAL EVERY 6 HOURS PRN
Status: DISCONTINUED | OUTPATIENT
Start: 2022-04-30 | End: 2022-05-05 | Stop reason: HOSPADM

## 2022-04-30 RX ADMIN — ATORVASTATIN CALCIUM 40 MG: 40 TABLET, FILM COATED ORAL at 18:10

## 2022-04-30 RX ADMIN — IOHEXOL 85 ML: 350 INJECTION, SOLUTION INTRAVENOUS at 11:54

## 2022-04-30 RX ADMIN — DOCUSATE SODIUM 100 MG: 100 CAPSULE, LIQUID FILLED ORAL at 08:39

## 2022-04-30 RX ADMIN — DOCUSATE SODIUM 100 MG: 100 CAPSULE, LIQUID FILLED ORAL at 18:10

## 2022-04-30 RX ADMIN — ENOXAPARIN SODIUM 40 MG: 100 INJECTION SUBCUTANEOUS at 08:39

## 2022-04-30 RX ADMIN — CEFTRIAXONE 1000 MG: 1 INJECTION, SOLUTION INTRAVENOUS at 18:10

## 2022-04-30 RX ADMIN — ASPIRIN 81 MG 81 MG: 81 TABLET ORAL at 08:39

## 2022-04-30 RX ADMIN — SERTRALINE HYDROCHLORIDE 50 MG: 50 TABLET ORAL at 22:15

## 2022-04-30 NOTE — PLAN OF CARE
Problem: PHYSICAL THERAPY ADULT  Goal: Performs mobility at highest level of function for planned discharge setting  See evaluation for individualized goals  Description: Treatment/Interventions: Functional transfer training,LE strengthening/ROM,Therapeutic exercise,Endurance training,Patient/family training,Equipment eval/education,Bed mobility,Gait training,Spoke to nursing,Continued evaluation  Equipment Recommended:  (TBD - continue with RW)       See flowsheet documentation for full assessment, interventions and recommendations  4/30/2022 1152 by Letty Neff PT  Note: Prognosis: Fair  Problem List: Decreased strength,Decreased endurance,Impaired balance,Decreased mobility,Decreased cognition,Decreased safety awareness  Assessment: Pt is 80 y o  female seen for high-complexity PT evaluation on 4/30/2022 s/p admit to [facility] on 4/29/2022 w/ Generalized weakness  PT was consulted to assess pt's functional mobility and d/c needs  Order placed for PT eval and tx  Unable to obtain any reliable PLOF/home environment info at time of PT eval, CM to follow up  At time of eval, pt requiring min A x2 for all phases of mobility, limited to short gait trial from EOB to recliner with RW  Upon evaluation, pt presenting with impaired functional mobility d/t decreased strength, decreased endurance, impaired balance, decreased mobility, decreased cognition and activity intolerance  Pertinent PMHx and current co-morbidities affecting pt's physical performance at time of assessment include: generalized weakness, HTN, current moderate episode of major depressive disorder, AMS, hyponatremia, hearing loss, gait abnormality, OA, spinal stenosis, B/L edema, BPPV, h/o CVA, urinary incontinence, mild neurocognitive disorder, h/o SI   Personal factors affecting pt at time of eval include: inability to ambulate household distances, inability to navigate level surfaces w/o external assistance, decreased cognition, limited home support, positive fall history, decreased initiation and engagement and limited insight into impairments  The following objective measures performed on IE also reveal limitations: Barthel Index: 25/100, Modified [place]: 4 (moderate/severe disability) and AM-PAC 6-Clicks: 07/45  Pt's clinical presentation is currently unstable/unpredictable seen in pt's presentation of advanced age, abnormal lab value(s), need for input for task focus and mobility technique and ongoing medical assessment  Overall, pt's rehab potential and prognosis to return to PLOF is fair as impacted by objective findings, warranting pt to receive further skilled PT interventions to address identified impairments, activity limitation(s), and participation restriction(s)  Pt to benefit from continued PT tx to address deficits as defined above and maximize level of functional independent mobility  From PT/mobility standpoint, recommendation at time of d/c would be post acute rehabilitation services pending progress in order to facilitate return to PLOF  Barriers to Discharge: Inaccessible home environment,Decreased caregiver support        PT Discharge Recommendation: Post acute rehabilitation services          See flowsheet documentation for full assessment  4/30/2022 1152 by Royal Venus PT  Note: Prognosis: Fair  Problem List: Decreased strength,Decreased endurance,Impaired balance,Decreased mobility,Decreased cognition,Decreased safety awareness  Assessment: Pt is 80 y o  female seen for high-complexity PT evaluation on 4/30/2022 s/p admit to [facility] on 4/29/2022 w/ Generalized weakness  PT was consulted to assess pt's functional mobility and d/c needs  Order placed for PT eval and tx  Unable to obtain any reliable PLOF/home environment info at time of PT eval, CM to follow up  At time of eval, pt requiring min A x2 for all phases of mobility, limited to short gait trial from EOB to recliner with RW   Upon evaluation, pt presenting with impaired functional mobility d/t decreased strength, decreased endurance, impaired balance, decreased mobility, decreased cognition and activity intolerance  Pertinent PMHx and current co-morbidities affecting pt's physical performance at time of assessment include: generalized weakness, HTN, current moderate episode of major depressive disorder, AMS, hyponatremia, hearing loss, gait abnormality, OA, spinal stenosis, B/L edema, BPPV, h/o CVA, urinary incontinence, mild neurocognitive disorder, h/o SI  Personal factors affecting pt at time of eval include: inability to ambulate household distances, inability to navigate level surfaces w/o external assistance, decreased cognition, limited home support, positive fall history, decreased initiation and engagement and limited insight into impairments  The following objective measures performed on IE also reveal limitations: Barthel Index: 25/100, Modified [place]: 4 (moderate/severe disability) and AM-PAC 6-Clicks: 19/40  Pt's clinical presentation is currently unstable/unpredictable seen in pt's presentation of advanced age, abnormal lab value(s), need for input for task focus and mobility technique and ongoing medical assessment  Overall, pt's rehab potential and prognosis to return to PLOF is fair as impacted by objective findings, warranting pt to receive further skilled PT interventions to address identified impairments, activity limitation(s), and participation restriction(s)  Pt to benefit from continued PT tx to address deficits as defined above and maximize level of functional independent mobility  From PT/mobility standpoint, recommendation at time of d/c would be post acute rehabilitation services pending progress in order to facilitate return to PLOF    Barriers to Discharge: Inaccessible home environment,Decreased caregiver support        PT Discharge Recommendation: Post acute rehabilitation services          See flowsheet documentation for full assessment

## 2022-04-30 NOTE — PHYSICAL THERAPY NOTE
Physical Therapy Evaluation   Time in: 1019  Time out: 1034  Total evaluation time: 15 minutes    Patient's Name: Russell Corral    Admitting Diagnosis  Weakness [R53 1]    Problem List  Patient Active Problem List   Diagnosis    Hearing loss    Gait abnormality    Osteoarthritis    Mediastinal mass    Spinal stenosis at L4-L5 level    Essential hypertension    Bilateral lower extremity edema    Benign paroxysmal positional vertigo    Urinary incontinence    History of CVA (cerebrovascular accident)    Right ankle sprain    Venous insufficiency of both lower extremities    Unspecified mood (affective) disorder, r/o major depressive disorder (single episode)    Mild neurocognitive disorder    Current moderate episode of major depressive disorder without prior episode ([facility] [address] )    History of suicidal ideation    Generalized weakness    AMS (altered mental status)    Hyponatremia       Past Medical History  Past Medical History:   Diagnosis Date    Arthritis     Cataract     LAST ASSESSED: [id number]    Depression     Dislocation of left shoulder joint     LAST ASSESSSED: [id number]    Essential hypertension 4/3/2019    Gait disorder     LAST ASSESSED: [id number]    Herpes zoster     LAST ASSESSED: [id number]    HL (hearing loss)     Impacted cerumen of both ears     LAST ASSESSED: [id number]    Impacted cerumen of right ear     LAST ASSESSED: [id number]    Lightheadedness     MILD AND PT REFUSES EKG ETC  PT PROMISES TO CALL IF WORSENS  LAST ASSESSED: [id number]    Multiple falls     LAST ASSESSED: [id number]    Near syncope     LAST ASSESSED: [id number]    Postherpetic polyneuropathy     LAST ASSESSED: [id number]       Past Surgical History  Past Surgical History:   Procedure Laterality Date    CATARACT EXTRACTION, BILATERAL      HERNIA REPAIR      TONSILLECTOMY      TUBAL LIGATION         PT performed at least 2 patient identifiers during session: Name and wristband         04/30/22 1022 PT Last Visit   PT Visit Date 04/30/22   Note Type   Note type Evaluation   Pain Assessment   Pain Assessment Tool FLACC   Pain Rating: FLACC (Rest) - Face 0   Pain Rating: FLACC (Rest) - Legs 0   Pain Rating: FLACC (Rest) - Activity 0   Pain Rating: FLACC (Rest) - Cry 0   Pain Rating: FLACC (Rest) - Consolability 0   Score: FLACC (Rest) 0   Pain Rating: FLACC (Activity) - Face 1   Pain Rating: FLACC (Activity) - Legs 0   Pain Rating: FLACC (Activity) - Activity 0   Pain Rating: FLACC (Activity) - Cry 1   Pain Rating: FLACC (Activity) - Consolability 0   Score: FLACC (Activity) 2   Restrictions/Precautions   Weight Bearing Precautions Per Order No   Other Precautions Cognitive; Chair Alarm; Bed Alarm; [facility]  (pt reports she uses RW at baseline)   Prior Function   Lives With Alone  (per pt)   Falls in the last 6 months   (at least 1, which occurred here this AM per RN)   Comments pt poor historian, CM to follow up, unable to obtain any PLOF/home environment info at time of PT IE   General   Family/Caregiver Present No   Cognition   Overall Cognitive Status Impaired   Arousal/Participation Arousable   Orientation Level Oriented to person;Disoriented to place; Disoriented to time;Disoriented to situation   Memory Decreased recall of biographical information;Decreased short term memory;Decreased recall of recent events;Decreased recall of precautions   Following Commands Follows one step commands with increased time or repetition   Comments pt agreeable to PT eval   RLE Assessment   RLE Assessment   (grossly 3+/5 observed with functional mobility)   LLE Assessment   LLE Assessment   (grossly 3+/5 observed with functional mobility)   Coordination   Movements are Fluid and Coordinated   (no ataxia observed)   Sensation WFL   Bed Mobility   Supine to Sit 4  Minimal assistance   Additional items Assist x 2;HOB elevated; Increased time required;Verbal cues;LE management   Transfers Sit to Stand 4  Minimal assistance   Additional items Increased time required;Verbal cues; Assist x 2   Stand to Sit 4  Minimal assistance   Additional items Increased time required;Verbal cues; Assist x 2   Ambulation/Elevation   Gait pattern Narrow ERLINDA; Decreased foot clearance; Short stride; Shuffling; Step to;Knees flexed; Excessively slow   Gait Assistance 4  Minimal assist   Additional items Assist x 2;Verbal cues; Tactile cues   Assistive Device Rolling walker   Distance 3' from EOB to recliner   Balance   Static Sitting Fair   Dynamic Sitting Fair -   Static Standing Poor +   Dynamic Standing Poor   Ambulatory Poor   Endurance Deficit   Endurance Deficit Yes   Activity Tolerance   Activity Tolerance Patient limited by fatigue; Other (Comment)  (cognition)   Nurse Made Aware RN Autumn Shabazz verbalized pt approrpiate to see, present to assist with mobility   Assessment   Prognosis Fair   Problem List Decreased strength;Decreased endurance; Impaired balance;Decreased mobility; Decreased cognition;Decreased safety awareness   Assessment Pt is 80 y o  female seen for high-complexity PT evaluation on 4/30/2022 s/p admit to [facility] on 4/29/2022 w/ Generalized weakness  PT was consulted to assess pt's functional mobility and d/c needs  Order placed for PT eval and tx  Unable to obtain any reliable PLOF/home environment info at time of PT eval, CM to follow up  At time of eval, pt requiring min A x2 for all phases of mobility, limited to short gait trial from EOB to recliner with RW  Upon evaluation, pt presenting with impaired functional mobility d/t decreased strength, decreased endurance, impaired balance, decreased mobility, decreased cognition and activity intolerance   Pertinent PMHx and current co-morbidities affecting pt's physical performance at time of assessment include: generalized weakness, HTN, current moderate episode of major depressive disorder, AMS, hyponatremia, hearing loss, gait abnormality, OA, spinal stenosis, B/L edema, BPPV, h/o CVA, urinary incontinence, mild neurocognitive disorder, h/o SI  Personal factors affecting pt at time of eval include: inability to ambulate household distances, inability to navigate level surfaces w/o external assistance, decreased cognition, limited home support, positive fall history, decreased initiation and engagement and limited insight into impairments  The following objective measures performed on IE also reveal limitations: Barthel Index: 25/100, Modified [place]: 4 (moderate/severe disability) and AM-PAC 6-Clicks: 83/75  Pt's clinical presentation is currently unstable/unpredictable seen in pt's presentation of advanced age, abnormal lab value(s), need for input for task focus and mobility technique and ongoing medical assessment  Overall, pt's rehab potential and prognosis to return to PLOF is fair as impacted by objective findings, warranting pt to receive further skilled PT interventions to address identified impairments, activity limitation(s), and participation restriction(s)  Pt to benefit from continued PT tx to address deficits as defined above and maximize level of functional independent mobility  From PT/mobility standpoint, recommendation at time of d/c would be post acute rehabilitation services pending progress in order to facilitate return to PLOF     Barriers to Discharge Inaccessible home environment;Decreased caregiver support   Goals   Patient Goals to go home   STG Expiration Date 05/10/22   Short Term Goal #1 In 7-10 days: Increase bilateral LE strength 1/2 grade to facilitate independent mobility, Perform all bed mobility tasks with min A of 1 to decrease caregiver burden, Perform all transfers with min A of 1 to improve independence, Ambulate > 50 ft  with least restrictive assistive device with min A of 1 w/o LOB and w/ normalized gait pattern 100% of the time, Increase all balance 1/2 grade to decrease risk for falls, Improve Barthel Index score to 40 or greater to facilitate independence, PT provider will perform functional balance assessment to determine fall risk and PT to see and establish goals for elevations/stairs when appropriate   PT Treatment Day 1   Plan   Treatment/Interventions Functional transfer training;LE strengthening/ROM; Therapeutic exercise; Endurance training;Patient/family training;Equipment eval/education; Bed mobility;Gait training;Spoke to nursing;Continued evaluation   PT Frequency 3-5x/wk   Recommendation   PT Discharge Recommendation Post acute rehabilitation services   Equipment Recommended   (TBD - continue with RW)   AM-PAC Basic Mobility Inpatient   Turning in Bed Without Bedrails 3   Lying on Back to Sitting on Edge of Flat Bed 2   Moving Bed to Chair 2   Standing Up From Chair 2   Walk in Room 2   Climb 3-5 Stairs 1   Basic Mobility Inpatient Raw Score 12   Basic Mobility Standardized Score 32 23   Highest Level Of Mobility   JH-HL Goal 4: Move to chair/commode   JH-HLM Highest Level of Mobility 4: Move to chair/commode   JH-HLM Goal Achieved Yes   Modified [place] Scale   Modified [place] Scale 4   Barthel Index   Feeding 5   Bathing 0   Grooming Score 0   Dressing Score 5   Bladder Score 0   Bowels Score 5   Toilet Use Score 5   Transfers (Bed/Chair) Score 5   Mobility (Level Surface) Score 0   Stairs Score 0   Barthel Index Score 25   Additional Treatment Session   Start Time 1034   End Time 1044   Treatment Assessment Pt seen for PT treatment session this date s/p PT eval, consisting of ther ex focused on strengthening  Current goals and POC remain appropriate, pt continues to have rehab potential and is making progress towards STGs  Pt prognosis for achieving goals is fair, pending pt progress with hospitalization/medical status improvements, and indicated by recent onset and eye contact  Pt limited d/t poor orientation and fear of falling  PT recommends post acute rehabilitation services upon discharge   Pt continues to be functioning below baseline level, and remains limited 2* factors listed above  PT will continue to see pt during current hospitalization in order to address the deficits listed above and provide interventions consistent w/ POC in effort to achieve STGs  Exercises   Heelslides Sitting;10 reps;AROM; Bilateral   Hip Abduction Sitting;10 reps;AROM; Bilateral   Knee AROM Long Arc Quad Sitting;10 reps;AROM; Bilateral   Ankle Pumps Sitting;10 reps;AROM; Bilateral       Marques Sandeep, PT, DPT

## 2022-04-30 NOTE — PROGRESS NOTES
[facility]  Progress Note - Rishi Reis 1/28/1930, 80 y o  female MRN: [id number]  Unit/Bed#: -[facility] Encounter: [id number]  Primary Care Provider: Blessing Elena DO   Date and time admitted to hospital: 4/29/2022 10:45 AM    * Generalized weakness  Assessment & Plan  Patient sent to ED for evaluation generalized weakness and possible altered mental status  CT head negative for any acute intracranial processes  Lab work remarkable for hyponatremia and leukocytosis  CXR revealed no acute cardiopulmonary processes  UA with WBCs 0-1 and occasional bacteria    -troponins WNL  -TSH 0 470, D-dimer 1 85  -blood cultures- pending  -PT/OT consulted, appreciate recommendations  -repeat labs in a m    -Rocephin for now, UA not overtly suggestive of UTI however given leukocytosis and functional decline will treat empirically  -CTA PE chest- negative for PE          AMS (altered mental status)  Assessment & Plan  Staff at facility reports patient's mood is different than normal  CT head negative for any acute intracranial processes  Leukocytosis noted on CBC    -continue to monitor    Hyponatremia  Assessment & Plan  Sodium of 127 on admission- improved to 130  Suspect likely secondary to poor oral intake over past several days    -IV fluids discontinued   -BMP    Current moderate episode of major depressive disorder without prior episode ([facility] [address] )  Assessment & Plan  -continue home Zoloft 50 mg p o  daily    Essential hypertension  Assessment & Plan  Patient's blood pressure soft on admission,  - continue to hold home amlodipine and HCTZ        VTE Pharmacologic Prophylaxis: VTE Score: 6 High Risk (Score >/= 5) - Pharmacological DVT Prophylaxis Ordered: enoxaparin (Lovenox)  Sequential Compression Devices Ordered  Patient Centered Rounds: I performed bedside rounds with nursing staff today    Discussions with Specialists or Other Care Team Provider: nursing    Education and Discussions with Family / Patient: Updated  (son and daughter in law) via phone  Time Spent for Care: 20 minutes  More than 50% of total time spent on counseling and coordination of care as described above  Current Length of Stay: 0 day(s)  Current Patient Status: Observation   Certification Statement: The patient, admitted on an observation basis, will now require > 2 midnight hospital stay due to continued workup and monitoring of change in mental status, STR placement  Discharge Plan: Anticipate discharge in 48-72 hrs to rehab facility  Code Status: Level 1 - Full Code    Subjective: The patient was seen examined  Patient was on lying in bed sleeping but woke easily when calling her name  The patient denies any complaints  Objective:     Vitals:   Temp (24hrs), Av 9 °F (36 6 °C), Min:97 7 °F (36 5 °C), Max:98 °F (36 7 °C)    Temp:  [97 7 °F (36 5 °C)-98 °F (36 7 °C)] 97 7 °F (36 5 °C)  HR:  [52-91] 87  Resp:  [17-20] 18  BP: (125-146)/(73-91) 125/79  SpO2:  [92 %-95 %] 92 %  Body mass index is 23 75 kg/m²  Input and Output Summary (last 24 hours): Intake/Output Summary (Last 24 hours) at 2022 1733  Last data filed at 2022 2136  Gross per 24 hour   Intake 1163 33 ml   Output --   Net 1163 33 ml       Physical Exam:   Physical Exam  Vitals and nursing note reviewed  Constitutional:       Appearance: Normal appearance  Cardiovascular:      Rate and Rhythm: Normal rate and regular rhythm  Pulmonary:      Effort: Pulmonary effort is normal       Breath sounds: Normal breath sounds  Abdominal:      Palpations: Abdomen is soft  Tenderness: There is no abdominal tenderness  Skin:     General: Skin is warm and dry  Neurological:      General: No focal deficit present  Mental Status: She is alert and easily aroused        Comments: Oriented to person only   Psychiatric:         Attention and Perception: Attention normal          Mood and Affect: Mood normal  Speech: Speech normal          Behavior: Behavior is cooperative  Additional Data:     Labs:  Results from last 7 days   Lab Units 04/30/22  0518 04/29/22  1137 04/29/22  1137   WBC Thousand/uL 13 38*   < > 13 83*   HEMOGLOBIN g/dL 14 1   < > 14 8   HEMATOCRIT % 41 1   < > 43 8   PLATELETS Thousands/uL 224   < > 245   NEUTROS PCT %  --   --  79*   LYMPHS PCT %  --   --  10*   MONOS PCT %  --   --  9   EOS PCT %  --   --  1    < > = values in this interval not displayed  Results from last 7 days   Lab Units 04/30/22  0518 04/29/22  1137 04/29/22  1137   SODIUM mmol/L 130*   < > 127*   POTASSIUM mmol/L 3 3*   < > 3 4*   CHLORIDE mmol/L 94*   < > 91*   CO2 mmol/L 22   < > 25   BUN mg/dL 13   < > 13   CREATININE mg/dL 0 62   < > 0 80   ANION GAP mmol/L 14*   < > 11   CALCIUM mg/dL 9 4   < > 9 5   ALBUMIN g/dL  --   --  3 8   TOTAL BILIRUBIN mg/dL  --   --  1 15*   ALK PHOS U/L  --   --  96   ALT U/L  --   --  33   AST U/L  --   --  38   GLUCOSE RANDOM mg/dL 62*   < > 76    < > = values in this interval not displayed  Lines/Drains:  Invasive Devices  Report    Peripheral Intravenous Line            Peripheral IV 08/27/20 Right Antecubital 611 days    Peripheral IV 04/29/22 Left Antecubital 1 day                      Imaging: No pertinent imaging reviewed  Recent Cultures (last 7 days):   Results from last 7 days   Lab Units 04/29/22  1724   BLOOD CULTURE  Received in Microbiology Lab  Culture in Progress  Received in Microbiology Lab  Culture in Progress         Last 24 Hours Medication List:   Current Facility-Administered Medications   Medication Dose Route Frequency Provider Last Rate    acetaminophen  650 mg Oral Q6H PRN Bibi Hernandez DO      aspirin  81 mg Oral Daily Bibi Hernandez DO      atorvastatin  40 mg Oral QPM Bibi Hernandez DO      cefTRIAXone  1,000 mg Intravenous Q24H Bibi Hernandez DO Stopped (04/29/22 1830)    docusate sodium  100 mg Oral BID Caroline Turcios Gonzalo Exon, DO      enoxaparin  40 mg Subcutaneous Daily Verdis Dyanafe, DO      sertraline  50 mg Oral HS Verdis Dyanafe, DO          Today, Patient Was Seen By: Jennifer Werner PA-C    **Please Note: This note may have been constructed using a voice recognition system  **

## 2022-04-30 NOTE — QUICK NOTE
Nursing reports unwitnessed GLF  The patient was found down around 715 this Am  She denies any pain and stated that she did not hit her head  She stated she was getting out of bed because she doesn't think she belongs here  The patient is oriented to person and place only  Vitals within normal limits without any evidence of injury  Given the fall was unwitnessed and the patient isn't a reliable historian, will repeat CT brain

## 2022-04-30 NOTE — ED NOTES
Patient set up from bed to recliner chair with PT  Bed alarm in place  Patient set up with bridgette Menezes, JULIETA  04/30/22 0357

## 2022-04-30 NOTE — ED NOTES
Spoke with patient's daughter, Odilon Gupta, given update on patient status  Set up patient on phone to call daughter       Elena Swann RN  04/30/22 9997

## 2022-04-30 NOTE — ASSESSMENT & PLAN NOTE
Sodium of 127 on admission- improved to 130  Suspect likely secondary to poor oral intake over past several days    -IV fluids discontinued   -BMP

## 2022-04-30 NOTE — PLAN OF CARE
Problem: Potential for Falls  Goal: Patient will remain free of falls  Description: INTERVENTIONS:  - Educate patient/family on patient safety including physical limitations  - Instruct patient to call for assistance with activity   - Consult OT/PT to assist with strengthening/mobility   - Keep Call bell within reach  - Keep bed low and locked with side rails adjusted as appropriate  - Keep care items and personal belongings within reach  - Initiate and maintain comfort rounds  - Make Fall Risk Sign visible to staff  - Offer Toileting every 2 Hours, in advance of need  - Initiate/Maintain bed alarm  - Apply yellow socks and bracelet for high fall risk patients  - Consider moving patient to room near nurses station  4/30/2022 1612 by Katalina Moura RN  Outcome: Progressing  4/30/2022 1611 by Katalina Moura RN  Outcome: Progressing     Problem: MOBILITY - ADULT  Goal: Maintain or return to baseline ADL function  Description: INTERVENTIONS:  -  Assess patient's ability to carry out ADLs; assess patient's baseline for ADL function and identify physical deficits which impact ability to perform ADLs (bathing, care of mouth/teeth, toileting, grooming, dressing, etc )  - Assess/evaluate cause of self-care deficits   - Assess range of motion  - Assess patient's mobility; develop plan if impaired  - Assess patient's need for assistive devices and provide as appropriate  - Encourage maximum independence but intervene and supervise when necessary  - Involve family in performance of ADLs  - Assess for home care needs following discharge   - Consider OT consult to assist with ADL evaluation and planning for discharge  - Provide patient education as appropriate  4/30/2022 1612 by Katalina Moura RN  Outcome: Progressing  4/30/2022 1611 by Katalina Moura RN  Outcome: Progressing  Goal: Maintains/Returns to pre admission functional level  Description: INTERVENTIONS:  - Perform BMAT or MOVE assessment daily    - Set and communicate daily mobility goal to care team and patient/family/caregiver  - Collaborate with rehabilitation services on mobility goals if consulted  - Reposition patient every 2 hours    - Record patient progress and toleration of activity level   4/30/2022 1612 by Jacinto Yarbrough RN  Outcome: Progressing  4/30/2022 1611 by Jacinto Yarbrough RN  Outcome: Progressing

## 2022-04-30 NOTE — ED NOTES
Patient found lying on floor, on her left hip, by this RN at 3582  Patient calling out for help when this RN passing in the hallway  At time of event, bed low and locked, stretcher side rails up x2  This RN assessed patient at this time  Patient AO x 2 with disorientation to time and situation  Vitals WNL at this time  When asked, patient reports she was getting out of bed to go because she does not know why she is here  Patient denies any pain at this time  Patient denies hitting head  Skin remains intact, no outward signs of injury  Patient moved off the floor and onto the chair  Patient weak with max assist at this time  Patient then moved from chair to bed  Patient placed in bed, bed low and locked, over bed table in reach, patient belongings in reach, call bell in reach, stretcher side rails in place x2  Patient encouraged to utilize call bell and not to get out of bed without help  Provider notified of this event  CT scan order placed  Bed alarm requested from hospital supervisor, not available at this time         Lillian Bales RN  04/30/22 5041

## 2022-04-30 NOTE — ASSESSMENT & PLAN NOTE
Patient sent to ED for evaluation generalized weakness and possible altered mental status  CT head negative for any acute intracranial processes    Lab work remarkable for hyponatremia and leukocytosis  CXR revealed no acute cardiopulmonary processes  UA with WBCs 0-1 and occasional bacteria    -troponins WNL  -TSH 0 470, D-dimer 1 85  -blood cultures- pending  -PT/OT consulted, appreciate recommendations  -repeat labs in a m    -Rocephin for now, UA not overtly suggestive of UTI however given leukocytosis and functional decline will treat empirically  -CTA PE chest- negative for PE

## 2022-04-30 NOTE — ASSESSMENT & PLAN NOTE
Staff at facility reports patient's mood is different than normal  CT head negative for any acute intracranial processes  Leukocytosis noted on CBC  Patient's family states they spoke to her on the phone today and felt she was back to her baseline     -continue to monitor

## 2022-05-01 LAB
ANION GAP SERPL CALCULATED.3IONS-SCNC: 7 MMOL/L (ref 4–13)
BUN SERPL-MCNC: 13 MG/DL (ref 5–25)
CALCIUM SERPL-MCNC: 9.4 MG/DL (ref 8.4–10.2)
CHLORIDE SERPL-SCNC: 97 MMOL/L (ref 96–108)
CO2 SERPL-SCNC: 27 MMOL/L (ref 21–32)
CREAT SERPL-MCNC: 0.63 MG/DL (ref 0.6–1.3)
ERYTHROCYTE [DISTWIDTH] IN BLOOD BY AUTOMATED COUNT: 12.7 % (ref 11.6–15.1)
GFR SERPL CREATININE-BSD FRML MDRD: 78 ML/MIN/1.73SQ M
GLUCOSE SERPL-MCNC: 99 MG/DL (ref 65–140)
HCT VFR BLD AUTO: 40 % (ref 34.8–46.1)
HGB BLD-MCNC: 13.9 G/DL (ref 11.5–15.4)
MCH RBC QN AUTO: 30.4 PG (ref 26.8–34.3)
MCHC RBC AUTO-ENTMCNC: 34.8 G/DL (ref 31.4–37.4)
MCV RBC AUTO: 88 FL (ref 82–98)
PLATELET # BLD AUTO: 200 THOUSANDS/UL (ref 149–390)
PMV BLD AUTO: 8.7 FL (ref 8.9–12.7)
POTASSIUM SERPL-SCNC: 3.2 MMOL/L (ref 3.5–5.3)
PROCALCITONIN SERPL-MCNC: 0.09 NG/ML
RBC # BLD AUTO: 4.57 MILLION/UL (ref 3.81–5.12)
SODIUM SERPL-SCNC: 131 MMOL/L (ref 135–147)
WBC # BLD AUTO: 7.55 THOUSAND/UL (ref 4.31–10.16)

## 2022-05-01 PROCEDURE — 84145 PROCALCITONIN (PCT): CPT | Performed by: PHYSICIAN ASSISTANT

## 2022-05-01 PROCEDURE — 97110 THERAPEUTIC EXERCISES: CPT

## 2022-05-01 PROCEDURE — 99225 PR SBSQ OBSERVATION CARE/DAY 25 MINUTES: CPT | Performed by: STUDENT IN AN ORGANIZED HEALTH CARE EDUCATION/TRAINING PROGRAM

## 2022-05-01 PROCEDURE — 97116 GAIT TRAINING THERAPY: CPT

## 2022-05-01 PROCEDURE — 97167 OT EVAL HIGH COMPLEX 60 MIN: CPT

## 2022-05-01 PROCEDURE — 80048 BASIC METABOLIC PNL TOTAL CA: CPT | Performed by: PHYSICIAN ASSISTANT

## 2022-05-01 PROCEDURE — 85027 COMPLETE CBC AUTOMATED: CPT | Performed by: PHYSICIAN ASSISTANT

## 2022-05-01 RX ORDER — POTASSIUM CHLORIDE 20 MEQ/1
40 TABLET, EXTENDED RELEASE ORAL ONCE
Status: COMPLETED | OUTPATIENT
Start: 2022-05-01 | End: 2022-05-01

## 2022-05-01 RX ADMIN — ASPIRIN 81 MG 81 MG: 81 TABLET ORAL at 09:56

## 2022-05-01 RX ADMIN — SODIUM CHLORIDE 1000 ML: 0.9 INJECTION, SOLUTION INTRAVENOUS at 12:07

## 2022-05-01 RX ADMIN — CEFTRIAXONE 1000 MG: 1 INJECTION, SOLUTION INTRAVENOUS at 17:16

## 2022-05-01 RX ADMIN — DOCUSATE SODIUM 100 MG: 100 CAPSULE, LIQUID FILLED ORAL at 09:56

## 2022-05-01 RX ADMIN — POTASSIUM CHLORIDE 40 MEQ: 1500 TABLET, EXTENDED RELEASE ORAL at 09:56

## 2022-05-01 RX ADMIN — SERTRALINE HYDROCHLORIDE 50 MG: 50 TABLET ORAL at 22:08

## 2022-05-01 RX ADMIN — DOCUSATE SODIUM 100 MG: 100 CAPSULE, LIQUID FILLED ORAL at 17:16

## 2022-05-01 RX ADMIN — ATORVASTATIN CALCIUM 40 MG: 40 TABLET, FILM COATED ORAL at 17:16

## 2022-05-01 RX ADMIN — ENOXAPARIN SODIUM 40 MG: 100 INJECTION SUBCUTANEOUS at 09:56

## 2022-05-01 NOTE — PLAN OF CARE
Problem: OCCUPATIONAL THERAPY ADULT  Goal: Performs self-care activities at highest level of function for planned discharge setting  See evaluation for individualized goals  Description: Treatment Interventions: ADL retraining,Functional transfer training,UE strengthening/ROM,Endurance training,Cognitive reorientation,Patient/family training,Equipment evaluation/education,Compensatory technique education,Activityengagement          See flowsheet documentation for full assessment, interventions and recommendations  Note: Limitation: Decreased ADL status,Decreased UE strength,Decreased Safe judgement during ADL,Decreased cognition,Decreased endurance,Decreased self-care trans,Decreased high-level ADLs  Prognosis: Good  Assessment: Patient is a 80 y o  female seen for OT evaluation s/p admit to [place] [address] on 4/29/2022 w/Generalized weakness  Commorbidities affecting patient's functional performance at time of assessment include: AMS, MDD, HTN, and hyponatremia  Orders placed for OT evaluation and treatment  Performed at least two patient identifiers during session including name and wristband  Prior to admission, Patient requires assistance with ADLs/IADLs, ambulatory with RW and lives at [facility]  Personal factors affecting patient at time of initial evaluation include: limited insight into deficits, difficulty performing ADLs and difficulty performing IADLs  Upon evaluation, patient requires minimal  and moderate assist for UB ADLs, maximal assist for LB ADLs, transfers and functional ambulation in room and bathroom with minimal  and moderate assist of 2, with the use of Rolling Walker  Patient is oriented to name,, disoriented to place, and disoriented to situation, and presents with impaired judgement, inability to make safe decisions    Occupational performance is affected by the following deficits: orientation, attention span, decreased muscle strength, impaired gross motor coordination, impaired fine motor coordination, dynamic sit/ stand balance deficit with poor standing tolerance time for self care and functional mobility, decreased activity tolerance, impaired memory, impaired problem solving, decreased safety awareness and delayed righting and equilibrium reactions  Based on the mentioned OT evaluation outcomes, functional performance deficits, and assessment findings, pt has been identified as a high complexity evaluation  Patient to benefit from continued Occupational Therapy treatment while in the hospital to address deficits as defined above and maximize level of functional independence with ADLs and functional mobility  Occupational Performance areas to address include: eating, grooming , bathing/ shower, dressing, toilet hygiene, transfer to all surfaces and functional ambulation  From OT standpoint, recommendation at time of d/c would be Post acute rehabilitation services         OT Discharge Recommendation: Post acute rehabilitation services  OT - OK to Discharge: Yes (Once medically cleared )     Arely Frye OT

## 2022-05-01 NOTE — PLAN OF CARE
Problem: Potential for Falls  Goal: Patient will remain free of falls  Description: INTERVENTIONS:  - Educate patient/family on patient safety including physical limitations  - Instruct patient to call for assistance with activity   - Consult OT/PT to assist with strengthening/mobility   - Keep Call bell within reach  - Keep bed low and locked with side rails adjusted as appropriate  - Keep care items and personal belongings within reach  - Initiate and maintain comfort rounds  - Make Fall Risk Sign visible to staff  - Offer Toileting every 2 Hours, in advance of need  - Initiate/Maintain bed alarm  - Apply yellow socks and bracelet for high fall risk patients  - Consider moving patient to room near nurses station  Outcome: Progressing     Problem: MOBILITY - ADULT  Goal: Maintain or return to baseline ADL function  Description: INTERVENTIONS:  -  Assess patient's ability to carry out ADLs; assess patient's baseline for ADL function and identify physical deficits which impact ability to perform ADLs (bathing, care of mouth/teeth, toileting, grooming, dressing, etc )  - Assess/evaluate cause of self-care deficits   - Assess range of motion  - Assess patient's mobility; develop plan if impaired  - Assess patient's need for assistive devices and provide as appropriate  - Encourage maximum independence but intervene and supervise when necessary  - Involve family in performance of ADLs  - Assess for home care needs following discharge   - Consider OT consult to assist with ADL evaluation and planning for discharge  - Provide patient education as appropriate  Outcome: Progressing  Goal: Maintains/Returns to pre admission functional level  Description: INTERVENTIONS:  - Perform BMAT or MOVE assessment daily    - Set and communicate daily mobility goal to care team and patient/family/caregiver  - Collaborate with rehabilitation services on mobility goals if consulted  - Reposition patient every 2 hours    - Record patient progress and toleration of activity level   Outcome: Progressing     Problem: Prexisting or High Potential for Compromised Skin Integrity  Goal: Skin integrity is maintained or improved  Description: INTERVENTIONS:  - Identify patients at risk for skin breakdown  - Assess and monitor skin integrity  - Assess and monitor nutrition and hydration status  - Monitor labs   - Assess for incontinence   - Turn and reposition patient  - Assist with mobility/ambulation  - Relieve pressure over bony prominences  - Avoid friction and shearing  - Provide appropriate hygiene as needed including keeping skin clean and dry  - Evaluate need for skin moisturizer/barrier cream  - Collaborate with interdisciplinary team   - Patient/family teaching  - Consider wound care consult   Outcome: Progressing

## 2022-05-01 NOTE — ASSESSMENT & PLAN NOTE
Sodium of 127 on admission- improved to 130  Suspect likely secondary to poor oral intake over past several days    -Normal saline bolus  -BMP

## 2022-05-01 NOTE — PHYSICAL THERAPY NOTE
PT Treatment Note    NAME:  Ada Reis  DATE: 05/01/22    AGE:   80 y o  Mrn:   [id number]  ADMIT DX:  Fatigue [R53 83]  Weakness [R53 1]  Change in mental status [R41 82]  Performed at least 2 patient identifiers during session: Name and Birthday       05/01/22 1158   PT Last Visit   PT Visit Date 05/01/22   Note Type   Note Type Treatment   Pain Assessment   Pain Assessment Tool 0-10   Pain Score No Pain  (pt reported a slight sore throat)   Restrictions/Precautions   Weight Bearing Precautions Per Order No   Other Precautions Cognitive; Chair Alarm; Bed Alarm; Fall Risk;Hard of hearing   General   Chart Reviewed Yes   Response to Previous Treatment Patient unable to report, no changes reported from family or staff   Family/Caregiver Present No   Cognition   Overall Cognitive Status Impaired   Arousal/Participation Alert; Responsive   Attention Attends with cues to redirect   Orientation Level Oriented to person;Disoriented to place; Disoriented to time;Disoriented to situation  (oriented to year but not month)   Memory Decreased short term memory;Decreased recall of recent events   Following Commands Follows one step commands with increased time or repetition   Subjective   Subjective pt coughing    Bed Mobility   Supine to Sit 4  Minimal assistance   Additional items Assist x 1;HOB elevated; Increased time required;Verbal cues;LE management   Additional Comments pt denied dizziness/ able to sit EOB with CGA   Transfers   Sit to Stand 4  Minimal assistance   Additional items Assist x 2; Increased time required;Verbal cues   Stand to Sit 4  Minimal assistance   Additional items Assist x 2;Impulsive;Verbal cues   Stand pivot 3  Moderate assistance   Additional items Assist x 2; Increased time required;Verbal cues  (with RW)   Additional Comments pt required cues for sequencing and required assistance with hip shifting    Ambulation/Elevation   Gait pattern Forward Flexion;Decreased foot clearance; Foward flexed Gait Assistance 4  Minimal assist   Additional items Assist x 2;Verbal cues   Assistive Device Rolling walker   Distance 3 ft   Balance   Static Sitting Fair   Dynamic Sitting Fair -   Static Standing Poor +   Dynamic Standing Poor   Ambulatory Poor   Endurance Deficit   Endurance Deficit Yes   Endurance Deficit Description SaO2 at 89% upon enterance, 92% with activity and 89% post exit   Activity Tolerance   Activity Tolerance Patient limited by fatigue  (cognition)   Patricia Bodily Dr Pamela Carney made aware of treatment findings   Exercises   Hip Adduction Sitting;10 reps;AAROM; Bilateral   Knee AROM Long Arc Quad Sitting;10 reps;AAROM; Bilateral   Ankle Pumps Sitting;10 reps;Bilateral;AROM   Assessment   Prognosis Fair   Assessment Pt seen for PT treatment session this date with interventions consisting of gait training to normalize gait pattern to decrease fall risk and therapeutic exercise to improve strength to improve functional mobility  Pt agreeable to PT treatment session upon arrival, pt found supine in bed, in no apparent distress  Since previous session, pt has made no progress as evidenced by requiring the same amount of assistance with mobility  Barriers during this session include confusion, decreased SaO2 levels and fatigue  Pt continues to be functioning below baseline level, and remains limited 2* factors listed above and including decreased strength, impaired activity tolerance, decreased balance, poor safety awareness and impaired cognition  Pt prognosis for achieving goals is fair, pending pt progress with hospitalization/medical status improvements, and indicated by static poor cognition and continued required assistance  PT will continue to see pt during current hospitalization in order to address the deficits listed above and provide interventions consistent w/ POC in effort to achieve goals   Current goals and POC remain appropriate, pt continues to have rehab potential  Continue to recommend post acute rehabilitation services at time of d/c in order to maximize pt's functional independence and safety w/ mobility  Upon conclusion pt seated OOB in recliner  The patient's AM-PAC Basic Mobility Inpatient Short Form Raw Score is 9  A Raw score of less than or equal to 16 suggests the patient may benefit from discharge to post-acute rehabilitation services  Please also refer to the recommendation of the Physical Therapist for safe discharge planning  This session, pt required and most appropriately benefited from skilled OT/PT co-treat due to extensive physical assistance of SKILLED therapists, significant regression from functional baseline and decreased activity tolerance  OT and PT goals were addressed separately as seen in documentation      Goals   Patient Goals to drink some juice   PT Treatment Day 2   Plan   Progress Slow progress, cognitive deficits   Recommendation   PT Discharge Recommendation Post acute rehabilitation services   AM-PAC Basic Mobility Inpatient   Turning in Bed Without Bedrails 3   Lying on Back to Sitting on Edge of Flat Bed 2   Moving Bed to Chair 1   Standing Up From Chair 1   Walk in Room 1   Climb 3-5 Stairs 1   Basic Mobility Inpatient Raw Score 9   Highest Level Of Mobility   JH-HLM Goal 3: Sit at edge of bed   JH-HLM Highest Level of Mobility 4: Move to chair/commode   JH-HLM Goal Achieved Yes       Time In: 1128  Time Out: 1158  Total Treatment Minutes: Aleksandr De La Fuente PT

## 2022-05-01 NOTE — ASSESSMENT & PLAN NOTE
Patient sent to ED for evaluation generalized weakness and possible altered mental status  CT head negative for any acute intracranial processes    Lab work remarkable for hyponatremia and leukocytosis  CXR revealed no acute cardiopulmonary processes  UA with WBCs 0-1 and occasional bacteria  CTA PE negative for emboli    -troponins WNL  -TSH 0 470, D-dimer 1 85  -blood cultures- no growth at 24 hours  -PT/OT consulted, recommending ARC   -Rocephin for now, UA not overtly suggestive of UTI however given leukocytosis and functional decline will treat empirically

## 2022-05-01 NOTE — ASSESSMENT & PLAN NOTE
Staff at facility reports patient's mood is different than normal  CT head negative for any acute intracranial processes  Leukocytosis noted on CBC  Patient's family states they spoke to her on the phone 4/30 and felt she was back to her baseline     -continue to monitor

## 2022-05-01 NOTE — PLAN OF CARE
Problem: PHYSICAL THERAPY ADULT  Goal: Performs mobility at highest level of function for planned discharge setting  See evaluation for individualized goals  Description: Treatment/Interventions: Functional transfer training,LE strengthening/ROM,Therapeutic exercise,Endurance training,Patient/family training,Equipment eval/education,Bed mobility,Gait training,Spoke to nursing,Continued evaluation  Equipment Recommended:  (TBD - continue with RW)       See flowsheet documentation for full assessment, interventions and recommendations  Outcome: Not Progressing  Note: Prognosis: Fair  Problem List: Decreased strength,Decreased endurance,Impaired balance,Decreased mobility,Decreased cognition,Decreased safety awareness  Assessment: Pt seen for PT treatment session this date with interventions consisting of gait training to normalize gait pattern to decrease fall risk and therapeutic exercise to improve strength to improve functional mobility  Pt agreeable to PT treatment session upon arrival, pt found supine in bed, in no apparent distress  Since previous session, pt has made no progress as evidenced by requiring the same amount of assistance with mobility  Barriers during this session include confusion, decreased SaO2 levels and fatigue  Pt continues to be functioning below baseline level, and remains limited 2* factors listed above and including decreased strength, impaired activity tolerance, decreased balance, poor safety awareness and impaired cognition  Pt prognosis for achieving goals is fair, pending pt progress with hospitalization/medical status improvements, and indicated by static poor cognition and continued required assistance  PT will continue to see pt during current hospitalization in order to address the deficits listed above and provide interventions consistent w/ POC in effort to achieve goals   Current goals and POC remain appropriate, pt continues to have rehab potential  Continue to recommend post acute rehabilitation services at time of d/c in order to maximize pt's functional independence and safety w/ mobility  Upon conclusion pt seated OOB in recliner  The patient's AM-PAC Basic Mobility Inpatient Short Form Raw Score is 9  A Raw score of less than or equal to 16 suggests the patient may benefit from discharge to post-acute rehabilitation services  Please also refer to the recommendation of the Physical Therapist for safe discharge planning  This session, pt required and most appropriately benefited from skilled OT/PT co-treat due to extensive physical assistance of SKILLED therapists, significant regression from functional baseline and decreased activity tolerance  OT and PT goals were addressed separately as seen in documentation  Barriers to Discharge: Inaccessible home environment,Decreased caregiver support        PT Discharge Recommendation: Post acute rehabilitation services          See flowsheet documentation for full assessment   Natalie Baez PT

## 2022-05-01 NOTE — OCCUPATIONAL THERAPY NOTE
Occupational Therapy Evaluation      Ada Reis    5/1/2022    Patient Active Problem List   Diagnosis    Hearing loss    Gait abnormality    Osteoarthritis    Mediastinal mass    Spinal stenosis at L4-L5 level    Essential hypertension    Bilateral lower extremity edema    Benign paroxysmal positional vertigo    Urinary incontinence    History of CVA (cerebrovascular accident)    Right ankle sprain    Venous insufficiency of both lower extremities    Unspecified mood (affective) disorder, r/o major depressive disorder (single episode)    Mild neurocognitive disorder    Current moderate episode of major depressive disorder without prior episode ([address] )    History of suicidal ideation    Generalized weakness    AMS (altered mental status)    Hyponatremia       Past Medical History:   Diagnosis Date    Arthritis     Cataract     LAST ASSESSED: [id number]    Depression     Dislocation of left shoulder joint     LAST ASSESSSED: [id number]    Essential hypertension 4/3/2019    Gait disorder     LAST ASSESSED: [id number]    Herpes zoster     LAST ASSESSED: [id number]    HL (hearing loss)     Impacted cerumen of both ears     LAST ASSESSED: [id number]    Impacted cerumen of right ear     LAST ASSESSED: [id number]    Lightheadedness     MILD AND PT REFUSES EKG ETC  PT PROMISES TO CALL IF WORSENS   LAST ASSESSED: [id number]    Multiple falls     LAST ASSESSED: [id number]    Near syncope     LAST ASSESSED: [id number]    Postherpetic polyneuropathy     LAST ASSESSED: [id number]       Past Surgical History:   Procedure Laterality Date    CATARACT EXTRACTION, BILATERAL      HERNIA REPAIR      TONSILLECTOMY      TUBAL LIGATION          05/01/22 1140   OT Last Visit   OT Visit Date 05/01/22   Note Type   Note type Evaluation   Additional Comments Pt agreeable to OT eval  Upon arrival pt supine in bed with HOB elevated    Restrictions/Precautions   Weight Bearing Precautions Per Order No   Other Precautions Cognitive; Chair Alarm; Bed Alarm; Fall Risk   Pain Assessment   Pain Assessment Tool FLACC   Pain Rating: FLACC (Rest) - Face 0   Pain Rating: FLACC (Rest) - Legs 0   Pain Rating: FLACC (Rest) - Activity 0   Pain Rating: FLACC (Rest) - Cry 0   Pain Rating: FLACC (Rest) - Consolability 0   Score: FLACC (Rest) 0   Pain Rating: FLACC (Activity) - Face 1   Pain Rating: FLACC (Activity) - Legs 0   Pain Rating: FLACC (Activity) - Activity 0   Pain Rating: FLACC (Activity) - Cry 0   Pain Rating: FLACC (Activity) - Consolability 0   Score: FLACC (Activity) 1   Home Living   Type of Home Assisted living  [facility])   Home Layout Performs ADLs on one level; Able to live on main level with bedroom/bathroom; Access   [address]  (pt reports utilizing RW at baseline )   Additional Comments Per chart review patient is currently residing at Assist living facility  Unclear what facility but patient reports that "[facility] sounds familiar"  Will follow-up with CM  Prior Function   Level of [place] Needs assistance with ADLs and functional mobility; Needs assistance with IADLs   Lives With Facility staff   Receives Help From Personal care attendant   ADL Assistance Needs assistance   IADLs Needs assistance   Falls in the last 6 months   (pt unable to recall; 1 fall in ED per chart review )   Vocational Retired   Comments Pt is a poor historian d/t cognitive impairment  Will follow-up with CM     Lifestyle   Autonomy Patient requires assistance with ADLs/IADLs, ambulatory with RW and lives at [facility] 2  Maximal [address] 3  Moderate [address] 2  Maximal [address]  2  Maximal Assistance   Additional Comments Pt limited by decreased strength, balance, endurance and cognition  Bed Mobility   Supine to Sit 4  Minimal assistance   Additional items Assist x 1;HOB elevated; Increased time required;Verbal cues;LE management   Sit to Supine   (DNT: pt seated OOB in recliner at end of eval )   Additional Comments Pt denied lightheaded/dizziness with transitional movements    Transfers   Sit to Stand 4  Minimal assistance   Additional items Assist x 2; Increased time required;Verbal cues   Stand to Sit 4  Minimal assistance   Additional items Assist x 2;Impulsive;Verbal cues   Stand pivot 3  Moderate assistance   Additional items Assist x 2; Increased time required;Verbal cues  (c RW usage )   Additional Comments Pt completed STS transfer with RW usage  Pt requires step-by-step instructions and verbal/tactile cues for hand placement  Pt unsteady and requires verbal/tactile cues for trunk control  Balance   Static Sitting Fair   Dynamic Sitting Fair -   Static Standing Poor +   Dynamic Standing Poor   Activity Tolerance   Activity Tolerance Patient limited by fatigue; Other (Comment)  (Cognition )   Medical Staff Made Aware Pt seen with PT due to the patient's co-morbidities, clinically unstable presentation, and present impairments which are a regression from the patient's baseline  Nurse Made Aware RN made aware of outcomes    RUE Assessment   RUE Assessment WFL  (grossly 3+/5 MMT)   LUE Assessment   LUE Assessment WFL  (grossly 3+/5 MMT)   Hand Function   Gross Motor Coordination Impaired   Fine Motor Coordination Impaired   Cognition   Overall Cognitive Status Impaired   Arousal/Participation Alert; Responsive; Cooperative   Attention Attends with cues to redirect   Orientation Level Oriented to person;Disoriented to person;Disoriented to place  (Oriented to year; disoriented to month )   Memory Decreased short term memory;Decreased recall of recent events;Decreased recall of precautions   Following Commands Follows one step commands with increased time or repetition   Assessment   Limitation Decreased ADL status; Decreased UE strength;Decreased Safe judgement during ADL;Decreased cognition;Decreased endurance;Decreased self-care trans;Decreased high-level ADLs   Prognosis Good   Assessment Patient is a 80 y o  female seen for OT evaluation s/p [facility] w/Generalized weakness  Commorbidities affecting patient's functional performance at time of assessment include: AMS, MDD, HTN, and hyponatremia  Orders placed for OT evaluation and treatment  Performed at least two patient identifiers during session including name and wristband  Prior to admission, Patient requires assistance with ADLs/IADLs, ambulatory with RW and lives at [facility]  Personal factors affecting patient at time of initial evaluation include: limited insight into deficits, difficulty performing ADLs and difficulty performing IADLs  Upon evaluation, patient requires minimal  and moderate assist for UB ADLs, maximal assist for LB ADLs, transfers and functional ambulation in room and bathroom with minimal  and moderate assist of 2, with the use of Rolling Walker  Patient is oriented to name,, disoriented to place, and disoriented to situation, and presents with impaired judgement, inability to make safe decisions  Occupational performance is affected by the following deficits: orientation, attention span, decreased muscle strength, impaired gross motor coordination, impaired fine motor coordination, dynamic sit/ stand balance deficit with poor standing tolerance time for self care and functional mobility, decreased activity tolerance, impaired memory, impaired problem solving, decreased safety awareness and delayed righting and equilibrium reactions  Based on the mentioned OT evaluation outcomes, functional performance deficits, and assessment findings, pt has been identified as a high complexity evaluation   Patient to benefit from continued Occupational Therapy treatment while in the hospital to address deficits as defined above and maximize level of functional independence with ADLs and functional mobility  Occupational Performance areas to address include: eating, grooming , bathing/ shower, dressing, toilet hygiene, transfer to all surfaces and functional ambulation  From OT standpoint, recommendation at time of d/c would be Post acute rehabilitation services  Goals   Patient Goals no goals related to therapy verbalized at this time d/t cognitive impairment    Plan   Treatment Interventions ADL retraining;Functional transfer training;UE strengthening/ROM; Endurance training;Cognitive reorientation;Patient/family training;Equipment evaluation/education; Compensatory technique education; Activityengagement   Goal Expiration Date 05/11/22   OT Treatment Day 0   OT Frequency 3-5x/wk   Recommendation   OT Discharge Recommendation Post acute rehabilitation services   OT - OK to Discharge Yes  (Once medically cleared )   Additional Comments  At end of eval, pt seated OOB in recliner with PT present    Additional Comments 2 The patient's raw score on the AM-PAC Daily Activity inpatient short form is 11, standardized score is 29 04, less than 39 4  Patients at this level are likely to benefit from discharge to post-acute rehabilitation services  Please refer to the recommendation of the Occupational Therapist for safe discharge planning     AM-PAC Daily Activity Inpatient   Lower Body Dressing 1   Bathing 1   Toileting 1   Upper Body Dressing 2   Grooming 3   Eating 3   Daily Activity Raw Score 11   Daily Activity Standardized Score (Calc for Raw Score >=11) 29 04   AM-[facility] Applied Cognition Inpatient   Following a Speech/Presentation 2   Understanding Ordinary Conversation 3   Taking Medications 1   Remembering Where Things Are Placed or Put Away 1   Remembering List of 4-5 Errands 1   Taking Care of Complicated Tasks 1   Applied Cognition Raw Score 9   Applied Cognition Standardized Score 22 48     GOALS:    *ADL transfers with (S) for inc'd independence with ADLs/purposeful tasks    *UB ADL with (S) for inc'd independence with self cares    *LB ADL with Min (A) using AE prn for inc'd independence with self cares    *Toileting with CGA for clothing management and hygiene for return to PLOF with personal care    *Increase stand tolerance x5 m for inc'd tolerance with standing purposeful tasks    *Participate in 10m UE therex to increase overall stamina/activity tolerance for purposeful tasks    *Bed mobility- (S) for inc'd independence to manage own comfort and initiate EOB & OOB purposeful tasks    *Patient will verbalize 3 safety awareness/ principles to prevent falls in the home setting  *Patient will increase OOB/sitting tolerance to 2-4 hours per day to increase participation in self-care and leisure tasks with no s/s of exertion  *Patient will engage in ongoing cognitive assessment to assist with safe discharge planning/recommendations        *Pt will demonstrate use of long handled AE during 100% of tx sessions for increased ADL safety and independence following D/C     DARION Hawthorne, OTR/L

## 2022-05-01 NOTE — PROGRESS NOTES
[address]  Progress Note - Charlee Reis 1/28/1930, 80 y o  female MRN: [id number]  Unit/Bed#: MS [facility] Encounter: [id number]  Primary Care Provider: Janna Beach DO   Date and time admitted to hospital: 4/29/2022 10:45 AM    * Generalized weakness  Assessment & Plan  Patient sent to ED for evaluation generalized weakness and possible altered mental status  CT head negative for any acute intracranial processes  Lab work remarkable for hyponatremia and leukocytosis  CXR revealed no acute cardiopulmonary processes  UA with WBCs 0-1 and occasional bacteria  CTA PE negative for emboli    -troponins WNL  -TSH 0 470, D-dimer 1 85  -blood cultures- no growth at 24 hours  -PT/OT consulted, recommending ARC   -Rocephin for now, UA not overtly suggestive of UTI however given leukocytosis and functional decline will treat empirically          Hyponatremia  Assessment & Plan  Sodium of 127 on admission- improved to 130  Suspect likely secondary to poor oral intake over past several days    -Normal saline bolus  -BMP    AMS (altered mental status)  Assessment & Plan  Staff at facility reports patient's mood is different than normal  CT head negative for any acute intracranial processes  Leukocytosis noted on CBC  Patient's family states they spoke to her on the phone 4/30 and felt she was back to her baseline     -continue to monitor    Current moderate episode of major depressive disorder without prior episode ([facility] [address] )  Assessment & Plan  -continue home Zoloft 50 mg p o  daily    Essential hypertension  Assessment & Plan  Patient's blood pressure soft on admission,  - continue to hold home amlodipine and HCTZ        VTE Pharmacologic Prophylaxis: VTE Score: 6 High Risk (Score >/= 5) - Pharmacological DVT Prophylaxis Ordered: enoxaparin (Lovenox)  Sequential Compression Devices Ordered  Patient Centered Rounds: I performed bedside rounds with nursing staff today    Discussions with Specialists or Other Care Team Provider: none    Education and Discussions with Family / Patient:  Discussed with patient's son    Time Spent for Care: 45 minutes  More than 50% of total time spent on counseling and coordination of care as described above  Current Length of Stay: 1 day(s)  Current Patient Status: Inpatient   Certification Statement: The patient will continue to require additional inpatient hospital stay due to Pending placement acute care rehab  Discharge Plan: Pending bed availability    Code Status: Level 1 - Full Code    Subjective:   Patient seen and examined at bedside  Patient resting in bed no apparent distress  No acute events overnight  Patient states she feels better today as compared to yesterday  Objective:     Vitals:   Temp (24hrs), Av 2 °F (36 8 °C), Min:97 7 °F (36 5 °C), Max:99 3 °F (37 4 °C)    Temp:  [97 7 °F (36 5 °C)-99 3 °F (37 4 °C)] 99 3 °F (37 4 °C)  HR:  [69-91] 70  Resp:  [16-20] 20  BP: (117-146)/(67-91) 131/80  SpO2:  [92 %-94 %] 94 %  Body mass index is 23 75 kg/m²  Input and Output Summary (last 24 hours): Intake/Output Summary (Last 24 hours) at 2022 1203  Last data filed at 2022 1900  Gross per 24 hour   Intake 120 ml   Output 400 ml   Net -280 ml       Physical Exam:   Physical Exam  Constitutional:       Comments: Frail-appearing   HENT:      Head: Normocephalic  Cardiovascular:      Rate and Rhythm: Normal rate and regular rhythm  Pulses: Normal pulses  Heart sounds: Normal heart sounds  Pulmonary:      Effort: Pulmonary effort is normal       Breath sounds: Normal breath sounds  Abdominal:      General: Abdomen is flat  Bowel sounds are normal       Palpations: Abdomen is soft  Musculoskeletal:         General: Normal range of motion  Cervical back: Normal range of motion  Skin:     General: Skin is warm  Neurological:      General: No focal deficit present        Mental Status: She is alert and oriented to person, place, and time  Mental status is at baseline  Psychiatric:         Mood and Affect: Mood normal           Additional Data:     Labs:  Results from last 7 days   Lab Units 05/01/22 0448 04/30/22 0518 04/29/22  1137   WBC Thousand/uL 7 55   < > 13 83*   HEMOGLOBIN g/dL 13 9   < > 14 8   HEMATOCRIT % 40 0   < > 43 8   PLATELETS Thousands/uL 200   < > 245   NEUTROS PCT %  --   --  79*   LYMPHS PCT %  --   --  10*   MONOS PCT %  --   --  9   EOS PCT %  --   --  1    < > = values in this interval not displayed  Results from last 7 days   Lab Units 05/01/22 0448 04/30/22 0518 04/29/22  1137   SODIUM mmol/L 131*   < > 127*   POTASSIUM mmol/L 3 2*   < > 3 4*   CHLORIDE mmol/L 97   < > 91*   CO2 mmol/L 27   < > 25   BUN mg/dL 13   < > 13   CREATININE mg/dL 0 63   < > 0 80   ANION GAP mmol/L 7   < > 11   CALCIUM mg/dL 9 4   < > 9 5   ALBUMIN g/dL  --   --  3 8   TOTAL BILIRUBIN mg/dL  --   --  1 15*   ALK PHOS U/L  --   --  96   ALT U/L  --   --  33   AST U/L  --   --  38   GLUCOSE RANDOM mg/dL 99   < > 76    < > = values in this interval not displayed  Results from last 7 days   Lab Units 05/01/22  0448   PROCALCITONIN ng/ml 0 09       Lines/Drains:  Invasive Devices  Report    Peripheral Intravenous Line            Peripheral IV 08/27/20 Right Antecubital 612 days    Peripheral IV 04/29/22 Left Antecubital 2 days                      Imaging: No pertinent imaging reviewed  Recent Cultures (last 7 days):   Results from last 7 days   Lab Units 04/29/22  1724   BLOOD CULTURE  No Growth at 24 hrs  No Growth at 24 hrs         Last 24 Hours Medication List:   Current Facility-Administered Medications   Medication Dose Route Frequency Provider Last Rate    acetaminophen  650 mg Oral Q6H PRN Anni Thomas, DO      aspirin  81 mg Oral Daily Anni Thomas, DO      atorvastatin  40 mg Oral QPM Anni Thomas, DO      cefTRIAXone  1,000 mg Intravenous Q24H Anni Thomas, DO 1,000 mg (04/30/22 1810)  docusate sodium  100 mg Oral BID Dakotah Nipple, DO      enoxaparin  40 mg Subcutaneous Daily Dakotah Nipple, DO      sertraline  50 mg Oral HS Dakotah Nipple, DO      sodium chloride  1,000 mL Intravenous Once Dakotah Nipple, DO          Today, Patient Was Seen By: Dakotah Watson DO    **Please Note: This note may have been constructed using a voice recognition system  **

## 2022-05-02 PROBLEM — G93.41 METABOLIC ENCEPHALOPATHY: Status: ACTIVE | Noted: 2022-04-29

## 2022-05-02 LAB
ANION GAP SERPL CALCULATED.3IONS-SCNC: 8 MMOL/L (ref 4–13)
BUN SERPL-MCNC: 11 MG/DL (ref 5–25)
CALCIUM SERPL-MCNC: 8.9 MG/DL (ref 8.4–10.2)
CHLORIDE SERPL-SCNC: 97 MMOL/L (ref 96–108)
CO2 SERPL-SCNC: 25 MMOL/L (ref 21–32)
CREAT SERPL-MCNC: 0.56 MG/DL (ref 0.6–1.3)
GFR SERPL CREATININE-BSD FRML MDRD: 81 ML/MIN/1.73SQ M
GLUCOSE SERPL-MCNC: 106 MG/DL (ref 65–140)
POTASSIUM SERPL-SCNC: 3.6 MMOL/L (ref 3.5–5.3)
SODIUM SERPL-SCNC: 130 MMOL/L (ref 135–147)

## 2022-05-02 PROCEDURE — 97110 THERAPEUTIC EXERCISES: CPT

## 2022-05-02 PROCEDURE — 97530 THERAPEUTIC ACTIVITIES: CPT

## 2022-05-02 PROCEDURE — 97535 SELF CARE MNGMENT TRAINING: CPT

## 2022-05-02 PROCEDURE — 99232 SBSQ HOSP IP/OBS MODERATE 35: CPT | Performed by: PHYSICIAN ASSISTANT

## 2022-05-02 PROCEDURE — 97116 GAIT TRAINING THERAPY: CPT

## 2022-05-02 PROCEDURE — 80048 BASIC METABOLIC PNL TOTAL CA: CPT | Performed by: STUDENT IN AN ORGANIZED HEALTH CARE EDUCATION/TRAINING PROGRAM

## 2022-05-02 RX ORDER — GUAIFENESIN/DEXTROMETHORPHAN 100-10MG/5
10 SYRUP ORAL EVERY 4 HOURS PRN
Status: DISCONTINUED | OUTPATIENT
Start: 2022-05-02 | End: 2022-05-05 | Stop reason: HOSPADM

## 2022-05-02 RX ADMIN — DOCUSATE SODIUM 100 MG: 100 CAPSULE, LIQUID FILLED ORAL at 17:57

## 2022-05-02 RX ADMIN — GUAIFENESIN AND DEXTROMETHORPHAN 10 ML: 100; 10 SYRUP ORAL at 23:45

## 2022-05-02 RX ADMIN — ASPIRIN 81 MG 81 MG: 81 TABLET ORAL at 07:57

## 2022-05-02 RX ADMIN — ATORVASTATIN CALCIUM 40 MG: 40 TABLET, FILM COATED ORAL at 17:57

## 2022-05-02 RX ADMIN — SERTRALINE HYDROCHLORIDE 50 MG: 50 TABLET ORAL at 23:24

## 2022-05-02 RX ADMIN — ENOXAPARIN SODIUM 40 MG: 100 INJECTION SUBCUTANEOUS at 07:58

## 2022-05-02 RX ADMIN — DOCUSATE SODIUM 100 MG: 100 CAPSULE, LIQUID FILLED ORAL at 07:57

## 2022-05-02 NOTE — ASSESSMENT & PLAN NOTE
Sodium of 127 on admission- improved to 130  Suspect likely secondary to poor oral intake over past several days    IV fluid discontinued

## 2022-05-02 NOTE — ASSESSMENT & PLAN NOTE
Staff at facility reports patient's mood is different than normal  Possibly due to hyponatremia   CT head negative for any acute intracranial processes  Leukocytosis noted on CBC  Patient's family states they spoke to her on the phone 4/30 and felt she was back to her baseline     -continue to monitor

## 2022-05-02 NOTE — PROGRESS NOTES
[address]  Progress Note - Arianna Reis 1/28/1930, 80 y o  female MRN: [id number]  Unit/Bed#: -01 Encounter: [id number]  Primary Care Provider: Sushil Nunez DO   Date and time admitted to hospital: 4/29/2022 10:45 AM    * Generalized weakness  Assessment & Plan  Patient sent to ED for evaluation generalized weakness and possible altered mental status  CT head negative for any acute intracranial processes  Lab work remarkable for hyponatremia and leukocytosis  CXR revealed no acute cardiopulmonary processes  UA with WBCs 0-1 and occasional bacteria  CTA PE negative for emboli    -troponins WNL  -TSH 0 470, D-dimer 1 85  -blood cultures- no growth at 24 hours  -PT/OT consulted, recommending ARC   -Patient completed 3 days of Rocephin UA not overtly suggestive of UTI however given leukocytosis and functional decline it was treated empirically          Metabolic encephalopathy  Assessment & Plan  Staff at facility reports patient's mood is different than normal  Possibly due to hyponatremia   CT head negative for any acute intracranial processes  Leukocytosis noted on CBC  Patient's family states they spoke to her on the phone 4/30 and felt she was back to her baseline     -continue to monitor    Hyponatremia  Assessment & Plan  Sodium of 127 on admission- improved to 130  Suspect likely secondary to poor oral intake over past several days    IV fluid discontinued    Current moderate episode of major depressive disorder without prior episode ([facility] [address] )  Assessment & Plan  -continue home Zoloft 50 mg p o  daily    Essential hypertension  Assessment & Plan  Patient's blood pressure soft on admission,  - continue to hold home amlodipine and HCTZ  - given hyponatremia patient should likely not be restarted on HCTZ        VTE Pharmacologic Prophylaxis: VTE Score: 6 High Risk (Score >/= 5) - Pharmacological DVT Prophylaxis Ordered: enoxaparin (Lovenox)   Sequential Compression Devices Ordered  Patient Centered Rounds: I performed bedside rounds with nursing staff today  Discussions with Specialists or Other Care Team Provider: nursingZAYDA      Time Spent for Care: 20 minutes  More than 50% of total time spent on counseling and coordination of care as described above  Current Length of Stay: 2 day(s)  Current Patient Status: Inpatient   Certification Statement: The patient will continue to require additional inpatient hospital stay due to need for STR placement  Discharge Plan: patient medically stable for discharge- pending placement    Code Status: Level 1 - Full Code    Subjective: The patient was seen and examined  The patient is sitting up in the chair  She denies any complaints  Objective:     Vitals:   Temp (24hrs), Av 6 °F (37 °C), Min:98 2 °F (36 8 °C), Max:99 3 °F (37 4 °C)    Temp:  [98 2 °F (36 8 °C)-99 3 °F (37 4 °C)] 98 3 °F (36 8 °C)  HR:  [68-78] 68  Resp:  [20] 20  BP: (114-154)/(78-87) 114/78  SpO2:  [91 %] 91 %  Body mass index is 23 75 kg/m²  Input and Output Summary (last 24 hours): Intake/Output Summary (Last 24 hours) at 2022 1638  Last data filed at 2022 1514  Gross per 24 hour   Intake --   Output 800 ml   Net -800 ml       Physical Exam:   Physical Exam  Vitals and nursing note reviewed  Constitutional:       General: She is awake  Appearance: Normal appearance  Cardiovascular:      Rate and Rhythm: Normal rate and regular rhythm  Pulmonary:      Effort: Pulmonary effort is normal       Breath sounds: Normal breath sounds  Abdominal:      Palpations: Abdomen is soft  Tenderness: There is no abdominal tenderness  Skin:     General: Skin is warm and dry  Neurological:      General: No focal deficit present  Mental Status: She is alert  Mental status is at baseline     Psychiatric:         Attention and Perception: Attention normal          Mood and Affect: Mood normal          Speech: Speech normal  Behavior: Behavior is cooperative  Additional Data:     Labs:  Results from last 7 days   Lab Units 05/01/22  0448 04/30/22  0518 04/29/22  1137   WBC Thousand/uL 7 55   < > 13 83*   HEMOGLOBIN g/dL 13 9   < > 14 8   HEMATOCRIT % 40 0   < > 43 8   PLATELETS Thousands/uL 200   < > 245   NEUTROS PCT %  --   --  79*   LYMPHS PCT %  --   --  10*   MONOS PCT %  --   --  9   EOS PCT %  --   --  1    < > = values in this interval not displayed  Results from last 7 days   Lab Units 05/02/22  0437 04/30/22  0518 04/29/22  1137   SODIUM mmol/L 130*   < > 127*   POTASSIUM mmol/L 3 6   < > 3 4*   CHLORIDE mmol/L 97   < > 91*   CO2 mmol/L 25   < > 25   BUN mg/dL 11   < > 13   CREATININE mg/dL 0 56*   < > 0 80   ANION GAP mmol/L 8   < > 11   CALCIUM mg/dL 8 9   < > 9 5   ALBUMIN g/dL  --   --  3 8   TOTAL BILIRUBIN mg/dL  --   --  1 15*   ALK PHOS U/L  --   --  96   ALT U/L  --   --  33   AST U/L  --   --  38   GLUCOSE RANDOM mg/dL 106   < > 76    < > = values in this interval not displayed  Results from last 7 days   Lab Units 05/01/22  0448   PROCALCITONIN ng/ml 0 09       Lines/Drains:  Invasive Devices  Report    Peripheral Intravenous Line            Peripheral IV 08/27/20 Right Antecubital 613 days    Peripheral IV 05/01/22 Right;Upper Arm 1 day                      Imaging: No pertinent imaging reviewed  Recent Cultures (last 7 days):   Results from last 7 days   Lab Units 04/29/22  1724   BLOOD CULTURE  No Growth at 48 hrs  No Growth at 48 hrs         Last 24 Hours Medication List:   Current Facility-Administered Medications   Medication Dose Route Frequency Provider Last Rate    acetaminophen  650 mg Oral Q6H PRN Margy Walker, DO      aspirin  81 mg Oral Daily Margy Walker, DO      atorvastatin  40 mg Oral QPM Margy Walker, DO      docusate sodium  100 mg Oral BID Margy Walker, DO      enoxaparin  40 mg Subcutaneous Daily Margy Walker, DO      sertraline  50 mg Oral HS Leslie Josue,           Today, Patient Was Seen By: Jared Bell PA-C    **Please Note: This note may have been constructed using a voice recognition system  **

## 2022-05-02 NOTE — PLAN OF CARE
Problem: OCCUPATIONAL THERAPY ADULT  Goal: Performs self-care activities at highest level of function for planned discharge setting  See evaluation for individualized goals  Description: Treatment Interventions: ADL retraining,Functional transfer training,UE strengthening/ROM,Endurance training,Cognitive reorientation,Patient/family training,Equipment evaluation/education,Compensatory technique education,Activityengagement          See flowsheet documentation for full assessment, interventions and recommendations  Outcome: Progressing  Note: Limitation: Decreased ADL status,Decreased UE strength,Decreased Safe judgement during ADL,Decreased cognition,Decreased endurance,Decreased self-care trans,Decreased high-level ADLs  Prognosis: Good  Assessment: Patient participated in Skilled OT session this date with interventions consisting of ADL re training with the use of correct body mechnaics,  therapeutic activities to: increase activity tolerance and increase dynamic sit/ stand balance during functional activity    Patient agreeable to OT treatment session, upon arrival patient was found supine in bed and in no apparent distress  Patient completed bed mobility with min assist and STS transfers with min assist of 2  Pt required mod assist of 2 to pivot to recliner  While seated at EOB, patient required sup-min assist for UB ADLs and max-total assist for LB ADLs  In comparison to previous session, patient with improvements in sitting tolerance and UB dressing  Patient requiring cognitive assistance to anticipate next step, one step directives and frequent rest periods  Patient continues to be functioning below baseline level, occupational performance remains limited secondary to factors listed above and increased risk for falls and injury  From OT standpoint, recommendation at time of d/c would be Post acute rehabilitation services     Patient to benefit from continued Occupational Therapy treatment while in the hospital to address deficits as defined above and maximize level of functional independence with ADLs and functional mobility        OT Discharge Recommendation: Post acute rehabilitation services  OT - OK to Discharge: Yes (Once medically cleared)     Guille Werner, OT

## 2022-05-02 NOTE — PLAN OF CARE
Problem: PHYSICAL THERAPY ADULT  Goal: Performs mobility at highest level of function for planned discharge setting  See evaluation for individualized goals  Description: Treatment/Interventions: Functional transfer training,LE strengthening/ROM,Therapeutic exercise,Endurance training,Patient/family training,Equipment eval/education,Bed mobility,Gait training,Spoke to nursing,Continued evaluation  Equipment Recommended:  (TBD - continue with RW)       See flowsheet documentation for full assessment, interventions and recommendations  Outcome: Progressing  Note: Prognosis: Fair  Problem List: Decreased strength,Decreased endurance,Impaired balance,Decreased mobility,Decreased coordination,Decreased cognition,Impaired judgement,Decreased safety awareness,Impaired hearing  Assessment: Pt seen for PT treatment session this date with interventions consisting of gait training to reduce the risk of medical complications w/ emphasis on improving pt's ability to ambulate level surfaces x 2 feet with mod A of 2 provided by therapist with hand held assistance/axillary support, Therapeutic exercise to increase BLE stability w/WB tasks consisting of: AAROM 15 reps B LE in sitting position and therapeutic activity to reduce fall risk consisting of training: supine<>sit transfers, sit<>stand transfers, static sitting tolerance at EOB for >10 minutes w/ unilateral UE support, static standing tolerance for 1 minutes w/ B UE support, vc and tactile cues for static sitting posture faciliation, vc and tactile cues for static standing posture faciliation, stand pivot transfers towards R direction and continued safety awareness education  Pt agreeable to PT treatment session upon arrival, pt found supine in bed w/ HOB elevated, A&O x 1  In comparison to previous session, pt with improvements in BLE ther ex rep tolerance   Post session: pt returned back to recliner, chair alarm engaged, all needs in reach and RN notified of session findings/recommendations  Continue to recommend post acute rehabilitation services at time of d/c in order to maximize pt's functional independence and safety w/ mobility  Pt continues to be functioning below baseline level, and remains limited 2* factors listed above and including weakness, impaired balance, gait deviations, impaired cognition, activity intolerance  PT will continue to see pt during current hospitalization in order to address the deficits listed above and provide interventions consistent w/ POC in effort to achieve STGs  Barriers to Discharge: Inaccessible home environment,Decreased caregiver support        PT Discharge Recommendation: Post acute rehabilitation services (maintained recommendation)          See flowsheet documentation for full assessment

## 2022-05-02 NOTE — PHYSICAL THERAPY NOTE
Physical Therapy Treatment Session Note    Patient's Name: Gabriel Dorsey    Admitting Diagnosis  Fatigue [R53 83]  Weakness [R53 1]  Change in mental status [R41 82]    Problem List  Patient Active Problem List   Diagnosis    Hearing loss    Gait abnormality    Osteoarthritis    Mediastinal mass    Spinal stenosis at L4-L5 level    Essential hypertension    Bilateral lower extremity edema    Benign paroxysmal positional vertigo    Urinary incontinence    History of CVA (cerebrovascular accident)    Right ankle sprain    Venous insufficiency of both lower extremities    Unspecified mood (affective) disorder, r/o major depressive disorder (single episode)    Mild neurocognitive disorder    Current moderate episode of major depressive disorder without prior episode ([facility] [address] )    History of suicidal ideation    Generalized weakness    Metabolic encephalopathy    Hyponatremia       Past Medical History  Past Medical History:   Diagnosis Date    Arthritis     Cataract     LAST ASSESSED: [id number]    Depression     Dislocation of left shoulder joint     LAST ASSESSSED: [id number]    Essential hypertension 4/3/2019    Gait disorder     LAST ASSESSED: [id number]    Herpes zoster     LAST ASSESSED: [id number]    HL (hearing loss)     Impacted cerumen of both ears     LAST ASSESSED: [id number]    Impacted cerumen of right ear     LAST ASSESSED: [id number]    Lightheadedness     MILD AND PT REFUSES EKG ETC  PT PROMISES TO CALL IF WORSENS   LAST ASSESSED: [id number]    Multiple falls     LAST ASSESSED: [id number]    Near syncope     LAST ASSESSED: [id number]    Postherpetic polyneuropathy     LAST ASSESSED: [id number]       Past Surgical History  Past Surgical History:   Procedure Laterality Date    CATARACT EXTRACTION, BILATERAL      HERNIA REPAIR      TONSILLECTOMY      TUBAL LIGATION          05/02/22 0946   PT Last Visit   PT Visit Date 05/02/22   Note Type   Note Type Treatment   Pain Assessment Pain Assessment Tool 0-10   Pain Score No Pain  (denies)   Restrictions/Precautions   Weight Bearing Precautions Per Order No   Other Precautions 1:1;Cognitive; Chair Alarm; Bed Alarm; Fall Risk;Telemetry;Multiple lines;Hard of hearing  (virtual 1:1)   General   Chart Reviewed Yes   Cognition   Overall Cognitive Status Impaired   Arousal/Participation Alert; Cooperative   Attention Attends with cues to redirect   Orientation Level Oriented to person;Disoriented to place; Disoriented to time;Disoriented to situation  (knew hospital, but believed in [place])   Memory Decreased short term memory;Decreased recall of recent events;Decreased recall of precautions   Following Commands Follows one step commands with increased time or repetition   Comments Pt  agreeable to PT tx session,pleasant  Subjective   Subjective "I can try'   Bed Mobility   Supine to Sit 4  Minimal assistance   Additional items Assist x 1;HOB elevated; Bedrails; Increased time required;Verbal cues;LE management   Sit to Supine   (DNT pt  was sitting out of bed on recliner upon conclusion )   Additional Comments Pt  denied lightheadedness/dizziness with positional changes  Intermittent required external tactile input to maintain dynamic sitting  POST LOB noted w/external POST correction  OT completed full ADL on bedside-please see OT note for detailed account  Transfers   Sit to Stand 4  Minimal assistance   Additional items Assist x 2;Bedrails; Increased time required;Verbal cues; Other  (hand held assistance/postural support)   Stand to Sit 4  Minimal assistance   Additional items Assist x 2;Bedrails; Increased time required;Verbal cues; Other  (hand held assistance/postural support)   Stand pivot 3  Moderate assistance   Additional items Assist x 2; Increased time required;Verbal cues  (hand held assistance/postural support)   Additional Comments Verbal cues for appropriate safety awareness while turning, maintaining pedal positioning with transitional movements, and safety awareness  Simple 1-step commands utilized due to cognitive impairment  Ambulation/Elevation   Gait pattern Improper Weight shift; Forward Flexion;Decreased foot clearance; Short stride; Excessively slow; Inconsistent dhruv   Gait Assistance 3  Moderate assist   Additional items Assist x 2;Verbal cues; Tactile cues   Assistive Device Other (Comment)  (hand held assistance/axillary support)   Distance 2 feet  (bedside->recliner, could not safely advance distance)   Stair Management Assistance Not tested   Balance   Static Sitting Fair   Dynamic Sitting Fair -   Static Standing Poor +   Dynamic Standing Poor   Ambulatory Poor   Endurance Deficit   Endurance Deficit Yes   Activity Tolerance   Activity Tolerance Patient limited by fatigue; Other (Comment)  (cognitive impairment severity)   Nurse Made Aware Yes, nursing staff informed of tx outcome  Exercises   Heelslides Sitting;15 reps;AAROM; Bilateral   Hip Flexion Sitting;15 reps;AAROM; Bilateral   Hip Abduction Sitting;15 reps;AAROM; Bilateral   Hip Adduction Sitting;15 reps;AAROM; Bilateral   Knee AROM Long Arc Quad Sitting;15 reps;AAROM; Bilateral   Ankle Pumps Sitting;15 reps;AAROM; Bilateral   Assessment   Prognosis Fair   Problem List Decreased strength;Decreased endurance; Impaired balance;Decreased mobility; Decreased coordination;Decreased cognition; Impaired judgement;Decreased safety awareness; Impaired hearing   Assessment Pt seen for PT treatment session this date with interventions consisting of gait training to reduce the risk of medical complications w/ emphasis on improving pt's ability to ambulate level surfaces x 2 feet with mod A of 2 provided by therapist with hand held assistance/axillary support, Therapeutic exercise to increase BLE stability w/WB tasks consisting of: AAROM 15 reps B LE in sitting position and therapeutic activity to reduce fall risk consisting of training: supine<>sit transfers, sit<>stand transfers, static sitting tolerance at EOB for >10 minutes w/ unilateral UE support, static standing tolerance for 1 minutes w/ B UE support, vc and tactile cues for static sitting posture faciliation, vc and tactile cues for static standing posture faciliation, stand pivot transfers towards R direction and continued safety awareness education  Pt agreeable to PT treatment session upon arrival, pt found supine in bed w/ HOB elevated, A&O x 1  In comparison to previous session, pt with improvements in BLE ther ex rep tolerance  Post session: pt returned back to recliner, chair alarm engaged, all needs in reach and RN notified of session findings/recommendations  Continue to recommend post acute rehabilitation services at time of d/c in order to maximize pt's functional independence and safety w/ mobility  Pt continues to be functioning below baseline level, and remains limited 2* factors listed above and including weakness, impaired balance, gait deviations, impaired cognition, activity intolerance  PT will continue to see pt during current hospitalization in order to address the deficits listed above and provide interventions consistent w/ POC in effort to achieve STGs  Barriers to Discharge Inaccessible home environment;Decreased caregiver support   Goals   Patient Goals pt  could not provide a therapy-related goal due to cognitive impairment severity   STG Expiration Date 05/10/22   Short Term Goal #1 STGs remain appropriate   PT Treatment Day 3   Plan   Treatment/Interventions Functional transfer training;LE strengthening/ROM; Elevations; Therapeutic exercise; Endurance training;Cognitive reorientation;Patient/family training;Bed mobility;Gait training;Spoke to nursing;OT   Progress Slow progress, cognitive deficits   PT Frequency 3-5x/wk   Recommendation   PT Discharge Recommendation Post acute rehabilitation services  (maintained recommendation)   Additional Comments Upon conclusion, pt  was resting out of bed on recliner w/chair alarm engaged and all needs within reach     AM-PAC Basic Mobility Inpatient   Turning in Bed Without Bedrails 3   Lying on Back to Sitting on Edge of Flat Bed 2   Moving Bed to Chair 2   Standing Up From Chair 2   Walk in Room 2   Climb 3-5 Stairs 1   Basic Mobility Inpatient Raw Score 12   Basic Mobility Standardized Score 32 23   Highest Level Of Mobility   JH-HLM Goal 4: Move to chair/commode   JH-HLM Highest Level of Mobility 4: Move to chair/commode   JH-HLM Goal Achieved Yes   Education   Education Provided Mobility training   Patient Reinforcement needed     Treatment Time: 8446-0816    Axel Leahy, PT

## 2022-05-02 NOTE — CASE MANAGEMENT
Case Management Assessment & Discharge Planning Note    Patient name Delta Espinal  Location [id number] MRN [id number]  : 1930 Date 2022       Current Admission Date: 2022  Current Admission Diagnosis:Generalized weakness   Patient Active Problem List    Diagnosis Date Noted    Generalized weakness 5010    Metabolic encephalopathy     Hyponatremia 2022    Current moderate episode of major depressive disorder without prior episode ([facility] [address] ) 2020    History of suicidal ideation 2020    History of CVA (cerebrovascular accident) 2020    Right ankle sprain 2020    Venous insufficiency of both lower extremities 2020    Unspecified mood (affective) disorder, r/o major depressive disorder (single episode) 2020    Mild neurocognitive disorder 2020    Benign paroxysmal positional vertigo 2019    Urinary incontinence 2019    Bilateral lower extremity edema 2019    Essential hypertension 2019    Mediastinal mass 2019    Spinal stenosis at L4-L5 level 2019    Gait abnormality 2015    Hearing loss 2013    Osteoarthritis 2012      LOS (days): 2  Geometric Mean LOS (GMLOS) (days): 2 60  Days to GMLOS:0 8     OBJECTIVE:    Risk of Unplanned Readmission Score: 10         Current admission status: Inpatient  Referral Reason: Other (Discharge planning)    Preferred Pharmacy:   [address] [place] [address]  Phone: [phone] Fax: [phone]    Primary Care Provider: Joaquín Gabriel DO    Primary Insurance: MEDICARE  Secondary Insurance: [facility] HEALTH OPTIONS PROGRAM    ASSESSMENT:  [address] Proxies    There are no active Health Care Proxies on file         Advance Directives  Does patient have a Health Care POA?: Yes  Does patient have Advance Directives?: Yes  Advance Directives: Power of  for health care  Primary Contact: Aashish Diaz - son         Readmission Root Cause  30 Day Readmission: No    Patient Information  Admitted from[de-identified] Facility  Mental Status: Alert  During Assessment patient was accompanied by: Not accompanied during assessment  Assessment information provided by[de-identified] Patient  Primary Caregiver: Self  Support Systems: Children,Friends/neighbors,Congregation/elo community,Family members  [place] of Residence: [address] do you live in?: Via [employer] entry access options   Select all that apply : Ramp  Type of Current Residence: Facility [facility] personal care home)  Upon entering residence, is there a bedroom on the main floor (no further steps)?: Yes  Upon entering residence, is there a bathroom on the main floor (no further steps)?: Yes  In the last 12 months, was there a time when you were not able to pay the mortgage or rent on time?: No  In the last 12 months, how many places have you lived?: 1  In the last 12 months, was there a time when you did not have a steady place to sleep or slept in a shelter (including now)?: No  Homeless/housing insecurity resource given?: N/A  Living Arrangements: Other (Comment) (Personal care home)  Is patient a ?: No    Activities of Daily Living Prior to Admission  Functional Status: Independent  Completes ADLs independently?: Yes  Ambulates independently?: No  Level of ambulatory dependence: Assistance  Does patient use assisted devices?: Yes  Assisted Devices (DME) used: Kimi Santiago  Does patient currently own DME?: Yes  What DME does the patient currently own?: Jagdish Florentino  Does patient have a history of Outpatient Therapy (PT/OT)?: No  Does the patient have a history of Short-Term Rehab?: Yes ([facility])  Does patient have a history of HHC?: Yes  Does patient currently have [address]?: No         Patient Information Continued  Income Source: Pension/assisted  Does patient have prescription coverage?: No  Within the past 12 months, you worried that your food would run out before you got the money to buy more : Never true  Within the past 12 months, the food you bought just didnt last and you didnt have money to get more : Never true  Food insecurity resource given?: N/A  Does patient receive dialysis treatments?: No  Does patient have a history of substance abuse?: No  Does patient have a history of Mental Health Diagnosis?: No         Means of Transportation  Means of Transport to Appts[de-identified] Family transport  In the past 12 months, has lack of transportation kept you from medical appointments or from getting medications?: No  In the past 12 months, has lack of transportation kept you from meetings, work, or from getting things needed for daily living?: No  Was application for public transport provided?: N/A        DISCHARGE DETAILS:    CM spoke with pt son Marcus Turcios via phone to discuss discharge planning  He is aware and in agreement with recommendation of STR  He requested [facility] to [facility]  He does not want [facility] as pt has been there before and they did not care for the treatment  Pt had a [facility] admit 8/2020 and is a target for SNF placement due to same  CM explained option process to son  He can present here at 1430 to sign option forms  CM will provide son with SNF list for more choices when he arrives  Discussed with Sean Soliman from [facility] who will order psychiatric consult for option process         Discharge planning discussed with[de-identified] patient son Chayo Fore of Choice: Yes     CM contacted family/caregiver?: Yes  Were Treatment Team discharge recommendations reviewed with patient/caregiver?: Yes  Did patient/caregiver verbalize understanding of patient care needs?: Yes  Were patient/caregiver advised of the risks associated with not following Treatment Team discharge recommendations?: Yes    Contacts  Patient Contacts: Nelson Andujar - son  Relationship to Patient[de-identified] Family  Contact Method: Phone  Phone Number: [phone]  Reason/Outcome: Continuity of [address]         Is the patient interested in [facility] at discharge?: No    DME Referral Provided  Referral made for DME?: No         Would you like to participate in our [address] service program?  : No - Declined    Treatment Team Recommendation: Short Term Rehab  Discharge Destination Plan[de-identified] Short Term Rehab  Transport at Discharge : [facility]LS Ambulance

## 2022-05-02 NOTE — OCCUPATIONAL THERAPY NOTE
Occupational Therapy Treatment Note     Patient Name: Ce FUENTES Date: 5/2/2022  Problem List  Principal Problem:    Generalized weakness  Active Problems:    Essential hypertension    Current moderate episode of major depressive disorder without prior episode [facility])    Metabolic encephalopathy    Hyponatremia        05/02/22 1008   OT Last Visit   OT Visit Date 05/02/22   Note Type   Note Type Treatment   Restrictions/Precautions   Weight Bearing Precautions Per Order No   Other Precautions 1:1;Cognitive; Chair Alarm; Bed Alarm; Fall Risk;Pain   General   Response to Previous Treatment Patient unable to report, no changes reported from family or staff   Lifestyle   Autonomy Patient requires assistance with ADLs/IADLs, ambulatory with RW and lives at [facility]   Pain Assessment   Pain Assessment Tool 0-10   Pain Score 3  (0/ 10 at rest; 3/10 c bed mobility )   Pain Location/Orientation Orientation: Lower; Location: Back   Pain Onset/Description Frequency: Intermittent; Descriptor: Aching;Descriptor: Sore   Hospital Pain Intervention(s) Ambulation/increased activity;Repositioned; Emotional support   ADL   Where Assessed Edge of bed   Grooming Assistance 4  Minimal Assistance   Grooming Deficit Setup;Verbal cueing;Supervision/safety; Increased time to complete;Brushing hair;Denture care   Grooming Comments Pt required assistance to reach posterior side of head and denture care  UB Bathing Assistance 5  Supervision/Setup   UB Bathing Deficit Setup;Verbal cueing;Supervision/safety; Increased time to complete   LB Bathing Assistance 2  Maximal Assistance   LB Bathing Deficit Setup;Verbal cueing;Supervision/safety; Increased time to complete;Perineal area; Buttocks;Right lower leg including foot; Left lower leg including foot   UB Dressing Assistance 4  Minimal Assistance   UB Dressing Deficit Setup;Verbal cueing;Supervision/safety; Increased time to complete;Pull around back; Fasteners   UB Dressing Comments Pt donned \[facility] [place] [facility]\"" gown  LB Dressing Assistance 1  Total Assistance  (Pt TD with doffing/donning socks )   Bed Mobility   Supine to Sit 4  Minimal assistance   Additional items Assist x 1;HOB elevated; Bedrails; Increased time required;Verbal cues;LE management   Sit to Supine   (DNT: pt seated OOB in recliner at end of session )   Transfers   Sit to Stand 4  Minimal assistance   Additional items Assist x 2; Increased time required;Verbal cues   Stand to Sit 4  Minimal assistance   Additional items Assist x 2;Armrests; Increased time required;Verbal cues   Stand pivot 3  Moderate assistance   Additional items Assist x 2; Increased time required;Verbal cues  (c HHA )   Additional Comments Pt requires step-by-step instructions and verbal/tactile cues for hand placement  Pt unsteady and requires verbal/tactile cues for trunk control  Cognition   Overall Cognitive Status Impaired   Arousal/Participation Alert; Responsive; Cooperative   Attention Attends with cues to redirect   Orientation Level Oriented to person;Disoriented to place; Disoriented to time;Disoriented to situation   Memory Decreased short term memory;Decreased recall of recent events;Decreased recall of precautions   Following Commands Follows one step commands with increased time or repetition   Comments Pt agreeable to OT session    Activity Tolerance   Activity Tolerance Patient limited by fatigue   Medical Staff Made Aware This session, pt required and most appropriately benefited from skilled OT/PT co-treat due to extensive physical assistance of SKILLED therapists, significant regression from functional baseline and decreased activity tolerance  OT and PT goals were addressed separately as seen in documentation      Assessment   Assessment Patient participated in Skilled OT session this date with interventions consisting of ADL re training with the use of correct body mechnaics,  therapeutic activities to: increase activity tolerance and increase dynamic sit/ stand balance during functional activity    Patient agreeable to OT treatment session, upon arrival patient was found supine in bed and in no apparent distress  Patient completed bed mobility with min assist and STS transfers with min assist of 2  Pt required mod assist of 2 to pivot to recliner  While seated at EOB, patient required sup-min assist for UB ADLs and max-total assist for LB ADLs  In comparison to previous session, patient with improvements in sitting tolerance and UB dressing  Patient requiring cognitive assistance to anticipate next step, one step directives and frequent rest periods  Patient continues to be functioning below baseline level, occupational performance remains limited secondary to factors listed above and increased risk for falls and injury  From OT standpoint, recommendation at time of d/c would be Post acute rehabilitation services  Patient to benefit from continued Occupational Therapy treatment while in the hospital to address deficits as defined above and maximize level of functional independence with ADLs and functional mobility  Plan   Treatment Interventions ADL retraining;Functional transfer training; Endurance training;Patient/family training; Compensatory technique education   Goal Expiration Date 05/11/22   OT Treatment Day 1   OT Frequency 3-5x/wk   Recommendation   OT Discharge Recommendation Post acute rehabilitation services   OT - OK to Discharge Yes  (Once medically cleared)   Additional Comments  At end of session, pt seated OOB in recliner with chair alarm activated and call bell within reach    Additional Comments 2 The patient's raw score on the AM-PAC Daily Activity inpatient short form is 12, standardized score is 30 6, less than 39 4  Patients at this level are likely to benefit from discharge to post-acute rehabilitation services  Please refer to the recommendation of the Occupational Therapist for safe discharge planning     AM-PAC Daily Activity Inpatient Lower Body Dressing 1   Bathing 1   Toileting 1   Upper Body Dressing 3   Grooming 3   Eating 3   Daily Activity Raw Score 12   Daily Activity Standardized Score (Calc for Raw Score >=11) 30 6   AM-PAC Applied Cognition Inpatient   Following a Speech/Presentation 2   Understanding Ordinary Conversation 3   Taking Medications 1   Remembering Where Things Are Placed or Put Away 1   Remembering List of 4-5 Errands 1   Taking Care of Complicated Tasks 1   Applied Cognition Raw Score 9   Applied Cognition Standardized Score 22 48     Mariettarosanna Malagon, OT

## 2022-05-02 NOTE — PLAN OF CARE
Problem: MOBILITY - ADULT  Goal: Maintain or return to baseline ADL function  Description: INTERVENTIONS:  -  Assess patient's ability to carry out ADLs; assess patient's baseline for ADL function and identify physical deficits which impact ability to perform ADLs (bathing, care of mouth/teeth, toileting, grooming, dressing, etc )  - Assess/evaluate cause of self-care deficits   - Assess range of motion  - Assess patient's mobility; develop plan if impaired  - Assess patient's need for assistive devices and provide as appropriate  - Encourage maximum independence but intervene and supervise when necessary  - Involve family in performance of ADLs  - Assess for home care needs following discharge   - Consider OT consult to assist with ADL evaluation and planning for discharge  - Provide patient education as appropriate  Outcome: Progressing  Goal: Maintains/Returns to pre admission functional level  Description: INTERVENTIONS:  - Perform BMAT or MOVE assessment daily    - Set and communicate daily mobility goal to care team and patient/family/caregiver  - Collaborate with rehabilitation services on mobility goals if consulted  - Reposition patient every 2 hours    - Record patient progress and toleration of activity level   Outcome: Progressing

## 2022-05-02 NOTE — ASSESSMENT & PLAN NOTE
Patient sent to ED for evaluation generalized weakness and possible altered mental status  CT head negative for any acute intracranial processes    Lab work remarkable for hyponatremia and leukocytosis  CXR revealed no acute cardiopulmonary processes  UA with WBCs 0-1 and occasional bacteria  CTA PE negative for emboli    -troponins WNL  -TSH 0 470, D-dimer 1 85  -blood cultures- no growth at 24 hours  -PT/OT consulted, recommending ARC   -Patient completed 3 days of Rocephin UA not overtly suggestive of UTI however given leukocytosis and functional decline it was treated empirically

## 2022-05-02 NOTE — ASSESSMENT & PLAN NOTE
Patient's blood pressure soft on admission,  - continue to hold home amlodipine and HCTZ  - given hyponatremia patient should likely not be restarted on HCTZ

## 2022-05-03 LAB
ANION GAP SERPL CALCULATED.3IONS-SCNC: 7 MMOL/L (ref 4–13)
BUN SERPL-MCNC: 13 MG/DL (ref 5–25)
CALCIUM SERPL-MCNC: 8.8 MG/DL (ref 8.4–10.2)
CHLORIDE SERPL-SCNC: 95 MMOL/L (ref 96–108)
CO2 SERPL-SCNC: 26 MMOL/L (ref 21–32)
CREAT SERPL-MCNC: 0.6 MG/DL (ref 0.6–1.3)
GFR SERPL CREATININE-BSD FRML MDRD: 79 ML/MIN/1.73SQ M
GLUCOSE SERPL-MCNC: 96 MG/DL (ref 65–140)
OSMOLALITY UR/SERPL-RTO: 275 MMOL/KG (ref 282–298)
POTASSIUM SERPL-SCNC: 3.9 MMOL/L (ref 3.5–5.3)
SODIUM SERPL-SCNC: 128 MMOL/L (ref 135–147)
URATE SERPL-MCNC: 3.4 MG/DL (ref 2–7.5)

## 2022-05-03 PROCEDURE — 99232 SBSQ HOSP IP/OBS MODERATE 35: CPT | Performed by: PHYSICIAN ASSISTANT

## 2022-05-03 PROCEDURE — 83930 ASSAY OF BLOOD OSMOLALITY: CPT | Performed by: PHYSICIAN ASSISTANT

## 2022-05-03 PROCEDURE — 97530 THERAPEUTIC ACTIVITIES: CPT

## 2022-05-03 PROCEDURE — 97116 GAIT TRAINING THERAPY: CPT

## 2022-05-03 PROCEDURE — 84550 ASSAY OF BLOOD/URIC ACID: CPT | Performed by: PHYSICIAN ASSISTANT

## 2022-05-03 PROCEDURE — 80048 BASIC METABOLIC PNL TOTAL CA: CPT | Performed by: PHYSICIAN ASSISTANT

## 2022-05-03 PROCEDURE — 99221 1ST HOSP IP/OBS SF/LOW 40: CPT | Performed by: PSYCHIATRY & NEUROLOGY

## 2022-05-03 RX ORDER — SERTRALINE HYDROCHLORIDE 25 MG/1
25 TABLET, FILM COATED ORAL
Status: COMPLETED | OUTPATIENT
Start: 2022-05-03 | End: 2022-05-03

## 2022-05-03 RX ADMIN — SERTRALINE 25 MG: 25 TABLET, FILM COATED ORAL at 22:44

## 2022-05-03 RX ADMIN — DOCUSATE SODIUM 100 MG: 100 CAPSULE, LIQUID FILLED ORAL at 08:01

## 2022-05-03 RX ADMIN — ATORVASTATIN CALCIUM 40 MG: 40 TABLET, FILM COATED ORAL at 17:29

## 2022-05-03 RX ADMIN — DOCUSATE SODIUM 100 MG: 100 CAPSULE, LIQUID FILLED ORAL at 17:29

## 2022-05-03 RX ADMIN — ASPIRIN 81 MG 81 MG: 81 TABLET ORAL at 08:01

## 2022-05-03 RX ADMIN — ENOXAPARIN SODIUM 40 MG: 100 INJECTION SUBCUTANEOUS at 08:01

## 2022-05-03 RX ADMIN — GUAIFENESIN AND DEXTROMETHORPHAN 10 ML: 100; 10 SYRUP ORAL at 22:53

## 2022-05-03 NOTE — PROGRESS NOTES
[facility]  66   Progress Note - Rima Reis 1/28/1930, 80 y o  female MRN: [id number]  Unit/Bed#: -01 Encounter: [id number]  Primary Care Provider: Te Heard DO   Date and time admitted to hospital: 4/29/2022 10:45 AM    * Generalized weakness  Assessment & Plan  · Patient sent to ED for evaluation generalized weakness and possible altered mental status  · CT head negative for any acute intracranial processes  · Lab work remarkable for hyponatremia and leukocytosis  · CXR revealed no acute cardiopulmonary processes  · UA with WBCs 0-1 and occasional bacteria  · CTA PE negative for emboli  · Troponins WNL  · TSH 0 470, D-dimer 1 85  · blood cultures- no growth at 72 hours  · PT/OT consulted, recommending ARC   · Patient completed 3 days of Rocephin UA not overtly suggestive of UTI however given leukocytosis and functional decline it was treated empirically  · Psych consult appreciated: no psych contraindication for STR upon medical clearance/discharge          Metabolic encephalopathy  Assessment & Plan  · Staff at facility reports patient's mood is different than normal  · Possibly due to hyponatremia   · CT head negative for any acute intracranial processes  · Leukocytosis noted on CBC  · Patient's family states they spoke to her on the phone 4/30 and felt she was back to her baseline        Hyponatremia  Assessment & Plan  · Sodium of 127 on admission  · Initially improved and stable low 130s   · Suspected likely secondary to poor oral intake over past several days, however Zoloft may be contributory as well   · Discontinue hydrochlorothiazide  · Will order urine studies and serum osmo since sodium decreased to 128 again today   · Will reduce Zoloft to 25mg PO x 1 day then discontinue as a trial drug holiday per psych recs  · S/p IVF    Current moderate episode of major depressive disorder without prior episode ([facility] [address] )  Assessment & Plan  · Previously on Zoloft 50 mg daily  · Psychology consult appreciated  · Due to persistent hyponatremia, will reduce Zoloft to 25 mg p o  X1 day and then discontinue as a trial drug holiday    Essential hypertension  Assessment & Plan  Patient's blood pressure soft on admission,  - continue to hold home amlodipine and HCTZ  - given hyponatremia patient should likely not be restarted on HCTZ          VTE Pharmacologic Prophylaxis: VTE Score: 6 High Risk (Score >/= 5) - Pharmacological DVT Prophylaxis Ordered: enoxaparin (Lovenox)  Sequential Compression Devices Ordered  Patient Centered Rounds: I performed bedside rounds with nursing staff today  Discussions with Specialists or Other Care Team Provider: ZAYDA,RN    Education and Discussions with Family / Patient: Attempted to update  (son) via phone  Left voicemail  Time Spent for Care: 30 minutes  More than 50% of total time spent on counseling and coordination of care as described above  Current Length of Stay: 3 day(s)  Current Patient Status: Inpatient   Certification Statement: The patient will continue to require additional inpatient hospital stay due to hyponatremia, pending STR placement   Discharge Plan: Anticipate discharge in 24-48 hrs to rehab facility  Code Status: Level 1 - Full Code    Subjective:   Patient was resting in bed comfortably, no complaints at this time  Objective:     Vitals:   Temp (24hrs), Av 2 °F (36 8 °C), Min:98 1 °F (36 7 °C), Max:98 3 °F (36 8 °C)    Temp:  [98 1 °F (36 7 °C)-98 3 °F (36 8 °C)] 98 1 °F (36 7 °C)  HR:  [68-71] 71  Resp:  [16-20] 18  BP: (114-121)/(78-79) 121/79  SpO2:  [90 %-93 %] 90 %  Body mass index is 23 75 kg/m²  Input and Output Summary (last 24 hours):      Intake/Output Summary (Last 24 hours) at 5/3/2022 1257  Last data filed at [facility] per 24 hour   Intake 480 ml   Output 800 ml   Net -320 ml       Physical Exam:   Physical Exam  Constitutional:       General: She is not in acute distress  HENT:      Mouth/Throat:      Mouth: Mucous membranes are moist    Cardiovascular:      Rate and Rhythm: Normal rate and regular rhythm  Heart sounds: Normal heart sounds  Pulmonary:      Breath sounds: Normal breath sounds  Abdominal:      General: Abdomen is flat  Bowel sounds are normal       Palpations: Abdomen is soft  Musculoskeletal:      Right lower leg: No edema  Left lower leg: No edema  Skin:     General: Skin is warm  Neurological:      Mental Status: She is alert  Mental status is at baseline  Psychiatric:         Mood and Affect: Mood normal           Additional Data:     Labs:  Results from last 7 days   Lab Units 05/01/22 0448 04/30/22 0518 04/29/22  1137   WBC Thousand/uL 7 55   < > 13 83*   HEMOGLOBIN g/dL 13 9   < > 14 8   HEMATOCRIT % 40 0   < > 43 8   PLATELETS Thousands/uL 200   < > 245   NEUTROS PCT %  --   --  79*   LYMPHS PCT %  --   --  10*   MONOS PCT %  --   --  9   EOS PCT %  --   --  1    < > = values in this interval not displayed  Results from last 7 days   Lab Units 05/03/22 0518 04/30/22 0518 04/29/22  1137   SODIUM mmol/L 128*   < > 127*   POTASSIUM mmol/L 3 9   < > 3 4*   CHLORIDE mmol/L 95*   < > 91*   CO2 mmol/L 26   < > 25   BUN mg/dL 13   < > 13   CREATININE mg/dL 0 60   < > 0 80   ANION GAP mmol/L 7   < > 11   CALCIUM mg/dL 8 8   < > 9 5   ALBUMIN g/dL  --   --  3 8   TOTAL BILIRUBIN mg/dL  --   --  1 15*   ALK PHOS U/L  --   --  96   ALT U/L  --   --  33   AST U/L  --   --  38   GLUCOSE RANDOM mg/dL 96   < > 76    < > = values in this interval not displayed  Results from last 7 days   Lab Units 05/01/22  0448   PROCALCITONIN ng/ml 0 09       Lines/Drains:  Invasive Devices  Report    Peripheral Intravenous Line            Peripheral IV 05/01/22 Right;Upper Arm 1 day                      Imaging: No pertinent imaging reviewed      Recent Cultures (last 7 days):   Results from last 7 days   Lab Units 04/29/22  1021 BLOOD CULTURE  No Growth at 72 hrs  No Growth at 72 hrs  Last 24 Hours Medication List:   Current Facility-Administered Medications   Medication Dose Route Frequency Provider Last Rate    acetaminophen  650 mg Oral Q6H PRN Yuki Solar, DO      aspirin  81 mg Oral Daily Yuki Solar, DO      atorvastatin  40 mg Oral QPM Yuki Solar, DO      dextromethorphan-guaiFENesin  10 mL Oral Q4H PRN Azam Pan PA-C      docusate sodium  100 mg Oral BID Yuki Solar, DO      enoxaparin  40 mg Subcutaneous Daily Yuki Solar, DO      sertraline  25 mg Oral HS Gideon Wrad PA-C          Today, Patient Was Seen By: Gideon Ward PA-C    **Please Note: This note may have been constructed using a voice recognition system  **

## 2022-05-03 NOTE — TELEMEDICINE
TeleConsultation - [address] [place] [address] 80 y o  female MRN: [id number]  Unit/Bed#: -01 Encounter: [id number]        REQUIRED DOCUMENTATION:     1  This service was provided via Telemedicine  2  Provider located at [place]  3  TeleMed provider: Monique Womack MD   4  Identify all parties in room with patient during tele consult:  Patient  5  Patient was then informed that this was a Telemedicine visit and that the exam was being conducted confidentially over secure lines  My office door was closed  No one else was in the room  Patient acknowledged consent and understanding of privacy and security of the Telemedicine visit, and gave us permission to have the assistant stay in the room in order to assist with the history and to conduct the exam   I informed the patient that I have reviewed their record in Epic and presented the opportunity for them to ask any questions regarding the visit today  The patient agreed to participate  Assessment/Plan     Assessment:  Metabolic encephalopathy/delirium secondary to hyponatremia; history of major depression moderate single episode on Zoloft  I am not aware as to whether the patient has had underlying dementia  I see no report of it in the medical records  Plan:   Risks, benefits and possible side effects of Medications:   Patient does not verbalize understanding at this time and will require further explanation  There is no psychiatric contraindication for transfer to rehab upon medical clearance and discharge  If there is any concern that Zoloft may be a contributing factor to the hyponatremia, consider reduction to 25 mg p o  q day x1 day then discontinue as a trial drug holiday  Re-consult Psychiatry if needed      Chief Complaint:  Patient has no complaint other than over court I feel kind of icky    History of Present Illness     Reason for Consult / Principal Problem:  Psychiatric clearance needed before transfer to rehab    Patient is a 80 y o  female currently being treated for hyponatremia with secondary metabolic encephalopathy/delirium  Psychiatric clearance needed before transfer to rehab  Nursing reports that the patient has been very cooperative with care, treatment and medications  She has been without behavioral disturbance  She has been very pleasant  Mental status examination:  The patient was alert and sitting up slightly in her bed at the time the evaluation  She made good eye contact  Is thought process was somewhat slowed  She was able to tell me her 1st and last name  It psych her some time to think about it but she was gradually able to the tell me the month of her birth then the date and then the year  She did not know where she was  She was able to tell me that the month was May and the year was over court in the 20s  She served as a poor historian due to her confusion secondary to metabolic encephalopathy/delirium  She denied any suicidal homicidal ideation  When asked about hallucinations she responded I do not know which room talking about  She does show some gross memory impairment all spheres secondary to encephalopathy/delirium at this time  Associations were tight in that she was able to stay focused on the question asked  No psychomotor agitation was evident  I suspect historically she has been of average intellectual functioning judging by her use vocabulary, sentence structure and syntax  Insight and judgment and cognitive capacity are impaired at this time      Inpatient consult to Psychiatry  Consult performed by: Craig Xie MD  Consult ordered by: Kimani Larry PA-C              Past Medical History:   Diagnosis Date    Arthritis     Cataract     LAST ASSESSED: [id number]    Depression     Dislocation of left shoulder joint     LAST ASSESSSED: [id number]    Essential hypertension 4/3/2019    Gait disorder     LAST ASSESSED: [id number]    Herpes zoster LAST ASSESSED: [id number]    HL (hearing loss)     Impacted cerumen of both ears     LAST ASSESSED: [id number]    Impacted cerumen of right ear     LAST ASSESSED: [id number]    Lightheadedness     MILD AND PT REFUSES EKG ETC  PT PROMISES TO CALL IF WORSENS  LAST ASSESSED: [id number]    Multiple falls     LAST ASSESSED: [id number]    Near syncope     LAST ASSESSED: [id number]    Postherpetic polyneuropathy     LAST ASSESSED: [id number]       Medical Review Of Systems:  Review of Systems    Meds/Allergies   all current active meds have been reviewed  No Known Allergies    Objective   Vital signs in last 24 hours:  Temp:  [98 1 °F (36 7 °C)-98 3 °F (36 8 °C)] 98 1 °F (36 7 °C)  HR:  [68-71] 71  Resp:  [16-20] 18  BP: (114-121)/(78-79) 121/79      Intake/Output Summary (Last 24 hours) at 5/3/2022 1023  Last data filed at 5/3/2022 0857  Gross per 24 hour   Intake 480 ml   Output 800 ml   Net -320 ml         Lab Results:  Reviewed  Imaging Studies:  Reviewed  EKG, Pathology, and Other Studies:  Reviewed    Code Status: Level 1 - Full Code  Advance Directive and Living Will: Yes    Power of :    POLST:      Counseling / Coordination of Care  Total floor / unit time spent today 30 minutes  Greater than 50% of total time was spent with the patient and / or family counseling and / or coordination of care  A description of the counseling / coordination of care:  Chart review, patient evaluation, coordination communication staff, nursing and provider

## 2022-05-03 NOTE — ASSESSMENT & PLAN NOTE
· Previously on Zoloft 50 mg daily  · Psychology consult appreciated  · Due to persistent hyponatremia, will reduce Zoloft to 25 mg p o  X1 day and then discontinue as a trial drug holiday

## 2022-05-03 NOTE — ASSESSMENT & PLAN NOTE
· Patient sent to ED for evaluation generalized weakness and possible altered mental status  · CT head negative for any acute intracranial processes    · Lab work remarkable for hyponatremia and leukocytosis  · CXR revealed no acute cardiopulmonary processes  · UA with WBCs 0-1 and occasional bacteria  · CTA PE negative for emboli  · Troponins WNL  · TSH 0 470, D-dimer 1 85  · blood cultures- no growth at 72 hours  · PT/OT consulted, recommending ARC   · Patient completed 3 days of Rocephin UA not overtly suggestive of UTI however given leukocytosis and functional decline it was treated empirically  · Psych consult appreciated: no psych contraindication for STR upon medical clearance/discharge

## 2022-05-03 NOTE — ASSESSMENT & PLAN NOTE
· Sodium of 127 on admission  · Initially improved and stable low 130s   · Suspected likely secondary to poor oral intake over past several days, however Zoloft may be contributory as well   · Discontinue hydrochlorothiazide  · Will order urine studies and serum osmo since sodium decreased to 128 again today   · Will reduce Zoloft to 25mg PO x 1 day then discontinue as a trial drug holiday per psych recs  · S/p IVF

## 2022-05-03 NOTE — PLAN OF CARE
Patient is AxOx3; disoriented to situation  Patient is on RA & VSS  Patient denies pain/discomfort  Patient sleeps between care  Virtual 1:1 continued  Patient is an assist x2 for care  Shaq Whitehead is patent  Bed is in lowest position  Alarms audible  All needs are within reach    Problem: Potential for Falls  Goal: Patient will remain free of falls  Description: INTERVENTIONS:  - Educate patient/family on patient safety including physical limitations  - Instruct patient to call for assistance with activity   - Consult OT/PT to assist with strengthening/mobility   - Keep Call bell within reach  - Keep bed low and locked with side rails adjusted as appropriate  - Keep care items and personal belongings within reach  - Initiate and maintain comfort rounds  - Make Fall Risk Sign visible to staff  - Offer Toileting every 2 Hours, in advance of need  - Initiate/Maintain bed alarm  - Apply yellow socks and bracelet for high fall risk patients  - Consider moving patient to room near nurses station  Outcome: Progressing     Problem: MOBILITY - ADULT  Goal: Maintain or return to baseline ADL function  Description: INTERVENTIONS:  -  Assess patient's ability to carry out ADLs; assess patient's baseline for ADL function and identify physical deficits which impact ability to perform ADLs (bathing, care of mouth/teeth, toileting, grooming, dressing, etc )  - Assess/evaluate cause of self-care deficits   - Assess range of motion  - Assess patient's mobility; develop plan if impaired  - Assess patient's need for assistive devices and provide as appropriate  - Encourage maximum independence but intervene and supervise when necessary  - Involve family in performance of ADLs  - Assess for home care needs following discharge   - Consider OT consult to assist with ADL evaluation and planning for discharge  - Provide patient education as appropriate  Outcome: Progressing     Problem: Prexisting or High Potential for Compromised Skin Integrity  Goal: Skin integrity is maintained or improved  Description: INTERVENTIONS:  - Identify patients at risk for skin breakdown  - Assess and monitor skin integrity  - Assess and monitor nutrition and hydration status  - Monitor labs   - Assess for incontinence   - Turn and reposition patient  - Assist with mobility/ambulation  - Relieve pressure over bony prominences  - Avoid friction and shearing  - Provide appropriate hygiene as needed including keeping skin clean and dry  - Evaluate need for skin moisturizer/barrier cream  - Collaborate with interdisciplinary team   - Patient/family teaching  - Consider wound care consult   Outcome: Progressing     Problem: INFECTION - ADULT  Goal: Absence or prevention of progression during hospitalization  Description: INTERVENTIONS:  - Assess and monitor for signs and symptoms of infection  - Monitor lab/diagnostic results  - Monitor all insertion sites, i e  indwelling lines, tubes, and drains  - Monitor endotracheal if appropriate and nasal secretions for changes in amount and color  - [place] appropriate cooling/warming therapies per order  - Administer medications as ordered  - Instruct and encourage patient and family to use good hand hygiene technique  - Identify and instruct in appropriate isolation precautions for identified infection/condition  Outcome: Progressing     Problem: SAFETY ADULT  Goal: Patient will remain free of falls  Description: INTERVENTIONS:  - Educate patient/family on patient safety including physical limitations  - Instruct patient to call for assistance with activity   - Consult OT/PT to assist with strengthening/mobility   - Keep Call bell within reach  - Keep bed low and locked with side rails adjusted as appropriate  - Keep care items and personal belongings within reach  - Initiate and maintain comfort rounds  - Make Fall Risk Sign visible to staff  - Offer Toileting every 2 Hours, in advance of need  - Initiate/Maintain bed alarm  - Apply yellow socks and bracelet for high fall risk patients  - Consider moving patient to room near nurses station  Outcome: Progressing  Goal: Maintain or return to baseline ADL function  Description: INTERVENTIONS:  -  Assess patient's ability to carry out ADLs; assess patient's baseline for ADL function and identify physical deficits which impact ability to perform ADLs (bathing, care of mouth/teeth, toileting, grooming, dressing, etc )  - Assess/evaluate cause of self-care deficits   - Assess range of motion  - Assess patient's mobility; develop plan if impaired  - Assess patient's need for assistive devices and provide as appropriate  - Encourage maximum independence but intervene and supervise when necessary  - Involve family in performance of ADLs  - Assess for home care needs following discharge   - Consider OT consult to assist with ADL evaluation and planning for discharge  - Provide patient education as appropriate  Outcome: Progressing     Problem: Knowledge Deficit  Goal: Patient/family/caregiver demonstrates understanding of disease process, treatment plan, medications, and discharge instructions  Description: Complete learning assessment and assess knowledge base    Interventions:  - Provide teaching at level of understanding  - Provide teaching via preferred learning methods  Outcome: Progressing     Problem: GENITOURINARY - ADULT  Goal: Maintains or returns to baseline urinary function  Description: INTERVENTIONS:  - Assess urinary function  - Encourage oral fluids to ensure adequate hydration if ordered  - Administer IV fluids as ordered to ensure adequate hydration  - Administer ordered medications as needed  - Offer frequent toileting  - Follow urinary retention protocol if ordered  Outcome: Progressing

## 2022-05-03 NOTE — PHYSICAL THERAPY NOTE
PHYSICAL THERAPY TREATMENT NOTE  NAME:  Ada Reis  DATE: 05/03/22    Length Of Stay: 3  Performed at least 2 patient identifiers during session: Name and ID bracelet    TREATMENT NOTE:     05/03/22 1353   PT Last Visit   PT Visit Date 05/03/22   Note Type   Note Type Treatment   Pain Assessment   Pain Assessment Tool 0-10   Pain Score No Pain   Restrictions/Precautions   Weight Bearing Precautions Per Order No   Other Precautions Cognitive; Chair Alarm; Bed Alarm; Fall Risk  (virtual 1:1)   General   Chart Reviewed Yes   Response to Previous Treatment Patient unable to report, no changes reported from family or staff   Family/Caregiver Present No   Cognition   Overall Cognitive Status Impaired   Arousal/Participation Alert; Responsive; Cooperative   Attention Attends with cues to redirect   Orientation Level Oriented to person;Oriented to place;Oriented to time;Disoriented to situation  (able to state the correct month)   Memory Decreased recall of precautions;Decreased recall of recent events;Decreased short term memory   Following Commands Follows one step commands with increased time or repetition   Comments pt agreeable to PT session   Bed Mobility   Supine to Sit 4  Minimal assistance   Additional items Assist x 1;HOB elevated; Bedrails; Increased time required;Verbal cues;LE management   Additional Comments pt OOB in recliner to end session   Transfers   Sit to Stand 3  Moderate assistance   Additional items Assist x 2; Increased time required;Verbal cues; Bedrails   Stand to Sit 3  Moderate assistance   Additional items Assist x 2; Increased time required;Verbal cues   Stand pivot 3  Moderate assistance   Additional items Assist x 2; Increased time required;Verbal cues  (RW)   Additional Comments pt performed sit to stand transfersx2 for linen change and hygiene, requires cues for trunk control   Ambulation/Elevation   Gait pattern Improper Weight shift;Narrow ERLINDA; Decreased foot clearance;Shuffling; Foward flexed; Step to;Excessively slow; Short stride   Gait Assistance 3  Moderate assist   Additional items Assist x 2;Verbal cues; Tactile cues   Assistive Device Rolling walker   Distance 2 feet   Stair Management Assistance Not tested   Ambulation/Elevation Additional Comments cues for RW navigation and management   Balance   Static Sitting Fair   Dynamic Sitting Fair -   Static Standing Poor +   Dynamic Standing Poor   Ambulatory Poor   Endurance Deficit   Endurance Deficit Yes   Activity Tolerance   Activity Tolerance Patient limited by fatigue; Other (Comment)  (cognition)   Nurse Made Aware yes, RN Analisa Alicea assisted with treatment   Assessment   Prognosis Fair   Problem List Decreased strength;Decreased endurance; Impaired balance;Decreased mobility; Decreased cognition;Decreased safety awareness   Assessment Pt seen for PT treatment session this date with interventions consisting of gait training w/ emphasis on improving pt's ability to ambulate level surfaces x 2 ft with mod A of 2 provided by therapist with RW and therapeutic activity consisting of training: bed mobility, supine<>sit transfers, sit<>stand transfers, static sitting tolerance at EOB for 12 minutes w/ min UE support, static standing tolerance for 1 minutes w/ mod UE support, vc and tactile cues for static sitting posture faciliation and vc and tactile cues for static standing posture faciliation  Pt agreeable to PT treatment session upon arrival, pt found supine in bed w/ HOB elevated, in no apparent distress and responsive  In comparison to previous session, pt with improvements in functional activity tolerance  Post session: pt returned back to recliner, chair alarm engaged, all needs in reach and RN notified of session findings/recommendations  Continue to recommend post acute rehabilitation services at time of d/c in order to maximize pt's functional independence and safety w/ mobility   Pt continues to be functioning below baseline level, and remains limited 2* factors listed above and including pt requires extensive Ax2 for functional mobility  PT will continue to see pt during current hospitalization in order to address the deficits listed above and provide interventions consistent w/ POC in effort to achieve STGs  Plan   Treatment/Interventions Functional transfer training;LE strengthening/ROM; Therapeutic exercise; Endurance training;Patient/family training;Cognitive reorientation;Equipment eval/education; Bed mobility;Gait training;Spoke to nursing   Progress Slow progress, cognitive deficits   PT Frequency 3-5x/wk   Recommendation   PT Discharge Recommendation Post acute rehabilitation services   AM-PAC Basic Mobility Inpatient   Turning in Bed Without Bedrails 3   Lying on Back to Sitting on Edge of Flat Bed 2   Moving Bed to Chair 2   Standing Up From Chair 2   Walk in Room 2   Climb 3-5 Stairs 1   Basic Mobility Inpatient Raw Score 12   Basic Mobility Standardized Score 32 23   Turning Head Towards Sound 3   Follow Simple Instructions 2   Low Function Basic Mobility Raw Score 17   Low Function Basic Mobility Standardized Score 27 46   Highest Level Of Mobility   JH-HL Goal 4: Move to chair/commode   JH-HLM Highest Level of Mobility 5: Stand (1 or more minutes)   JH-HLM Goal Achieved Yes   Education   Education Provided Mobility training;Assistive device   Patient Reinforcement needed   End of Consult   Patient Position at End of Consult Bedside chair;Bed/Chair alarm activated; All needs within reach  (virtual 1:1)       The patient's AM-PAC Basic Mobility Inpatient Short Form Raw Score is 12  A Raw score of less than or equal to 16 suggests the patient may benefit from discharge to post-acute rehabilitation services  Please also refer to the recommendation of the Physical Therapist for safe discharge planning        Aaliyah Veliz PTA,PTA

## 2022-05-03 NOTE — PLAN OF CARE
Problem: PHYSICAL THERAPY ADULT  Goal: Performs mobility at highest level of function for planned discharge setting  See evaluation for individualized goals  Description: Treatment/Interventions: Functional transfer training,LE strengthening/ROM,Therapeutic exercise,Endurance training,Patient/family training,Equipment eval/education,Bed mobility,Gait training,Spoke to nursing,Continued evaluation  Equipment Recommended:  (TBD - continue with RW)       See flowsheet documentation for full assessment, interventions and recommendations  Outcome: Progressing  Note: Prognosis: Fair  Problem List: Decreased strength,Decreased endurance,Impaired balance,Decreased mobility,Decreased cognition,Decreased safety awareness  Assessment: Pt seen for PT treatment session this date with interventions consisting of gait training w/ emphasis on improving pt's ability to ambulate level surfaces x 2 ft with mod A of 2 provided by therapist with RW and therapeutic activity consisting of training: bed mobility, supine<>sit transfers, sit<>stand transfers, static sitting tolerance at EOB for 12 minutes w/ min UE support, static standing tolerance for 1 minutes w/ mod UE support, vc and tactile cues for static sitting posture faciliation and vc and tactile cues for static standing posture faciliation  Pt agreeable to PT treatment session upon arrival, pt found supine in bed w/ HOB elevated, in no apparent distress and responsive  In comparison to previous session, pt with improvements in functional activity tolerance  Post session: pt returned back to recliner, chair alarm engaged, all needs in reach and RN notified of session findings/recommendations  Continue to recommend post acute rehabilitation services at time of d/c in order to maximize pt's functional independence and safety w/ mobility   Pt continues to be functioning below baseline level, and remains limited 2* factors listed above and including pt requires extensive Ax2 for functional mobility  PT will continue to see pt during current hospitalization in order to address the deficits listed above and provide interventions consistent w/ POC in effort to achieve STGs  Barriers to Discharge: Inaccessible home environment,Decreased caregiver support        PT Discharge Recommendation: Post acute rehabilitation services          See flowsheet documentation for full assessment

## 2022-05-03 NOTE — ASSESSMENT & PLAN NOTE
· Staff at facility reports patient's mood is different than normal  · Possibly due to hyponatremia   · CT head negative for any acute intracranial processes  · Leukocytosis noted on CBC  · Patient's family states they spoke to her on the phone 4/30 and felt she was back to her baseline

## 2022-05-03 NOTE — CASE MANAGEMENT
Case Management Discharge Planning Note    Patient name Ce Fitzgerald  Location [id number] MRN [id number]  : 1930 Date 5/3/2022       Current Admission Date: 2022  Current Admission Diagnosis:Generalized weakness   Patient Active Problem List    Diagnosis Date Noted    Generalized weakness 8290    Metabolic encephalopathy     Hyponatremia 2022    Current moderate episode of major depressive disorder without prior episode ([address] ) 2020    History of suicidal ideation 2020    History of CVA (cerebrovascular accident) 2020    Right ankle sprain 2020    Venous insufficiency of both lower extremities 2020    Unspecified mood (affective) disorder, r/o major depressive disorder (single episode) 2020    Mild neurocognitive disorder 2020    Benign paroxysmal positional vertigo 2019    Urinary incontinence 2019    Bilateral lower extremity edema 2019    Essential hypertension 2019    Mediastinal mass 2019    Spinal stenosis at L4-L5 level 2019    Gait abnormality 2015    Hearing loss 2013    Osteoarthritis 2012      LOS (days): 3  Geometric Mean LOS (GMLOS) (days): 2 60  Days to GMLOS:-0 1     OBJECTIVE:  Risk of Unplanned Readmission Score: 10         Current admission status: Inpatient   Preferred Pharmacy:   [address] [place] [address] [address]  [address]  [address] [facility] [address]  Phone: [phone] Fax: [phone]    Primary Care Provider: Anni Agrawal DO    Primary Insurance: MEDICARE  Secondary Insurance: [facility] HEALTH OPTIONS PROGRAM    DISCHARGE DETAILS:  CM met with pt son Meir Denton at bedside to discuss discharge planning  He requested CM speak with Garrett Ocasio owner at [facility] to determine if pt can return there directly and not go to rehab  CM informed son pt was a max assist x 2 yesterday with therapy   [facility] is an assisted living facility and does not have the staff to provide that kind of assistance  CM did speak directly to [place] who confirmed same  Pt cannot return to the facility if she needs that much assistance  If pt were an assist x 1 she could return directly per [place]  Son aware and in agreement  All paperwork for option process was faxed to [address] [place] [address]  Awaiting evlauation and clearance for SNF placement for STR  Son aware and in agreement

## 2022-05-04 ENCOUNTER — HOME HEALTH ADMISSION (OUTPATIENT)
Dept: HOME HEALTH SERVICES | Facility: HOME HEALTHCARE | Age: 87
End: 2022-05-04
Payer: MEDICARE

## 2022-05-04 LAB
ANION GAP SERPL CALCULATED.3IONS-SCNC: 8 MMOL/L (ref 4–13)
BACTERIA BLD CULT: NORMAL
BACTERIA BLD CULT: NORMAL
BUN SERPL-MCNC: 11 MG/DL (ref 5–25)
CALCIUM SERPL-MCNC: 8.9 MG/DL (ref 8.4–10.2)
CHLORIDE SERPL-SCNC: 94 MMOL/L (ref 96–108)
CO2 SERPL-SCNC: 27 MMOL/L (ref 21–32)
CORTIS AM PEAK SERPL-MCNC: 22.1 UG/DL (ref 4.2–22.4)
CREAT SERPL-MCNC: 0.74 MG/DL (ref 0.6–1.3)
GFR SERPL CREATININE-BSD FRML MDRD: 70 ML/MIN/1.73SQ M
GLUCOSE SERPL-MCNC: 91 MG/DL (ref 65–140)
POTASSIUM SERPL-SCNC: 4 MMOL/L (ref 3.5–5.3)
SODIUM SERPL-SCNC: 129 MMOL/L (ref 135–147)

## 2022-05-04 PROCEDURE — 99232 SBSQ HOSP IP/OBS MODERATE 35: CPT | Performed by: NURSE PRACTITIONER

## 2022-05-04 PROCEDURE — 97110 THERAPEUTIC EXERCISES: CPT

## 2022-05-04 PROCEDURE — 82533 TOTAL CORTISOL: CPT | Performed by: PHYSICIAN ASSISTANT

## 2022-05-04 PROCEDURE — 80048 BASIC METABOLIC PNL TOTAL CA: CPT | Performed by: PHYSICIAN ASSISTANT

## 2022-05-04 RX ADMIN — ENOXAPARIN SODIUM 40 MG: 100 INJECTION SUBCUTANEOUS at 08:22

## 2022-05-04 RX ADMIN — ATORVASTATIN CALCIUM 40 MG: 40 TABLET, FILM COATED ORAL at 17:02

## 2022-05-04 RX ADMIN — DOCUSATE SODIUM 100 MG: 100 CAPSULE, LIQUID FILLED ORAL at 17:02

## 2022-05-04 RX ADMIN — DOCUSATE SODIUM 100 MG: 100 CAPSULE, LIQUID FILLED ORAL at 08:22

## 2022-05-04 RX ADMIN — ASPIRIN 81 MG 81 MG: 81 TABLET ORAL at 08:22

## 2022-05-04 NOTE — ASSESSMENT & PLAN NOTE
· Previously on Zoloft 50 mg daily  · Psychology consult appreciated  · Due to persistent hyponatremia; recommended to wean and discontinue Zoloft for trial drug holiday

## 2022-05-04 NOTE — OCCUPATIONAL THERAPY NOTE
05/04/22 1133   OT Last Visit   OT Visit Date 05/04/22   Note Type   Note Type Treatment   Restrictions/Precautions   Weight Bearing Precautions Per Order No   Other Precautions Cognitive; Bed Alarm; Fall Risk  (virtual 1:1)   General   Response to Previous Treatment Patient unable to report, no changes reported from family or staff   Lifestyle   Intrinsic Gratification No TV - possibly music; used to enjoy card games at the [facility] [facility]"   Pain Assessment   Pain Assessment Tool 0-10   Pain Score No Pain   ADL   Grooming Assistance 2  Maximal Assistance  (for combing hair on back of head)   Grooming Comments patient wiped face with washcloth provided to approx  70% completion (did not reach forehead)  (was able to comb top+sides of hair with fairly good result)   Therapeutic Exercise - ROM   UE-ROM Yes   ROM- Right Upper Extremities   R Shoulder AAROM; Flexion; Horizontal ABduction  (And pro/re-traction )   R Elbow Elbow flexion;Elbow extension  (And supin/pron-ation)   R Weight/Reps/Sets 15 reps each    ROM - Left Upper Extremities    L Weight/Reps/Sets all exers  same as RUE above   LUE ROM Comment *pt  sleepy throughout exercise - kept eyes closed through most of session, but readily responsive to simple questions    Coordination   Gross Motor impaired   Cognition   Overall Cognitive Status Impaired   Arousal/Participation Responsive; Cooperative;Persistent stimuli required   Attention Attends with cues to redirect   Following Commands Follows one step commands with increased time or repetition   Comments pt  liked warm blanket provided    Activity Tolerance   Activity Tolerance Patient limited by fatigue   Assessment   Assessment Patient participated in Skilled OT session this date with interventions consisting of therapeutic exercise to: increase functional use of BUEs, increase BUE muscle strength toward resumption of basic self-care/daily tasks    Patient agreeable to OT treatment session, upon arrival patient was found semi-reclined in bed and sleeping  Patient requiring frequent rest periods (tired today), verbal cues with physical assistance to optimize outcome of exercises, and self-care assistance as noted in flow sheet  Patient continues to be functioning below baseline level, occupational performance remains limited secondary to factors listed above and increased risk for falls and injury  From OT standpoint, recommendation at time of d/c would be post-acute rehabilitation services  Patient to benefit from continued Occupational Therapy treatment while in the hospital to address deficits as defined above and maximize level of functional independence with ADLs and functional mobility  Plan   Treatment Interventions UE strengthening/ROM; Activityengagement   Goal Expiration Date 05/11/22   OT Treatment Day 2   Recommendation   OT Discharge Recommendation Post acute rehabilitation services   Additional Comments 2 The patient's raw score on the AM-PAC Daily Activity inpatient short form is 11, standardized score is 29 04,  39 4  Patients at this level are likely to benefit from discharge to post-acute rehabilitation services  Please refer to the recommendation of the Occupational Therapist for safe discharge planning     AM-PAC Daily Activity Inpatient   Lower Body Dressing 1   Bathing 1   Toileting 1   Upper Body Dressing 2   Grooming 3   Eating 3   Daily Activity Raw Score 11   Daily Activity Standardized Score (Calc for Raw Score >=11) 29 04   AM-PAC Applied Cognition Inpatient   Following a Speech/Presentation 2   Understanding Ordinary Conversation 3   Taking Medications 1   Remembering Where Things Are Placed or Put Away 1   Remembering List of 4-5 Errands 1   Taking Care of Complicated Tasks 1   Applied Cognition Raw Score 9   Applied Cognition Standardized Score 22 48   KYRA [place]/ANGELITO

## 2022-05-04 NOTE — PROGRESS NOTES
Liv ROTHMAN  66   Progress Note - Iza Reis 1/28/1930, 80 y o  female MRN: [id number]  Unit/Bed#: [facility] Encounter: [id number]  Primary Care Provider: Codey Rodriguez DO   Date and time admitted to hospital: 4/29/2022 10:45 AM    * Generalized weakness  Assessment & Plan  · Patient presented to the ED with complaints of generalized weakness, and slightly altered mental status  · CT Head (4/29/22): Negative for acute intracranial abnormality  · On admission, noted to have hyponatremia, leukocytosis  · Patient is now s/p 3 days of IV Rocephin due to leukocytosis/functional decline, treated empirically for UTI  · PT/OT Eval  · Recommending STR  · Psych consulted; cleared for STR  Hyponatremia  Assessment & Plan  · Present on admission, evidenced by sodium of 127  · Suspected likely secondary to poor oral intake and medication induced - Zoloft/Hctz  · Sodium level noted to be 129 today  · Urine studies ordered  · Serum osmolality slightly decreased at 275  · Urine sodium/Osmo still needs to be collected  · Zoloft/Hctz on hold  Metabolic encephalopathy  Assessment & Plan  · Present on admission; staff reported patients mood to be off  · Possibly due to hyponatremia   · CT head negative for any acute intracranial processes  · Leukocytosis noted on CBC  · Patient's family states they spoke to her on the phone 4/30 and felt she was back to her baseline  Current moderate episode of major depressive disorder without prior episode ([facility] [address] )  Assessment & Plan  · Previously on Zoloft 50 mg daily  · Psychology consult appreciated  · Due to persistent hyponatremia; recommended to wean and discontinue Zoloft for trial drug holiday      Essential hypertension  Assessment & Plan  · Patient's blood pressure soft on admission  · Continue to hold home amlodipine and HCTZ  · Given hyponatremia patient should likely not be restarted on HCTZ      VTE Pharmacologic Prophylaxis: VTE Score: 6 High Risk (Score >/= 5) - Pharmacological DVT Prophylaxis Ordered: enoxaparin (Lovenox)  Sequential Compression Devices Ordered  Patient Centered Rounds: I performed bedside rounds with nursing staff today  Discussions with Specialists or Other Care Team Provider: Case Management    Education and Discussions with Family / Patient: Patient declined call to   Time Spent for Care: 20 minutes  More than 50% of total time spent on counseling and coordination of care as described above  Current Length of Stay: 4 day(s)  Current Patient Status: Inpatient   Certification Statement: The patient will continue to require additional inpatient hospital stay due to ongoing treatment in setting of hyponatremia; pending placement  Discharge Plan: Anticipate discharge in 24-48 hrs to rehab facility  Code Status: Level 1 - Full Code    Subjective:   CC: "I'm okay"    Patient resting in bed, sleeping but easy to arouse to voice  Denies complaints of pain at this time  Denies shortness of breath, fever or chills  Objective:     Vitals:   Temp (24hrs), Av 1 °F (36 7 °C), Min:97 9 °F (36 6 °C), Max:98 3 °F (36 8 °C)    Temp:  [97 9 °F (36 6 °C)-98 3 °F (36 8 °C)] 98 3 °F (36 8 °C)  HR:  [66-76] 66  Resp:  [16-17] 17  BP: (114-123)/(77-91) 123/91  SpO2:  [90 %-92 %] 90 %  Body mass index is 23 75 kg/m²  Input and Output Summary (last 24 hours): Intake/Output Summary (Last 24 hours) at 2022 1118  Last data filed at 5/3/2022 1801  Gross per 24 hour   Intake 240 ml   Output --   Net 240 ml       Physical Exam:  Physical Exam  Vitals and nursing note reviewed  Constitutional:       General: She is not in acute distress  Appearance: She is normal weight  She is ill-appearing  Cardiovascular:      Rate and Rhythm: Normal rate  Pulses: Normal pulses  Heart sounds: Normal heart sounds  Pulmonary:      Effort: Pulmonary effort is normal       Breath sounds: Normal breath sounds  Abdominal:      General: Bowel sounds are normal       Palpations: Abdomen is soft  Musculoskeletal:         General: Normal range of motion  Skin:     General: Skin is warm and dry  Neurological:      Mental Status: She is alert  Mental status is at baseline  Psychiatric:         Mood and Affect: Mood normal           Additional Data:     Labs:  Results from last 7 days   Lab Units 05/01/22 0448 04/30/22 0518 04/29/22  1137   WBC Thousand/uL 7 55   < > 13 83*   HEMOGLOBIN g/dL 13 9   < > 14 8   HEMATOCRIT % 40 0   < > 43 8   PLATELETS Thousands/uL 200   < > 245   NEUTROS PCT %  --   --  79*   LYMPHS PCT %  --   --  10*   MONOS PCT %  --   --  9   EOS PCT %  --   --  1    < > = values in this interval not displayed  Results from last 7 days   Lab Units 05/04/22 0438 04/30/22 0518 04/29/22  1137   SODIUM mmol/L 129*   < > 127*   POTASSIUM mmol/L 4 0   < > 3 4*   CHLORIDE mmol/L 94*   < > 91*   CO2 mmol/L 27   < > 25   BUN mg/dL 11   < > 13   CREATININE mg/dL 0 74   < > 0 80   ANION GAP mmol/L 8   < > 11   CALCIUM mg/dL 8 9   < > 9 5   ALBUMIN g/dL  --   --  3 8   TOTAL BILIRUBIN mg/dL  --   --  1 15*   ALK PHOS U/L  --   --  96   ALT U/L  --   --  33   AST U/L  --   --  38   GLUCOSE RANDOM mg/dL 91   < > 76    < > = values in this interval not displayed  Results from last 7 days   Lab Units 05/01/22  0448   PROCALCITONIN ng/ml 0 09       Lines/Drains:  Invasive Devices  Report    Peripheral Intravenous Line            Peripheral IV 05/01/22 Right;Upper Arm 2 days          Drain            External Urinary Catheter <1 day                Imaging: No pertinent imaging reviewed  Recent Cultures (last 7 days):   Results from last 7 days   Lab Units 04/29/22  1724   BLOOD CULTURE  No Growth After 4 Days  No Growth After 4 Days         Last 24 Hours Medication List:   Current Facility-Administered Medications   Medication Dose Route Frequency Provider Last Rate    acetaminophen  650 mg Oral Q6H PRN Ambercamilo Zhang, DO      aspirin  81 mg Oral Daily Ambercamilo Zhang, DO      atorvastatin  40 mg Oral QPM Amber Zhang, DO      dextromethorphan-guaiFENesin  10 mL Oral Q4H PRN Garcia Masters PA-C      docusate sodium  100 mg Oral BID Ambercamilo Zhang, DO      enoxaparin  40 mg Subcutaneous Daily Amber Zhang, DO          Today, Patient Was Seen By: ANTONIA Ding    **Please Note: This note may have been constructed using a voice recognition system  **

## 2022-05-04 NOTE — CASE MANAGEMENT
Case Management Discharge Planning Note    Patient name Zheng Oviedo  Location /-72 MRN [id number]  : 1930 Date 2022       Current Admission Date: 2022  Current Admission Diagnosis:Generalized weakness   Patient Active Problem List    Diagnosis Date Noted    Generalized weakness     Metabolic encephalopathy     Hyponatremia 2022    Current moderate episode of major depressive disorder without prior episode ([address] ) 2020    History of suicidal ideation 2020    History of CVA (cerebrovascular accident) 2020    Right ankle sprain 2020    Venous insufficiency of both lower extremities 2020    Unspecified mood (affective) disorder, r/o major depressive disorder (single episode) 2020    Mild neurocognitive disorder 2020    Benign paroxysmal positional vertigo 2019    Urinary incontinence 2019    Bilateral lower extremity edema 2019    Essential hypertension 2019    Mediastinal mass 2019    Spinal stenosis at L4-L5 level 2019    Gait abnormality 2015    Hearing loss 2013    Osteoarthritis 2012      LOS (days): 4  Geometric Mean LOS (GMLOS) (days): 3 30  Days to GMLOS:-0 4     OBJECTIVE:  Risk of Unplanned Readmission Score: 10         Current admission status: Inpatient   Preferred Pharmacy:   [address] [place] [address]  Phone: [phone] Fax: [phone]    Primary Care Provider: Cecelia Boateng DO    Primary Insurance: MEDICARE  Secondary Insurance: [facility] HEALTH OPTIONS PROGRAM    DISCHARGE DETAILS:  Pt awaiting [facility] office evaluation for option process  Per Ying from [address] she will evaluate pt tomorrow 22  CM awaiting same and option letter clearing pt for SNF placement for STR

## 2022-05-04 NOTE — ASSESSMENT & PLAN NOTE
· Patient's blood pressure soft on admission  · Continue to hold home amlodipine and HCTZ  · Given hyponatremia patient should likely not be restarted on HCTZ

## 2022-05-04 NOTE — PLAN OF CARE
Problem: OCCUPATIONAL THERAPY ADULT  Goal: Performs self-care activities at highest level of function for planned discharge setting  See evaluation for individualized goals  Description: Treatment Interventions: ADL retraining,Functional transfer training,UE strengthening/ROM,Endurance training,Cognitive reorientation,Patient/family training,Equipment evaluation/education,Compensatory technique education,Activityengagement          See flowsheet documentation for full assessment, interventions and recommendations  Outcome: Progressing, Slowly   Note: Limitation: Decreased ADL status,Decreased UE strength,Decreased Safe judgement during ADL,Decreased cognition,Decreased endurance,Decreased self-care trans,Decreased high-level ADLs  Prognosis: Good  Assessment: Patient participated in Skilled OT session this date with interventions consisting of therapeutic exercise to: increase functional use of BUEs, increase BUE muscle strength toward resumption of basic self-care/daily tasks   Patient agreeable to OT treatment session, upon arrival patient was found semi-reclined in bed and sleeping  Patient requiring frequent rest periods (tired today), verbal cues with physical assistance to optimize outcome of exercises, and self-care assistance as noted in flow sheet  Patient continues to be functioning below baseline level, occupational performance remains limited secondary to factors listed above and increased risk for falls and injury  From OT standpoint, recommendation at time of d/c would be post-acute rehabilitation services  Patient to benefit from continued Occupational Therapy treatment while in the hospital to address deficits as defined above and maximize level of functional independence with ADLs and functional mobility        OT Discharge Recommendation: Post acute rehabilitation services  OT - OK to Discharge: Yes (Once medically cleared)     KYRA Mills/ANGELITO

## 2022-05-04 NOTE — ASSESSMENT & PLAN NOTE
· Patient presented to the ED with complaints of generalized weakness, and slightly altered mental status  · CT Head (4/29/22): Negative for acute intracranial abnormality  · On admission, noted to have hyponatremia, leukocytosis  · Patient is now s/p 3 days of IV Rocephin due to leukocytosis/functional decline, treated empirically for UTI  · PT/OT Eval  · Recommending STR  · Psych consulted; cleared for STR

## 2022-05-04 NOTE — ASSESSMENT & PLAN NOTE
· Present on admission, evidenced by sodium of 127  · Suspected likely secondary to poor oral intake and medication induced - Zoloft/Hctz  · Sodium level noted to be 129 today  · Urine studies ordered  · Serum osmolality slightly decreased at 275  · Urine sodium/Osmo still needs to be collected  · Zoloft/Hctz on hold

## 2022-05-04 NOTE — CASE MANAGEMENT
Case Management Discharge Planning Note    Patient name Nathalia Manual  Location [address] 207/-10 MRN [id number]  : 1930 Date 2022       Current Admission Date: 2022  Current Admission Diagnosis:Generalized weakness   Patient Active Problem List    Diagnosis Date Noted    Generalized weakness     Metabolic encephalopathy     Hyponatremia 2022    Current moderate episode of major depressive disorder without prior episode ([address] ) 2020    History of suicidal ideation 2020    History of CVA (cerebrovascular accident) 2020    Right ankle sprain 2020    Venous insufficiency of both lower extremities 2020    Unspecified mood (affective) disorder, r/o major depressive disorder (single episode) 2020    Mild neurocognitive disorder 2020    Benign paroxysmal positional vertigo 2019    Urinary incontinence 2019    Bilateral lower extremity edema 2019    Essential hypertension 2019    Mediastinal mass 2019    Spinal stenosis at L4-L5 level 2019    Gait abnormality 2015    Hearing loss 2013    Osteoarthritis 2012      LOS (days): 4  Geometric Mean LOS (GMLOS) (days): 3 30  Days to GMLOS:-0 5     OBJECTIVE:  Risk of Unplanned Readmission Score: 10         Current admission status: Inpatient   Preferred Pharmacy:   [address] [place], [place] [address]  [address]  [address] [place] [address]  Phone: [phone] Fax: [phone]    Primary Care Provider: Reyes Ganser, DO    Primary Insurance: MEDICARE  Secondary Insurance: [facility] HEALTH OPTIONS PROGRAM    DISCHARGE DETAILS:    Freedom of Choice: Yes     CM received call from Lashanda Shields owner of [facility] where pt resides  She spoke with pt daughter Shira Saini who would like pt to return to [facility] directly and not go to [facility] reported she is willing to pay for extra help in order for her to return  Yolande Chavez will work on getting extra help for pt to return  Therapy will continue to work with pt in hopes she will improve and only need assist x 1 in order to return to [facility] PRIEST spoke with Suleman Mason and confirmed she is willing to pay for extra help  She requested SUSAN for [address] if pt able to return to to [facility]  Referral sent as requested  CM awaiting PT/OT on 5/5 for ultimate d/c decision  Pt will need transport back to

## 2022-05-04 NOTE — PLAN OF CARE
Patient is AxOx3; disoriented to situation  Patient is on RA & VSS  Patient denies pain/discomfort  Patient sleeps between care  Virtual 1:1 continued  Patient is an assist x2 for care  Evan Sterling is patent  Bed is in lowest position  Alarms audible  All needs are within reach    Problem: Potential for Falls  Goal: Patient will remain free of falls  Description: INTERVENTIONS:  - Educate patient/family on patient safety including physical limitations  - Instruct patient to call for assistance with activity   - Consult OT/PT to assist with strengthening/mobility   - Keep Call bell within reach  - Keep bed low and locked with side rails adjusted as appropriate  - Keep care items and personal belongings within reach  - Initiate and maintain comfort rounds  - Make Fall Risk Sign visible to staff  - Offer Toileting every 2 Hours, in advance of need  - Initiate/Maintain bed alarm  - Apply yellow socks and bracelet for high fall risk patients  - Consider moving patient to room near nurses station  Outcome: Progressing     Problem: MOBILITY - ADULT  Goal: Maintain or return to baseline ADL function  Description: INTERVENTIONS:  -  Assess patient's ability to carry out ADLs; assess patient's baseline for ADL function and identify physical deficits which impact ability to perform ADLs (bathing, care of mouth/teeth, toileting, grooming, dressing, etc )  - Assess/evaluate cause of self-care deficits   - Assess range of motion  - Assess patient's mobility; develop plan if impaired  - Assess patient's need for assistive devices and provide as appropriate  - Encourage maximum independence but intervene and supervise when necessary  - Involve family in performance of ADLs  - Assess for home care needs following discharge   - Consider OT consult to assist with ADL evaluation and planning for discharge  - Provide patient education as appropriate  Outcome: Progressing     Problem: Prexisting or High Potential for Compromised Skin Integrity  Goal: Skin integrity is maintained or improved  Description: INTERVENTIONS:  - Identify patients at risk for skin breakdown  - Assess and monitor skin integrity  - Assess and monitor nutrition and hydration status  - Monitor labs   - Assess for incontinence   - Turn and reposition patient  - Assist with mobility/ambulation  - Relieve pressure over bony prominences  - Avoid friction and shearing  - Provide appropriate hygiene as needed including keeping skin clean and dry  - Evaluate need for skin moisturizer/barrier cream  - Collaborate with interdisciplinary team   - Patient/family teaching  - Consider wound care consult   Outcome: Progressing     Problem: INFECTION - ADULT  Goal: Absence or prevention of progression during hospitalization  Description: INTERVENTIONS:  - Assess and monitor for signs and symptoms of infection  - Monitor lab/diagnostic results  - Monitor all insertion sites, i e  indwelling lines, tubes, and drains  - Monitor endotracheal if appropriate and nasal secretions for changes in amount and color  - [employer] appropriate cooling/warming therapies per order  - Administer medications as ordered  - Instruct and encourage patient and family to use good hand hygiene technique  - Identify and instruct in appropriate isolation precautions for identified infection/condition  Outcome: Progressing     Problem: SAFETY ADULT  Goal: Patient will remain free of falls  Description: INTERVENTIONS:  - Educate patient/family on patient safety including physical limitations  - Instruct patient to call for assistance with activity   - Consult OT/PT to assist with strengthening/mobility   - Keep Call bell within reach  - Keep bed low and locked with side rails adjusted as appropriate  - Keep care items and personal belongings within reach  - Initiate and maintain comfort rounds  - Make Fall Risk Sign visible to staff  - Offer Toileting every 2 Hours, in advance of need  - Initiate/Maintain bed alarm  - Apply yellow socks and bracelet for high fall risk patients  - Consider moving patient to room near nurses station  Outcome: Progressing  Goal: Maintain or return to baseline ADL function  Description: INTERVENTIONS:  -  Assess patient's ability to carry out ADLs; assess patient's baseline for ADL function and identify physical deficits which impact ability to perform ADLs (bathing, care of mouth/teeth, toileting, grooming, dressing, etc )  - Assess/evaluate cause of self-care deficits   - Assess range of motion  - Assess patient's mobility; develop plan if impaired  - Assess patient's need for assistive devices and provide as appropriate  - Encourage maximum independence but intervene and supervise when necessary  - Involve family in performance of ADLs  - Assess for home care needs following discharge   - Consider OT consult to assist with ADL evaluation and planning for discharge  - Provide patient education as appropriate  Outcome: Progressing     Problem: Knowledge Deficit  Goal: Patient/family/caregiver demonstrates understanding of disease process, treatment plan, medications, and discharge instructions  Description: Complete learning assessment and assess knowledge base    Interventions:  - Provide teaching at level of understanding  - Provide teaching via preferred learning methods  Outcome: Progressing

## 2022-05-04 NOTE — ASSESSMENT & PLAN NOTE
· Present on admission; staff reported patients mood to be off  · Possibly due to hyponatremia   · CT head negative for any acute intracranial processes  · Leukocytosis noted on CBC  · Patient's family states they spoke to her on the phone 4/30 and felt she was back to her baseline

## 2022-05-05 VITALS
HEIGHT: 62 IN | TEMPERATURE: 97.5 F | DIASTOLIC BLOOD PRESSURE: 88 MMHG | WEIGHT: 129.85 LBS | SYSTOLIC BLOOD PRESSURE: 132 MMHG | HEART RATE: 67 BPM | BODY MASS INDEX: 23.9 KG/M2 | RESPIRATION RATE: 18 BRPM | OXYGEN SATURATION: 90 %

## 2022-05-05 LAB
ANION GAP SERPL CALCULATED.3IONS-SCNC: 5 MMOL/L (ref 4–13)
BUN SERPL-MCNC: 10 MG/DL (ref 5–25)
CALCIUM SERPL-MCNC: 8.6 MG/DL (ref 8.4–10.2)
CHLORIDE SERPL-SCNC: 98 MMOL/L (ref 96–108)
CO2 SERPL-SCNC: 26 MMOL/L (ref 21–32)
CREAT SERPL-MCNC: 0.63 MG/DL (ref 0.6–1.3)
FLUAV RNA RESP QL NAA+PROBE: POSITIVE
FLUBV RNA RESP QL NAA+PROBE: NEGATIVE
GFR SERPL CREATININE-BSD FRML MDRD: 78 ML/MIN/1.73SQ M
GLUCOSE SERPL-MCNC: 89 MG/DL (ref 65–140)
POTASSIUM SERPL-SCNC: 3.8 MMOL/L (ref 3.5–5.3)
RSV RNA RESP QL NAA+PROBE: NEGATIVE
SARS-COV-2 RNA RESP QL NAA+PROBE: NEGATIVE
SODIUM SERPL-SCNC: 129 MMOL/L (ref 135–147)

## 2022-05-05 PROCEDURE — 0241U HB NFCT DS VIR RESP RNA 4 TRGT: CPT | Performed by: NURSE PRACTITIONER

## 2022-05-05 PROCEDURE — 99239 HOSP IP/OBS DSCHRG MGMT >30: CPT | Performed by: NURSE PRACTITIONER

## 2022-05-05 PROCEDURE — 80048 BASIC METABOLIC PNL TOTAL CA: CPT | Performed by: NURSE PRACTITIONER

## 2022-05-05 PROCEDURE — 97530 THERAPEUTIC ACTIVITIES: CPT

## 2022-05-05 PROCEDURE — 97110 THERAPEUTIC EXERCISES: CPT

## 2022-05-05 PROCEDURE — 97535 SELF CARE MNGMENT TRAINING: CPT

## 2022-05-05 RX ADMIN — DOCUSATE SODIUM 100 MG: 100 CAPSULE, LIQUID FILLED ORAL at 08:33

## 2022-05-05 RX ADMIN — ASPIRIN 81 MG 81 MG: 81 TABLET ORAL at 08:33

## 2022-05-05 RX ADMIN — ENOXAPARIN SODIUM 40 MG: 100 INJECTION SUBCUTANEOUS at 08:33

## 2022-05-05 NOTE — ASSESSMENT & PLAN NOTE
· Present on admission; staff reported patients mood to be off  · Possibly due to hyponatremia   · CT head negative for any acute intracranial processes  · Leukocytosis noted on CBC  · Patient's family states they spoke to her on the phone 4/30 and felt she was back to her baseline  (2) Complete hemianopia

## 2022-05-05 NOTE — PLAN OF CARE
VSS, afebrile  A/O x 3, only disoriented to situation  SPO2 above 90% on RA  Repositioned during night, using wedge to offload  No new skin issues  Denied pain  Makes needs known  Incontinent of urine  Bed alarm in place for safety  Remains on virtual 1:1 for safety     Problem: Potential for Falls  Goal: Patient will remain free of falls  Description: INTERVENTIONS:  - Educate patient/family on patient safety including physical limitations  - Instruct patient to call for assistance with activity   - Consult OT/PT to assist with strengthening/mobility   - Keep Call bell within reach  - Keep bed low and locked with side rails adjusted as appropriate  - Keep care items and personal belongings within reach  - Initiate and maintain comfort rounds  - Make Fall Risk Sign visible to staff  - Offer Toileting every 2 Hours, in advance of need  - Initiate/Maintain bed alarm  - Apply yellow socks and bracelet for high fall risk patients  - Consider moving patient to room near nurses station  Outcome: Progressing     Problem: MOBILITY - ADULT  Goal: Maintain or return to baseline ADL function  Description: INTERVENTIONS:  -  Assess patient's ability to carry out ADLs; assess patient's baseline for ADL function and identify physical deficits which impact ability to perform ADLs (bathing, care of mouth/teeth, toileting, grooming, dressing, etc )  - Assess/evaluate cause of self-care deficits   - Assess range of motion  - Assess patient's mobility; develop plan if impaired  - Assess patient's need for assistive devices and provide as appropriate  - Encourage maximum independence but intervene and supervise when necessary  - Involve family in performance of ADLs  - Assess for home care needs following discharge   - Consider OT consult to assist with ADL evaluation and planning for discharge  - Provide patient education as appropriate  Outcome: Progressing  Goal: Maintains/Returns to pre admission functional level  Description: INTERVENTIONS:  - Perform BMAT or MOVE assessment daily    - Set and communicate daily mobility goal to care team and patient/family/caregiver  - Collaborate with rehabilitation services on mobility goals if consulted  - Reposition patient every 2 hours    - Record patient progress and toleration of activity level   Outcome: Progressing     Problem: Prexisting or High Potential for Compromised Skin Integrity  Goal: Skin integrity is maintained or improved  Description: INTERVENTIONS:  - Identify patients at risk for skin breakdown  - Assess and monitor skin integrity  - Assess and monitor nutrition and hydration status  - Monitor labs   - Assess for incontinence   - Turn and reposition patient  - Assist with mobility/ambulation  - Relieve pressure over bony prominences  - Avoid friction and shearing  - Provide appropriate hygiene as needed including keeping skin clean and dry  - Evaluate need for skin moisturizer/barrier cream  - Collaborate with interdisciplinary team   - Patient/family teaching  - Consider wound care consult   Outcome: Progressing     Problem: PAIN - ADULT  Goal: Verbalizes/displays adequate comfort level or baseline comfort level  Description: Interventions:  - Encourage patient to monitor pain and request assistance  - Assess pain using appropriate pain scale  - Administer analgesics based on type and severity of pain and evaluate response  - Implement non-pharmacological measures as appropriate and evaluate response  - Consider cultural and social influences on pain and pain management  - Notify physician/advanced practitioner if interventions unsuccessful or patient reports new pain  Outcome: Progressing     Problem: INFECTION - ADULT  Goal: Absence or prevention of progression during hospitalization  Description: INTERVENTIONS:  - Assess and monitor for signs and symptoms of infection  - Monitor lab/diagnostic results  - Monitor all insertion sites, i e  indwelling lines, tubes, and drains  - Monitor endotracheal if appropriate and nasal secretions for changes in amount and color  - [place] appropriate cooling/warming therapies per order  - Administer medications as ordered  - Instruct and encourage patient and family to use good hand hygiene technique  - Identify and instruct in appropriate isolation precautions for identified infection/condition  Outcome: Progressing  Goal: Absence of fever/infection during neutropenic period  Description: INTERVENTIONS:  - Monitor WBC    Outcome: Progressing     Problem: SAFETY ADULT  Goal: Patient will remain free of falls  Description: INTERVENTIONS:  - Educate patient/family on patient safety including physical limitations  - Instruct patient to call for assistance with activity   - Consult OT/PT to assist with strengthening/mobility   - Keep Call bell within reach  - Keep bed low and locked with side rails adjusted as appropriate  - Keep care items and personal belongings within reach  - Initiate and maintain comfort rounds  - Make Fall Risk Sign visible to staff  - Offer Toileting every 2 Hours, in advance of need  - Initiate/Maintain bed alarm  - Apply yellow socks and bracelet for high fall risk patients  - Consider moving patient to room near nurses station  Outcome: Progressing  Goal: Maintain or return to baseline ADL function  Description: INTERVENTIONS:  -  Assess patient's ability to carry out ADLs; assess patient's baseline for ADL function and identify physical deficits which impact ability to perform ADLs (bathing, care of mouth/teeth, toileting, grooming, dressing, etc )  - Assess/evaluate cause of self-care deficits   - Assess range of motion  - Assess patient's mobility; develop plan if impaired  - Assess patient's need for assistive devices and provide as appropriate  - Encourage maximum independence but intervene and supervise when necessary  - Involve family in performance of ADLs  - Assess for home care needs following discharge   - Consider OT consult to assist with ADL evaluation and planning for discharge  - Provide patient education as appropriate  Outcome: Progressing  Goal: Maintains/Returns to pre admission functional level  Description: INTERVENTIONS:  - Perform BMAT or MOVE assessment daily    - Set and communicate daily mobility goal to care team and patient/family/caregiver  - Collaborate with rehabilitation services on mobility goals if consulted  - Reposition patient every 2 hours  - Record patient progress and toleration of activity level   Outcome: Progressing     Problem: DISCHARGE PLANNING  Goal: Discharge to home or other facility with appropriate resources  Description: INTERVENTIONS:  - Identify barriers to discharge w/patient and caregiver  - Arrange for needed discharge resources and transportation as appropriate  - Identify discharge learning needs (meds, wound care, etc )  - Arrange for interpretive services to assist at discharge as needed  - Refer to Case Management Department for coordinating discharge planning if the patient needs post-hospital services based on physician/advanced practitioner order or complex needs related to functional status, cognitive ability, or social support system  Outcome: Progressing     Problem: Knowledge Deficit  Goal: Patient/family/caregiver demonstrates understanding of disease process, treatment plan, medications, and discharge instructions  Description: Complete learning assessment and assess knowledge base    Interventions:  - Provide teaching at level of understanding  - Provide teaching via preferred learning methods  Outcome: Progressing     Problem: GENITOURINARY - ADULT  Goal: Maintains or returns to baseline urinary function  Description: INTERVENTIONS:  - Assess urinary function  - Encourage oral fluids to ensure adequate hydration if ordered  - Administer IV fluids as ordered to ensure adequate hydration  - Administer ordered medications as needed  - Offer frequent toileting  - Follow urinary retention protocol if ordered  Outcome: Progressing

## 2022-05-05 NOTE — PHYSICAL THERAPY NOTE
Physical Therapy Treatment Note       05/05/22 0847   PT Last Visit   PT Visit Date 05/05/22   Note Type   Note Type Treatment   Pain Assessment   Pain Assessment Tool 0-10   Pain Score No Pain   Restrictions/Precautions   Weight Bearing Precautions Per Order No   Other Precautions Cognitive; Chair Alarm; Bed Alarm; Fall Risk  (virtual 1:1)   General   Chart Reviewed Yes   Response to Previous Treatment Patient unable to report, no changes reported from family or staff   Family/Caregiver Present No   Cognition   Arousal/Participation Alert; Responsive; Cooperative   Attention Attends with cues to redirect   Orientation Level Oriented to person;Oriented to place;Oriented to time;Disoriented to situation   Memory Decreased recall of biographical information;Decreased recall of recent events;Decreased recall of precautions   Following Commands Follows one step commands with increased time or repetition   Comments pt agreeable to PT session   Subjective   Subjective "I can get up for my coffee"   Bed Mobility   Rolling R 5  Supervision   Additional items Assist x 1;Bedrails; Increased time required   Supine to Sit 4  Minimal assistance   Additional items Assist x 1;HOB elevated; Bedrails; Increased time required   Transfers   Sit to Stand 3  Moderate assistance   Additional items Assist x 1; Increased time required   Stand to Sit 4  Minimal assistance   Additional items Assist x 1; Armrests; Verbal cues  (VCs for hand placement)   Stand pivot 3  Moderate assistance   Additional items Assist x 1; Increased time required  (c RW)   Ambulation/Elevation   Gait pattern Decreased foot clearance; Improper Weight shift; Short stride; Excessively slow   Gait Assistance 3  Moderate assist   Additional items Assist x 1   Assistive Device Rolling walker   Distance 3 ft from bed>recliner   Stair Management Assistance Not tested   Balance   Static Sitting Fair +   Dynamic Sitting Fair   Static Standing Poor +   Dynamic Standing Poor +   Ambulatory Poor +   Endurance Deficit   Endurance Deficit Yes   Activity Tolerance   Activity Tolerance Patient limited by fatigue;Treatment limited secondary to medical complications (Comment)  (decreased cognition)   Nurse Made Aware Yes, RN Rosa Maria Dorsey verbalized pt appropriate for PT session, made aware of outcomes/recs   Assessment   Prognosis Good   Problem List Decreased strength;Decreased endurance; Impaired balance;Decreased mobility; Decreased cognition;Decreased safety awareness   Assessment Pt seen for PT treatment session this date, consisting of ther act focused on bed mobility and functional transfer training and gt training on level surfaces to improve pt safety in household environment  Since previous session, pt has made good progress in terms of requiring physical assist of 1 staff member for transfers and short amb trial  Pertinent barriers during this session include cognitive status  Current goals and POC remain appropriate, pt continues to have rehab potential and is making progress towards STGs  Pt prognosis for achieving goals is good, pending pt progress with hospitalization/medical status improvements, and indicated by self-expression (thoughts, feelings, needs), eye contact and initiation level  PT recommends return to facility with rehabilitation services upon discharge  Pt continues to be functioning below baseline level, and remains limited 2* factors listed above  PT will continue to see pt during current hospitalization in order to address the deficits listed above and provide interventions consistent w/ POC in effort to achieve STGs  Barriers to Discharge Inaccessible home environment;Decreased caregiver support   Goals   Patient Goals "to go back to [facility]"   STG Expiration Date 05/10/22   Short Term Goal #1 goals remain appropriate   PT Treatment Day 4   Plan   Treatment/Interventions Functional transfer training;LE strengthening/ROM; Therapeutic exercise; Endurance training;Cognitive reorientation;Patient/family training;Equipment eval/education; Bed mobility;Gait training;Spoke to nursing;Spoke to case management   Progress Progressing toward goals   PT Frequency 3-5x/wk   Recommendation   PT Discharge Recommendation Return to facility with rehabilitation services   Equipment Recommended   (continue RW use)   Additional Comments Pt's raw score on the AM-PAC Basic Mobility inpatient short form is 13, standardized score is 33 99  Patients at this level are likely to benefit from DC to [facility], however, please refer to therapist recommendation for safe DC planning  AM-[facility] Basic Mobility Inpatient   Turning in Bed Without Bedrails 3   Lying on Back to Sitting on Edge of Flat Bed 3   Moving Bed to Chair 2   Standing Up From Chair 2   Walk in Room 2   Climb 3-5 Stairs 1   Basic Mobility Inpatient Raw Score 13   Basic Mobility Standardized Score 33 99   Highest Level Of Mobility   JH-HLM Goal 4: Move to chair/commode   JH-HLM Highest Level of Mobility 4: Move to chair/commode   JH-HLM Goal Achieved Yes   Education   Education Provided Mobility training;Assistive device   Patient Explanation/teachback used;Demonstrates verbal understanding;Reinforcement needed   End of Consult   Patient Position at End of Consult Bedside chair;Bed/Chair alarm activated; All needs within reach       Alison Bardales PT    Time of PT treatment session: 3655-6859 (total treatment time = 39 minutes)

## 2022-05-05 NOTE — DISCHARGE SUMMARY
[address]  Discharge- Evelyn 1/28/1930, 80 y o  female MRN: [id number]  Unit/Bed#: -01 Encounter: [id number]  Primary Care Provider: Vini Rose DO   Date and time admitted to hospital: 4/29/2022 10:45 AM    * Generalized weakness  Assessment & Plan  · Patient presented to the ED with complaints of generalized weakness, and slightly altered mental status  · CT Head (4/29/22): Negative for acute intracranial abnormality  · On admission, noted to have hyponatremia, leukocytosis  · Patient is now s/p 3 days of IV Rocephin due to leukocytosis/functional decline, treated empirically for UTI  · PT/OT Eval  · Patients personal care home will accept patient back; will set up [place] [id number]  · Psych consulted; cleared for STR  Hyponatremia  Assessment & Plan  · Present on admission, evidenced by sodium of 127  · Suspected likely secondary to poor oral intake and medication induced - Zoloft/Hctz  · Sodium level noted to be 129 today  · Urine studies ordered  · Serum osmolality slightly decreased at 275  · Urine sodium/Osmo still needs to be collected  · Zoloft/Hctz on hold  · Sodium level is stable; on review of records; patient always runs high 120-low 130s  Metabolic encephalopathy  Assessment & Plan  · Present on admission; staff reported patients mood to be off  · Possibly due to hyponatremia   · CT head negative for any acute intracranial processes  · Leukocytosis noted on CBC  · Patient's family states they spoke to her on the phone 4/30 and felt she was back to her baseline  Current moderate episode of major depressive disorder without prior episode ([facility] [address] )  Assessment & Plan  · Previously on Zoloft 50 mg daily  · Psychology consult appreciated  · Due to persistent hyponatremia; recommended to wean and discontinue Zoloft for trial drug holiday      Essential hypertension  Assessment & Plan  · Chronic  · Blood pressure stable without Amlodipine/Hctz, continue to hold     Medical Problems             Resolved Problems  Date Reviewed: 5/5/2022    None              Discharging Physician / Practitioner: Guilherme Collazo  PCP: Blessing Elena DO  Admission Date:   Admission Orders (From admission, onward)     Ordered        04/30/22 1742  Inpatient Admission  Once            04/29/22 1543  Place in Observation  Once                      Discharge Date: 05/05/22    Consultations During Hospital Stay:  IP CONSULT TO CASE MANAGEMENT  IP CONSULT TO PSYCHIATRY    Procedures Performed:   · None    Significant Findings / Test Results:   CTA chest pe study   Final Result by Konstantin Ignacio MD (04/30 1228)      No pulmonary embolus  No acute CT findings to convincingly account for the patient's symptoms  [place] patchy density in the right middle lobe possibly representing some accommodation of mucous plugging and atelectasis but indeterminate  Correlation with the clinical    scenario will determine the need for follow-up noncontrast chest CT in 3-6 months  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 as described, similar when compared to March 31, 2019  Workstation performed: [id number]         CT head wo contrast   Final Result by Magda Morris MD (04/30 3766)      No acute intracranial abnormality  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  Workstation performed: [id number]         CT head without contrast   Final Result by Juanjose Stewart MD (04/29 5371)      No acute intracranial abnormality  Workstation performed: [id number]         XR chest 1 view portable   Final Result by Dian Palacios MD (04/29 3126)      No acute cardiopulmonary disease                    Workstation performed: [id number]             Incidental Findings:   · None     Test Results Pending at Discharge (will require follow up): · None     Outpatient Tests Requested:  · Follow up with PCP    Complications:  None    Reason for Admission: Generalized weakness    Hospital Course:   Mo Dubose is a 80 y o  female patient with past medical history of hypertension, depression who originally presented to the hospital on 4/29/2022 due to generalized weakness  Work up was essentially benign except for mild leukocytosis prompting treatment for a UTI - patient received 3 days of IV Rocephin  She was seen by PT/OT who recommended patient for STR  Fortunately, patient is able to get services at her personal care home and will return home today  Vital signs have been stable, labs are stable  Please see above list of diagnoses and related plan for additional information  Condition at Discharge: stable    Discharge Day Visit / Exam:   Subjective: Patient resting in the recliner, denies complaints of chest pain, shortness of breath, fever, or chills  Vitals: Blood Pressure: 120/75 (05/05/22 0731)  Pulse: 79 (05/05/22 0949)  Temperature: 98 2 °F (36 8 °C) (05/05/22 0731)  Temp Source: Oral (05/04/22 2317)  Respirations: 18 (05/05/22 0731)  Height: 5' 2" (157 5 cm) (04/30/22 1431)  Weight - Scale: 58 9 kg (129 lb 13 6 oz) (04/30/22 1431)  SpO2: 93 % (05/05/22 0949)     Exam:   Physical Exam  Vitals and nursing note reviewed  Constitutional:       General: She is not in acute distress  Appearance: She is normal weight  She is ill-appearing (Elderly, Frail)  Cardiovascular:      Rate and Rhythm: Normal rate  Pulses: Normal pulses  Heart sounds: Normal heart sounds  Pulmonary:      Effort: Pulmonary effort is normal       Breath sounds: Normal breath sounds  Abdominal:      General: Bowel sounds are normal       Palpations: Abdomen is soft  Musculoskeletal:         General: Normal range of motion  Skin:     General: Skin is warm and dry        Capillary Refill: Capillary refill takes less than 2 seconds  Neurological:      Mental Status: She is alert  Mental status is at baseline  Psychiatric:         Mood and Affect: Mood normal           Discussion with Family: Case management discussed with son  Kelly Press Discharge instructions/Information to patient and family:   See after visit summary for information provided to patient and family  Provisions for Follow-Up Care:  See after visit summary for information related to follow-up care and any pertinent home health orders  Disposition:   Assisted Living Facility at [facility] Readmission: None     Discharge Statement:  I spent 35minutes discharging the patient  This time was spent on the day of discharge  I had direct contact with the patient on the day of discharge  Greater than 50% of the total time was spent examining patient, answering all patient questions, arranging and discussing plan of care with patient as well as directly providing post-discharge instructions  Additional time then spent on discharge activities  Discharge Medications:  See after visit summary for reconciled discharge medications provided to patient and/or family        **Please Note: This note may have been constructed using a voice recognition system**

## 2022-05-05 NOTE — OCCUPATIONAL THERAPY NOTE
05/05/22 0940   OT Last Visit   OT Visit Date 05/05/22   Note Type   Note Type Treatment   Restrictions/Precautions   Weight Bearing Precautions Per Order No   Other Precautions Cognitive; Chair Alarm; Fall Risk  (VIRTUAL 1:1)   General   Response to Previous Treatment Patient unable to report, no changes reported from family or staff   Lifestyle   Intrinsic [address] on in patient's room this morning   (@end of session, pt  stated "i want to go to the [place] [address]")   Pain Assessment   Pain Assessment Tool 0-10   Pain Score No Pain  (denied)   ADL   Where Assessed Other (Comment)  (in recliner )   Grooming Comments minimal assistance with hair combing today, for completion/thoroughness   UB Bathing Assistance 2  Maximal Assistance   UB Bathing Deficit Setup;Verbal cueing;Supervision/safety; Increased time to complete;Right arm;Left arm; Abdomen   UB Bathing Comments when first handed washcloth and told to wipe/wash face, pt  responded with "What should I do with This?"    LB Bathing Assistance 1  Total Assistance   LB Bathing Deficit Setup;Verbal cueing; Increased time to complete;Perineal area; Buttocks;Right upper leg;Left upper leg;Right lower leg including foot; Left lower leg including foot   LB Bathing Comments * patient kept eyes closed again for most of this session - she did attempt to follow through with instructions given, but exhibits Low strength, energy, and initiative/determination    UB Dressing Assistance 2  Maximal Assistance   UB Dressing Comments *[facility] gown    LB Dressing Assistance 1  Total Assistance   Toileting Comments Pt  stated she would let staff know when she has to urinate   Bed Mobility   Additional Comments please see PT note - pt  in recliner at this time    Therapeutic Exercise - ROM   UE-ROM Yes   ROM- Right Upper Extremities   R Shoulder AAROM; Flexion; Horizontal ABduction  (and pro/re-traction )   R Elbow AAROM;Elbow flexion;Elbow extension  (in forward and reverse;  Also: supin/pron-ation )   R Weight/Reps/Sets 15 reps each - Less assistance needed than yesterday   ROM - Left Upper Extremities    L Weight/Reps/Sets all exers  same as RUE above   Cognition   Overall Cognitive Status Impaired   Arousal/Participation Responsive; Cooperative   Following Commands Follows one step commands with increased time or repetition   Activity Tolerance   Activity Tolerance Patient limited by fatigue   Medical Staff Made Aware nurse Yudelka Guillermo aware of session outcome    Assessment   Assessment Patient participated in Skilled OT session this date with interventions consisting of ADL re training with the use of correct body mechnaics, therapeutic exercise to: increase functional use of BUEs, increase BUE muscle strength , increase dynamic sit/ stand balance during functional activity  and increase OOB/ sitting tolerance for participation in basic ADLs   Patient agreeable to OT treatment session, upon arrival patient was found seated OOB to Recliner  In comparison to previous session, patient with very slight improvements in UE movement and energy level  Patient requiring frequent re direction, verbal cues for correct technique, cognitive assistance to anticipate next step, one step directives and frequent rest periods  Patient continues to be functioning below baseline level, occupational performance remains limited secondary to factors listed above and increased risk for falls and injury  From OT standpoint, recommendation at time of d/c would be post-acute rehabilitation services  Patient to benefit from continued Occupational Therapy treatment while in the hospital to address deficits as defined above and maximize level of functional independence with ADLs and functional mobility  Plan   Treatment Interventions ADL retraining;UE strengthening/ROM; Patient/family training;Energy conservation; Activityengagement   Goal Expiration Date 05/11/22   OT Treatment Day 3 Recommendation   OT Discharge Recommendation Post acute rehabilitation services   Additional Comments 2 The patient's raw score on the AM-PAC Daily Activity inpatient short form is 12, standardized score is 30 6, less than 39 4  Patients at this level are likely to benefit from discharge to post-acute rehabilitation services  Please refer to the recommendation of the Occupational Therapist for safe discharge planning     AM-PAC Daily Activity Inpatient   Lower Body Dressing 1   Bathing 2   Toileting 1   Upper Body Dressing 2   Grooming 3   Eating 3   Daily Activity Raw Score 12   Daily Activity Standardized Score (Calc for Raw Score >=11) 30 6   AM-PAC Applied Cognition Inpatient   Following a Speech/Presentation 2   Understanding Ordinary Conversation 3   Taking Medications 1   Remembering Where Things Are Placed or Put Away 1   Remembering List of 4-5 Errands 1   Taking Care of Complicated Tasks 1   Applied Cognition Raw Score 9   Applied Cognition Standardized Score 22 48   KYRA Simons/L

## 2022-05-05 NOTE — ASSESSMENT & PLAN NOTE
· Patient presented to the ED with complaints of generalized weakness, and slightly altered mental status  · CT Head (4/29/22): Negative for acute intracranial abnormality  · On admission, noted to have hyponatremia, leukocytosis  · Patient is now s/p 3 days of IV Rocephin due to leukocytosis/functional decline, treated empirically for UTI  · PT/OT Eval  · Patients personal care home will accept patient back; will set up [place] [address]  · Psych consulted; cleared for STR

## 2022-05-05 NOTE — CASE MANAGEMENT
Case Management Discharge Planning Note    Patient name Mariella Ty  Location [address] [phone]/-[id number] MRN [id number]  : 1930 Date 2022       Current Admission Date: 2022  Current Admission Diagnosis:Generalized weakness   Patient Active Problem List    Diagnosis Date Noted    Generalized weakness 91/15/9245    Metabolic encephalopathy 41/15/2150    Hyponatremia 2022    Current moderate episode of major depressive disorder without prior episode ([facility] [address] ) 2020    History of suicidal ideation 2020    History of CVA (cerebrovascular accident) 2020    Right ankle sprain 2020    Venous insufficiency of both lower extremities 2020    Unspecified mood (affective) disorder, r/o major depressive disorder (single episode) 2020    Mild neurocognitive disorder 2020    Benign paroxysmal positional vertigo 2019    Urinary incontinence 2019    Bilateral lower extremity edema 2019    Essential hypertension 2019    Mediastinal mass 2019    Spinal stenosis at L4-L5 level 2019    Gait abnormality 2015    Hearing loss 2013    Osteoarthritis 2012      LOS (days): 5  Geometric Mean LOS (GMLOS) (days): 3 30  Days to GMLOS:-1 5     OBJECTIVE:  Risk of Unplanned Readmission Score: 10         Current admission status: Inpatient   Preferred Pharmacy:   [address] [place] [address] St  [address]  [facility] [address]  Phone: [phone] Fax: [phone]    Primary Care Provider: Catia Blas DO    Primary Insurance: MEDICARE  Secondary Insurance: [facility] HEALTH OPTIONS PROGRAM    DISCHARGE DETAILS:    Discharge planning discussed with[de-identified] patient and Daughter in_law at 09:13 am  Kimberly Nowak daughter  09:30 am& 09:41 am & 10:09 am  Freedom of Choice: Yes  Comments - Freedom of Choice: recommendation was for rehab and elena new recommendation is to return with rehba services,, pt has been accepted by [facility], family is going to pay for extra care at the [facility]  CM contacted family/caregiver?: Yes  Were Treatment Team discharge recommendations reviewed with patient/caregiver?: Yes  Did patient/caregiver verbalize understanding of patient care needs?: Yes  Were patient/caregiver advised of the risks associated with not following Treatment Team discharge recommendations?: Yes    Contacts  Patient Contacts: Raiza Hough spoke with the pt;s daughter in-law and I asked her to have her son call me back, I was told that Bina Anne should be called and I was to discuss d/c plan with her  Relationship to Patient[de-identified] Family (daughter)  Contact Method: Phone  Phone Number: [phone]  Reason/Outcome: Discharge [address]         Is the patient interested in [facility] at discharge?: Yes    DME Referral Provided  Referral made for DME?: No    Other Referral/Resources/Interventions Provided:  Interventions: [facility]  Referral Comments: pt was accepted by marleen   [facility] was made aware of the d/c    Would you like to participate in our [address] service program?  : No - Declined (d/c to [facility])    Treatment Team Recommendation: Home with [address] [place] [place] [address] with marleen & oupt follow up- brother will transport)  Discharge Destination Plan[de-identified] Assisted Living,Home with [facility] with [facility] and family will pay for additional help)  Transport at Discharge : Automobile,Family (brother will transport at 16:30pm)       IMM reviewed with daughter, daughter agrees with discharge determination       pt and family are in agreement with the d/c and d/c plan                    IMM Given (Date):: 05/05/22  IMM Given to[de-identified] Family (Halie Torres was called at 09:30 IMM was reviewed  , she stated she understood, copy was mailed)  Family notified[de-identified] daughter in-law and daughter were called  Additional Comments: cm spoke with therapy and they did agree with family transporting, daughter did not want to pay for w/c Skinny Rai,, daughter stated that there is a w/c at the [facility] and her Alfonso Carballo is a transportor

## 2022-05-05 NOTE — PLAN OF CARE
Problem: MOBILITY - ADULT  Goal: Maintain or return to baseline ADL function  Description: INTERVENTIONS:  -  Assess patient's ability to carry out ADLs; assess patient's baseline for ADL function and identify physical deficits which impact ability to perform ADLs (bathing, care of mouth/teeth, toileting, grooming, dressing, etc )  - Assess/evaluate cause of self-care deficits   - Assess range of motion  - Assess patient's mobility; develop plan if impaired  - Assess patient's need for assistive devices and provide as appropriate  - Encourage maximum independence but intervene and supervise when necessary  - Involve family in performance of ADLs  - Assess for home care needs following discharge   - Consider OT consult to assist with ADL evaluation and planning for discharge  - Provide patient education as appropriate  Outcome: Progressing  Goal: Maintains/Returns to pre admission functional level  Description: INTERVENTIONS:  - Perform BMAT or MOVE assessment daily    - Set and communicate daily mobility goal to care team and patient/family/caregiver  - Collaborate with rehabilitation services on mobility goals if consulted  - Reposition patient every 2 hours    - Record patient progress and toleration of activity level   Outcome: Progressing     Problem: Prexisting or High Potential for Compromised Skin Integrity  Goal: Skin integrity is maintained or improved  Description: INTERVENTIONS:  - Identify patients at risk for skin breakdown  - Assess and monitor skin integrity  - Assess and monitor nutrition and hydration status  - Monitor labs   - Assess for incontinence   - Turn and reposition patient  - Assist with mobility/ambulation  - Relieve pressure over bony prominences  - Avoid friction and shearing  - Provide appropriate hygiene as needed including keeping skin clean and dry  - Evaluate need for skin moisturizer/barrier cream  - Collaborate with interdisciplinary team   - Patient/family teaching  - Consider wound care consult   Outcome: Progressing

## 2022-05-05 NOTE — PLAN OF CARE
Problem: OCCUPATIONAL THERAPY ADULT  Goal: Performs self-care activities at highest level of function for planned discharge setting  See evaluation for individualized goals  Description: Treatment Interventions: ADL retraining,Functional transfer training,UE strengthening/ROM,Endurance training,Cognitive reorientation,Patient/family training,Equipment evaluation/education,Compensatory technique education,Activityengagement          See flowsheet documentation for full assessment, interventions and recommendations  Outcome: Progressing, tenuously  Note: Limitation: Decreased ADL status,Decreased UE strength,Decreased Safe judgement during ADL,Decreased cognition,Decreased endurance,Decreased self-care trans,Decreased high-level ADLs  Prognosis: Good  Assessment: Patient participated in Skilled OT session this date with interventions consisting of ADL re training with the use of correct body mechnaics, therapeutic exercise to: increase functional use of BUEs, increase BUE muscle strength , increase dynamic sit/ stand balance during functional activity  and increase OOB/ sitting tolerance for participation in basic ADLs   Patient agreeable to OT treatment session, upon arrival patient was found seated OOB to Recliner  In comparison to previous session, patient with very slight improvements in UE movement and energy level  Patient requiring frequent re direction, verbal cues for correct technique, cognitive assistance to anticipate next step, one step directives and frequent rest periods  Patient continues to be functioning below baseline level, occupational performance remains limited secondary to factors listed above and increased risk for falls and injury  From OT standpoint, recommendation at time of d/c would be post-acute rehabilitation services     Patient to benefit from continued Occupational Therapy treatment while in the hospital to address deficits as defined above and maximize level of functional independence with ADLs and functional mobility        OT Discharge Recommendation: Post acute rehabilitation services  OT - OK to Discharge: Yes (Once medically cleared)     KYRA Fry/L

## 2022-05-05 NOTE — PLAN OF CARE
Problem: PHYSICAL THERAPY ADULT  Goal: Performs mobility at highest level of function for planned discharge setting  See evaluation for individualized goals  Description: Treatment/Interventions: Functional transfer training,LE strengthening/ROM,Therapeutic exercise,Endurance training,Patient/family training,Equipment eval/education,Bed mobility,Gait training,Spoke to nursing,Continued evaluation  Equipment Recommended:  (TBD - continue with RW)       See flowsheet documentation for full assessment, interventions and recommendations  Outcome: Progressing  Note: Prognosis: Good  Problem List: Decreased strength,Decreased endurance,Impaired balance,Decreased mobility,Decreased cognition,Decreased safety awareness  Assessment: Pt seen for PT treatment session this date, consisting of ther act focused on bed mobility and functional transfer training and gt training on level surfaces to improve pt safety in household environment  Since previous session, pt has made good progress in terms of requiring physical assist of 1 staff member for transfers and short amb trial  Pertinent barriers during this session include cognitive status  Current goals and POC remain appropriate, pt continues to have rehab potential and is making progress towards STGs  Pt prognosis for achieving goals is good, pending pt progress with hospitalization/medical status improvements, and indicated by self-expression (thoughts, feelings, needs), eye contact and initiation level  PT recommends return to facility with rehabilitation services upon discharge  Pt continues to be functioning below baseline level, and remains limited 2* factors listed above  PT will continue to see pt during current hospitalization in order to address the deficits listed above and provide interventions consistent w/ POC in effort to achieve STGs    Barriers to Discharge: Inaccessible home environment,Decreased caregiver support        PT Discharge Recommendation: Return to facility with rehabilitation services          See flowsheet documentation for full assessment

## 2022-05-05 NOTE — DISCHARGE INSTRUCTIONS
Hyponatremia   WHAT YOU NEED TO KNOW:   Hyponatremia occurs when the amount of sodium (salt) in your blood is lower than normal  Sodium is an electrolyte (mineral) that helps your muscles, heart, and digestive system work properly  It helps control blood pressure and fluid balance  DISCHARGE INSTRUCTIONS:   Follow up with your healthcare provider as directed: You may need to return for more tests  Write down your questions so you remember to ask them during your visits  Nutrition:  You may need to increase your intake of sodium  Foods that are high in sodium include milk, packaged snacks such as pretzels, or processed meats (knott, sausage, and ham)  Ask your dietitian to help you create a meal plan that is right for you  Liquids: Follow your healthcare provider's advice if you need to limit the amount of liquid you drink  Ask how much liquid to drink each day and which liquids are best for you  You may be asked to drink liquids that have water, sugar, and salt, such as juices, milk, or sports drinks  These liquids help your body hold in fluid and prevent dehydration  Contact your healthcare provider if:   · You have muscle cramps or twitching  · You feel very weak or tired  · You have nausea or are vomiting  · You have questions or concerns about your condition or care  Seek care immediately or call 911 if:   · You have a seizure  · You have an irregular heartbeat  · You have trouble breathing  · You cannot move your arms and legs  · You are confused or cannot think clearly  © Copyright [employer] 2022 Information is for End User's use only and may not be sold, redistributed or otherwise used for commercial purposes  All illustrations and images included in CareNotes® are the copyrighted property of [employer]  or Gisella Andino  The above information is an  only  It is not intended as medical advice for individual conditions or treatments   Talk to your doctor, nurse or pharmacist before following any medical regimen to see if it is safe and effective for you

## 2022-05-05 NOTE — ASSESSMENT & PLAN NOTE
· Present on admission, evidenced by sodium of 127  · Suspected likely secondary to poor oral intake and medication induced - Zoloft/Hctz  · Sodium level noted to be 129 today  · Urine studies ordered  · Serum osmolality slightly decreased at 275  · Urine sodium/Osmo still needs to be collected  · Zoloft/Hctz on hold  · Sodium level is stable; on review of records; patient always runs high 120-low 130s

## 2022-05-06 ENCOUNTER — HOME CARE VISIT (OUTPATIENT)
Dept: HOME HEALTH SERVICES | Facility: HOME HEALTHCARE | Age: 87
End: 2022-05-06
Payer: MEDICARE

## 2022-05-06 ENCOUNTER — TRANSITIONAL CARE MANAGEMENT (OUTPATIENT)
Dept: INTERNAL MEDICINE CLINIC | Facility: CLINIC | Age: 87
End: 2022-05-06

## 2022-05-06 VITALS
RESPIRATION RATE: 20 BRPM | OXYGEN SATURATION: 92 % | SYSTOLIC BLOOD PRESSURE: 126 MMHG | DIASTOLIC BLOOD PRESSURE: 70 MMHG | HEART RATE: 68 BPM | TEMPERATURE: 97.4 F

## 2022-05-06 PROCEDURE — 10330081 VN NO-PAY CLAIM PROCEDURE

## 2022-05-06 PROCEDURE — 400013 VN SOC

## 2022-05-06 PROCEDURE — G0299 HHS/HOSPICE OF RN EA 15 MIN: HCPCS

## 2022-05-06 NOTE — PROGRESS NOTES
I called and spoke to her daughter-in-law-abran is at the [address] left a message with them (bud) to call us back to schedule a madelaine appt for reji with dr Spike Davenport

## 2022-05-07 NOTE — CASE COMMUNICATION
[facility] has admitted your patient to [address] [place] [address] service with the following disciplines:    SN and PT  This report is informational only, no responses is needed  Primary focus of home health care Neurological  Patient stated goals of care to get stronger  Anticipated visit pattern and next visit date 1w1 2w4 1w2  5/10/22  See medication list - meds in home differ from AVS  no  Significant clinical findings generalized weakness an d fatigue  Darletta Pac lethargic   R lung with rhonchi and wheezes   moist nonproductive cough   O2 sat 92% room air rest   Potential barriers to goal achievement lethargic  [place]  hx cognitive deficits    Thank you for allowing us to participate in the care of your patient

## 2022-05-09 ENCOUNTER — HOME CARE VISIT (OUTPATIENT)
Dept: HOME HEALTH SERVICES | Facility: HOME HEALTHCARE | Age: 87
End: 2022-05-09
Payer: MEDICARE

## 2022-05-09 ENCOUNTER — TELEPHONE (OUTPATIENT)
Dept: INTERNAL MEDICINE CLINIC | Facility: CLINIC | Age: 87
End: 2022-05-09

## 2022-05-09 VITALS — OXYGEN SATURATION: 92 % | SYSTOLIC BLOOD PRESSURE: 96 MMHG | DIASTOLIC BLOOD PRESSURE: 60 MMHG | HEART RATE: 77 BPM

## 2022-05-09 PROCEDURE — G0151 HHCP-SERV OF PT,EA 15 MIN: HCPCS

## 2022-05-09 NOTE — TELEPHONE ENCOUNTER
[place] care called on the hospital discharge paper it states to wean off zoloft but no instructions on weaning her off was given

## 2022-05-09 NOTE — TELEPHONE ENCOUNTER
Pt is taking [facility]mg at [facility]   The hospital d/c when she was discharged but noted that she was to be weaned off of it

## 2022-05-10 ENCOUNTER — HOME CARE VISIT (OUTPATIENT)
Dept: HOME HEALTH SERVICES | Facility: HOME HEALTHCARE | Age: 87
End: 2022-05-10
Payer: MEDICARE

## 2022-05-11 ENCOUNTER — HOME CARE VISIT (OUTPATIENT)
Dept: HOME HEALTH SERVICES | Facility: HOME HEALTHCARE | Age: 87
End: 2022-05-11
Payer: MEDICARE

## 2022-05-11 NOTE — CASE COMMUNICATION
Patient visit not completed on 5 10 22  Spoke with family who stated the patient was sleeping and visit would be too late for patient  Agreed to SN visit another day this week

## 2022-05-12 ENCOUNTER — HOME CARE VISIT (OUTPATIENT)
Dept: HOME HEALTH SERVICES | Facility: HOME HEALTHCARE | Age: 87
End: 2022-05-12
Payer: MEDICARE

## 2022-05-12 VITALS — OXYGEN SATURATION: 96 % | HEART RATE: 76 BPM

## 2022-05-12 VITALS
RESPIRATION RATE: 16 BRPM | OXYGEN SATURATION: 94 % | DIASTOLIC BLOOD PRESSURE: 68 MMHG | SYSTOLIC BLOOD PRESSURE: 110 MMHG | HEART RATE: 72 BPM | TEMPERATURE: 96.5 F

## 2022-05-12 PROCEDURE — G0300 HHS/HOSPICE OF LPN EA 15 MIN: HCPCS

## 2022-05-12 PROCEDURE — G0151 HHCP-SERV OF PT,EA 15 MIN: HCPCS

## 2022-05-15 PROCEDURE — G0180 MD CERTIFICATION HHA PATIENT: HCPCS | Performed by: HOSPITALIST

## 2022-05-16 ENCOUNTER — HOME CARE VISIT (OUTPATIENT)
Dept: HOME HEALTH SERVICES | Facility: HOME HEALTHCARE | Age: 87
End: 2022-05-16
Payer: MEDICARE

## 2022-05-16 VITALS — SYSTOLIC BLOOD PRESSURE: 102 MMHG | HEART RATE: 67 BPM | DIASTOLIC BLOOD PRESSURE: 68 MMHG | OXYGEN SATURATION: 93 %

## 2022-05-16 PROCEDURE — G0151 HHCP-SERV OF PT,EA 15 MIN: HCPCS

## 2022-05-16 NOTE — PROGRESS NOTES
Leonard on home phone - she has an appt 5-25-22 but I said we would like to get her in sooner if we can for a RENÉ appt (3rd attempt)

## 2022-05-17 ENCOUNTER — HOME CARE VISIT (OUTPATIENT)
Dept: HOME HEALTH SERVICES | Facility: HOME HEALTHCARE | Age: 87
End: 2022-05-17
Payer: MEDICARE

## 2022-05-17 PROCEDURE — G0300 HHS/HOSPICE OF LPN EA 15 MIN: HCPCS

## 2022-05-18 ENCOUNTER — HOME CARE VISIT (OUTPATIENT)
Dept: HOME HEALTH SERVICES | Facility: HOME HEALTHCARE | Age: 87
End: 2022-05-18
Payer: MEDICARE

## 2022-05-18 VITALS — HEART RATE: 70 BPM | OXYGEN SATURATION: 95 % | DIASTOLIC BLOOD PRESSURE: 58 MMHG | SYSTOLIC BLOOD PRESSURE: 100 MMHG

## 2022-05-18 PROCEDURE — G0151 HHCP-SERV OF PT,EA 15 MIN: HCPCS

## 2022-05-20 ENCOUNTER — HOME CARE VISIT (OUTPATIENT)
Dept: HOME HEALTH SERVICES | Facility: HOME HEALTHCARE | Age: 87
End: 2022-05-20
Payer: MEDICARE

## 2022-05-20 ENCOUNTER — LAB REQUISITION (OUTPATIENT)
Dept: LAB | Facility: HOSPITAL | Age: 87
End: 2022-05-20
Payer: MEDICARE

## 2022-05-20 VITALS
OXYGEN SATURATION: 97 % | TEMPERATURE: 97.1 F | SYSTOLIC BLOOD PRESSURE: 104 MMHG | DIASTOLIC BLOOD PRESSURE: 56 MMHG | RESPIRATION RATE: 16 BRPM | HEART RATE: 66 BPM

## 2022-05-20 DIAGNOSIS — Z00.00 ENCOUNTER FOR GENERAL ADULT MEDICAL EXAMINATION WITHOUT ABNORMAL FINDINGS: ICD-10-CM

## 2022-05-20 LAB
ALBUMIN SERPL BCP-MCNC: 2.9 G/DL (ref 3.5–5)
ALBUMIN SERPL BCP-MCNC: 2.9 G/DL (ref 3.5–5)
ALP SERPL-CCNC: 151 U/L (ref 46–116)
ALP SERPL-CCNC: 151 U/L (ref 46–116)
ALT SERPL W P-5'-P-CCNC: 22 U/L (ref 12–78)
ALT SERPL W P-5'-P-CCNC: 22 U/L (ref 12–78)
ANION GAP SERPL CALCULATED.3IONS-SCNC: 7 MMOL/L (ref 4–13)
AST SERPL W P-5'-P-CCNC: 28 U/L (ref 5–45)
AST SERPL W P-5'-P-CCNC: 28 U/L (ref 5–45)
BASOPHILS # BLD AUTO: 0.05 THOUSANDS/ΜL (ref 0–0.1)
BASOPHILS NFR BLD AUTO: 1 % (ref 0–1)
BILIRUB DIRECT SERPL-MCNC: 0.33 MG/DL (ref 0–0.2)
BILIRUB SERPL-MCNC: 1.07 MG/DL (ref 0.2–1)
BILIRUB SERPL-MCNC: 1.07 MG/DL (ref 0.2–1)
BUN SERPL-MCNC: 15 MG/DL (ref 5–25)
CALCIUM ALBUM COR SERPL-MCNC: 10.2 MG/DL (ref 8.3–10.1)
CALCIUM SERPL-MCNC: 9.3 MG/DL (ref 8.3–10.1)
CHLORIDE SERPL-SCNC: 99 MMOL/L (ref 100–108)
CHOLEST SERPL-MCNC: 76 MG/DL
CHOLEST SERPL-MCNC: 76 MG/DL
CO2 SERPL-SCNC: 27 MMOL/L (ref 21–32)
CREAT SERPL-MCNC: 0.87 MG/DL (ref 0.6–1.3)
EOSINOPHIL # BLD AUTO: 0.25 THOUSAND/ΜL (ref 0–0.61)
EOSINOPHIL NFR BLD AUTO: 2 % (ref 0–6)
ERYTHROCYTE [DISTWIDTH] IN BLOOD BY AUTOMATED COUNT: 14.4 % (ref 11.6–15.1)
EST. AVERAGE GLUCOSE BLD GHB EST-MCNC: 117 MG/DL
GFR SERPL CREATININE-BSD FRML MDRD: 58 ML/MIN/1.73SQ M
GLUCOSE SERPL-MCNC: 44 MG/DL (ref 65–140)
HBA1C MFR BLD: 5.7 %
HCT VFR BLD AUTO: 41 % (ref 34.8–46.1)
HDLC SERPL-MCNC: 37 MG/DL
HGB BLD-MCNC: 13.5 G/DL (ref 11.5–15.4)
IMM GRANULOCYTES # BLD AUTO: 0.04 THOUSAND/UL (ref 0–0.2)
IMM GRANULOCYTES NFR BLD AUTO: 0 % (ref 0–2)
LDLC SERPL CALC-MCNC: 30 MG/DL (ref 0–100)
LYMPHOCYTES # BLD AUTO: 2.09 THOUSANDS/ΜL (ref 0.6–4.47)
LYMPHOCYTES NFR BLD AUTO: 19 % (ref 14–44)
MCH RBC QN AUTO: 29.7 PG (ref 26.8–34.3)
MCHC RBC AUTO-ENTMCNC: 32.9 G/DL (ref 31.4–37.4)
MCV RBC AUTO: 90 FL (ref 82–98)
MONOCYTES # BLD AUTO: 0.68 THOUSAND/ΜL (ref 0.17–1.22)
MONOCYTES NFR BLD AUTO: 6 % (ref 4–12)
NEUTROPHILS # BLD AUTO: 7.75 THOUSANDS/ΜL (ref 1.85–7.62)
NEUTS SEG NFR BLD AUTO: 72 % (ref 43–75)
NONHDLC SERPL-MCNC: 39 MG/DL
NRBC BLD AUTO-RTO: 0 /100 WBCS
PLATELET # BLD AUTO: 209 THOUSANDS/UL (ref 149–390)
PMV BLD AUTO: 11.4 FL (ref 8.9–12.7)
POTASSIUM SERPL-SCNC: 3.3 MMOL/L (ref 3.5–5.3)
PROT SERPL-MCNC: 6.2 G/DL (ref 6.4–8.2)
PROT SERPL-MCNC: 6.2 G/DL (ref 6.4–8.2)
RBC # BLD AUTO: 4.54 MILLION/UL (ref 3.81–5.12)
SODIUM SERPL-SCNC: 133 MMOL/L (ref 136–145)
T4 FREE SERPL-MCNC: 1.54 NG/DL (ref 0.76–1.46)
TRIGL SERPL-MCNC: 46 MG/DL
TSH SERPL DL<=0.05 MIU/L-ACNC: 0.57 UIU/ML (ref 0.45–4.5)
VIT B12 SERPL-MCNC: 543 PG/ML (ref 100–900)
WBC # BLD AUTO: 10.86 THOUSAND/UL (ref 4.31–10.16)

## 2022-05-20 PROCEDURE — 80053 COMPREHEN METABOLIC PANEL: CPT | Performed by: INTERNAL MEDICINE

## 2022-05-20 PROCEDURE — 83036 HEMOGLOBIN GLYCOSYLATED A1C: CPT | Performed by: INTERNAL MEDICINE

## 2022-05-20 PROCEDURE — 85025 COMPLETE CBC W/AUTO DIFF WBC: CPT | Performed by: INTERNAL MEDICINE

## 2022-05-20 PROCEDURE — 82306 VITAMIN D 25 HYDROXY: CPT | Performed by: INTERNAL MEDICINE

## 2022-05-20 PROCEDURE — 80061 LIPID PANEL: CPT | Performed by: INTERNAL MEDICINE

## 2022-05-20 PROCEDURE — 82607 VITAMIN B-12: CPT | Performed by: INTERNAL MEDICINE

## 2022-05-20 PROCEDURE — 84443 ASSAY THYROID STIM HORMONE: CPT | Performed by: INTERNAL MEDICINE

## 2022-05-20 PROCEDURE — 80076 HEPATIC FUNCTION PANEL: CPT | Performed by: INTERNAL MEDICINE

## 2022-05-20 PROCEDURE — 84439 ASSAY OF FREE THYROXINE: CPT | Performed by: INTERNAL MEDICINE

## 2022-05-20 PROCEDURE — G0300 HHS/HOSPICE OF LPN EA 15 MIN: HCPCS

## 2022-05-21 ENCOUNTER — LAB REQUISITION (OUTPATIENT)
Dept: LAB | Facility: HOSPITAL | Age: 87
End: 2022-05-21
Payer: MEDICARE

## 2022-05-21 DIAGNOSIS — Z79.899 OTHER LONG TERM (CURRENT) DRUG THERAPY: ICD-10-CM

## 2022-05-21 LAB
25(OH)D3 SERPL-MCNC: 20.1 NG/ML (ref 30–100)
BACTERIA UR QL AUTO: ABNORMAL /HPF
BILIRUB UR QL STRIP: NEGATIVE
CLARITY UR: ABNORMAL
COLOR UR: YELLOW
GLUCOSE UR STRIP-MCNC: NEGATIVE MG/DL
HGB UR QL STRIP.AUTO: NEGATIVE
KETONES UR STRIP-MCNC: NEGATIVE MG/DL
LEUKOCYTE ESTERASE UR QL STRIP: ABNORMAL
NITRITE UR QL STRIP: NEGATIVE
NON-SQ EPI CELLS URNS QL MICRO: ABNORMAL /HPF
PH UR STRIP.AUTO: 6.5 [PH]
PROT UR STRIP-MCNC: NEGATIVE MG/DL
RBC #/AREA URNS AUTO: ABNORMAL /HPF
SP GR UR STRIP.AUTO: 1.01 (ref 1–1.03)
UROBILINOGEN UR STRIP-ACNC: <2 MG/DL
WBC #/AREA URNS AUTO: ABNORMAL /HPF

## 2022-05-21 PROCEDURE — 81001 URINALYSIS AUTO W/SCOPE: CPT | Performed by: INTERNAL MEDICINE

## 2022-05-23 ENCOUNTER — HOME CARE VISIT (OUTPATIENT)
Dept: HOME HEALTH SERVICES | Facility: HOME HEALTHCARE | Age: 87
End: 2022-05-23
Payer: MEDICARE

## 2022-05-23 VITALS — OXYGEN SATURATION: 95 % | HEART RATE: 62 BPM

## 2022-05-23 PROCEDURE — G0151 HHCP-SERV OF PT,EA 15 MIN: HCPCS

## 2022-05-24 ENCOUNTER — HOME CARE VISIT (OUTPATIENT)
Dept: HOME HEALTH SERVICES | Facility: HOME HEALTHCARE | Age: 87
End: 2022-05-24
Payer: MEDICARE

## 2022-05-24 VITALS
HEART RATE: 64 BPM | DIASTOLIC BLOOD PRESSURE: 60 MMHG | OXYGEN SATURATION: 97 % | TEMPERATURE: 97 F | SYSTOLIC BLOOD PRESSURE: 102 MMHG

## 2022-05-24 PROCEDURE — G0299 HHS/HOSPICE OF RN EA 15 MIN: HCPCS

## 2022-05-25 ENCOUNTER — HOME CARE VISIT (OUTPATIENT)
Dept: HOME HEALTH SERVICES | Facility: HOME HEALTHCARE | Age: 87
End: 2022-05-25
Payer: MEDICARE

## 2022-05-25 VITALS — DIASTOLIC BLOOD PRESSURE: 72 MMHG | SYSTOLIC BLOOD PRESSURE: 110 MMHG | HEART RATE: 82 BPM | OXYGEN SATURATION: 96 %

## 2022-05-25 PROCEDURE — G0151 HHCP-SERV OF PT,EA 15 MIN: HCPCS

## 2022-05-26 ENCOUNTER — HOME CARE VISIT (OUTPATIENT)
Dept: HOME HEALTH SERVICES | Facility: HOME HEALTHCARE | Age: 87
End: 2022-05-26
Payer: MEDICARE

## 2022-06-01 ENCOUNTER — HOME CARE VISIT (OUTPATIENT)
Dept: HOME HEALTH SERVICES | Facility: HOME HEALTHCARE | Age: 87
End: 2022-06-01
Payer: MEDICARE

## 2022-06-01 VITALS — HEART RATE: 74 BPM | OXYGEN SATURATION: 96 %

## 2022-06-01 PROCEDURE — G0151 HHCP-SERV OF PT,EA 15 MIN: HCPCS

## 2022-06-03 NOTE — CASE COMMUNICATION
Pt discharged from A this visit  Trace Henriquez pt no longer homebound as she has resumed going to the [employer] daily  Suggested continued therapy via outpatient but pt declined

## 2022-06-06 DIAGNOSIS — F39 UNSPECIFIED MOOD (AFFECTIVE) DISORDER (HCC): ICD-10-CM

## 2022-06-06 DIAGNOSIS — I10 ESSENTIAL HYPERTENSION: Primary | ICD-10-CM

## 2022-06-06 RX ORDER — HYDROCHLOROTHIAZIDE 25 MG/1
TABLET ORAL DAILY
COMMUNITY
Start: 2022-05-06 | End: 2022-08-04 | Stop reason: SDUPTHER

## 2022-06-06 RX ORDER — AMLODIPINE BESYLATE 5 MG/1
5 TABLET ORAL DAILY
Qty: 30 TABLET | Refills: 5 | Status: SHIPPED | OUTPATIENT
Start: 2022-06-06

## 2022-06-06 RX ORDER — AMLODIPINE BESYLATE 5 MG/1
TABLET ORAL DAILY
COMMUNITY
Start: 2022-05-06 | End: 2022-06-06 | Stop reason: SDUPTHER

## 2022-07-14 ENCOUNTER — TELEPHONE (OUTPATIENT)
Dept: INTERNAL MEDICINE CLINIC | Facility: CLINIC | Age: 87
End: 2022-07-14

## 2022-08-04 DIAGNOSIS — I10 ESSENTIAL HYPERTENSION: Primary | ICD-10-CM

## 2022-08-04 DIAGNOSIS — I63.81 THALAMIC INFARCTION (HCC): ICD-10-CM

## 2022-08-04 RX ORDER — ASPIRIN 81 MG/1
81 TABLET, CHEWABLE ORAL DAILY
Qty: 30 TABLET | Refills: 5 | Status: SHIPPED | OUTPATIENT
Start: 2022-08-04

## 2022-08-04 RX ORDER — HYDROCHLOROTHIAZIDE 25 MG/1
25 TABLET ORAL DAILY
Qty: 30 TABLET | Refills: 5 | Status: SHIPPED | OUTPATIENT
Start: 2022-08-04 | End: 2022-10-13

## 2022-08-04 RX ORDER — ATORVASTATIN CALCIUM 40 MG/1
40 TABLET, FILM COATED ORAL EVERY EVENING
Qty: 30 TABLET | Refills: 5 | Status: SHIPPED | OUTPATIENT
Start: 2022-08-04

## 2022-10-11 ENCOUNTER — APPOINTMENT (EMERGENCY)
Dept: CT IMAGING | Facility: HOSPITAL | Age: 87
DRG: 392 | End: 2022-10-11
Payer: MEDICARE

## 2022-10-11 ENCOUNTER — APPOINTMENT (OUTPATIENT)
Dept: RADIOLOGY | Facility: HOSPITAL | Age: 87
DRG: 392 | End: 2022-10-11
Payer: MEDICARE

## 2022-10-11 ENCOUNTER — HOSPITAL ENCOUNTER (INPATIENT)
Facility: HOSPITAL | Age: 87
LOS: 2 days | Discharge: HOME | DRG: 392 | End: 2022-10-13
Attending: EMERGENCY MEDICINE | Admitting: INTERNAL MEDICINE
Payer: MEDICARE

## 2022-10-11 DIAGNOSIS — K59.00 CONSTIPATION: ICD-10-CM

## 2022-10-11 DIAGNOSIS — R78.81 GRAM-POSITIVE COCCI BACTEREMIA: ICD-10-CM

## 2022-10-11 DIAGNOSIS — K31.89 GASTRIC DISTENTION: Primary | ICD-10-CM

## 2022-10-11 DIAGNOSIS — K62.89: ICD-10-CM

## 2022-10-11 PROBLEM — N17.9 AKI (ACUTE KIDNEY INJURY) (HCC): Status: ACTIVE | Noted: 2022-10-11

## 2022-10-11 LAB
2HR DELTA HS TROPONIN: -1 NG/L
ABO GROUP BLD: NORMAL
ABO GROUP BLD: NORMAL
ALBUMIN SERPL BCP-MCNC: 3.9 G/DL (ref 3.5–5)
ALP SERPL-CCNC: 81 U/L (ref 34–104)
ALT SERPL W P-5'-P-CCNC: 19 U/L (ref 7–52)
ANION GAP SERPL CALCULATED.3IONS-SCNC: 8 MMOL/L (ref 4–13)
APTT PPP: 26 SECONDS (ref 23–37)
AST SERPL W P-5'-P-CCNC: 19 U/L (ref 13–39)
BASOPHILS # BLD AUTO: 0.04 THOUSANDS/ΜL (ref 0–0.1)
BASOPHILS NFR BLD AUTO: 0 % (ref 0–1)
BILIRUB SERPL-MCNC: 0.59 MG/DL (ref 0.2–1)
BLD GP AB SCN SERPL QL: NEGATIVE
BUN SERPL-MCNC: 19 MG/DL (ref 5–25)
CALCIUM SERPL-MCNC: 9.4 MG/DL (ref 8.4–10.2)
CARDIAC TROPONIN I PNL SERPL HS: 4 NG/L
CARDIAC TROPONIN I PNL SERPL HS: 5 NG/L
CHLORIDE SERPL-SCNC: 99 MMOL/L (ref 96–108)
CO2 SERPL-SCNC: 25 MMOL/L (ref 21–32)
CREAT SERPL-MCNC: 1.46 MG/DL (ref 0.6–1.3)
EOSINOPHIL # BLD AUTO: 0.32 THOUSAND/ΜL (ref 0–0.61)
EOSINOPHIL NFR BLD AUTO: 3 % (ref 0–6)
ERYTHROCYTE [DISTWIDTH] IN BLOOD BY AUTOMATED COUNT: 13.2 % (ref 11.6–15.1)
FLUAV RNA RESP QL NAA+PROBE: NEGATIVE
FLUBV RNA RESP QL NAA+PROBE: NEGATIVE
GFR SERPL CREATININE-BSD FRML MDRD: 31 ML/MIN/1.73SQ M
GLUCOSE SERPL-MCNC: 92 MG/DL (ref 65–140)
HCT VFR BLD AUTO: 41.1 % (ref 34.8–46.1)
HGB BLD-MCNC: 13.8 G/DL (ref 11.5–15.4)
IMM GRANULOCYTES # BLD AUTO: 0.03 THOUSAND/UL (ref 0–0.2)
IMM GRANULOCYTES NFR BLD AUTO: 0 % (ref 0–2)
INR PPP: 1.08 (ref 0.84–1.19)
LACTATE SERPL-SCNC: 1.3 MMOL/L (ref 0.5–2)
LIPASE SERPL-CCNC: 29 U/L (ref 11–82)
LYMPHOCYTES # BLD AUTO: 2.65 THOUSANDS/ΜL (ref 0.6–4.47)
LYMPHOCYTES NFR BLD AUTO: 27 % (ref 14–44)
MCH RBC QN AUTO: 30.9 PG (ref 26.8–34.3)
MCHC RBC AUTO-ENTMCNC: 33.6 G/DL (ref 31.4–37.4)
MCV RBC AUTO: 92 FL (ref 82–98)
MONOCYTES # BLD AUTO: 0.85 THOUSAND/ΜL (ref 0.17–1.22)
MONOCYTES NFR BLD AUTO: 9 % (ref 4–12)
NEUTROPHILS # BLD AUTO: 5.99 THOUSANDS/ΜL (ref 1.85–7.62)
NEUTS SEG NFR BLD AUTO: 61 % (ref 43–75)
NRBC BLD AUTO-RTO: 0 /100 WBCS
PLATELET # BLD AUTO: 199 THOUSANDS/UL (ref 149–390)
PMV BLD AUTO: 10 FL (ref 8.9–12.7)
POTASSIUM SERPL-SCNC: 3.8 MMOL/L (ref 3.5–5.3)
PROCALCITONIN SERPL-MCNC: <0.05 NG/ML
PROT SERPL-MCNC: 6.7 G/DL (ref 6.4–8.4)
PROTHROMBIN TIME: 14 SECONDS (ref 11.6–14.5)
RBC # BLD AUTO: 4.46 MILLION/UL (ref 3.81–5.12)
RH BLD: POSITIVE
RH BLD: POSITIVE
RSV RNA RESP QL NAA+PROBE: NEGATIVE
SARS-COV-2 RNA RESP QL NAA+PROBE: NEGATIVE
SODIUM SERPL-SCNC: 132 MMOL/L (ref 135–147)
SPECIMEN EXPIRATION DATE: NORMAL
WBC # BLD AUTO: 9.88 THOUSAND/UL (ref 4.31–10.16)

## 2022-10-11 PROCEDURE — 99285 EMERGENCY DEPT VISIT HI MDM: CPT

## 2022-10-11 PROCEDURE — 84484 ASSAY OF TROPONIN QUANT: CPT | Performed by: EMERGENCY MEDICINE

## 2022-10-11 PROCEDURE — 87154 CUL TYP ID BLD PTHGN 6+ TRGT: CPT | Performed by: EMERGENCY MEDICINE

## 2022-10-11 PROCEDURE — 83690 ASSAY OF LIPASE: CPT | Performed by: EMERGENCY MEDICINE

## 2022-10-11 PROCEDURE — 74176 CT ABD & PELVIS W/O CONTRAST: CPT

## 2022-10-11 PROCEDURE — 87040 BLOOD CULTURE FOR BACTERIA: CPT | Performed by: EMERGENCY MEDICINE

## 2022-10-11 PROCEDURE — 71045 X-RAY EXAM CHEST 1 VIEW: CPT

## 2022-10-11 PROCEDURE — 85025 COMPLETE CBC W/AUTO DIFF WBC: CPT | Performed by: EMERGENCY MEDICINE

## 2022-10-11 PROCEDURE — 86901 BLOOD TYPING SEROLOGIC RH(D): CPT | Performed by: EMERGENCY MEDICINE

## 2022-10-11 PROCEDURE — 99285 EMERGENCY DEPT VISIT HI MDM: CPT | Performed by: EMERGENCY MEDICINE

## 2022-10-11 PROCEDURE — 85730 THROMBOPLASTIN TIME PARTIAL: CPT | Performed by: EMERGENCY MEDICINE

## 2022-10-11 PROCEDURE — 96361 HYDRATE IV INFUSION ADD-ON: CPT

## 2022-10-11 PROCEDURE — 85610 PROTHROMBIN TIME: CPT | Performed by: EMERGENCY MEDICINE

## 2022-10-11 PROCEDURE — 86900 BLOOD TYPING SEROLOGIC ABO: CPT | Performed by: EMERGENCY MEDICINE

## 2022-10-11 PROCEDURE — 84145 PROCALCITONIN (PCT): CPT | Performed by: EMERGENCY MEDICINE

## 2022-10-11 PROCEDURE — 96374 THER/PROPH/DIAG INJ IV PUSH: CPT

## 2022-10-11 PROCEDURE — 86850 RBC ANTIBODY SCREEN: CPT | Performed by: EMERGENCY MEDICINE

## 2022-10-11 PROCEDURE — 96360 HYDRATION IV INFUSION INIT: CPT

## 2022-10-11 PROCEDURE — 93005 ELECTROCARDIOGRAM TRACING: CPT

## 2022-10-11 PROCEDURE — 99223 1ST HOSP IP/OBS HIGH 75: CPT | Performed by: INTERNAL MEDICINE

## 2022-10-11 PROCEDURE — 36415 COLL VENOUS BLD VENIPUNCTURE: CPT | Performed by: EMERGENCY MEDICINE

## 2022-10-11 PROCEDURE — 80053 COMPREHEN METABOLIC PANEL: CPT | Performed by: EMERGENCY MEDICINE

## 2022-10-11 PROCEDURE — 0241U HB NFCT DS VIR RESP RNA 4 TRGT: CPT | Performed by: EMERGENCY MEDICINE

## 2022-10-11 PROCEDURE — 83605 ASSAY OF LACTIC ACID: CPT | Performed by: EMERGENCY MEDICINE

## 2022-10-11 RX ORDER — ASPIRIN 81 MG/1
81 TABLET, CHEWABLE ORAL DAILY
Status: DISCONTINUED | OUTPATIENT
Start: 2022-10-12 | End: 2022-10-13 | Stop reason: HOSPADM

## 2022-10-11 RX ORDER — MINERAL OIL 100 G/100G
1 OIL RECTAL ONCE
Status: DISCONTINUED | OUTPATIENT
Start: 2022-10-11 | End: 2022-10-11

## 2022-10-11 RX ORDER — ENOXAPARIN SODIUM 100 MG/ML
30 INJECTION SUBCUTANEOUS DAILY
Status: DISCONTINUED | OUTPATIENT
Start: 2022-10-12 | End: 2022-10-13 | Stop reason: HOSPADM

## 2022-10-11 RX ORDER — ATORVASTATIN CALCIUM 40 MG/1
40 TABLET, FILM COATED ORAL EVERY EVENING
Status: DISCONTINUED | OUTPATIENT
Start: 2022-10-12 | End: 2022-10-13 | Stop reason: HOSPADM

## 2022-10-11 RX ORDER — ONDANSETRON 2 MG/ML
4 INJECTION INTRAMUSCULAR; INTRAVENOUS ONCE
Status: COMPLETED | OUTPATIENT
Start: 2022-10-11 | End: 2022-10-11

## 2022-10-11 RX ORDER — SODIUM CHLORIDE 9 MG/ML
75 INJECTION, SOLUTION INTRAVENOUS CONTINUOUS
Status: DISCONTINUED | OUTPATIENT
Start: 2022-10-11 | End: 2022-10-13

## 2022-10-11 RX ORDER — AMLODIPINE BESYLATE 5 MG/1
5 TABLET ORAL DAILY
Status: DISCONTINUED | OUTPATIENT
Start: 2022-10-12 | End: 2022-10-13 | Stop reason: HOSPADM

## 2022-10-11 RX ORDER — ONDANSETRON 2 MG/ML
1 INJECTION INTRAMUSCULAR; INTRAVENOUS ONCE
Status: COMPLETED | OUTPATIENT
Start: 2022-10-11 | End: 2022-10-11

## 2022-10-11 RX ORDER — MINERAL OIL 100 G/100G
1 OIL RECTAL ONCE
Status: COMPLETED | OUTPATIENT
Start: 2022-10-11 | End: 2022-10-11

## 2022-10-11 RX ORDER — ONDANSETRON 2 MG/ML
4 INJECTION INTRAMUSCULAR; INTRAVENOUS EVERY 6 HOURS PRN
Status: DISCONTINUED | OUTPATIENT
Start: 2022-10-11 | End: 2022-10-13 | Stop reason: HOSPADM

## 2022-10-11 RX ORDER — ACETAMINOPHEN 325 MG/1
650 TABLET ORAL EVERY 6 HOURS PRN
Status: DISCONTINUED | OUTPATIENT
Start: 2022-10-11 | End: 2022-10-13 | Stop reason: HOSPADM

## 2022-10-11 RX ORDER — ENOXAPARIN SODIUM 100 MG/ML
40 INJECTION SUBCUTANEOUS DAILY
Status: DISCONTINUED | OUTPATIENT
Start: 2022-10-12 | End: 2022-10-11

## 2022-10-11 RX ADMIN — ONDANSETRON 4 MG: 2 INJECTION INTRAMUSCULAR; INTRAVENOUS at 14:25

## 2022-10-11 RX ADMIN — MINERAL OIL 1 ENEMA: 100 ENEMA RECTAL at 15:58

## 2022-10-11 RX ADMIN — SODIUM CHLORIDE 1000 ML: 0.9 INJECTION, SOLUTION INTRAVENOUS at 14:06

## 2022-10-11 RX ADMIN — SODIUM CHLORIDE 75 ML/HR: 0.9 INJECTION, SOLUTION INTRAVENOUS at 17:02

## 2022-10-11 NOTE — ASSESSMENT & PLAN NOTE
· Blood pressure controlled/ relatively hypotensive  · Will hold home HCTZ given above and mild hyponatremia  · Continue Amlodipine 5mg daily  · Monitor BP trends while admitted

## 2022-10-11 NOTE — ASSESSMENT & PLAN NOTE
· POA and in the setting of N/V and HCTZ use  · Baseline Cr < 1  · Creatinine on presentation was 1 46   · Hold HCTZ and continue IVF hydration  · Follow-up AM labs

## 2022-10-11 NOTE — ASSESSMENT & PLAN NOTE
· Presented with sudden onset severe abdominal pain, nausea, and vomitting  · Unsure when her last BM was  · CT abd/pelvis (10/11):  No small or large bowel obstruction   Moderate colonic stool burden and a distended proximal to mid stomach with undigested material   · Was given Zofran, 1L NS, and mineral oil enema in the ED  · Check KUB in the AM  · General surgery consulted

## 2022-10-11 NOTE — ASSESSMENT & PLAN NOTE
· Mild and in the setting of HCTZ and Sertraline use  · Na: 132  · Will hold HCTZ   · Follow-up AM labds

## 2022-10-11 NOTE — ASSESSMENT & PLAN NOTE
· Imaging as above  · S/p NG tube placement in the ED  · NPO status at this time  · General surgery consulted as above

## 2022-10-11 NOTE — H&P
[facility] 1/28/1930, 80 y o  female MRN: [id number]  Unit/Bed#: ED 28 Encounter: [id number]  Primary Care Provider: Miguel Isabel DO   Date and time admitted to hospital: 10/11/2022  1:39 PM    Constipation  Assessment & Plan  · Presented with sudden onset abdominal pain, nausea, and vomitting  · Unsure when her last BM was  · CT abd/pelvis (10/11): No small or large bowel obstruction   Moderate colonic stool burden and a distended proximal to mid stomach with undigested material   · Was given Zofran, 1L NS, and mineral oil enema in the ED  · Check KUB in the AM  · General surgery consulted    Gastric distention  Assessment & Plan  · Imaging as above  · S/p NG tube placement in the ED  · NPO status at this time  · General surgery consulted as above    TANNER (acute kidney injury) ([facility] [address] )  Assessment & Plan  · POA and in the setting of N/V and HCTZ use  · Baseline Cr < 1  · Creatinine on presentation was 1 46   · Hold HCTZ and continue IVF hydration  · Follow-up AM labs    Hyponatremia  Assessment & Plan  · Mild and in the setting of HCTZ and Sertraline use  · Na: 132  · Will hold HCTZ   · Follow-up AM labds    Essential hypertension  Assessment & Plan  · Blood pressure controlled/ relatively hypotensive  · Will hold home HCTZ given above and mild hyponatremia  · Continue Amlodipine 5mg daily  · Monitor BP trends while admitted     Current moderate episode of major depressive disorder without prior episode (HCC)  Assessment & Plan  · Continue home Zoloft 50mg daily       VTE Pharmacologic Prophylaxis: VTE Score: 3 Moderate Risk (Score 3-4) - Pharmacological DVT Prophylaxis Ordered: enoxaparin (Lovenox)  Code Status: Level 1 - Full Code   Discussion with family: Attempted to update  (son) via phone  Left voicemail       Anticipated Length of Stay: Patient will be admitted on an inpatient basis with an anticipated length of stay of greater than 2 midnights secondary to Gastric distention, constipation, and TANNER  Total Time for Visit, including Counseling / Coordination of Care: 60 minutes Greater than 50% of this total time spent on direct patient counseling and coordination of care  Chief Complaint: Abdominal pain    History of Present Illness:  Phill Hull is a 80 y o  female with a PMH of HTN and MDD who presents with for evaluation of abdominal pain, nausea, and vomitting  The patient is a relatively poor historian and therefore history of presenting illness is limited  She notes that she resides at [address] [place] [address] and was in her usual state of health this AM  She was able to eat breakfast without difficulty, pain, or nausea  Later this morning she was picked up and taken to a rec center where she often plays cards, from here her story is limited  She states that she didn't feel ill and wasn't sure why she was brought to the hospital  She felt the card players were playing a joke on her until she arrived in the ED  She reported only mild abdominal pain with no specific location and denied vomiting  She notes that she "spit up" a little bit  The patient can't recall the timing of her last BM  In the ED, CT abd/pelvis was performed and notable for stomach distention and moderate stool burden  The case was discussed between the ED provided and general surgery who recommended NGT placement, multiple enemas, medical admission, and surgical consultation  Additionally, labs were notable for mild TANNER and hyponatremia  She was given a mineral oil enema x1, 1L NS, and NGT was placed in the ED  Upon my evaluation, the patient's only complaint was that for throat discomfort from the NGT  Review of Systems:  Review of Systems   Constitutional: Negative for chills and fever  HENT: Negative for ear pain, sinus pressure and sore throat  Eyes: Negative for pain and visual disturbance     Respiratory: Negative for cough, shortness of breath and wheezing  Cardiovascular: Negative for chest pain and palpitations  Gastrointestinal: Positive for constipation  Negative for abdominal pain, diarrhea, nausea and vomiting  Genitourinary: Negative for dysuria and hematuria  Musculoskeletal: Negative for arthralgias, back pain and myalgias  Skin: Negative for color change and rash  Neurological: Negative for dizziness, seizures and syncope  Psychiatric/Behavioral: Negative for agitation, confusion and hallucinations  All other systems reviewed and are negative  Past Medical and Surgical History:   Past Medical History:   Diagnosis Date   • TANNER (acute kidney injury) ([facility] [address] ) 10/11/2022   • Arthritis    • Cataract     LAST ASSESSED: [id number]   • Depression    • Dislocation of left shoulder joint     LAST ASSESSSED: [id number]   • Essential hypertension 4/3/2019   • Gait disorder     LAST ASSESSED: [id number]   • Herpes zoster     LAST ASSESSED: [id number]   • HL (hearing loss)    • Impacted cerumen of both ears     LAST ASSESSED: [id number]   • Impacted cerumen of right ear     LAST ASSESSED: [id number]   • Lightheadedness     MILD AND PT REFUSES EKG ETC  PT PROMISES TO CALL IF WORSENS  LAST ASSESSED: [id number]   • Multiple falls     LAST ASSESSED: [id number]   • Near syncope     LAST ASSESSED: [id number]   • Postherpetic polyneuropathy     LAST ASSESSED: [id number]       Past Surgical History:   Procedure Laterality Date   • CATARACT EXTRACTION, BILATERAL     • HERNIA REPAIR     • TONSILLECTOMY     • TUBAL LIGATION         Meds/Allergies:  Prior to Admission medications    Medication Sig Start Date End Date Taking?  Authorizing Provider   acetaminophen (TYLENOL) 325 mg tablet Take 2 tablets (650 mg total) by mouth every 6 (six) hours as needed for mild pain 8/2/21   Harman Sanches,    amLODIPine (NORVASC) 5 mg tablet Take 1 tablet (5 mg total) by mouth in the morning 6/6/22   Harman Sanches DO   aspirin 81 mg chewable tablet Chew 1 tablet (81 mg total) daily 8/4/22   Barbaraute Stamp, DO   atorvastatin (LIPITOR) 40 mg tablet Take 1 tablet (40 mg total) by mouth every evening 8/4/22 Mollie Stamp, DO   docusate sodium (COLACE) 100 mg capsule Take 1 capsule (100 mg total) by mouth 2 (two) times a day 8/2/21 Mollie Stamp, DO   hydrochlorothiazide (HYDRODIURIL) 25 mg tablet Take 1 tablet (25 mg total) by mouth in the morning 8/4/22 Mollie Stamp, DO   sertraline (ZOLOFT) 50 mg tablet Take 1 tablet (50 mg total) by mouth in the morning 6/6/22   Barbaraute Stamp, DO     I have reviewed home medications with patient personally  Allergies: No Known Allergies    Social History:  Marital Status:    Patient Pre-hospital Living Situation: Assisted Living  Patient Pre-hospital Level of Mobility: walks with walker  Patient Pre-hospital Diet Restrictions: None    Substance Use History:   Social History     Substance and Sexual Activity   Alcohol Use Never     Social History     Tobacco Use   Smoking Status Never Smoker   Smokeless Tobacco Never Used     Social History     Substance and Sexual Activity   Drug Use No       Family History:  Family History   Problem Relation Age of Onset   • Cirrhosis Father    • Heart disease Son    • Mental illness Son        Physical Exam:     Vitals:   Blood Pressure: 125/86 (10/11/22 1630)  Pulse: 82 (10/11/22 1630)  Temperature: 97 8 °F (36 6 °C) (10/11/22 1619)  Temp Source: Oral (10/11/22 1619)  Respirations: 21 (10/11/22 1630)  Height: 5' 2" (157 5 cm) (10/11/22 1342)  Weight - Scale: 58 5 kg (129 lb) (10/11/22 1342)  SpO2: 94 % (10/11/22 1630)    Physical Exam  Vitals and nursing note reviewed  Constitutional:       General: She is not in acute distress  Appearance: She is well-developed and normal weight  HENT:      Head: Normocephalic and atraumatic  Nose:      Comments: NGT      Mouth/Throat:      Mouth: Mucous membranes are moist       Pharynx: Oropharynx is clear     Eyes:      Conjunctiva/sclera: Conjunctivae normal    Cardiovascular:      Rate and Rhythm: Normal rate and regular rhythm  Pulses: Normal pulses  Heart sounds: Normal heart sounds  No murmur heard  Pulmonary:      Effort: Pulmonary effort is normal  No respiratory distress  Breath sounds: Normal breath sounds  Abdominal:      General: Bowel sounds are normal  There is distension  Palpations: Abdomen is soft  Tenderness: There is abdominal tenderness  Musculoskeletal:         General: Normal range of motion  Cervical back: Normal range of motion and neck supple  Skin:     General: Skin is warm and dry  Neurological:      General: No focal deficit present  Mental Status: She is alert and oriented to person, place, and time  Psychiatric:         Mood and Affect: Mood normal          Behavior: Behavior normal          Lab Results:  Results from last 7 days   Lab Units 10/11/22  1354   WBC Thousand/uL 9 88   HEMOGLOBIN g/dL 13 8   HEMATOCRIT % 41 1   PLATELETS Thousands/uL 199   NEUTROS PCT % 61   LYMPHS PCT % 27   MONOS PCT % 9   EOS PCT % 3     Results from last 7 days   Lab Units 10/11/22  1354   SODIUM mmol/L 132*   POTASSIUM mmol/L 3 8   CHLORIDE mmol/L 99   CO2 mmol/L 25   BUN mg/dL 19   CREATININE mg/dL 1 46*   ANION GAP mmol/L 8   CALCIUM mg/dL 9 4   ALBUMIN g/dL 3 9   TOTAL BILIRUBIN mg/dL 0 59   ALK PHOS U/L 81   ALT U/L 19   AST U/L 19   GLUCOSE RANDOM mg/dL 92     Results from last 7 days   Lab Units 10/11/22  1354   INR  1 08             Results from last 7 days   Lab Units 10/11/22  1354   LACTIC ACID mmol/L 1 3   PROCALCITONIN ng/ml <0 05       Imaging: Reviewed radiology reports from this admission including: abdominal/pelvic CT  CT abdomen pelvis wo contrast   Final Result by Davidson Kennedy MD (10/11 9291)      1  No small or large bowel obstruction  Moderate colonic stool burden and a distended proximal to mid stomach with undigested material    2   Cardiomegaly and pulmonary edema  Workstation performed: [id number]         XR chest portable   Final Result by Margreta Favre, MD (71/18 0903)      No acute cardiopulmonary disease  Workstation performed: [id number]         XR abdomen 1 view kub    (Results Pending)       EKG and Other Studies Reviewed on Admission:   · EKG: NSR  HR 75     ** Please Note: This note has been constructed using a voice recognition system   **

## 2022-10-11 NOTE — ED PROVIDER NOTES
History  Chief Complaint   Patient presents with   • Abdominal Pain     Was at the [facility] center, developed nausea and abdominal pain, was near syncopal and short of breath  51-year-old female presenting to the ED stating that she is unsure of when her last bowel movement is and today she was at the [facility] center prior to arrival and had sudden onset severe abdominal pain with nausea vomiting, shortness of breath and a near syncopal episode  Denies recent illness, fevers, sore throat, cough, chest pain  Denies prior surgeries on her abdomen  Prior to Admission Medications   Prescriptions Last Dose Informant Patient Reported?  Taking?   acetaminophen (TYLENOL) 325 mg tablet   No No   Sig: Take 2 tablets (650 mg total) by mouth every 6 (six) hours as needed for mild pain   amLODIPine (NORVASC) 5 mg tablet   No No   Sig: Take 1 tablet (5 mg total) by mouth in the morning   aspirin 81 mg chewable tablet   No No   Sig: Chew 1 tablet (81 mg total) daily   atorvastatin (LIPITOR) 40 mg tablet   No No   Sig: Take 1 tablet (40 mg total) by mouth every evening   docusate sodium (COLACE) 100 mg capsule   No No   Sig: Take 1 capsule (100 mg total) by mouth 2 (two) times a day   hydrochlorothiazide (HYDRODIURIL) 25 mg tablet   No No   Sig: Take 1 tablet (25 mg total) by mouth in the morning   sertraline (ZOLOFT) 50 mg tablet   No No   Sig: Take 1 tablet (50 mg total) by mouth in the morning      Facility-Administered Medications: None       Past Medical History:   Diagnosis Date   • Arthritis    • Cataract     LAST ASSESSED: [id number]   • Depression    • Dislocation of left shoulder joint     LAST ASSESSSED: [id number]   • Essential hypertension 4/3/2019   • Gait disorder     LAST ASSESSED: [id number]   • Herpes zoster     LAST ASSESSED: [id number]   • HL (hearing loss)    • Impacted cerumen of both ears     LAST ASSESSED: [id number]   • Impacted cerumen of right ear     LAST ASSESSED: [id number]   • Lightheadedness     MILD AND PT REFUSES EKG ETC  PT PROMISES TO CALL IF WORSENS  LAST ASSESSED: [id number]   • Multiple falls     LAST ASSESSED: [id number]   • Near syncope     LAST ASSESSED: [id number]   • Postherpetic polyneuropathy     LAST ASSESSED: [id number]       Past Surgical History:   Procedure Laterality Date   • CATARACT EXTRACTION, BILATERAL     • HERNIA REPAIR     • TONSILLECTOMY     • TUBAL LIGATION         Family History   Problem Relation Age of Onset   • Cirrhosis Father    • Heart disease Son    • Mental illness Son      I have reviewed and agree with the history as documented  E-Cigarette/Vaping   • E-Cigarette Use Never User      E-Cigarette/Vaping Substances     Social History     Tobacco Use   • Smoking status: Never Smoker   • Smokeless tobacco: Never Used   Vaping Use   • Vaping Use: Never used   Substance Use Topics   • Alcohol use: Never   • Drug use: No       Review of Systems   Respiratory: Positive for shortness of breath  Gastrointestinal: Positive for abdominal pain, nausea and vomiting  Neurological: Positive for syncope (near)  All other systems reviewed and are negative  Physical Exam  Physical Exam  Vitals and nursing note reviewed  Constitutional:       General: She is not in acute distress  Appearance: She is well-developed  She is not diaphoretic  HENT:      Head: Normocephalic and atraumatic  Right Ear: External ear normal       Left Ear: External ear normal       Nose: Nose normal    Eyes:      General: No scleral icterus  Right eye: No discharge  Left eye: No discharge  Conjunctiva/sclera: Conjunctivae normal       Pupils: Pupils are equal, round, and reactive to light  Neck:      Vascular: No JVD  Trachea: No tracheal deviation  Cardiovascular:      Rate and Rhythm: Normal rate and regular rhythm  Heart sounds: Normal heart sounds  No murmur heard  No friction rub  No gallop     Pulmonary:      Effort: Pulmonary effort is normal  No respiratory distress  Breath sounds: Normal breath sounds  No stridor  No wheezing or rales  Abdominal:      General: Bowel sounds are normal  There is no distension  Palpations: Abdomen is soft  There is no mass  Tenderness: There is generalized abdominal tenderness  There is no guarding  Comments: Dry heaving and vomiting on examination   Musculoskeletal:         General: No tenderness or deformity  Normal range of motion  Cervical back: Normal range of motion and neck supple  Skin:     General: Skin is warm and dry  Coloration: Skin is not pale  Findings: No erythema or rash  Neurological:      Mental Status: She is alert and oriented to person, place, and time  Cranial Nerves: No cranial nerve deficit  Sensory: No sensory deficit  Motor: No abnormal muscle tone  Psychiatric:         Behavior: Behavior normal          Thought Content:  Thought content normal          Judgment: Judgment normal          Vital Signs  ED Triage Vitals   Temp Pulse Respirations Blood Pressure SpO2   -- 10/11/22 1342 10/11/22 1342 10/11/22 1342 10/11/22 1342    72 16 95/55 91 %      Temp src Heart Rate Source Patient Position - Orthostatic VS BP Location FiO2 (%)   -- 10/11/22 1342 10/11/22 1342 10/11/22 1342 --    Monitor Lying Left arm       Pain Score       10/11/22 1530       No Pain           Vitals:    10/11/22 1342 10/11/22 1530   BP: 95/55 121/65   Pulse: 72 66   Patient Position - Orthostatic VS: Lying          Visual Acuity      ED Medications  Medications   ondansetron (FOR EMS ONLY) (ZOFRAN) 4 mg/2 mL injection 4 mg (0 mg Does not apply Given to EMS 10/11/22 1352)   sodium chloride 0 9 % bolus 1,000 mL (1,000 mL Intravenous New Bag 10/11/22 1406)   ondansetron (ZOFRAN) injection 4 mg (4 mg Intravenous Given 10/11/22 1425)   mineral oil enema 1 enema (1 enema Rectal Given 10/11/22 8498)       Diagnostic Studies  Results Reviewed     Procedure Component Value Units Date/Time    HS Troponin I 2hr [[id number]] Collected: 10/11/22 1556    Lab Status: In process Specimen: Blood from Arm, Left Updated: 10/11/22 1600    HS Troponin I 4hr [[id number]]     Lab Status: No result Specimen: Blood     FLU/RSV/COVID - if FLU/RSV clinically relevant [[id number]]  (Normal) Collected: 10/11/22 1354    Lab Status: Final result Specimen: Nares from Nose Updated: 10/11/22 1440     SARS-CoV-2 Negative     INFLUENZA A PCR Negative     INFLUENZA B PCR Negative     RSV PCR Negative    Narrative:      FOR PEDIATRIC PATIENTS - copy/paste COVID Guidelines URL to browser: [email or web address]    SARS-CoV-2 assay is a Nucleic Acid Amplification assay intended for the  qualitative detection of nucleic acid from SARS-CoV-2 in nasopharyngeal  swabs  Results are for the presumptive identification of SARS-CoV-2 RNA  Positive results are indicative of infection with SARS-CoV-2, the virus  causing COVID-19, but do not rule out bacterial infection or co-infection  with other viruses  Laboratories within the [place] and its  territories are required to report all positive results to the appropriate  public health authorities  Negative results do not preclude SARS-CoV-2  infection and should not be used as the sole basis for treatment or other  patient management decisions  Negative results must be combined with  clinical observations, patient history, and epidemiological information  This test has not been FDA cleared or approved  This test has been authorized by FDA under an Emergency Use Authorization  (EUA)  This test is only authorized for the duration of time the  declaration that circumstances exist justifying the authorization of the  emergency use of an in vitro diagnostic tests for detection of SARS-CoV-2  virus and/or diagnosis of COVID-19 infection under section 564(b)(1) of  the Act, 21 U  S C  150AAH-0(A)(2), unless the authorization is terminated  or revoked sooner  The test has been validated but independent review by FDA  and CLIA is pending  Test performed using [employer] Gene[employer]pert: This RT-PCR assay targets N2,  a region unique to SARS-CoV-2  A conserved region in the E-gene was chosen  for pan-Sarbecovirus detection which includes SARS-CoV-2  According to CMS-2020-01-R, this platform meets the definition of high-throughput technology  Procalcitonin [[id number]]  (Normal) Collected: 10/11/22 1354    Lab Status: Final result Specimen: Blood from Arm, Right Updated: 10/11/22 1434     Procalcitonin <0 05 ng/ml     HS Troponin 0hr (reflex protocol) [[id number]]  (Normal) Collected: 10/11/22 1354    Lab Status: Final result Specimen: Blood from Arm, Right Updated: 10/11/22 1431     hs TnI 0hr 5 ng/L     Lactic acid [[id number]]  (Normal) Collected: 10/11/22 1354    Lab Status: Final result Specimen: Blood from Arm, Right Updated: 10/11/22 1423     LACTIC ACID 1 3 mmol/L     Narrative:      Result may be elevated if tourniquet was used during collection      Comprehensive metabolic panel [[id number]]  (Abnormal) Collected: 10/11/22 1354    Lab Status: Final result Specimen: Blood from Arm, Right Updated: 10/11/22 1422     Sodium 132 mmol/L      Potassium 3 8 mmol/L      Chloride 99 mmol/L      CO2 25 mmol/L      ANION GAP 8 mmol/L      BUN 19 mg/dL      Creatinine 1 46 mg/dL      Glucose 92 mg/dL      Calcium 9 4 mg/dL      AST 19 U/L      ALT 19 U/L      Alkaline Phosphatase 81 U/L      Total Protein 6 7 g/dL      Albumin 3 9 g/dL      Total Bilirubin 0 59 mg/dL      eGFR 31 ml/min/1 73sq m     Narrative:      [facility] guidelines for Chronic Kidney Disease (CKD):   •  Stage 1 with normal or high GFR (GFR > 90 mL/min/1 73 square meters)  •  Stage 2 Mild CKD (GFR = 60-89 mL/min/1 73 square meters)  •  Stage 3A Moderate CKD (GFR = 45-59 mL/min/1 73 square meters)  •  Stage 3B Moderate CKD (GFR = 30-44 mL/min/1 73 square meters)  •  Stage 4 Severe CKD (GFR = 15-29 mL/min/1 73 square meters)  •  Stage 5 End Stage CKD (GFR <15 mL/min/1 73 square meters)  Note: GFR calculation is accurate only with a steady state creatinine    Lipase [[id number]]  (Normal) Collected: 10/11/22 1354    Lab Status: Final result Specimen: Blood from Arm, Right Updated: 10/11/22 1422     Lipase 29 u/L     Protime-INR [[id number]]  (Normal) Collected: 10/11/22 1354    Lab Status: Final result Specimen: Blood from Arm, Right Updated: 10/11/22 1415     Protime 14 0 seconds      INR 1 08    APTT [[id number]]  (Normal) Collected: 10/11/22 1354    Lab Status: Final result Specimen: Blood from Arm, Right Updated: 10/11/22 1415     PTT 26 seconds     Blood culture #2 [[id number]] Collected: 10/11/22 1409    Lab Status: In process Specimen: Blood from Arm, Left Updated: 10/11/22 1411    CBC and differential [[id number]] Collected: 10/11/22 1354    Lab Status: Final result Specimen: Blood from Arm, Right Updated: 10/11/22 1404     WBC 9 88 Thousand/uL      RBC 4 46 Million/uL      Hemoglobin 13 8 g/dL      Hematocrit 41 1 %      MCV 92 fL      MCH 30 9 pg      MCHC 33 6 g/dL      RDW 13 2 %      MPV 10 0 fL      Platelets 451 Thousands/uL      nRBC 0 /100 WBCs      Neutrophils Relative 61 %      Immat GRANS % 0 %      Lymphocytes Relative 27 %      Monocytes Relative 9 %      Eosinophils Relative 3 %      Basophils Relative 0 %      Neutrophils Absolute 5 99 Thousands/µL      Immature Grans Absolute 0 03 Thousand/uL      Lymphocytes Absolute 2 65 Thousands/µL      Monocytes Absolute 0 85 Thousand/µL      Eosinophils Absolute 0 32 Thousand/µL      Basophils Absolute 0 04 Thousands/µL     Blood culture #1 [[id number]] Collected: 10/11/22 1353    Lab Status:  In process Specimen: Blood from Arm, Right Updated: 10/11/22 1359    UA w Reflex to Microscopic w Reflex to Culture [[id number]]     Lab Status: No result Specimen: Urine                  CT abdomen pelvis wo contrast Final Result by Mae Hankins MD (01/81 6045)      1  No small or large bowel obstruction  Moderate colonic stool burden and a distended proximal to mid stomach with undigested material    2   Cardiomegaly and pulmonary edema  Workstation performed: [id number]         XR chest portable    (Results Pending)              Procedures  ECG 12 Lead Documentation Only    Date/Time: 10/11/2022 4:12 PM  Performed by: Bina Dhaliwal DO  Authorized by: Bina Dhaliwal DO     Interpretation:     Interpretation: normal    Rate:     ECG rate:  75    ECG rate assessment: normal    Rhythm:     Rhythm: sinus rhythm    Ectopy:     Ectopy: none    QRS:     QRS axis:  Normal    QRS intervals:  Normal  Conduction:     Conduction: normal    ST segments:     ST segments:  Normal  T waves:     T waves: normal               ED Course  ED Course as of 10/11/22 1615   Tue Oct 11, 2022   1448 eGFR: 32  Converted CT to non-con at this time secondary to TANNER   1546 Discussed case with General surgery who is recommending NG tube and multiple enemas at this time  HEART Risk Score    Flowsheet Row Most Recent Value   Heart Score Risk Calculator    History 0 Filed at: 10/11/2022 1615   ECG 0 Filed at: 10/11/2022 1615   Age 2 Filed at: 10/11/2022 1615   Risk Factors 2 Filed at: 10/11/2022 1615   Troponin 0 Filed at: 10/11/2022 1615   HEART Score 4 Filed at: 10/11/2022 1615                                      MDM  Number of Diagnoses or Management Options  Gastric distention  Megarectum  Diagnosis management comments: 42-year-old female presenting for sudden onset severe abdominal pain with unknown last bowel movement along with nausea and persistent vomiting  Patient stating that she felt like she was going to pass out and she felt very short of breath  Will check septic labs, CT abdomen pelvis      Results discussed with General surgery who recommend NG tube placement and enemas and admission to Medicine with consultation to their service  Disposition  Final diagnoses:   Gastric distention   Megarectum     Time reflects when diagnosis was documented in both MDM as applicable and the Disposition within this note     Time User Action Codes Description Comment    10/11/2022  3:43 PM Paulie Haddad Add [K31 89] Gastric distention     10/11/2022  3:43 PM Marek Clark Megarectum       ED Disposition     ED Disposition   Admit    Condition   Stable    Date/Time   Tue Oct 11, 2022  4:11 PM    Comment   Case was discussed with JOHNIE and the patient's admission status was agreed to be Admission Status: inpatient status to the service of Dr Minor Barnett  Follow-up Information    None         Patient's Medications   Discharge Prescriptions    No medications on file       No discharge procedures on file      PDMP Review     None          ED Provider  Electronically Signed by           Brendan Bedoya DO  10/11/22 7175

## 2022-10-11 NOTE — ED NOTES
This nurse went into room to check on patient and found that she pulled out her N/G tube  Replaced N/G tube and transferred to ICU as a med-surg patient        Bryon Medina RN  10/11/22 1936

## 2022-10-11 NOTE — ED NOTES
Assisted pt to bedside commode , pt urinated but no BM  Will continue to monitor pt  Georgina Minaya, [facility]  10/11/22 1918

## 2022-10-12 ENCOUNTER — APPOINTMENT (OUTPATIENT)
Dept: RADIOLOGY | Facility: HOSPITAL | Age: 87
DRG: 392 | End: 2022-10-12
Payer: MEDICARE

## 2022-10-12 PROBLEM — R78.81 GRAM-POSITIVE COCCI BACTEREMIA: Status: ACTIVE | Noted: 2022-10-12

## 2022-10-12 LAB
ANION GAP SERPL CALCULATED.3IONS-SCNC: 6 MMOL/L (ref 4–13)
BILIRUB UR QL STRIP: NEGATIVE
BUN SERPL-MCNC: 18 MG/DL (ref 5–25)
CALCIUM SERPL-MCNC: 8.9 MG/DL (ref 8.4–10.2)
CHLORIDE SERPL-SCNC: 103 MMOL/L (ref 96–108)
CLARITY UR: CLEAR
CO2 SERPL-SCNC: 27 MMOL/L (ref 21–32)
COLOR UR: YELLOW
CREAT SERPL-MCNC: 1.01 MG/DL (ref 0.6–1.3)
ERYTHROCYTE [DISTWIDTH] IN BLOOD BY AUTOMATED COUNT: 13.2 % (ref 11.6–15.1)
GFR SERPL CREATININE-BSD FRML MDRD: 48 ML/MIN/1.73SQ M
GLUCOSE SERPL-MCNC: 78 MG/DL (ref 65–140)
GLUCOSE UR STRIP-MCNC: NEGATIVE MG/DL
HCT VFR BLD AUTO: 37.9 % (ref 34.8–46.1)
HGB BLD-MCNC: 13 G/DL (ref 11.5–15.4)
HGB UR QL STRIP.AUTO: NEGATIVE
KETONES UR STRIP-MCNC: NEGATIVE MG/DL
LEUKOCYTE ESTERASE UR QL STRIP: NEGATIVE
MCH RBC QN AUTO: 31.6 PG (ref 26.8–34.3)
MCHC RBC AUTO-ENTMCNC: 34.3 G/DL (ref 31.4–37.4)
MCV RBC AUTO: 92 FL (ref 82–98)
NITRITE UR QL STRIP: NEGATIVE
PH UR STRIP.AUTO: 7 [PH]
PLATELET # BLD AUTO: 163 THOUSANDS/UL (ref 149–390)
PMV BLD AUTO: 10 FL (ref 8.9–12.7)
POTASSIUM SERPL-SCNC: 3.8 MMOL/L (ref 3.5–5.3)
PROT UR STRIP-MCNC: NEGATIVE MG/DL
RBC # BLD AUTO: 4.12 MILLION/UL (ref 3.81–5.12)
SODIUM SERPL-SCNC: 136 MMOL/L (ref 135–147)
SP GR UR STRIP.AUTO: 1.01 (ref 1–1.03)
UROBILINOGEN UR QL STRIP.AUTO: 1 E.U./DL
WBC # BLD AUTO: 9.39 THOUSAND/UL (ref 4.31–10.16)

## 2022-10-12 PROCEDURE — 85027 COMPLETE CBC AUTOMATED: CPT | Performed by: INTERNAL MEDICINE

## 2022-10-12 PROCEDURE — 81003 URINALYSIS AUTO W/O SCOPE: CPT | Performed by: EMERGENCY MEDICINE

## 2022-10-12 PROCEDURE — 99222 1ST HOSP IP/OBS MODERATE 55: CPT | Performed by: SPECIALIST

## 2022-10-12 PROCEDURE — 80048 BASIC METABOLIC PNL TOTAL CA: CPT | Performed by: INTERNAL MEDICINE

## 2022-10-12 PROCEDURE — 74018 RADEX ABDOMEN 1 VIEW: CPT

## 2022-10-12 PROCEDURE — 99233 SBSQ HOSP IP/OBS HIGH 50: CPT | Performed by: INTERNAL MEDICINE

## 2022-10-12 RX ORDER — DIPHENHYDRAMINE HYDROCHLORIDE 50 MG/ML
12.5 INJECTION INTRAMUSCULAR; INTRAVENOUS ONCE
Status: COMPLETED | OUTPATIENT
Start: 2022-10-12 | End: 2022-10-12

## 2022-10-12 RX ORDER — VANCOMYCIN HYDROCHLORIDE 1 G/200ML
15 INJECTION, SOLUTION INTRAVENOUS EVERY 24 HOURS
Status: DISCONTINUED | OUTPATIENT
Start: 2022-10-12 | End: 2022-10-12

## 2022-10-12 RX ORDER — HYDRALAZINE HYDROCHLORIDE 20 MG/ML
INJECTION INTRAMUSCULAR; INTRAVENOUS
Status: COMPLETED
Start: 2022-10-12 | End: 2022-10-12

## 2022-10-12 RX ORDER — MINERAL OIL 100 G/100G
1 OIL RECTAL ONCE
Status: DISCONTINUED | OUTPATIENT
Start: 2022-10-12 | End: 2022-10-12

## 2022-10-12 RX ORDER — MINERAL OIL 100 G/100G
1 OIL RECTAL ONCE
Status: COMPLETED | OUTPATIENT
Start: 2022-10-12 | End: 2022-10-12

## 2022-10-12 RX ORDER — HYDRALAZINE HYDROCHLORIDE 20 MG/ML
10 INJECTION INTRAMUSCULAR; INTRAVENOUS EVERY 6 HOURS PRN
Status: DISCONTINUED | OUTPATIENT
Start: 2022-10-12 | End: 2022-10-13 | Stop reason: HOSPADM

## 2022-10-12 RX ORDER — DIPHENHYDRAMINE HYDROCHLORIDE, ZINC ACETATE 2; .1 G/100G; G/100G
CREAM TOPICAL 3 TIMES DAILY PRN
Status: DISCONTINUED | OUTPATIENT
Start: 2022-10-13 | End: 2022-10-13 | Stop reason: HOSPADM

## 2022-10-12 RX ORDER — FAMOTIDINE 10 MG/ML
20 INJECTION, SOLUTION INTRAVENOUS ONCE
Status: COMPLETED | OUTPATIENT
Start: 2022-10-12 | End: 2022-10-12

## 2022-10-12 RX ORDER — DIPHENHYDRAMINE HYDROCHLORIDE 50 MG/ML
INJECTION INTRAMUSCULAR; INTRAVENOUS
Status: COMPLETED
Start: 2022-10-12 | End: 2022-10-12

## 2022-10-12 RX ADMIN — VANCOMYCIN HYDROCHLORIDE 1000 MG: 1 INJECTION, SOLUTION INTRAVENOUS at 22:17

## 2022-10-12 RX ADMIN — SODIUM CHLORIDE 75 ML/HR: 0.9 INJECTION, SOLUTION INTRAVENOUS at 06:14

## 2022-10-12 RX ADMIN — DIPHENHYDRAMINE HYDROCHLORIDE 12.5 MG: 50 INJECTION, SOLUTION INTRAMUSCULAR; INTRAVENOUS at 23:20

## 2022-10-12 RX ADMIN — SODIUM CHLORIDE 75 ML/HR: 0.9 INJECTION, SOLUTION INTRAVENOUS at 17:21

## 2022-10-12 RX ADMIN — DIPHENHYDRAMINE HYDROCHLORIDE 12.5 MG: 50 INJECTION, SOLUTION INTRAMUSCULAR; INTRAVENOUS at 23:37

## 2022-10-12 RX ADMIN — HYDRALAZINE HYDROCHLORIDE 10 MG: 20 INJECTION INTRAMUSCULAR; INTRAVENOUS at 22:18

## 2022-10-12 RX ADMIN — SODIUM CHLORIDE 1000 ML: 0.9 INJECTION, SOLUTION INTRAVENOUS at 23:43

## 2022-10-12 RX ADMIN — FAMOTIDINE 20 MG: 10 INJECTION, SOLUTION INTRAVENOUS at 23:31

## 2022-10-12 RX ADMIN — MINERAL OIL 1 ENEMA: 100 ENEMA RECTAL at 10:27

## 2022-10-12 RX ADMIN — DIPHENHYDRAMINE HYDROCHLORIDE 12.5 MG: 50 INJECTION INTRAMUSCULAR; INTRAVENOUS at 23:37

## 2022-10-12 RX ADMIN — ENOXAPARIN SODIUM 30 MG: 100 INJECTION SUBCUTANEOUS at 08:28

## 2022-10-12 NOTE — PLAN OF CARE
Problem: Potential for Falls  Goal: Patient will remain free of falls  Description: INTERVENTIONS:  - Educate patient/family on patient safety including physical limitations  - Instruct patient to call for assistance with activity   - Consult OT/PT to assist with strengthening/mobility   - Keep Call bell within reach  - Keep bed low and locked with side rails adjusted as appropriate  - Keep care items and personal belongings within reach  - Initiate and maintain comfort rounds  - Make Fall Risk Sign visible to staff  - Offer Toileting every 2 Hours, in advance of need  - Initiate/Maintain fall alarm  - Obtain necessary fall risk management equipment: fall alarm  - Apply yellow socks and bracelet for high fall risk patients  - Consider moving patient to room near nurses station  Outcome: Progressing     Problem: MOBILITY - ADULT  Goal: Maintain or return to baseline ADL function  Description: INTERVENTIONS:  -  Assess patient's ability to carry out ADLs; assess patient's baseline for ADL function and identify physical deficits which impact ability to perform ADLs (bathing, care of mouth/teeth, toileting, grooming, dressing, etc )  - Assess/evaluate cause of self-care deficits   - Assess range of motion  - Assess patient's mobility; develop plan if impaired  - Assess patient's need for assistive devices and provide as appropriate  - Encourage maximum independence but intervene and supervise when necessary  - Involve family in performance of ADLs  - Assess for home care needs following discharge   - Consider OT consult to assist with ADL evaluation and planning for discharge  - Provide patient education as appropriate  Outcome: Progressing  Goal: Maintains/Returns to pre admission functional level  Description: INTERVENTIONS:  - Perform BMAT or MOVE assessment daily    - Set and communicate daily mobility goal to care team and patient/family/caregiver     - Collaborate with rehabilitation services on mobility goals if consulted  - Perform Range of Motion 3 times a day  - Reposition patient every 2 hours    - Dangle patient 3 times a day  - Stand patient 3 times a day  - Ambulate patient 3 times a day  - Out of bed to chair 3 times a day   - Out of bed for meals 3 times a day  - Out of bed for toileting  - Record patient progress and toleration of activity level   Outcome: Progressing     Problem: Prexisting or High Potential for Compromised Skin Integrity  Goal: Skin integrity is maintained or improved  Description: INTERVENTIONS:  - Identify patients at risk for skin breakdown  - Assess and monitor skin integrity  - Assess and monitor nutrition and hydration status  - Monitor labs   - Assess for incontinence   - Turn and reposition patient  - Assist with mobility/ambulation  - Relieve pressure over bony prominences  - Avoid friction and shearing  - Provide appropriate hygiene as needed including keeping skin clean and dry  - Evaluate need for skin moisturizer/barrier cream  - Collaborate with interdisciplinary team   - Patient/family teaching  - Consider wound care consult   Outcome: Progressing     Problem: PAIN - ADULT  Goal: Verbalizes/displays adequate comfort level or baseline comfort level  Description: Interventions:  - Encourage patient to monitor pain and request assistance  - Assess pain using appropriate pain scale  - Administer analgesics based on type and severity of pain and evaluate response  - Implement non-pharmacological measures as appropriate and evaluate response  - Consider cultural and social influences on pain and pain management  - Notify physician/advanced practitioner if interventions unsuccessful or patient reports new pain  Outcome: Progressing     Problem: INFECTION - ADULT  Goal: Absence or prevention of progression during hospitalization  Description: INTERVENTIONS:  - Assess and monitor for signs and symptoms of infection  - Monitor lab/diagnostic results  - Monitor all insertion sites, i e  indwelling lines, tubes, and drains  - Monitor endotracheal if appropriate and nasal secretions for changes in amount and color  - Melvin appropriate cooling/warming therapies per order  - Administer medications as ordered  - Instruct and encourage patient and family to use good hand hygiene technique  - Identify and instruct in appropriate isolation precautions for identified infection/condition  Outcome: Progressing  Goal: Absence of fever/infection during neutropenic period  Description: INTERVENTIONS:  - Monitor WBC    Outcome: Progressing     Problem: DISCHARGE PLANNING  Goal: Discharge to home or other facility with appropriate resources  Description: INTERVENTIONS:  - Identify barriers to discharge w/patient and caregiver  - Arrange for needed discharge resources and transportation as appropriate  - Identify discharge learning needs (meds, wound care, etc )  - Arrange for interpretive services to assist at discharge as needed  - Refer to Case Management Department for coordinating discharge planning if the patient needs post-hospital services based on physician/advanced practitioner order or complex needs related to functional status, cognitive ability, or social support system  Outcome: Progressing     Problem: Knowledge Deficit  Goal: Patient/family/caregiver demonstrates understanding of disease process, treatment plan, medications, and discharge instructions  Description: Complete learning assessment and assess knowledge base  Interventions:  - Provide teaching at level of understanding  - Provide teaching via preferred learning methods  Outcome: Progressing     Problem: Nutrition/Hydration-ADULT  Goal: Nutrient/Hydration intake appropriate for improving, restoring or maintaining nutritional needs  Description: Monitor and assess patient's nutrition/hydration status for malnutrition  Collaborate with interdisciplinary team and initiate plan and interventions as ordered    Monitor patient's weight and dietary intake as ordered or per policy  Utilize nutrition screening tool and intervene as necessary  Determine patient's food preferences and provide high-protein, high-caloric foods as appropriate       INTERVENTIONS:  - Monitor oral intake, urinary output, labs, and treatment plans  - Assess nutrition and hydration status and recommend course of action  - Evaluate amount of meals eaten  - Assist patient with eating if necessary   - Allow adequate time for meals  - Recommend/ encourage appropriate diets, oral nutritional supplements, and vitamin/mineral supplements  - Order, calculate, and assess calorie counts as needed  - Recommend, monitor, and adjust tube feedings and TPN/PPN based on assessed needs  - Assess need for intravenous fluids  - Provide specific nutrition/hydration education as appropriate  - Include patient/family/caregiver in decisions related to nutrition  Outcome: Progressing

## 2022-10-12 NOTE — ASSESSMENT & PLAN NOTE
· POA and in the setting of N/V and HCTZ use  · Baseline Cr < 1  · Creatinine has improved to 1 01  · Hold HCTZ   · Continue IVF hydration while NPO  · Follow-up AM labs

## 2022-10-12 NOTE — PROGRESS NOTES
[address]  Progress Note - Michaela Reis 1/28/1930, 80 y o  female MRN: [id number]  Unit/Bed#: ICU 10-01 Encounter: [id number]  Primary Care Provider: Natalie Painting DO   Date and time admitted to hospital: 10/11/2022  1:39 PM    * Constipation  Assessment & Plan  · Presented with sudden onset abdominal pain, nausea, and vomitting  · S/p mineral oil enema without BM  · CT abd/pelvis (10/11): No small or large bowel obstruction   Moderate colonic stool burden and a distended proximal to mid stomach with undigested material   · KUB pending  · Attempt another mineral oil enema and then soap-ricardo enema if no BM  · General surgery following    Gastric distention  Assessment & Plan  · Imaging as above  · NGT placed in the ED upon arrival but the patient took it out over night  · Awaiting KUB and CM  · NPO status at this time  · General surgery following    TANNER (acute kidney injury) ([facility] [address] )  Assessment & Plan  · POA and in the setting of N/V and HCTZ use  · Baseline Cr < 1  · Creatinine has improved to 1 01  · Hold HCTZ   · Continue IVF hydration while NPO  · Follow-up AM labs    Essential hypertension  Assessment & Plan  · Blood pressure controlled  · Continue to hold home HCTZ given above and mild hyponatremia on presentation   · Continue Amlodipine 5mg daily  · Monitor BP trends while admitted       VTE Pharmacologic Prophylaxis: VTE Score: 3 Moderate Risk (Score 3-4) - Pharmacological DVT Prophylaxis Ordered: enoxaparin (Lovenox)  Patient Centered Rounds: I performed bedside rounds with nursing staff today  Discussions with Specialists or Other Care Team Provider: Nursing and CM    Education and Discussions with Family / Patient: Updated  (son) via phone  Time Spent for Care: 30 minutes  More than 50% of total time spent on counseling and coordination of care as described above      Current Length of Stay: 1 day(s)  Current Patient Status: Inpatient   Certification Statement: The patient will continue to require additional inpatient hospital stay due to gastric distention and constipation  Discharge Plan: Anticipate discharge in 24-48 hrs to prior assisted or independent living facility  Code Status: Level 1 - Full Code    Subjective:   Patient resting comfortably in bed without any acute complaints  She did remove her NGT over night but denies any nausea/vomiting or abdominal discomfort  Patient is anxious for discharge  Objective:     Vitals:   Temp (24hrs), Av 8 °F (36 6 °C), Min:97 6 °F (36 4 °C), Max:97 8 °F (36 6 °C)    Temp:  [97 6 °F (36 4 °C)-97 8 °F (36 6 °C)] 97 8 °F (36 6 °C)  HR:  [62-96] 65  Resp:  [16-40] 23  BP: ()/(55-87) 137/68  SpO2:  [89 %-97 %] 93 %  Body mass index is 24 4 kg/m²  Input and Output Summary (last 24 hours): Intake/Output Summary (Last 24 hours) at 10/12/2022 1247  Last data filed at 10/12/2022 0800  Gross per 24 hour   Intake 1122 5 ml   Output 150 ml   Net 972 5 ml       Physical Exam:   Physical Exam  Vitals and nursing note reviewed  Constitutional:       General: She is not in acute distress  Appearance: She is well-developed  HENT:      Head: Normocephalic and atraumatic  Mouth/Throat:      Mouth: Mucous membranes are moist       Pharynx: Oropharynx is clear  Eyes:      Extraocular Movements: Extraocular movements intact  Conjunctiva/sclera: Conjunctivae normal    Cardiovascular:      Rate and Rhythm: Normal rate and regular rhythm  Pulses: Normal pulses  Heart sounds: Normal heart sounds  No murmur heard  Pulmonary:      Effort: Pulmonary effort is normal  No respiratory distress  Breath sounds: Normal breath sounds  Abdominal:      General: Bowel sounds are normal  There is no distension  Palpations: Abdomen is soft  Tenderness: There is abdominal tenderness  Musculoskeletal:         General: Normal range of motion        Cervical back: Normal range of motion and neck supple  Skin:     General: Skin is warm and dry  Neurological:      General: No focal deficit present  Mental Status: She is alert and oriented to person, place, and time  Mental status is at baseline  Psychiatric:         Mood and Affect: Mood normal          Behavior: Behavior normal          Judgment: Judgment normal          Labs:  Results from last 7 days   Lab Units 10/12/22  0449 10/11/22  1354   WBC Thousand/uL 9 39 9 88   HEMOGLOBIN g/dL 13 0 13 8   HEMATOCRIT % 37 9 41 1   PLATELETS Thousands/uL 163 199   NEUTROS PCT %  --  61   LYMPHS PCT %  --  27   MONOS PCT %  --  9   EOS PCT %  --  3     Results from last 7 days   Lab Units 10/12/22  0449 10/11/22  1354   SODIUM mmol/L 136 132*   POTASSIUM mmol/L 3 8 3 8   CHLORIDE mmol/L 103 99   CO2 mmol/L 27 25   BUN mg/dL 18 19   CREATININE mg/dL 1 01 1 46*   ANION GAP mmol/L 6 8   CALCIUM mg/dL 8 9 9 4   ALBUMIN g/dL  --  3 9   TOTAL BILIRUBIN mg/dL  --  0 59   ALK PHOS U/L  --  81   ALT U/L  --  19   AST U/L  --  19   GLUCOSE RANDOM mg/dL 78 92     Results from last 7 days   Lab Units 10/11/22  1354   INR  1 08             Results from last 7 days   Lab Units 10/11/22  1354   LACTIC ACID mmol/L 1 3   PROCALCITONIN ng/ml <0 05       Lines/Drains:  Invasive Devices  Report    Peripheral Intravenous Line  Duration           Peripheral IV 10/11/22 Left Antecubital <1 day    Peripheral IV 10/11/22 Right Antecubital <1 day          Drain  Duration           NG/OG/Enteral Tube Nasogastric 14 Fr Right nare <1 day              Imaging: Personally reviewed the following imaging: xray(s)    Recent Cultures (last 7 days):   Results from last 7 days   Lab Units 10/11/22  1409 10/11/22  1353   BLOOD CULTURE  Received in Microbiology Lab  Culture in Progress  Received in Microbiology Lab  Culture in Progress         Last 24 Hours Medication List:   Current Facility-Administered Medications   Medication Dose Route Frequency Provider Last Rate   • acetaminophen  650 mg Oral Q6H PRN [facility] [address], DO     • amLODIPine  5 mg Oral Daily [facility] [address], DO     • aspirin  81 mg Oral Daily [facility] [address], DO     • atorvastatin  40 mg Oral QPM [facility] [address], DO     • enoxaparin  30 mg Subcutaneous Daily [facility] [address], DO     • ondansetron  4 mg Intravenous Q6H PRN [facility] [address], DO     • sertraline  50 mg Oral Daily [facility], DO     • sodium chloride  75 mL/hr Intravenous Continuous [facility] [address], DO 75 mL/hr (10/12/22 0800)        Today, Patient Was Seen By: Laith Carl    **Please Note: This note may have been constructed using a voice recognition system  **

## 2022-10-12 NOTE — ASSESSMENT & PLAN NOTE
· Presented with sudden onset abdominal pain, nausea, and vomitting  · S/p mineral oil enema without BM  · CT abd/pelvis (10/11):  No small or large bowel obstruction   Moderate colonic stool burden and a distended proximal to mid stomach with undigested material   · KUB pending  · Attempt another mineral oil enema and then soap-ricardo enema if no BM  · General surgery following

## 2022-10-12 NOTE — ASSESSMENT & PLAN NOTE
· Imaging as above  · NGT placed in the ED upon arrival but the patient took it out over night  · Awaiting KUB and CM  · NPO status at this time  · General surgery following

## 2022-10-12 NOTE — PLAN OF CARE
Problem: Potential for Falls  Goal: Patient will remain free of falls  Description: INTERVENTIONS:  - Educate patient/family on patient safety including physical limitations  - Instruct patient to call for assistance with activity   - Consult OT/PT to assist with strengthening/mobility   - Keep Call bell within reach  - Keep bed low and locked with side rails adjusted as appropriate  - Keep care items and personal belongings within reach  - Initiate and maintain comfort rounds  - Make Fall Risk Sign visible to staff  - Offer Toileting every 2 Hours, in advance of need  - Initiate/Maintain fall alarm  - Obtain necessary fall risk management equipment: fall alarm  - Apply yellow socks and bracelet for high fall risk patients  - Consider moving patient to room near nurses station  Outcome: Progressing     Problem: MOBILITY - ADULT  Goal: Maintain or return to baseline ADL function  Description: INTERVENTIONS:  -  Assess patient's ability to carry out ADLs; assess patient's baseline for ADL function and identify physical deficits which impact ability to perform ADLs (bathing, care of mouth/teeth, toileting, grooming, dressing, etc )  - Assess/evaluate cause of self-care deficits   - Assess range of motion  - Assess patient's mobility; develop plan if impaired  - Assess patient's need for assistive devices and provide as appropriate  - Encourage maximum independence but intervene and supervise when necessary  - Involve family in performance of ADLs  - Assess for home care needs following discharge   - Consider OT consult to assist with ADL evaluation and planning for discharge  - Provide patient education as appropriate  Outcome: Progressing     Problem: MOBILITY - ADULT  Goal: Maintains/Returns to pre admission functional level  Description: INTERVENTIONS:  - Perform BMAT or MOVE assessment daily    - Set and communicate daily mobility goal to care team and patient/family/caregiver     - Collaborate with rehabilitation services on mobility goals if consulted  - Perform Range of Motion 3 times a day  - Reposition patient every 2 hours    - Dangle patient 3 times a day  - Stand patient 3 times a day  - Ambulate patient 3 times a day  - Out of bed to chair 3 times a day   - Out of bed for meals 3 times a day  - Out of bed for toileting  - Record patient progress and toleration of activity level   Outcome: Progressing     Problem: Prexisting or High Potential for Compromised Skin Integrity  Goal: Skin integrity is maintained or improved  Description: INTERVENTIONS:  - Identify patients at risk for skin breakdown  - Assess and monitor skin integrity  - Assess and monitor nutrition and hydration status  - Monitor labs   - Assess for incontinence   - Turn and reposition patient  - Assist with mobility/ambulation  - Relieve pressure over bony prominences  - Avoid friction and shearing  - Provide appropriate hygiene as needed including keeping skin clean and dry  - Evaluate need for skin moisturizer/barrier cream  - Collaborate with interdisciplinary team   - Patient/family teaching  - Consider wound care consult   Outcome: Progressing     Problem: PAIN - ADULT  Goal: Verbalizes/displays adequate comfort level or baseline comfort level  Description: Interventions:  - Encourage patient to monitor pain and request assistance  - Assess pain using appropriate pain scale  - Administer analgesics based on type and severity of pain and evaluate response  - Implement non-pharmacological measures as appropriate and evaluate response  - Consider cultural and social influences on pain and pain management  - Notify physician/advanced practitioner if interventions unsuccessful or patient reports new pain  Outcome: Progressing     Problem: INFECTION - ADULT  Goal: Absence or prevention of progression during hospitalization  Description: INTERVENTIONS:  - Assess and monitor for signs and symptoms of infection  - Monitor lab/diagnostic results  - Monitor all insertion sites, i e  indwelling lines, tubes, and drains  - Monitor endotracheal if appropriate and nasal secretions for changes in amount and color  - [place] appropriate cooling/warming therapies per order  - Administer medications as ordered  - Instruct and encourage patient and family to use good hand hygiene technique  - Identify and instruct in appropriate isolation precautions for identified infection/condition  Outcome: Progressing     Problem: Nutrition/Hydration-ADULT  Goal: Nutrient/Hydration intake appropriate for improving, restoring or maintaining nutritional needs  Description: Monitor and assess patient's nutrition/hydration status for malnutrition  Collaborate with interdisciplinary team and initiate plan and interventions as ordered  Monitor patient's weight and dietary intake as ordered or per policy  Utilize nutrition screening tool and intervene as necessary  Determine patient's food preferences and provide high-protein, high-caloric foods as appropriate       INTERVENTIONS:  - Monitor oral intake, urinary output, labs, and treatment plans  - Assess nutrition and hydration status and recommend course of action  - Evaluate amount of meals eaten  - Assist patient with eating if necessary   - Allow adequate time for meals  - Recommend/ encourage appropriate diets, oral nutritional supplements, and vitamin/mineral supplements  - Order, calculate, and assess calorie counts as needed  - Recommend, monitor, and adjust tube feedings and TPN/PPN based on assessed needs  - Assess need for intravenous fluids  - Provide specific nutrition/hydration education as appropriate  - Include patient/family/caregiver in decisions related to nutrition  Outcome: Progressing

## 2022-10-12 NOTE — PROGRESS NOTES
Nurse found patient in bed with NG tube removed  Pt stated " I feel great with that thing out  I dont need to be here" SLIM notified   Pt will remain with no NG tube

## 2022-10-12 NOTE — CASE MANAGEMENT
Case Management Assessment & Discharge Planning Note    Patient name Estevan Serna  Location ICU 10/ICU 10-01 MRN [id number]  : 1930 Date 10/12/2022       Current Admission Date: 10/11/2022  Current Admission Diagnosis:Constipation   Patient Active Problem List    Diagnosis Date Noted   • Constipation 10/11/2022   • Gastric distention 10/11/2022   • TANNER (acute kidney injury) ([facility] [address] ) 10/11/2022   • Generalized weakness 4769   • Metabolic encephalopathy    • Hyponatremia 2022   • Current moderate episode of major depressive disorder without prior episode ([facility] [address] ) 2020   • History of suicidal ideation 2020   • History of CVA (cerebrovascular accident) 2020   • Right ankle sprain 2020   • Venous insufficiency of both lower extremities 2020   • Unspecified mood (affective) disorder, r/o major depressive disorder (single episode) 2020   • Mild neurocognitive disorder 2020   • Benign paroxysmal positional vertigo 2019   • Urinary incontinence 2019   • Bilateral lower extremity edema 2019   • Essential hypertension 2019   • Mediastinal mass 2019   • Spinal stenosis at L4-L5 level 2019   • Gait abnormality 2015   • Hearing loss 2013   • Osteoarthritis 2012      LOS (days): 1  Geometric Mean LOS (GMLOS) (days): 3 10  Days to GMLOS:2 2     OBJECTIVE:    Risk of Unplanned Readmission Score: 12 14     Current admission status: Inpatient    Preferred Pharmacy:   [address] [place] [address]  Phone: [phone] Fax: [phone]    Primary Care Provider: Cinthya Dias DO    Primary Insurance: MEDICARE  Secondary Insurance: [facility] HEALTH OPTIONS PROGRAM    ASSESSMENT:  Avni Hung Proxies    There are no active Health Care Proxies on file         Advance Directives  Does patient have a Health Care POA?: Yes  Does patient have Advance Directives?: Yes  Advance Directives: Living will, Power of  for health care  Primary Contact: Ashlie Ansari son    Readmission Root Cause  30 Day Readmission: No    Patient Information  Admitted from[de-identified] Facility  Mental Status: Alert  During Assessment patient was accompanied by: Not accompanied during assessment  Assessment information provided by[de-identified] Patient  Primary Caregiver: Other (Comment) (DARY)  Caregiver's Name[de-identified] Ellen Virgen long term  Caregiver's Relationship to Patient[de-identified] Other (Specify)  Caregiver's Telephone Number[de-identified] Ritu Rebolledo  at [address]: [address] [place] [address] Dr of Residence: [address] do you live in?: Via [employer] entry access options   Select all that apply : Ramp  Type of Current Residence: Facility  Upon entering residence, is there a bedroom on the main floor (no further steps)?: Yes  Upon entering residence, is there a bathroom on the main floor (no further steps)?: Yes  In the last 12 months, was there a time when you were not able to pay the mortgage or rent on time?: No  In the last 12 months, how many places have you lived?: 1  In the last 12 months, was there a time when you did not have a steady place to sleep or slept in a shelter (including now)?: No  Homeless/housing insecurity resource given?: N/A  Living Arrangements: Other (Comment)  Is patient a ?: No    Activities of Daily Living Prior to Admission  Functional Status: Assistance  Completes ADLs independently?: Yes  Ambulates independently?: Yes  Does patient use assisted devices?: Yes  Assisted Devices (DME) used: Eliud Ashley  Does patient currently own DME?: Yes  What DME does the patient currently own?: Eliud Ashley  Does patient have a history of Outpatient Therapy (PT/OT)?: No  Does the patient have a history of Short-Term Rehab?: No  Does patient have a history of HHC?: No  Does patient currently have [facility]?: No    Patient Information Continued  Income Source: Pension/long-term  Does patient have prescription coverage?: Yes  Within the past 12 months, you worried that your food would run out before you got the money to buy more : Never true  Within the past 12 months, the food you bought just didn't last and you didn't have money to get more : Never true  Food insecurity resource given?: N/A  Does patient receive dialysis treatments?: No  Does patient have a history of substance abuse?: No  Does patient have a history of Mental Health Diagnosis?: No    PHQ 2/9 Screening   Reviewed PHQ 2/9 Depression Screening Score?: No    Means of Transportation  Means of Transport to Appts[de-identified] Family transport  In the past 12 months, has lack of transportation kept you from medical appointments or from getting medications?: No  In the past 12 months, has lack of transportation kept you from meetings, work, or from getting things needed for daily living?: No  Was application for public transport provided?: N/A    DISCHARGE DETAILS:    Discharge planning discussed with[de-identified] Donnie Hansen from [facility] and son Lorie [place] of Choice: Yes  Comments - Freedom of Choice: Son would like the pt to return to [address] [place] [address] contacted family/caregiver?: Yes    Contacts  Patient Contacts: Robert Salomon son  Relationship to Patient[de-identified] Family  Contact Method: Phone  Phone Number: [phone]  Reason/Outcome: Discharge [address]         Is the patient interested in [facility] at discharge?: No    DME Referral Provided  Referral made for DME?: No    Would you like to participate in our [address] service program?  : No - Declined    Treatment Team Recommendation: Assisted Living  Discharge Destination Plan[de-identified] Assisted Living  Transport at Discharge : Family

## 2022-10-12 NOTE — PLAN OF CARE
Problem: Potential for Falls  Goal: Patient will remain free of falls  Description: INTERVENTIONS:  - Educate patient/family on patient safety including physical limitations  - Instruct patient to call for assistance with activity   - Consult OT/PT to assist with strengthening/mobility   - Keep Call bell within reach  - Keep bed low and locked with side rails adjusted as appropriate  - Keep care items and personal belongings within reach  - Initiate and maintain comfort rounds  - Make Fall Risk Sign visible to staff  - Offer Toileting every 2 Hours, in advance of need  - Initiate/Maintain fall alarm  - Obtain necessary fall risk management equipment: fall alarm  - Apply yellow socks and bracelet for high fall risk patients  - Consider moving patient to room near nurses station  Outcome: Progressing     Problem: Prexisting or High Potential for Compromised Skin Integrity  Goal: Skin integrity is maintained or improved  Description: INTERVENTIONS:  - Identify patients at risk for skin breakdown  - Assess and monitor skin integrity  - Assess and monitor nutrition and hydration status  - Monitor labs   - Assess for incontinence   - Turn and reposition patient  - Assist with mobility/ambulation  - Relieve pressure over bony prominences  - Avoid friction and shearing  - Provide appropriate hygiene as needed including keeping skin clean and dry  - Evaluate need for skin moisturizer/barrier cream  - Collaborate with interdisciplinary team   - Patient/family teaching  - Consider wound care consult   Outcome: Progressing     Problem: PAIN - ADULT  Goal: Verbalizes/displays adequate comfort level or baseline comfort level  Description: Interventions:  - Encourage patient to monitor pain and request assistance  - Assess pain using appropriate pain scale  - Administer analgesics based on type and severity of pain and evaluate response  - Implement non-pharmacological measures as appropriate and evaluate response  - Consider cultural and social influences on pain and pain management  - Notify physician/advanced practitioner if interventions unsuccessful or patient reports new pain  Outcome: Progressing     Problem: INFECTION - ADULT  Goal: Absence or prevention of progression during hospitalization  Description: INTERVENTIONS:  - Assess and monitor for signs and symptoms of infection  - Monitor lab/diagnostic results  - Monitor all insertion sites, i e  indwelling lines, tubes, and drains  - Monitor endotracheal if appropriate and nasal secretions for changes in amount and color  - [place] appropriate cooling/warming therapies per order  - Administer medications as ordered  - Instruct and encourage patient and family to use good hand hygiene technique  - Identify and instruct in appropriate isolation precautions for identified infection/condition  Outcome: Progressing  Goal: Absence of fever/infection during neutropenic period  Description: INTERVENTIONS:  - Monitor WBC    Outcome: Progressing     Problem: DISCHARGE PLANNING  Goal: Discharge to home or other facility with appropriate resources  Description: INTERVENTIONS:  - Identify barriers to discharge w/patient and caregiver  - Arrange for needed discharge resources and transportation as appropriate  - Identify discharge learning needs (meds, wound care, etc )  - Arrange for interpretive services to assist at discharge as needed  - Refer to Case Management Department for coordinating discharge planning if the patient needs post-hospital services based on physician/advanced practitioner order or complex needs related to functional status, cognitive ability, or social support system  Outcome: Progressing     Problem: Knowledge Deficit  Goal: Patient/family/caregiver demonstrates understanding of disease process, treatment plan, medications, and discharge instructions  Description: Complete learning assessment and assess knowledge base    Interventions:  - Provide teaching at level of understanding  - Provide teaching via preferred learning methods  Outcome: Progressing     Problem: Nutrition/Hydration-ADULT  Goal: Nutrient/Hydration intake appropriate for improving, restoring or maintaining nutritional needs  Description: Monitor and assess patient's nutrition/hydration status for malnutrition  Collaborate with interdisciplinary team and initiate plan and interventions as ordered  Monitor patient's weight and dietary intake as ordered or per policy  Utilize nutrition screening tool and intervene as necessary  Determine patient's food preferences and provide high-protein, high-caloric foods as appropriate       INTERVENTIONS:  - Monitor oral intake, urinary output, labs, and treatment plans  - Assess nutrition and hydration status and recommend course of action  - Evaluate amount of meals eaten  - Assist patient with eating if necessary   - Allow adequate time for meals  - Recommend/ encourage appropriate diets, oral nutritional supplements, and vitamin/mineral supplements  - Order, calculate, and assess calorie counts as needed  - Recommend, monitor, and adjust tube feedings and TPN/PPN based on assessed needs  - Assess need for intravenous fluids  - Provide specific nutrition/hydration education as appropriate  - Include patient/family/caregiver in decisions related to nutrition  Outcome: Progressing

## 2022-10-12 NOTE — ASSESSMENT & PLAN NOTE
· Blood pressure controlled  · Continue to hold home HCTZ given above and mild hyponatremia on presentation   · Continue Amlodipine 5mg daily  · Monitor BP trends while admitted

## 2022-10-12 NOTE — CONSULTS
Consultation - General Surgery   Mark Anthony Bergeraureliorich 80 y o  female MRN: [id number]  Unit/Bed#: ICU 10-01 Encounter: [id number]    Assessment:  80year old female with past medical history significant for HTN, MDD, HL presenting with constipation and gastric distention  Patient is a poor historian and unable to recall reason she was brought to ED  On arrival to ED, vitals signs were stable and labs unremarkable other than TANNER with Cr at 1 46  CT performed in ED without evidence of SBO/LBO but evidence of gastric distention and colonic stool burden  Unknown last bowel movement  -Afebrile, intermittent episodes of tachypnea 20s to 40s, other vital signs stable on room air   -UO 150cc  -No leukocytosis, WBC 9 39  -Electrolytes unremarkable   -TANNER resolved, Cr 1 01  -CTAP 10/11/2022 with "No small or large bowel obstruction  Moderate colonic stool burden and a distended proximal to mid stomach with undigested material  Cardiomegaly and pulmonary edema "    Plan:  Follow up XR abdomen   S/p mineral oil enema yesterday afternoon with no results  Mineral oil enema again this morning, if no result will try SSE  Will hold on replacement of NGT, patient currently asymptomatic  NPO, sips with meds   IVF  Medical management per SLIM, appreciate recommendations  Discussed with attending     History of Present Illness   Chief Complaint: abdominal pain     HPI:  Simon Vasquez is a 80 y o  female with past medical history significant for HTN, MDD, HL who initially presented [facility] ED via EMS on 10/11/2022 with complaints of abdominal pain  Per chart review of ED notes, patient developed acute onset severe abdominal pain, nausea, vomiting prompting evaluation in ED  Upon arrival to ED, vital signs stable and labs unremarkable other than TANNER with Cr at 1 46  Patient did not have leukocytosis and had normal lactic acid   CT performed in ED without evidence of small or large bowel obstruction but with evidence of gastric distention and colonic stool burden  NGT was placed in ED however, patient pulled out NGT overnight  General surgery consulted for evaluation of gastric distention and megarectum  Patient is a poor historian and unable to recall events upon evaluation this morning  She states that she does not know why she was brought to the hospital  Patient does not have any acute complaints at this time  Past surgical history significant for bilateral cataract extraction, tonsillectomy,tubal ligation, and hernia repair  Review of Systems   Constitutional: Negative for activity change, appetite change, chills and fever  HENT: Negative  Respiratory: Negative for cough and shortness of breath  Cardiovascular: Negative for chest pain, palpitations and leg swelling  Gastrointestinal: Negative for abdominal distention, abdominal pain, constipation, diarrhea, nausea and vomiting  Endocrine: Negative for polydipsia, polyphagia and polyuria  Genitourinary: Negative for difficulty urinating, dysuria, frequency and urgency  Musculoskeletal: Negative for arthralgias and myalgias  Skin: Negative for color change, rash and wound  Positive for pruritis   Neurological: Negative for dizziness, weakness, light-headedness, numbness and headaches  Historical Information   Past Medical History:   Diagnosis Date   • TANNER (acute kidney injury) ([facility] [address] ) 10/11/2022   • Arthritis    • Cataract     LAST ASSESSED: [id number]   • Depression    • Dislocation of left shoulder joint     LAST ASSESSSED: [id number]   • Essential hypertension 4/3/2019   • Gait disorder     LAST ASSESSED: [id number]   • Herpes zoster     LAST ASSESSED: [id number]   • HL (hearing loss)    • Impacted cerumen of both ears     LAST ASSESSED: [id number]   • Impacted cerumen of right ear     LAST ASSESSED: [id number]   • Lightheadedness     MILD AND PT REFUSES EKG ETC  PT PROMISES TO CALL IF WORSENS   LAST ASSESSED: [id number]   • Multiple falls     LAST ASSESSED: [id number]   • Near syncope     LAST ASSESSED: [id number]   • Postherpetic polyneuropathy     LAST ASSESSED: [id number]     Past Surgical History:   Procedure Laterality Date   • CATARACT EXTRACTION, BILATERAL     • HERNIA REPAIR     • TONSILLECTOMY     • TUBAL LIGATION       Social History   Social History     Substance and Sexual Activity   Alcohol Use Never     Social History     Substance and Sexual Activity   Drug Use No     E-Cigarette/Vaping   • E-Cigarette Use Never User      E-Cigarette/Vaping Substances     Social History     Tobacco Use   Smoking Status Never Smoker   Smokeless Tobacco Never Used     Family History:   Family History   Problem Relation Age of Onset   • Cirrhosis Father    • Heart disease Son    • Mental illness Son        Meds/Allergies   all current active meds have been reviewed and PTA meds:   Prior to Admission Medications   Prescriptions Last Dose Informant Patient Reported?  Taking?   acetaminophen (TYLENOL) 325 mg tablet Unknown at Unknown time  No No   Sig: Take 2 tablets (650 mg total) by mouth every 6 (six) hours as needed for mild pain   amLODIPine (NORVASC) 5 mg tablet Unknown at Unknown time  No No   Sig: Take 1 tablet (5 mg total) by mouth in the morning   aspirin 81 mg chewable tablet Unknown at Unknown time  No No   Sig: Chew 1 tablet (81 mg total) daily   atorvastatin (LIPITOR) 40 mg tablet Unknown at Unknown time  No No   Sig: Take 1 tablet (40 mg total) by mouth every evening   docusate sodium (COLACE) 100 mg capsule Unknown at Unknown time  No No   Sig: Take 1 capsule (100 mg total) by mouth 2 (two) times a day   hydrochlorothiazide (HYDRODIURIL) 25 mg tablet Unknown at Unknown time  No No   Sig: Take 1 tablet (25 mg total) by mouth in the morning   sertraline (ZOLOFT) 50 mg tablet Unknown at Unknown time  No No   Sig: Take 1 tablet (50 mg total) by mouth in the morning      Facility-Administered Medications: None     No Known Allergies    Objective   First Vitals:   Blood Pressure: 95/55 (10/11/22 1342)  Pulse: 72 (10/11/22 1342)  Temperature: 97 8 °F (36 6 °C) (10/11/22 1619)  Temp Source: Oral (10/11/22 1619)  Respirations: 16 (10/11/22 1342)  Height: 5' 2" (157 5 cm) (10/11/22 1342)  Weight - Scale: 58 5 kg (129 lb) (10/11/22 1342)  SpO2: 91 % (10/11/22 1342)    Current Vitals:   Blood Pressure: 159/67 (10/12/22 0700)  Pulse: 69 (10/12/22 0700)  Temperature: 97 8 °F (36 6 °C) (10/12/22 0700)  Temp Source: Tympanic (10/12/22 0700)  Respirations: 18 (10/12/22 0700)  Height: 5' 2" (157 5 cm) (10/11/22 1958)  Weight - Scale: 60 5 kg (133 lb 6 1 oz) (10/12/22 0500)  SpO2: 93 % (10/12/22 0700)      Intake/Output Summary (Last 24 hours) at 10/12/2022 0727  Last data filed at 10/12/2022 0400  Gross per 24 hour   Intake --   Output 150 ml   Net -150 ml       Invasive Devices  Report    Peripheral Intravenous Line  Duration           Peripheral IV 10/11/22 Left Antecubital <1 day    Peripheral IV 10/11/22 Right Antecubital <1 day          Drain  Duration           NG/OG/Enteral Tube Nasogastric 14 Fr Right nare <1 day              Physical Exam  Vitals and nursing note reviewed  Constitutional:       General: She is not in acute distress  Appearance: Normal appearance  She is normal weight  She is not ill-appearing or toxic-appearing  HENT:      Head: Normocephalic and atraumatic  Cardiovascular:      Rate and Rhythm: Normal rate and regular rhythm  Pulses: Normal pulses  Heart sounds: Normal heart sounds  No murmur heard  No friction rub  No gallop  Pulmonary:      Effort: Pulmonary effort is normal  No respiratory distress  Breath sounds: Normal breath sounds  No wheezing, rhonchi or rales  Comments: Anterior lung fields  Abdominal:      General: A surgical scar is present  Bowel sounds are normal  There is no distension  Palpations: Abdomen is soft  There is no mass  Tenderness: There is abdominal tenderness in the left lower quadrant   There is no guarding or rebound  Hernia: No hernia is present  Musculoskeletal:         General: Normal range of motion  Right lower leg: No edema  Left lower leg: No edema  Skin:     General: Skin is warm and dry  Neurological:      Mental Status: She is alert  Psychiatric:         Mood and Affect: Mood normal          Behavior: Behavior normal  Behavior is cooperative  Thought Content: Thought content normal        Lab Results:   I have personally reviewed pertinent lab results  CBC:   Lab Results   Component Value Date    WBC 9 39 10/12/2022    HGB 13 0 10/12/2022    HCT 37 9 10/12/2022    MCV 92 10/12/2022     10/12/2022    MCH 31 6 10/12/2022    MCHC 34 3 10/12/2022    RDW 13 2 10/12/2022    MPV 10 0 10/12/2022    NRBC 0 10/11/2022     CMP:   Lab Results   Component Value Date    SODIUM 136 10/12/2022    K 3 8 10/12/2022     10/12/2022    CO2 27 10/12/2022    BUN 18 10/12/2022    CREATININE 1 01 10/12/2022    CALCIUM 8 9 10/12/2022    AST 19 10/11/2022    ALT 19 10/11/2022    ALKPHOS 81 10/11/2022    EGFR 48 10/12/2022     Coagulation:   Lab Results   Component Value Date    INR 1 08 10/11/2022     Lipase:   Lab Results   Component Value Date    LIPASE 29 10/11/2022     Imaging: I have personally reviewed pertinent reports  XR chest portable   Result Date: 10/11/2022  Impression:No acute cardiopulmonary disease  CT abdomne pelvis wo contrast  Result Date: 10/11/2022  Impression: No small or large bowel obstruction  Moderate colonic stool burden and a distended proximal to mid stomach with undigested material  Cardiomegaly and pulmonary edema  EKG, Pathology, and Other Studies: I have personally reviewed pertinent reports  Code Status: Level 1 - Full Code  Advance Directive and Living Will: Yes    Power of : Yes  POLST:      Counseling / Coordination of Care  Total floor / unit time spent today 35 minutes    Greater than 50% of total time was spent with the patient and / or family counseling and / or coordination of care  A description of the counseling / coordination of care: reviewing pertinent diagnostic images and laboratory studies, evaluation of patient, discussion with attending       Kenn Goncalves PA-C

## 2022-10-13 ENCOUNTER — TRANSITIONAL CARE MANAGEMENT (OUTPATIENT)
Dept: INTERNAL MEDICINE CLINIC | Facility: CLINIC | Age: 87
End: 2022-10-13

## 2022-10-13 VITALS
DIASTOLIC BLOOD PRESSURE: 55 MMHG | SYSTOLIC BLOOD PRESSURE: 94 MMHG | OXYGEN SATURATION: 88 % | HEART RATE: 74 BPM | HEIGHT: 62 IN | RESPIRATION RATE: 21 BRPM | WEIGHT: 133.38 LBS | BODY MASS INDEX: 24.54 KG/M2 | TEMPERATURE: 98.4 F

## 2022-10-13 LAB
ANION GAP SERPL CALCULATED.3IONS-SCNC: 7 MMOL/L (ref 4–13)
ATRIAL RATE: 75 BPM
BUN SERPL-MCNC: 14 MG/DL (ref 5–25)
CALCIUM SERPL-MCNC: 8.7 MG/DL (ref 8.4–10.2)
CHLORIDE SERPL-SCNC: 104 MMOL/L (ref 96–108)
CO2 SERPL-SCNC: 24 MMOL/L (ref 21–32)
CREAT SERPL-MCNC: 0.81 MG/DL (ref 0.6–1.3)
ERYTHROCYTE [DISTWIDTH] IN BLOOD BY AUTOMATED COUNT: 13.2 % (ref 11.6–15.1)
GFR SERPL CREATININE-BSD FRML MDRD: 63 ML/MIN/1.73SQ M
GLUCOSE SERPL-MCNC: 68 MG/DL (ref 65–140)
HCT VFR BLD AUTO: 40.4 % (ref 34.8–46.1)
HGB BLD-MCNC: 13.7 G/DL (ref 11.5–15.4)
MCH RBC QN AUTO: 30.9 PG (ref 26.8–34.3)
MCHC RBC AUTO-ENTMCNC: 33.9 G/DL (ref 31.4–37.4)
MCV RBC AUTO: 91 FL (ref 82–98)
P AXIS: 44 DEGREES
PLATELET # BLD AUTO: 168 THOUSANDS/UL (ref 149–390)
PMV BLD AUTO: 9.9 FL (ref 8.9–12.7)
POTASSIUM SERPL-SCNC: 3.5 MMOL/L (ref 3.5–5.3)
PR INTERVAL: 182 MS
QRS AXIS: -24 DEGREES
QRSD INTERVAL: 78 MS
QT INTERVAL: 414 MS
QTC INTERVAL: 462 MS
RBC # BLD AUTO: 4.43 MILLION/UL (ref 3.81–5.12)
SODIUM SERPL-SCNC: 135 MMOL/L (ref 135–147)
T WAVE AXIS: 20 DEGREES
VENTRICULAR RATE: 75 BPM
WBC # BLD AUTO: 11.06 THOUSAND/UL (ref 4.31–10.16)

## 2022-10-13 PROCEDURE — 99239 HOSP IP/OBS DSCHRG MGMT >30: CPT | Performed by: INTERNAL MEDICINE

## 2022-10-13 PROCEDURE — 99231 SBSQ HOSP IP/OBS SF/LOW 25: CPT

## 2022-10-13 PROCEDURE — 93010 ELECTROCARDIOGRAM REPORT: CPT | Performed by: INTERNAL MEDICINE

## 2022-10-13 PROCEDURE — 80048 BASIC METABOLIC PNL TOTAL CA: CPT | Performed by: INTERNAL MEDICINE

## 2022-10-13 PROCEDURE — 85027 COMPLETE CBC AUTOMATED: CPT | Performed by: INTERNAL MEDICINE

## 2022-10-13 RX ORDER — POLYETHYLENE GLYCOL 3350 17 G/17G
17 POWDER, FOR SOLUTION ORAL DAILY
Status: DISCONTINUED | OUTPATIENT
Start: 2022-10-13 | End: 2022-10-13 | Stop reason: HOSPADM

## 2022-10-13 RX ORDER — POLYETHYLENE GLYCOL 3350 17 G/17G
17 POWDER, FOR SOLUTION ORAL DAILY
Qty: 510 G | Refills: 0 | Status: SHIPPED | OUTPATIENT
Start: 2022-10-14 | End: 2022-11-13

## 2022-10-13 RX ADMIN — ASPIRIN 81 MG CHEWABLE TABLET 81 MG: 81 TABLET CHEWABLE at 08:47

## 2022-10-13 RX ADMIN — ENOXAPARIN SODIUM 30 MG: 100 INJECTION SUBCUTANEOUS at 08:47

## 2022-10-13 RX ADMIN — AMLODIPINE BESYLATE 5 MG: 5 TABLET ORAL at 08:48

## 2022-10-13 RX ADMIN — POLYETHYLENE GLYCOL 3350 17 G: 17 POWDER, FOR SOLUTION ORAL at 08:47

## 2022-10-13 RX ADMIN — SODIUM CHLORIDE 75 ML/HR: 0.9 INJECTION, SOLUTION INTRAVENOUS at 03:42

## 2022-10-13 RX ADMIN — SERTRALINE HYDROCHLORIDE 50 MG: 50 TABLET ORAL at 08:48

## 2022-10-13 NOTE — DISCHARGE SUMMARY
Liv U  66   Discharge- [place] 1/28/1930, 80 y o  female MRN: [id number]  Unit/Bed#: ICU 10-01 Encounter: [id number]  Primary Care Provider: Gilda Hi DO   Date and time admitted to hospital: 10/11/2022  1:39 PM    * Constipation  Assessment & Plan  · Presented with sudden onset abdominal pain, nausea, and vomitting  · S/p mineral oil enema x2 and soap-ricardo enema x1  · Small soft stool noted yesterday evening  · CT abd/pelvis (10/11): No small or large bowel obstruction   Moderate colonic stool burden and a distended proximal to mid stomach with undigested material   · KUB (10/12): With moderate stool burden  · Tolerated breakfast without pain or nausea/vomiting    Gram-positive cocci bacteremia  Assessment & Plan  · BClx (10/11): 1/2 bottles positive for coagulase negative staph  · Likely contaminant  · No abx therapy is needed at this time    Gastric distention  Assessment & Plan  · Abdomen is soft and without tenderness this AM  · No significant distention noted on KUB    TANNER (acute kidney injury) ([address] )  Assessment & Plan  · POA and resolved at this time  · Baseline Cr < 1  · Continue to hold HCTZ     Essential hypertension  Assessment & Plan  · Blood pressure relatively controlled  · Continue to hold home HCTZ given above and mild hyponatremia on presentation   · Continue Amlodipine 5mg daily  · Monitor BP trends while admitted       Medical Problems             Resolved Problems  Date Reviewed: 5/5/2022   None               Discharging Physician / Practitioner: Zach Jensen  PCP: Gilda Hi DO  Admission Date:   Admission Orders (From admission, onward)     Ordered        10/11/22 1610  INPATIENT ADMISSION  Once                      Discharge Date: 10/13/22    Consultations During Hospital Stay:  · General Surgery     Procedures Performed:   · Multiple enemas    Significant Findings / Test Results:   · CT abd/pelvis (10/11): No small or large bowel obstruction   Moderate colonic stool burden and a distended proximal to mid stomach with undigested material   · KUB (10/12): With moderate stool burden    Incidental Findings:   · None     Test Results Pending at Discharge (will require follow up): · None     Outpatient Tests Requested:  · To be determined upon outpatient follow-up with PCP    Complications:  None    Reason for Admission: Constipation and Gastric distention    Hospital Course:   Pearl Forman is a 80 y o  female patient who originally presented to the hospital on 10/11/2022 due to abdominal pain and vomiting  Please see H&P as documented by myself for complete details regarding history of presenting illness  In brief, the patient has a past medical history of hypertension and major depressive disorder who presented for evaluation of abdominal pain, nausea, and vomiting  Upon arrival to the emergency department CT scan was performed and demonstrated stomach distension and moderate stool burden  NG tube was placed in the emergency department with subsequent decompression her stomach  Additionally she was given 3 enema with production of moderate amount of loose stool  She was ultimately discharged back to her retirement once tolerating a regular diet without any further pain or vomiting  She was discharged in stable condition all of her questions were answered prior to departure  This is a brief discharge summary please see full medical record for more details  Please see above list of diagnoses and related plan for additional information  Condition at Discharge: stable    Discharge Day Visit / Exam:   Subjective:  Patient resting comfortably in bed without any acute complaints  She reports bowel movement yesterday afternoon  Additionally she notes tolerating her breakfast this morning without any pain or nausea  No overnight events reported by nursing      Vitals: Blood Pressure: 94/55 (10/13/22 1100)  Pulse: 74 (10/13/22 1100)  Temperature: 98 4 °F (36 9 °C) (10/13/22 0700)  Temp Source: Tympanic (10/13/22 0700)  Respirations: 21 (10/13/22 1100)  Height: 5' 2" (157 5 cm) (10/11/22 1958)  Weight - Scale: 60 5 kg (133 lb 6 1 oz) (10/12/22 0500)  SpO2: (!) 88 % (10/13/22 1100)     Exam:   Physical Exam  Vitals and nursing note reviewed  Constitutional:       General: She is not in acute distress  Appearance: She is well-developed and normal weight  HENT:      Head: Normocephalic and atraumatic  Mouth/Throat:      Mouth: Mucous membranes are moist       Pharynx: Oropharynx is clear  Eyes:      Extraocular Movements: Extraocular movements intact  Conjunctiva/sclera: Conjunctivae normal    Cardiovascular:      Rate and Rhythm: Normal rate and regular rhythm  Pulses: Normal pulses  Heart sounds: Normal heart sounds  No murmur heard  Pulmonary:      Effort: Pulmonary effort is normal  No respiratory distress  Breath sounds: Normal breath sounds  Abdominal:      General: Bowel sounds are normal  There is no distension  Palpations: Abdomen is soft  Tenderness: There is no abdominal tenderness  Musculoskeletal:         General: Normal range of motion  Cervical back: Normal range of motion and neck supple  Skin:     General: Skin is warm and dry  Neurological:      General: No focal deficit present  Mental Status: She is alert  Mental status is at baseline  Psychiatric:         Mood and Affect: Mood normal          Behavior: Behavior normal         Discussion with Family: Updated  (son) via phone  Discharge instructions/Information to patient and family:   See after visit summary for information provided to patient and family  Provisions for Follow-Up Care:  See after visit summary for information related to follow-up care and any pertinent home health orders         Disposition:   [address] at [facility] Readmission:  None     Discharge Statement:  I spent > 35 minutes discharging the patient  This time was spent on the day of discharge  I had direct contact with the patient on the day of discharge  Greater than 50% of the total time was spent examining patient, answering all patient questions, arranging and discussing plan of care with patient as well as directly providing post-discharge instructions  Additional time then spent on discharge activities  Discharge Medications:  See after visit summary for reconciled discharge medications provided to patient and/or family        **Please Note: This note may have been constructed using a voice recognition system**

## 2022-10-13 NOTE — ASSESSMENT & PLAN NOTE
· Blood pressure relatively controlled  · Continue to hold home HCTZ given above and mild hyponatremia on presentation   · Continue Amlodipine 5mg daily  · Monitor BP trends while admitted

## 2022-10-13 NOTE — ASSESSMENT & PLAN NOTE
· BClx (10/11): 1/2 bottles positive for coagulase negative staph  · Likely contaminant  · No abx therapy is needed at this time

## 2022-10-13 NOTE — ASSESSMENT & PLAN NOTE
· Presented with sudden onset abdominal pain, nausea, and vomitting  · S/p mineral oil enema x2 and soap-ricardo enema x1  · Small soft stool noted yesterday evening  · CT abd/pelvis (10/11): No small or large bowel obstruction   Moderate colonic stool burden and a distended proximal to mid stomach with undigested material   · KUB (10/12):  With moderate stool burden  · Tolerated breakfast without pain or nausea/vomiting

## 2022-10-13 NOTE — PROGRESS NOTES
Vancomycin Assessment    Ada George is a 80 y o  female who is currently receiving vancomycin 750 mg IV q24 for bacteremia     Relevant clinical data and objective history reviewed:  Creatinine   Date Value Ref Range Status   10/12/2022 1 01 0 60 - 1 30 mg/dL Final     Comment:     Standardized to IDMS reference method   10/11/2022 1 46 (H) 0 60 - 1 30 mg/dL Final     Comment:     Standardized to IDMS reference method   05/20/2022 0 87 0 60 - 1 30 mg/dL Final     Comment:     Standardized to IDMS reference method   08/12/2015 0 83 0 60 - 1 30 mg/dL Final     Comment:     Standardized to IDMS reference method   03/11/2015 0 91 0 60 - 1 30 mg/dL Final     Comment:     Standardized to IDMS reference method   01/20/2014 0 91 0 60 - 1 30 mg/dL Final     Comment:     Standardized to IDMS reference method     BP (!) 184/77   Pulse 79   Temp (!) 97 °F (36 1 °C) (Tympanic)   Resp (!) 34   Ht 5' 2" (1 575 m)   Wt 60 5 kg (133 lb 6 1 oz)   SpO2 91%   BMI 24 40 kg/m²   I/O last 3 completed shifts: In: 1947 5 [I V :1947 5]  Out: 400 [Urine:400]  Lab Results   Component Value Date/Time    BUN 18 10/12/2022 04:49 AM    BUN 11 08/12/2015 09:33 AM    WBC 9 39 10/12/2022 04:49 AM    WBC 7 01 08/12/2015 09:33 AM    HGB 13 0 10/12/2022 04:49 AM    HGB 14 7 08/12/2015 09:33 AM    HCT 37 9 10/12/2022 04:49 AM    HCT 42 0 08/12/2015 09:33 AM    MCV 92 10/12/2022 04:49 AM    MCV 88 08/12/2015 09:33 AM     10/12/2022 04:49 AM     08/12/2015 09:33 AM     Temp Readings from Last 3 Encounters:   10/12/22 (!) 97 °F (36 1 °C) (Tympanic)   05/24/22 (!) 97 °F (36 1 °C)   05/20/22 (!) 97 1 °F (36 2 °C) (Tympanic)     Vancomycin Days of Therapy: 1    Assessment/Plan  The patient is currently on vancomycin utilizing scheduled dosing based on actual body weight  Baseline risks associated with therapy include: advanced age    The patient is currently receiving 750 mg IV q24 and after clinical evaluation will be changed to 1000 mg IV q24  Pharmacy will also follow closely for s/sx of nephrotoxicity, infusion reactions, and appropriateness of therapy  BMP and CBC will be ordered per protocol  Plan for trough as patient approaches steady state, prior to the 4th  dose at approximately 2100 on 10/15/22  Due to infection severity, will target a trough of 15-20 (appropriate for most indications)   Pharmacy will continue to follow the patient’s culture results and clinical progress daily      Damien Dinh, Pharmacist

## 2022-10-13 NOTE — QUICK NOTE
Gram-positive cocci bacteremia  Assessment & Plan  · Notified by nursing 1/2 sets w/ Gram pos cocci on blood cultures  · Vancomycin ordered      Notified by nursing that patient is itching from the vancomycin  The blood culture identification panel is now updated and looks like staph epi    Will d/c vanco   Prn benadryl, 1 dose pepcid

## 2022-10-13 NOTE — PROGRESS NOTES
Progress Note - General Surgery   Marco Reis 80 y o  female MRN: [id number]  Unit/Bed#: ICU 10-01 Encounter: [id number]    Assessment:  80year old female with past medical history significant for HTN, MDD, HL presenting with constipation and gastric distention  -afebrile, intermittent episodes of tachypnea 20s to 30s, intermittent episodes of hypertension with SBP 170s to 190s, other vital signs stable on room air  -UO 1 5 L via pure wick  -leukocytosis, WBC 11 06  -electrolytes unremarkable  -s/p mineral oil enema x2 and soapsuds enema x1 with bowel movement    Plan:  Surgically stable, general surgery will sign off at this time  Medical management per primary, appreciate recommendations  Discussed with attending    Subjective/Objective   Subjective:  No acute overnight events  Patient overall doing well this morning with no acute complaints  Patient reports that she did have small bowel movement following enemas yesterday afternoon  Patient otherwise denies abdominal pain, nausea, vomiting  Objective:   Blood pressure 116/63, pulse 79, temperature 98 4 °F (36 9 °C), temperature source Tympanic, resp  rate 18, height 5' 2" (1 575 m), weight 60 5 kg (133 lb 6 1 oz), SpO2 90 %, not currently breastfeeding  ,Body mass index is 24 4 kg/m²  Intake/Output Summary (Last 24 hours) at 10/13/2022 0802  Last data filed at 10/13/2022 0600  Gross per 24 hour   Intake 2203 75 ml   Output 1250 ml   Net 953 75 ml       Invasive Devices  Report    Peripheral Intravenous Line  Duration           Peripheral IV 10/13/22 Distal;Right;Upper;Ventral (anterior) Arm <1 day          Drain  Duration           External Urinary Catheter <1 day              Physical Exam  Vitals and nursing note reviewed  Constitutional:       General: She is not in acute distress  Appearance: Normal appearance  She is normal weight  She is not ill-appearing or toxic-appearing     Cardiovascular:      Rate and Rhythm: Normal rate and regular rhythm  Pulses: Normal pulses  Heart sounds: Normal heart sounds  No murmur heard  No friction rub  No gallop  Pulmonary:      Effort: Pulmonary effort is normal  No respiratory distress  Breath sounds: Normal breath sounds  No wheezing, rhonchi or rales  Abdominal:      General: Bowel sounds are normal  There is no distension  Palpations: Abdomen is soft  Tenderness: There is no abdominal tenderness  There is no guarding or rebound  Musculoskeletal:         General: Normal range of motion  Right lower leg: No edema  Left lower leg: No edema  Skin:     General: Skin is warm and dry  Neurological:      General: No focal deficit present  Mental Status: She is alert  Mental status is at baseline  Psychiatric:         Mood and Affect: Mood normal          Behavior: Behavior normal          Thought Content: Thought content normal        Lab, Imaging and other studies:  I have personally reviewed pertinent lab results      CBC:   Lab Results   Component Value Date    WBC 11 06 (H) 10/13/2022    HGB 13 7 10/13/2022    HCT 40 4 10/13/2022    MCV 91 10/13/2022     10/13/2022    MCH 30 9 10/13/2022    MCHC 33 9 10/13/2022    RDW 13 2 10/13/2022    MPV 9 9 10/13/2022     CMP:   Lab Results   Component Value Date    SODIUM 135 10/13/2022    K 3 5 10/13/2022     10/13/2022    CO2 24 10/13/2022    BUN 14 10/13/2022    CREATININE 0 81 10/13/2022    CALCIUM 8 7 10/13/2022    EGFR 63 10/13/2022     VTE Pharmacologic Prophylaxis: Enoxaparin (Lovenox)  VTE Mechanical Prophylaxis: sequential compression device    Reyes Gunderson PA-C

## 2022-10-13 NOTE — CONSULTS
The patient’s Vancomycin therapy has been completed / discontinued  Thank you for this consult; Pharmacy will sign-off now      Daniele Newsome PharmD

## 2022-10-13 NOTE — NURSING NOTE
Report called to [address] [place] [address] home  Patient discharged by wheelchair; Patient's brother at bedside for transport to home

## 2022-10-14 LAB
BACTERIA BLD CULT: ABNORMAL
GRAM STN SPEC: ABNORMAL
S EPIDERMIDIS DNA BLD POS QL NAA+NON-PRB: DETECTED

## 2022-10-16 LAB — BACTERIA BLD CULT: NORMAL

## 2022-10-18 NOTE — PROGRESS NOTES
I called [facility]-she asked if I can call them back tomorrw when the lead staff in there- she needs a RENÉ appt

## 2022-10-19 NOTE — PROGRESS NOTES
I called-Reji sees Dr Zarco Flatten now-dr Maria Eugenia Jessica comes to the house and it is too hard to do transports for reji  No RENÉ appt scheduled    Please remove Dr Saranya Pedro as her PCP-thanks

## 2022-11-14 ENCOUNTER — APPOINTMENT (EMERGENCY)
Dept: RADIOLOGY | Facility: HOSPITAL | Age: 87
End: 2022-11-14

## 2022-11-14 ENCOUNTER — HOSPITAL ENCOUNTER (OUTPATIENT)
Facility: HOSPITAL | Age: 87
Setting detail: OBSERVATION
Discharge: HOME | End: 2022-11-15
Attending: EMERGENCY MEDICINE | Admitting: STUDENT IN AN ORGANIZED HEALTH CARE EDUCATION/TRAINING PROGRAM

## 2022-11-14 ENCOUNTER — APPOINTMENT (EMERGENCY)
Dept: CT IMAGING | Facility: HOSPITAL | Age: 87
End: 2022-11-14

## 2022-11-14 ENCOUNTER — APPOINTMENT (OUTPATIENT)
Dept: CT IMAGING | Facility: HOSPITAL | Age: 87
End: 2022-11-14

## 2022-11-14 DIAGNOSIS — R55 SYNCOPE: ICD-10-CM

## 2022-11-14 DIAGNOSIS — N39.0 UTI (URINARY TRACT INFECTION): Primary | ICD-10-CM

## 2022-11-14 PROBLEM — N30.00 ACUTE CYSTITIS WITHOUT HEMATURIA: Status: ACTIVE | Noted: 2022-11-14

## 2022-11-14 PROBLEM — R93.89 ABNORMAL CT SCAN: Status: ACTIVE | Noted: 2022-11-14

## 2022-11-14 LAB
2HR DELTA HS TROPONIN: 0 NG/L
ALBUMIN SERPL BCP-MCNC: 4.1 G/DL (ref 3.5–5)
ALP SERPL-CCNC: 93 U/L (ref 34–104)
ALT SERPL W P-5'-P-CCNC: 21 U/L (ref 7–52)
ANION GAP SERPL CALCULATED.3IONS-SCNC: 6 MMOL/L (ref 4–13)
APTT PPP: 27 SECONDS (ref 23–37)
AST SERPL W P-5'-P-CCNC: 25 U/L (ref 13–39)
BACTERIA UR QL AUTO: ABNORMAL /HPF
BASOPHILS # BLD AUTO: 0.03 THOUSANDS/ÂΜL (ref 0–0.1)
BASOPHILS NFR BLD AUTO: 0 % (ref 0–1)
BILIRUB SERPL-MCNC: 0.84 MG/DL (ref 0.2–1)
BILIRUB UR QL STRIP: NEGATIVE
BNP SERPL-MCNC: 193 PG/ML (ref 0–100)
BUN SERPL-MCNC: 15 MG/DL (ref 5–25)
CALCIUM SERPL-MCNC: 9.4 MG/DL (ref 8.4–10.2)
CARDIAC TROPONIN I PNL SERPL HS: 6 NG/L
CARDIAC TROPONIN I PNL SERPL HS: 6 NG/L
CHLORIDE SERPL-SCNC: 99 MMOL/L (ref 96–108)
CLARITY UR: ABNORMAL
CO2 SERPL-SCNC: 29 MMOL/L (ref 21–32)
COLOR UR: YELLOW
CREAT SERPL-MCNC: 0.96 MG/DL (ref 0.6–1.3)
EOSINOPHIL # BLD AUTO: 0.21 THOUSAND/ÂΜL (ref 0–0.61)
EOSINOPHIL NFR BLD AUTO: 3 % (ref 0–6)
ERYTHROCYTE [DISTWIDTH] IN BLOOD BY AUTOMATED COUNT: 13 % (ref 11.6–15.1)
FLUAV RNA RESP QL NAA+PROBE: NEGATIVE
FLUBV RNA RESP QL NAA+PROBE: NEGATIVE
GFR SERPL CREATININE-BSD FRML MDRD: 51 ML/MIN/1.73SQ M
GLUCOSE SERPL-MCNC: 120 MG/DL (ref 65–140)
GLUCOSE UR STRIP-MCNC: NEGATIVE MG/DL
HCT VFR BLD AUTO: 43.2 % (ref 34.8–46.1)
HGB BLD-MCNC: 14.4 G/DL (ref 11.5–15.4)
HGB UR QL STRIP.AUTO: ABNORMAL
IMM GRANULOCYTES # BLD AUTO: 0.03 THOUSAND/UL (ref 0–0.2)
IMM GRANULOCYTES NFR BLD AUTO: 0 % (ref 0–2)
INR PPP: 1.1 (ref 0.84–1.19)
KETONES UR STRIP-MCNC: NEGATIVE MG/DL
LEUKOCYTE ESTERASE UR QL STRIP: ABNORMAL
LIPASE SERPL-CCNC: 31 U/L (ref 11–82)
LYMPHOCYTES # BLD AUTO: 1.4 THOUSANDS/ÂΜL (ref 0.6–4.47)
LYMPHOCYTES NFR BLD AUTO: 20 % (ref 14–44)
MCH RBC QN AUTO: 31.4 PG (ref 26.8–34.3)
MCHC RBC AUTO-ENTMCNC: 33.3 G/DL (ref 31.4–37.4)
MCV RBC AUTO: 94 FL (ref 82–98)
MONOCYTES # BLD AUTO: 0.56 THOUSAND/ÂΜL (ref 0.17–1.22)
MONOCYTES NFR BLD AUTO: 8 % (ref 4–12)
NEUTROPHILS # BLD AUTO: 4.95 THOUSANDS/ÂΜL (ref 1.85–7.62)
NEUTS SEG NFR BLD AUTO: 69 % (ref 43–75)
NITRITE UR QL STRIP: NEGATIVE
NON-SQ EPI CELLS URNS QL MICRO: ABNORMAL /HPF
NRBC BLD AUTO-RTO: 0 /100 WBCS
PH UR STRIP.AUTO: 7.5 [PH]
PLATELET # BLD AUTO: 165 THOUSANDS/UL (ref 149–390)
PMV BLD AUTO: 10.2 FL (ref 8.9–12.7)
POTASSIUM SERPL-SCNC: 4.1 MMOL/L (ref 3.5–5.3)
PROT SERPL-MCNC: 7.3 G/DL (ref 6.4–8.4)
PROT UR STRIP-MCNC: NEGATIVE MG/DL
PROTHROMBIN TIME: 14.2 SECONDS (ref 11.6–14.5)
RBC # BLD AUTO: 4.58 MILLION/UL (ref 3.81–5.12)
RBC #/AREA URNS AUTO: ABNORMAL /HPF
RSV RNA RESP QL NAA+PROBE: NEGATIVE
SARS-COV-2 RNA RESP QL NAA+PROBE: NEGATIVE
SODIUM SERPL-SCNC: 134 MMOL/L (ref 135–147)
SP GR UR STRIP.AUTO: 1.02 (ref 1–1.03)
TSH SERPL DL<=0.05 MIU/L-ACNC: 0.99 UIU/ML (ref 0.45–4.5)
UROBILINOGEN UR QL STRIP.AUTO: 1 E.U./DL
WBC # BLD AUTO: 7.18 THOUSAND/UL (ref 4.31–10.16)
WBC #/AREA URNS AUTO: ABNORMAL /HPF

## 2022-11-14 RX ORDER — ONDANSETRON 2 MG/ML
1 INJECTION INTRAMUSCULAR; INTRAVENOUS ONCE
Status: COMPLETED | OUTPATIENT
Start: 2022-11-14 | End: 2022-11-14

## 2022-11-14 RX ORDER — DOCUSATE SODIUM 100 MG/1
100 CAPSULE, LIQUID FILLED ORAL 2 TIMES DAILY
Status: DISCONTINUED | OUTPATIENT
Start: 2022-11-14 | End: 2022-11-15 | Stop reason: HOSPADM

## 2022-11-14 RX ORDER — ACETAMINOPHEN 325 MG/1
650 TABLET ORAL EVERY 6 HOURS PRN
Status: DISCONTINUED | OUTPATIENT
Start: 2022-11-14 | End: 2022-11-15 | Stop reason: HOSPADM

## 2022-11-14 RX ORDER — ASPIRIN 81 MG/1
81 TABLET, CHEWABLE ORAL DAILY
Status: DISCONTINUED | OUTPATIENT
Start: 2022-11-15 | End: 2022-11-15 | Stop reason: HOSPADM

## 2022-11-14 RX ORDER — POLYETHYLENE GLYCOL 3350 17 G/17G
17 POWDER, FOR SOLUTION ORAL DAILY
Status: DISCONTINUED | OUTPATIENT
Start: 2022-11-15 | End: 2022-11-15 | Stop reason: HOSPADM

## 2022-11-14 RX ORDER — ATORVASTATIN CALCIUM 40 MG/1
40 TABLET, FILM COATED ORAL EVERY EVENING
Status: DISCONTINUED | OUTPATIENT
Start: 2022-11-14 | End: 2022-11-15 | Stop reason: HOSPADM

## 2022-11-14 RX ORDER — AMLODIPINE BESYLATE 5 MG/1
5 TABLET ORAL DAILY
Status: DISCONTINUED | OUTPATIENT
Start: 2022-11-14 | End: 2022-11-15 | Stop reason: HOSPADM

## 2022-11-14 RX ORDER — CEFTRIAXONE 1 G/50ML
1000 INJECTION, SOLUTION INTRAVENOUS EVERY 24 HOURS
Status: DISCONTINUED | OUTPATIENT
Start: 2022-11-15 | End: 2022-11-15

## 2022-11-14 RX ORDER — SODIUM CHLORIDE, SODIUM GLUCONATE, SODIUM ACETATE, POTASSIUM CHLORIDE, MAGNESIUM CHLORIDE, SODIUM PHOSPHATE, DIBASIC, AND POTASSIUM PHOSPHATE .53; .5; .37; .037; .03; .012; .00082 G/100ML; G/100ML; G/100ML; G/100ML; G/100ML; G/100ML; G/100ML
75 INJECTION, SOLUTION INTRAVENOUS ONCE
Status: COMPLETED | OUTPATIENT
Start: 2022-11-14 | End: 2022-11-15

## 2022-11-14 RX ORDER — HEPARIN SODIUM 5000 [USP'U]/ML
5000 INJECTION, SOLUTION INTRAVENOUS; SUBCUTANEOUS EVERY 8 HOURS SCHEDULED
Status: DISCONTINUED | OUTPATIENT
Start: 2022-11-14 | End: 2022-11-15 | Stop reason: HOSPADM

## 2022-11-14 RX ORDER — CEFTRIAXONE 1 G/50ML
1000 INJECTION, SOLUTION INTRAVENOUS ONCE
Status: COMPLETED | OUTPATIENT
Start: 2022-11-14 | End: 2022-11-14

## 2022-11-14 RX ADMIN — SODIUM CHLORIDE, SODIUM GLUCONATE, SODIUM ACETATE, POTASSIUM CHLORIDE, MAGNESIUM CHLORIDE, SODIUM PHOSPHATE, DIBASIC, AND POTASSIUM PHOSPHATE 75 ML/HR: .53; .5; .37; .037; .03; .012; .00082 INJECTION, SOLUTION INTRAVENOUS at 21:22

## 2022-11-14 RX ADMIN — SERTRALINE HYDROCHLORIDE 50 MG: 50 TABLET ORAL at 18:50

## 2022-11-14 RX ADMIN — HEPARIN SODIUM 5000 UNITS: 5000 INJECTION INTRAVENOUS; SUBCUTANEOUS at 18:50

## 2022-11-14 RX ADMIN — SODIUM CHLORIDE 1000 ML: 0.9 INJECTION, SOLUTION INTRAVENOUS at 12:40

## 2022-11-14 RX ADMIN — DOCUSATE SODIUM 100 MG: 100 CAPSULE, LIQUID FILLED ORAL at 18:50

## 2022-11-14 RX ADMIN — IOHEXOL 100 ML: 350 INJECTION, SOLUTION INTRAVENOUS at 14:13

## 2022-11-14 RX ADMIN — ATORVASTATIN CALCIUM 40 MG: 40 TABLET, FILM COATED ORAL at 18:50

## 2022-11-14 RX ADMIN — CEFTRIAXONE 1000 MG: 1 INJECTION, SOLUTION INTRAVENOUS at 15:44

## 2022-11-14 NOTE — ASSESSMENT & PLAN NOTE
· CT a/p shows incidental 8 mm cystic pancreatic lesion in the distal body of the pancreas  Recommend to follow-up with nonemergent MRI

## 2022-11-14 NOTE — H&P
[address] 1/28/1930, 80 y o  female MRN: [id number]  Unit/Bed#: ED 21 Encounter: [id number]  Primary Care Provider: Briana Turcios MD   Date and time admitted to hospital: 11/14/2022 11:50 AM    * Syncope  Assessment & Plan  · The patient was brought to the ED after having and “passed out” episode at [facility]  · The patient does not remember the event  She denies any prodrome of symptoms  · Will monitor on telemetry  · Obtain echocardiogram  · Obtain orthostatics blood pressure  · Fall precautions  · PT/OT evaluation    Abnormal CT scan  Assessment & Plan  · CT a/p shows incidental 8 mm cystic pancreatic lesion in the distal body of the pancreas  Recommend to follow-up with nonemergent MRI  Acute cystitis without hematuria  Assessment & Plan  · The patient complains of increasing frequency  · Urinalysis shows possible urinary tract infection  · Follow-up with urine culture  · Continue with ceftriaxone     Hyponatremia  Assessment & Plan  · Na 134   · Likely due to hypovolemia hyponatremia  · Will give gentle IV fluid  · Follow-up with BMP      Current moderate episode of major depressive disorder without prior episode ([facility] [address] )  Assessment & Plan  · Continue with Zoloft    History of CVA (cerebrovascular accident)  Assessment & Plan  · Without significant deficit  · Continue with aspirin and statin    Benign paroxysmal positional vertigo  Assessment & Plan  · Denies any symptoms currently  · Continue supportive care    Essential hypertension  Assessment & Plan  · BP appears to be controlled  · Continue with amlodipine    VTE Prophylaxis: Heparin  Code Status: DNR/DNI  POLST: POLST is not applicable to this patient  Discussion with family: attempted to call Paula Schultz- son [phone] but got the voicemail     Anticipated Length of Stay:  Patient will be admitted on an Observation basis with an anticipated length of stay of  < 2 midnights     Justification for Hospital Stay: syncope     Chief Complaint:   Syncope    History of Present Illness:    Kaila Hernandez is a 80 y o  female who presents with syncope  The patient past medical history of hypertension and CVA  The patient was brought in to the ED by EMS after having an “passed out” episodes at [facility]  The patient does not remember the event and states that she has been feeling well without any lightheadedness, dizziness, chest pain, palpitation, or headache  Information was obtained from ED staff and medical records  The patient found to have feces upon arrival to the ED  The patient has no focal neurological deficits and mentation appears to be at baseline  Review of Systems:  Review of Systems   Constitutional: Negative for activity change, appetite change, chills, diaphoresis and fever  HENT: Negative for congestion, ear pain, hearing loss, tinnitus and trouble swallowing  Eyes: Negative for photophobia, pain, discharge, itching and visual disturbance  Respiratory: Negative for cough, shortness of breath, wheezing and stridor  Cardiovascular: Negative for chest pain, palpitations and leg swelling  Gastrointestinal: Negative for abdominal pain, blood in stool, constipation, diarrhea, nausea and vomiting  Endocrine: Negative for cold intolerance, heat intolerance, polydipsia, polyphagia and polyuria  Genitourinary: Negative for difficulty urinating, dysuria, frequency, hematuria and urgency  Musculoskeletal: Negative for back pain, gait problem and neck stiffness  Skin: Negative for pallor, rash and wound  Allergic/Immunologic: Negative for environmental allergies, food allergies and immunocompromised state  Neurological: Negative for dizziness, tremors, speech difficulty, weakness, light-headedness, numbness and headaches  Hematological: Negative for adenopathy  Does not bruise/bleed easily     Psychiatric/Behavioral: Negative for confusion, hallucinations and sleep disturbance  Past Medical and Surgical History:   Past Medical History:   Diagnosis Date   • TANNER (acute kidney injury) ([facility] [address] ) 10/11/2022   • Arthritis    • Cataract     LAST ASSESSED: [id number]   • Depression    • Dislocation of left shoulder joint     LAST ASSESSSED: [id number]   • Essential hypertension 4/3/2019   • Gait disorder     LAST ASSESSED: [id number]   • Herpes zoster     LAST ASSESSED: [id number]   • HL (hearing loss)    • Impacted cerumen of both ears     LAST ASSESSED: [id number]   • Impacted cerumen of right ear     LAST ASSESSED: [id number]   • Lightheadedness     MILD AND PT REFUSES EKG ETC  PT PROMISES TO CALL IF WORSENS  LAST ASSESSED: [id number]   • Multiple falls     LAST ASSESSED: [id number]   • Near syncope     LAST ASSESSED: [id number]   • Postherpetic polyneuropathy     LAST ASSESSED: [id number]       Past Surgical History:   Procedure Laterality Date   • CATARACT EXTRACTION, BILATERAL     • HERNIA REPAIR     • TONSILLECTOMY     • TUBAL LIGATION         Meds/Allergies:  Prior to Admission medications    Medication Sig Start Date End Date Taking?  Authorizing Provider   acetaminophen (TYLENOL) 325 mg tablet Take 2 tablets (650 mg total) by mouth every 6 (six) hours as needed for mild pain 8/2/21   Perfecto Gaw, DO   amLODIPine (NORVASC) 5 mg tablet Take 1 tablet (5 mg total) by mouth in the morning 6/6/22   Perfecto Gaw, DO   aspirin 81 mg chewable tablet Chew 1 tablet (81 mg total) daily 8/4/22   Perfecto Gaw, DO   atorvastatin (LIPITOR) 40 mg tablet Take 1 tablet (40 mg total) by mouth every evening 8/4/22   Perfecto Gaw, DO   docusate sodium (COLACE) 100 mg capsule Take 1 capsule (100 mg total) by mouth 2 (two) times a day 8/2/21   Perfecto Gaw, DO   polyethylene glycol (MIRALAX) 17 g packet Take 17 g by mouth daily Do not start before October 14, 2022  10/14/22 11/13/22  [facility]   sertraline (ZOLOFT) 50 mg tablet Take 1 tablet (50 mg total) by mouth in the morning 6/6/22 Erick William DO     I have reveiwed home medications using records provided by [facility]  Allergies: Allergies   Allergen Reactions   • Vancomycin Hives and Itching       Social History:  Marital Status:    Occupation: retired   Patient Pre-hospital Living Situation: alone   Patient Pre-hospital Level of Mobility: independent   Patient Pre-hospital Diet Restrictions: none   Substance Use History:   Social History     Substance and Sexual Activity   Alcohol Use Never     Social History     Tobacco Use   Smoking Status Never Smoker   Smokeless Tobacco Never Used     Social History     Substance and Sexual Activity   Drug Use No       Family History:  I have reviewed the patients family history    Physical Exam:   Vitals:   Blood Pressure: 135/85 (11/14/22 1730)  Pulse: 89 (11/14/22 1730)  Temperature: 98 9 °F (37 2 °C) (11/14/22 1157)  Temp Source: Oral (11/14/22 1157)  Respirations: 16 (11/14/22 1157)  Height: 5' 2" (157 5 cm) (11/14/22 1157)  Weight - Scale: 60 3 kg (133 lb) (11/14/22 1157)  SpO2: 92 % (11/14/22 1730)    Physical Exam  Vitals and nursing note reviewed  Constitutional:       General: She is not in acute distress  Appearance: Normal appearance  Comments: Frail and elderly      HENT:      Head: Normocephalic and atraumatic  Right Ear: External ear normal       Left Ear: External ear normal       Nose: Nose normal  No rhinorrhea  Mouth/Throat:      Mouth: Mucous membranes are moist       Pharynx: Oropharynx is clear  Eyes:      General:         Right eye: No discharge  Left eye: No discharge  Pupils: Pupils are equal, round, and reactive to light  Cardiovascular:      Rate and Rhythm: Normal rate and regular rhythm  Pulses: Normal pulses  Heart sounds: Normal heart sounds  No murmur heard  Pulmonary:      Effort: Pulmonary effort is normal  No respiratory distress  Breath sounds: Normal breath sounds     Abdominal:      General: Bowel sounds are normal  There is no distension  Palpations: Abdomen is soft  There is no mass  Tenderness: There is no abdominal tenderness  Musculoskeletal:         General: No swelling or tenderness  Normal range of motion  Cervical back: Normal range of motion and neck supple  No muscular tenderness  Skin:     General: Skin is warm and dry  Capillary Refill: Capillary refill takes less than 2 seconds  Findings: No erythema or rash  Neurological:      General: No focal deficit present  Mental Status: She is alert  Mental status is at baseline  Comments: Somewhat forgetful   Psychiatric:         Mood and Affect: Mood normal          Behavior: Behavior normal          Thought Content: Thought content normal          Judgment: Judgment normal          Additional Data:   Lab Results: I have personally reviewed pertinent reports  Results from last 7 days   Lab Units 11/14/22  1237   WBC Thousand/uL 7 18   HEMOGLOBIN g/dL 14 4   HEMATOCRIT % 43 2   PLATELETS Thousands/uL 165   NEUTROS PCT % 69   LYMPHS PCT % 20   MONOS PCT % 8   EOS PCT % 3     Results from last 7 days   Lab Units 11/14/22  1324   SODIUM mmol/L 134*   POTASSIUM mmol/L 4 1   CHLORIDE mmol/L 99   CO2 mmol/L 29   BUN mg/dL 15   CREATININE mg/dL 0 96   CALCIUM mg/dL 9 4   ALK PHOS U/L 93   ALT U/L 21   AST U/L 25     Results from last 7 days   Lab Units 11/14/22  1324   INR  1 10               Imaging: I have personally reviewed pertinent reports      CT abdomen pelvis with contrast   Final Result by Isaiah Ramos MD (11/14 4505)      No bowel obstruction   No acute inflammatory stranding   Ventral hernia has containing fat   Incidental 8 mm cystic pancreatic lesion in the distal body of the pancreas, image 39 series 601 this may be evaluated with the nonemergent MRI            Workstation performed: [id number]         X-ray chest 1 view portable   Final Result by Darlene Davies MD (11/14 8393)      No acute cardiopulmonary disease  Workstation performed: [id number]             EKG, Pathology, and Other Studies Reviewed on Admission:   · EKG: n/a     Epic Records Reviewed: Yes     ** Please Note: This note has been constructed using a voice recognition system   **

## 2022-11-14 NOTE — ASSESSMENT & PLAN NOTE
· The patient complains of increasing frequency  · Urinalysis shows possible urinary tract infection  · Follow-up with urine culture  · Continue with ceftriaxone

## 2022-11-14 NOTE — ASSESSMENT & PLAN NOTE
· The patient was brought to the ED after having and “passed out” episode at [facility]  · The patient does not remember the event  She denies any prodrome of symptoms     · Will monitor on telemetry  · Obtain echocardiogram  · Obtain orthostatics blood pressure  · Fall precautions  · PT/OT evaluation

## 2022-11-14 NOTE — ASSESSMENT & PLAN NOTE
· Na 134   · Likely due to hypovolemia hyponatremia  · Will give gentle IV fluid  · Follow-up with BMP

## 2022-11-14 NOTE — DISCHARGE INSTRUCTIONS
The following findings require follow up:  Radiographic finding   Findin mm cystic pancreatic lesion   Follow up required: MRI for further evaluation   Follow up should be done within 1 month(s)    Please notify the following clinician to assist with the follow up:   Dr Jadon García

## 2022-11-14 NOTE — ED PROVIDER NOTES
History  Chief Complaint   Patient presents with   • Syncope     While at [facility]     80year-old female with history hypertension presents to the emergency department for evaluation of a single episode of syncope that occurred this morning  Patient was brought here by EMS who report that she "passed out" at [facility]  Patient states that she does not remember the incident  The last thing that she remembers was planned [facility] with her friends at the table and remembers waking up being loaded onto the ambulance  No prodromal symptoms such as lightheadedness, nausea, dizziness, headaches  Patient offers no complaints at this time  Denies shortness of breath, chest pain, abdominal pain, nausea, vomiting, diarrhea, constipation, blood in stool, recent medication change, falls, trauma  On arrival, patient covered in her feces but is alert and oriented x4  GCS 15  No history of syncopal episodes  No seizure activity  History provided by:  Patient and EMS personnel   used: No    Syncope  Episode history:  Single  Most recent episode: Today  Progression:  Unchanged  Chronicity:  New  Context: not bowel movement, not dehydration, not medication change, not sitting down and not standing up    Witnessed: yes    Relieved by:  Nothing  Worsened by:  Nothing  Ineffective treatments:  None tried  Associated symptoms: no anxiety, no chest pain, no confusion, no difficulty breathing, no dizziness, no fever, no focal sensory loss, no focal weakness, no headaches, no nausea, no palpitations, no seizures, no shortness of breath, no vomiting and no weakness        Prior to Admission Medications   Prescriptions Last Dose Informant Patient Reported?  Taking?   acetaminophen (TYLENOL) 325 mg tablet   No No   Sig: Take 2 tablets (650 mg total) by mouth every 6 (six) hours as needed for mild pain   amLODIPine (NORVASC) 5 mg tablet   No No   Sig: Take 1 tablet (5 mg total) by mouth in the morning   aspirin 81 mg chewable tablet   No No   Sig: Chew 1 tablet (81 mg total) daily   atorvastatin (LIPITOR) 40 mg tablet   No No   Sig: Take 1 tablet (40 mg total) by mouth every evening   docusate sodium (COLACE) 100 mg capsule   No No   Sig: Take 1 capsule (100 mg total) by mouth 2 (two) times a day   polyethylene glycol (MIRALAX) 17 g packet   No No   Sig: Take 17 g by mouth daily Do not start before October 14, 2022  sertraline (ZOLOFT) 50 mg tablet   No No   Sig: Take 1 tablet (50 mg total) by mouth in the morning      Facility-Administered Medications: None       Past Medical History:   Diagnosis Date   • [facility] 10/11/2022   • Arthritis    • Cataract     LAST ASSESSED: [id number]   • Depression    • Dislocation of left shoulder joint     LAST ASSESSSED: [id number]   • Essential hypertension 4/3/2019   • Gait disorder     LAST ASSESSED: [id number]   • Herpes zoster     LAST ASSESSED: [id number]   • HL (hearing loss)    • Impacted cerumen of both ears     LAST ASSESSED: [id number]   • Impacted cerumen of right ear     LAST ASSESSED: [id number]   • Lightheadedness     MILD AND PT REFUSES EKG ETC  PT PROMISES TO CALL IF WORSENS  LAST ASSESSED: [id number]   • Multiple falls     LAST ASSESSED: [id number]   • Near syncope     LAST ASSESSED: [id number]   • Postherpetic polyneuropathy     LAST ASSESSED: [id number]       Past Surgical History:   Procedure Laterality Date   • CATARACT EXTRACTION, BILATERAL     • HERNIA REPAIR     • TONSILLECTOMY     • TUBAL LIGATION         Family History   Problem Relation Age of Onset   • Cirrhosis Father    • Heart disease Son    • Mental illness Son      I have reviewed and agree with the history as documented      E-Cigarette/Vaping   • E-Cigarette Use Never User      E-Cigarette/Vaping Substances     Social History     Tobacco Use   • Smoking status: Never Smoker   • Smokeless tobacco: Never Used   Vaping Use   • Vaping Use: Never used   Substance Use Topics   • Alcohol use: Never • Drug use: No       Review of Systems   Constitutional: Negative for chills and fever  HENT: Negative for ear pain and sore throat  Eyes: Negative for pain and visual disturbance  Respiratory: Negative for cough and shortness of breath  Cardiovascular: Positive for syncope  Negative for chest pain and palpitations  Gastrointestinal: Negative for abdominal pain, nausea and vomiting  Genitourinary: Negative for dysuria and hematuria  Musculoskeletal: Negative for arthralgias, back pain and neck pain  Skin: Negative for color change and rash  Neurological: Positive for syncope  Negative for dizziness, focal weakness, seizures, facial asymmetry, speech difficulty, weakness, light-headedness and headaches  Psychiatric/Behavioral: Negative for confusion  All other systems reviewed and are negative  Physical Exam  Physical Exam  Vitals and nursing note reviewed  Constitutional:       General: She is not in acute distress  Appearance: Normal appearance  She is well-developed and normal weight  She is not ill-appearing  HENT:      Head: Normocephalic and atraumatic  Right Ear: Tympanic membrane and external ear normal       Left Ear: Tympanic membrane and external ear normal       Nose: Nose normal       Mouth/Throat:      Mouth: Mucous membranes are moist       Pharynx: Oropharynx is clear  No oropharyngeal exudate or posterior oropharyngeal erythema  Eyes:      General: No scleral icterus  Right eye: No discharge  Left eye: No discharge  Extraocular Movements: Extraocular movements intact  Conjunctiva/sclera: Conjunctivae normal       Pupils: Pupils are equal, round, and reactive to light  Cardiovascular:      Rate and Rhythm: Regular rhythm  Bradycardia present  Pulses: Normal pulses  Heart sounds: Normal heart sounds  No murmur heard  Pulmonary:      Effort: Pulmonary effort is normal  No respiratory distress        Breath sounds: Normal breath sounds  No wheezing or rales  Chest:      Chest wall: No tenderness  Abdominal:      General: Abdomen is flat  Bowel sounds are normal       Palpations: Abdomen is soft  Tenderness: There is no abdominal tenderness  There is no right CVA tenderness or left CVA tenderness  Musculoskeletal:         General: No signs of injury  Normal range of motion  Cervical back: Normal range of motion and neck supple  No tenderness  Right lower leg: No edema  Left lower leg: No edema  Skin:     General: Skin is warm and dry  Capillary Refill: Capillary refill takes less than 2 seconds  Findings: No rash  Neurological:      General: No focal deficit present  Mental Status: She is alert and oriented to person, place, and time  Mental status is at baseline  Motor: No weakness  Gait: Gait normal    Psychiatric:         Mood and Affect: Mood normal          Behavior: Behavior normal          Thought Content:  Thought content normal          Vital Signs  ED Triage Vitals [11/14/22 1157]   Temperature Pulse Respirations Blood Pressure SpO2   98 9 °F (37 2 °C) (!) 51 16 110/73 97 %      Temp Source Heart Rate Source Patient Position - Orthostatic VS BP Location FiO2 (%)   Oral Monitor Lying Left arm --      Pain Score       No Pain           Vitals:    11/14/22 1157 11/14/22 1301   BP: 110/73 160/80   Pulse: (!) 51 65   Patient Position - Orthostatic VS: Lying          Visual Acuity      ED Medications  Medications   amLODIPine (NORVASC) tablet 5 mg (has no administration in time range)   aspirin chewable tablet 81 mg (has no administration in time range)   atorvastatin (LIPITOR) tablet 40 mg (has no administration in time range)   docusate sodium (COLACE) capsule 100 mg (has no administration in time range)   polyethylene glycol (MIRALAX) packet 17 g (has no administration in time range)   sertraline (ZOLOFT) tablet 50 mg (has no administration in time range)   multi-electrolyte (PLASMALYTE-A/ISOLYTE-S PH 7 4) IV solution (has no administration in time range)   acetaminophen (TYLENOL) tablet 650 mg (has no administration in time range)   heparin (porcine) subcutaneous injection 5,000 Units (has no administration in time range)   cefTRIAXone (ROCEPHIN) IVPB (premix in dextrose) 1,000 mg 50 mL (has no administration in time range)   ondansetron (FOR EMS ONLY) (ZOFRAN) 4 mg/2 mL injection 4 mg (0 mg Does not apply Given to EMS 11/14/22 1402)   sodium chloride 0 9 % bolus 1,000 mL (0 mL Intravenous Stopped 11/14/22 1554)   iohexol (OMNIPAQUE) 350 MG/ML injection (SINGLE-DOSE) 100 mL (100 mL Intravenous Given 11/14/22 1413)   cefTRIAXone (ROCEPHIN) IVPB (premix in dextrose) 1,000 mg 50 mL (1,000 mg Intravenous New Bag 11/14/22 1544)       Diagnostic Studies  Results Reviewed     Procedure Component Value Units Date/Time    TSH, 3rd generation with Free T4 reflex [[id number]]     Lab Status: No result Specimen: Blood     Urine culture [[id number]]     Lab Status: No result Specimen: Urine, Clean Catch     HS Troponin I 2hr [[id number]]  (Normal) Collected: 11/14/22 1544    Lab Status: Final result Specimen: Blood from Arm, Left Updated: 11/14/22 1612     hs TnI 2hr 6 ng/L      Delta 2hr hsTnI 0 ng/L     HS Troponin I 4hr [[id number]]     Lab Status: No result Specimen: Blood     Urine Microscopic [[id number]]  (Abnormal) Collected: 11/14/22 1405    Lab Status: Final result Specimen: Urine, Clean Catch Updated: 11/14/22 1423     RBC, UA 1-2 /hpf      WBC, UA 2-4 /hpf      Epithelial Cells Occasional /hpf      Bacteria, UA Innumerable /hpf     UA w Reflex to Microscopic w Reflex to Culture [[id number]]  (Abnormal) Collected: 11/14/22 1405    Lab Status: Final result Specimen: Urine, Clean Catch Updated: 11/14/22 1414     Color, UA Yellow     Clarity, UA Cloudy     Specific [place], UA 1 020     pH, UA 7 5     Leukocytes, UA 1+     Nitrite, UA Negative     Protein, UA Negative mg/dl      Glucose, UA Negative mg/dl      Ketones, UA Negative mg/dl      Urobilinogen, UA 1 0 E U /dl      Bilirubin, UA Negative     Occult Blood, UA Trace-Intact    HS Troponin 0hr (reflex protocol) [[id number]]  (Normal) Collected: 11/14/22 1324    Lab Status: Final result Specimen: Blood from Arm, Right Updated: 11/14/22 1356     hs TnI 0hr 6 ng/L     B-Type Natriuretic Peptide(BNP) AN, CA, EA Campuses Only [[id number]]  (Abnormal) Collected: 11/14/22 1324    Lab Status: Final result Specimen: Blood from Arm, Right Updated: 11/14/22 1355      pg/mL     Comprehensive metabolic panel [[id number]]  (Abnormal) Collected: 11/14/22 1324    Lab Status: Final result Specimen: Blood from Arm, Right Updated: 11/14/22 1348     Sodium 134 mmol/L      Potassium 4 1 mmol/L      Chloride 99 mmol/L      CO2 29 mmol/L      ANION GAP 6 mmol/L      BUN 15 mg/dL      Creatinine 0 96 mg/dL      Glucose 120 mg/dL      Calcium 9 4 mg/dL      AST 25 U/L      ALT 21 U/L      Alkaline Phosphatase 93 U/L      Total Protein 7 3 g/dL      Albumin 4 1 g/dL      Total Bilirubin 0 84 mg/dL      eGFR 51 ml/min/1 73sq m     Narrative:      [place] guidelines for Chronic Kidney Disease (CKD):   •  Stage 1 with normal or high GFR (GFR > 90 mL/min/1 73 square meters)  •  Stage 2 Mild CKD (GFR = 60-89 mL/min/1 73 square meters)  •  Stage 3A Moderate CKD (GFR = 45-59 mL/min/1 73 square meters)  •  Stage 3B Moderate CKD (GFR = 30-44 mL/min/1 73 square meters)  •  Stage 4 Severe CKD (GFR = 15-29 mL/min/1 73 square meters)  •  Stage 5 End Stage CKD (GFR <15 mL/min/1 73 square meters)  Note: GFR calculation is accurate only with a steady state creatinine    Lipase [[id number]]  (Normal) Collected: 11/14/22 1324    Lab Status: Final result Specimen: Blood from Arm, Right Updated: 11/14/22 1348     Lipase 31 u/L     Protime-INR [[id number]]  (Normal) Collected: 11/14/22 1324    Lab Status: Final result Specimen: Blood from Arm, Right Updated: 11/14/22 1344     Protime 14 2 seconds      INR 1 10    APTT [[id number]]  (Normal) Collected: 11/14/22 1324    Lab Status: Final result Specimen: Blood from Arm, Right Updated: 11/14/22 1344     PTT 27 seconds     FLU/RSV/COVID - if FLU/RSV clinically relevant [[id number]]  (Normal) Collected: 11/14/22 1237    Lab Status: Final result Specimen: Nares from Nose Updated: 11/14/22 1334     SARS-CoV-2 Negative     INFLUENZA A PCR Negative     INFLUENZA B PCR Negative     RSV PCR Negative    Narrative:      FOR PEDIATRIC PATIENTS - copy/paste COVID Guidelines URL to browser: [email or web address]  [email or web address]    SARS-CoV-2 assay is a Nucleic Acid Amplification assay intended for the  qualitative detection of nucleic acid from SARS-CoV-2 in nasopharyngeal  swabs  Results are for the presumptive identification of SARS-CoV-2 RNA  Positive results are indicative of infection with SARS-CoV-2, the virus  causing COVID-19, but do not rule out bacterial infection or co-infection  with other viruses  Laboratories within the [place] and its  territories are required to report all positive results to the appropriate  public health authorities  Negative results do not preclude SARS-CoV-2  infection and should not be used as the sole basis for treatment or other  patient management decisions  Negative results must be combined with  clinical observations, patient history, and epidemiological information  This test has not been FDA cleared or approved  This test has been authorized by FDA under an Emergency Use Authorization  (EUA)  This test is only authorized for the duration of time the  declaration that circumstances exist justifying the authorization of the  emergency use of an in vitro diagnostic tests for detection of SARS-CoV-2  virus and/or diagnosis of COVID-19 infection under section 564(b)(1) of  the Act, 21 U  S C  895RUD-8(R)(2), unless the authorization is terminated  or revoked sooner  The test has been validated but independent review by FDA  and CLIA is pending  Test performed using [employer] GeneXpert: This RT-PCR assay targets N2,  a region unique to SARS-CoV-2  A conserved region in the E-gene was chosen  for pan-Sarbecovirus detection which includes SARS-CoV-2  According to CMS-2020-01-R, this platform meets the definition of high-throughput technology  CBC and differential [[id number]] Collected: 11/14/22 1237    Lab Status: Final result Specimen: Blood from Arm, Right Updated: 11/14/22 1244     WBC 7 18 Thousand/uL      RBC 4 58 Million/uL      Hemoglobin 14 4 g/dL      Hematocrit 43 2 %      MCV 94 fL      MCH 31 4 pg      MCHC 33 3 g/dL      RDW 13 0 %      MPV 10 2 fL      Platelets 097 Thousands/uL      nRBC 0 /100 WBCs      Neutrophils Relative 69 %      Immat GRANS % 0 %      Lymphocytes Relative 20 %      Monocytes Relative 8 %      Eosinophils Relative 3 %      Basophils Relative 0 %      Neutrophils Absolute 4 95 Thousands/µL      Immature Grans Absolute 0 03 Thousand/uL      Lymphocytes Absolute 1 40 Thousands/µL      Monocytes Absolute 0 56 Thousand/µL      Eosinophils Absolute 0 21 Thousand/µL      Basophils Absolute 0 03 Thousands/µL                  CT abdomen pelvis with contrast   Final Result by Linda Del Angel MD (11/14 7328)      No bowel obstruction   No acute inflammatory stranding   Ventral hernia has containing fat   Incidental 8 mm cystic pancreatic lesion in the distal body of the pancreas, image 39 series 601 this may be evaluated with the nonemergent MRI            Workstation performed: [id number]         X-ray chest 1 view portable   Final Result by Mendel Haff, MD (11/14 0447)      No acute cardiopulmonary disease                    Workstation performed: [id number]                    Procedures  ECG 12 Lead Documentation Only    Date/Time: 11/14/2022 4:55 PM  Performed by: Pedro Beck PA-C  Authorized by: Louann Us Sarina Dennis PA-C     Indications / Diagnosis:  Syncope  ECG reviewed by me, the ED Provider: yes    Patient location:  ED  Interpretation:     Interpretation: non-specific    Rate:     ECG rate:  56    ECG rate assessment: bradycardic    Rhythm:     Rhythm: sinus bradycardia    Ectopy:     Ectopy: PAC    QRS:     QRS axis:  Normal    QRS intervals:  Normal  Conduction:     Conduction: normal    ST segments:     ST segments:  Normal  T waves:     T waves: normal    ECG 12 Lead Documentation Only    Date/Time: 11/14/2022 4:56 PM  Performed by: Mic Ramírez PA-C  Authorized by: Mic Ramírez PA-C     Indications / Diagnosis:  Syncope  ECG reviewed by me, the ED Provider: yes    Patient location:  ED  Previous ECG:     Previous ECG:  Compared to current    Comparison ECG info:  Compared to ECG from 2 hours prior  Interpretation:     Interpretation: non-specific    Quality:     Tracing quality:  Limited by artifact  Rate:     ECG rate:  94    ECG rate assessment: normal    Rhythm:     Rhythm: sinus rhythm and A-V block    Ectopy:     Ectopy: none    QRS:     QRS axis:  Left    QRS intervals:  Normal  Conduction:     Conduction: normal    ST segments:     ST segments:  Normal  T waves:     T waves: normal               ED Course  ED Course as of 11/14/22 1725   Mon Nov 14, 2022   [address] [place] [address] Pkwy syncope rule categorizes patient in the "not low risk group " Boyd syncope risk score puts patient at 5 1% risk of 30-day serious adverse event  [facility] agreed to evaluate  Patient is overall well-appearing  VS are stable  Syncope could be caused by UTI, but unclear if cardiac syncope due to no prodromal symptoms                                                MDM  Number of Diagnoses or Management Options  Syncope: new and requires workup  UTI (urinary tract infection): new and requires workup  Diagnosis management comments: 20-year-old female with history of hypertension presents to the emergency department for evaluation after a syncopal episode this morning  No prodromal symptoms concerning for cardiac etiology of syncope  On arrival patient is bradycardic at 51  Otherwise unremarkable vitals  Patient was cleaned of her feces  Exam relatively benign  No focal neuro deficits  Mild nausea and epigastric tenderness noted  Started IV fluids and gave zofran  Troponin negative  ECG shows sinus zoë but no ST interval changes  CXR shows no acute cardiopulmonary process  UA concerning for UTI  Gave rocephin for UTI  CT A/P negative for acute process  Discussed incidental findings with patient and recommended f/u with PCP for further testing in regards to pancreatic cyst    On re-evaluation, patient overall well-appearing, in no distress  Vitals have stabilized  HR now in the 80s  Syncope could be related to patient's UTI, however, more suspicious of cardiac etiology with no prodromal symptoms, sudden onset, zoë on arrival    With the concern for cardiac etiology of syncope, it is my concern that patient needs admission  Discussed with SLIM who agreed to evaluate  SLIM in agreement with observation  Dr Dorothy Lombardo accepting          Amount and/or Complexity of Data Reviewed  Clinical lab tests: ordered and reviewed  Tests in the radiology section of CPT®: ordered and reviewed  Obtain history from someone other than the patient: yes (EMS)  Review and summarize past medical records: yes  Discuss the patient with other providers: yes  Independent visualization of images, tracings, or specimens: yes    Patient Progress  Patient progress: improved      Disposition  Final diagnoses:   UTI (urinary tract infection)   Syncope     Time reflects when diagnosis was documented in both MDM as applicable and the Disposition within this note     Time User Action Codes Description Comment    11/14/2022  3:31 PM Shabbir Arreola Add [N39 0] UTI (urinary tract infection)     11/14/2022  3:40 PM Dorina Cancer, [address] [place] [address] [R55] Syncope       ED Disposition     ED Disposition   Admit    Condition   Stable    Date/Time   Mon Nov 14, 2022  5:12 PM    Comment   Case was discussed with Dr Scotty Cash and the patient's admission status was agreed to be Admission Status: observation status to the service of Dr Scotty Cash   Follow-up Information    None         Patient's Medications   Discharge Prescriptions    No medications on file       No discharge procedures on file      PDMP Review     None          ED Provider  Electronically Signed by           Bri Martino PA-C  11/14/22 2432

## 2022-11-15 ENCOUNTER — HOME HEALTH ADMISSION (OUTPATIENT)
Dept: HOME HEALTH SERVICES | Facility: HOME HEALTHCARE | Age: 87
End: 2022-11-15

## 2022-11-15 ENCOUNTER — APPOINTMENT (OUTPATIENT)
Dept: NON INVASIVE DIAGNOSTICS | Facility: HOSPITAL | Age: 87
End: 2022-11-15

## 2022-11-15 VITALS
DIASTOLIC BLOOD PRESSURE: 84 MMHG | BODY MASS INDEX: 24.48 KG/M2 | WEIGHT: 133 LBS | HEART RATE: 91 BPM | OXYGEN SATURATION: 92 % | RESPIRATION RATE: 20 BRPM | SYSTOLIC BLOOD PRESSURE: 154 MMHG | HEIGHT: 62 IN | TEMPERATURE: 97.5 F

## 2022-11-15 LAB
ANION GAP SERPL CALCULATED.3IONS-SCNC: 7 MMOL/L (ref 4–13)
AORTIC ROOT: 2.7 CM
APICAL FOUR CHAMBER EJECTION FRACTION: 68 %
ATRIAL RATE: 56 BPM
ATRIAL RATE: 94 BPM
BUN SERPL-MCNC: 12 MG/DL (ref 5–25)
CALCIUM SERPL-MCNC: 9.2 MG/DL (ref 8.4–10.2)
CHLORIDE SERPL-SCNC: 101 MMOL/L (ref 96–108)
CO2 SERPL-SCNC: 26 MMOL/L (ref 21–32)
CREAT SERPL-MCNC: 0.7 MG/DL (ref 0.6–1.3)
E WAVE DECELERATION TIME: 198 MS
ERYTHROCYTE [DISTWIDTH] IN BLOOD BY AUTOMATED COUNT: 13 % (ref 11.6–15.1)
FRACTIONAL SHORTENING: 36 (ref 28–44)
GFR SERPL CREATININE-BSD FRML MDRD: 75 ML/MIN/1.73SQ M
GLUCOSE P FAST SERPL-MCNC: 98 MG/DL (ref 65–99)
GLUCOSE SERPL-MCNC: 98 MG/DL (ref 65–140)
HCT VFR BLD AUTO: 42.7 % (ref 34.8–46.1)
HGB BLD-MCNC: 14.5 G/DL (ref 11.5–15.4)
INTERVENTRICULAR SEPTUM IN DIASTOLE (PARASTERNAL SHORT AXIS VIEW): 0.8 CM
INTERVENTRICULAR SEPTUM: 0.8 CM (ref 0.6–1.1)
LAAS-AP2: 9.2 CM2
LAAS-AP4: 8.6 CM2
LEFT ATRIUM SIZE: 2.7 CM
LEFT INTERNAL DIMENSION IN SYSTOLE: 2.8 CM (ref 2.1–4)
LEFT VENTRICULAR INTERNAL DIMENSION IN DIASTOLE: 4.4 CM (ref 3.5–6)
LEFT VENTRICULAR POSTERIOR WALL IN END DIASTOLE: 0.8 CM
LEFT VENTRICULAR STROKE VOLUME: 59 ML
LVSV (TEICH): 59 ML
MCH RBC QN AUTO: 31 PG (ref 26.8–34.3)
MCHC RBC AUTO-ENTMCNC: 34 G/DL (ref 31.4–37.4)
MCV RBC AUTO: 91 FL (ref 82–98)
MV E'TISSUE VEL-SEP: 4 CM/S
MV PEAK A VEL: 1.01 M/S
MV PEAK E VEL: 68 CM/S
MV STENOSIS PRESSURE HALF TIME: 58 MS
MV VALVE AREA P 1/2 METHOD: 3.79
P AXIS: 58 DEGREES
P AXIS: 71 DEGREES
PLATELET # BLD AUTO: 159 THOUSANDS/UL (ref 149–390)
PMV BLD AUTO: 10.1 FL (ref 8.9–12.7)
POTASSIUM SERPL-SCNC: 3.5 MMOL/L (ref 3.5–5.3)
PR INTERVAL: 204 MS
PR INTERVAL: 210 MS
QRS AXIS: -27 DEGREES
QRS AXIS: -32 DEGREES
QRSD INTERVAL: 84 MS
QRSD INTERVAL: 86 MS
QT INTERVAL: 376 MS
QT INTERVAL: 442 MS
QTC INTERVAL: 426 MS
QTC INTERVAL: 470 MS
RBC # BLD AUTO: 4.67 MILLION/UL (ref 3.81–5.12)
RIGHT VENTRICLE ID DIMENSION: 2.2 CM
SL CV LEFT ATRIUM LENGTH A2C: 3.4 CM
SL CV LV EF: 60
SL CV PED ECHO LEFT VENTRICLE DIASTOLIC VOLUME (MOD BIPLANE) 2D: 89 ML
SL CV PED ECHO LEFT VENTRICLE SYSTOLIC VOLUME (MOD BIPLANE) 2D: 31 ML
SODIUM SERPL-SCNC: 134 MMOL/L (ref 135–147)
T WAVE AXIS: 32 DEGREES
T WAVE AXIS: 45 DEGREES
TR MAX PG: 28 MMHG
TR PEAK VELOCITY: 2.7 M/S
TRICUSPID VALVE PEAK REGURGITATION VELOCITY: 2.67 M/S
VENTRICULAR RATE: 56 BPM
VENTRICULAR RATE: 94 BPM
WBC # BLD AUTO: 10.49 THOUSAND/UL (ref 4.31–10.16)

## 2022-11-15 RX ORDER — CEPHALEXIN 500 MG/1
500 CAPSULE ORAL EVERY 6 HOURS SCHEDULED
Status: DISCONTINUED | OUTPATIENT
Start: 2022-11-16 | End: 2022-11-15 | Stop reason: HOSPADM

## 2022-11-15 RX ORDER — CEPHALEXIN 500 MG/1
500 CAPSULE ORAL EVERY 6 HOURS SCHEDULED
Qty: 4 CAPSULE | Refills: 0 | Status: SHIPPED | OUTPATIENT
Start: 2022-11-16 | End: 2022-11-17

## 2022-11-15 RX ORDER — CEPHALEXIN 500 MG/1
500 CAPSULE ORAL EVERY 6 HOURS SCHEDULED
Qty: 4 CAPSULE | Refills: 0 | Status: SHIPPED | OUTPATIENT
Start: 2022-11-16 | End: 2022-11-15 | Stop reason: SDUPTHER

## 2022-11-15 RX ADMIN — SERTRALINE HYDROCHLORIDE 50 MG: 50 TABLET ORAL at 09:03

## 2022-11-15 RX ADMIN — ASPIRIN 81 MG 81 MG: 81 TABLET ORAL at 09:05

## 2022-11-15 RX ADMIN — CEFTRIAXONE 1000 MG: 1 INJECTION, SOLUTION INTRAVENOUS at 15:00

## 2022-11-15 RX ADMIN — HEPARIN SODIUM 5000 UNITS: 5000 INJECTION INTRAVENOUS; SUBCUTANEOUS at 05:29

## 2022-11-15 RX ADMIN — AMLODIPINE BESYLATE 5 MG: 5 TABLET ORAL at 09:03

## 2022-11-15 RX ADMIN — HEPARIN SODIUM 5000 UNITS: 5000 INJECTION INTRAVENOUS; SUBCUTANEOUS at 14:33

## 2022-11-15 NOTE — PHYSICAL THERAPY NOTE
Physical Therapy Evaluation     Patient's Name: Estevan Serna    Admitting Diagnosis  Syncope [R55]    Problem List  Patient Active Problem List   Diagnosis    Hearing loss    Gait abnormality    Osteoarthritis    Mediastinal mass    Spinal stenosis at L4-L5 level    Essential hypertension    Bilateral lower extremity edema    Benign paroxysmal positional vertigo    Urinary incontinence    History of CVA (cerebrovascular accident)    Right ankle sprain    Venous insufficiency of both lower extremities    Unspecified mood (affective) disorder, r/o major depressive disorder (single episode)    Mild neurocognitive disorder    Current moderate episode of major depressive disorder without prior episode ([facility] )    History of suicidal ideation    Generalized weakness    Metabolic encephalopathy    Hyponatremia    Constipation    Gastric distention    TANNER (acute kidney injury) ([facility] )    Gram-positive cocci bacteremia    Syncope    Acute cystitis without hematuria    Abnormal CT scan       Past Medical History  Past Medical History:   Diagnosis Date    TANNER (acute kidney injury) ([place] [address] ) 10/11/2022    Arthritis     Cataract     LAST ASSESSED: [id number]    Depression     Dislocation of left shoulder joint     LAST ASSESSSED: [id number]    Essential hypertension 4/3/2019    Gait disorder     LAST ASSESSED: [id number]    Herpes zoster     LAST ASSESSED: 10KRH1844    HL (hearing loss)     Impacted cerumen of both ears     LAST ASSESSED: [id number]    Impacted cerumen of right ear     LAST ASSESSED: [id number]    Lightheadedness     MILD AND PT REFUSES EKG ETC  PT PROMISES TO CALL IF WORSENS   LAST ASSESSED: 03OFW1003    Multiple falls     LAST ASSESSED: 06AGI7619    Near syncope     LAST ASSESSED: 44XSE0204    Postherpetic polyneuropathy     LAST ASSESSED: [id number]       Past Surgical History  Past Surgical History:   Procedure Laterality Date    CATARACT EXTRACTION, BILATERAL      HERNIA REPAIR      TONSILLECTOMY      TUBAL LIGATION 11/15/22 0755   PT Last Visit   PT Visit Date 11/15/22   Note Type   Note type Evaluation   Pain Assessment   Pain Assessment Tool 0-10   Pain Score No Pain  (denies "not right now")   Restrictions/Precautions   Weight Bearing Precautions Per Order No   Other Precautions Cognitive;Multiple lines;Telemetry; Fall Risk;Hard of hearing   Home Living   Type of Home Assisted living  [facility])   Home Layout Performs ADLs on one level; Able to live on main level with bedroom/bathroom; Access; Ramped entrance   [employer] chair;Grab bars in shower;Grab bars around toilet   [facility]  (baseline rollator usage)   Prior Function   Level of [place] Needs assistance with IADLS; Independent with functional mobility; Needs assistance with ADLs   Lives With Facility staff   Receives Help From Personal care attendant   IADLs Family/Friend/Other provides transportation; Family/Friend/Other provides meals; Independent with medication management   Falls in the last 6 months 0  (denies "I'm very careful" however syncopal event prior to arrival)   Vocational Retired   [employer]   Additional Pertinent History Jackie OT present for co-assessment due to medical complexity, required skilled interventions of 2 clinicians for care delivery  Family/Caregiver Present No   Cognition   Overall Cognitive Status Impaired   Arousal/Participation Alert   Orientation Level Oriented to person;Disoriented to situation;Oriented to time;Disoriented to place  ("I have no idea why I'm here", "December")   Memory Decreased recall of recent events;Decreased short term memory  ("they say I fell, I don't remember")   Following Commands Follows one step commands with increased time or repetition   Comments [place] was agreeable to PT assessment     RLE Assessment   RLE Assessment X  (3/5 gross musculature)   LLE Assessment LLE Assessment X  (3/5 gross musculature)   Vision-Basic Assessment   Current Vision Wears glasses only for reading  ("cheaters")   Patient Visual Report Other (Comment)  (denies acute visual changes)   Vestibular   Spontaneous Nystagmus (-) no evidence of nystagmus at rest in room light   Coordination   Movements are Fluid and Coordinated 0   Coordination and Movement Description Incremental mobility requiring increased time and tactile facilitation  Sharp/Dull   RLE Sharp/Dull Grossly intact   LLE Sharp/Dull Grossly intact   Bed Mobility   Supine to Sit 4  Minimal assistance   Additional items Assist x 1;HOB elevated; Bedrails; Increased time required;Verbal cues;LE management   Sit to Supine   (DNT pt  was sitting out of bed on recliner upon conclusion )   Additional Comments BP taken supine 184/84-> sitting 178/87->standing 167/88  Increasing lightheadedness reported upon sitting edge of bed  Increased time provided for resolution > 2 minutes  Transfers   Sit to Stand 4  Minimal assistance   Additional items Assist x 2;Bedrails; Increased time required;Verbal cues  (RW usage)   Stand to Sit 4  Minimal assistance   Additional items Assist x 2;Bedrails; Increased time required;Verbal cues   Stand pivot 4  Minimal assistance   Additional items Assist x 2; Increased time required;Verbal cues   Additional Comments Verbal cues for proper body mechanics, hand placement with transitional movements, and B pedal placement on floor  Ambulation/Elevation   Gait pattern Improper Weight shift;Narrow ERLINDA; Forward Flexion;Decreased foot clearance; Short stride; Step to  (BLE instability)   Gait Assistance 4  Minimal assist   Additional items Assist x 2; Tactile cues; Verbal cues   Assistive Device Rolling walker   Distance 2 feet  (to recliner, could not advance distance due to safety concerns)   Ambulation/Elevation Additional Comments Verbal cues for base of support widening for stability, safety while turning, and proper AD usage  Balance   Static Sitting Fair   Dynamic Sitting Fair -   Static Standing Poor +   Dynamic Standing Poor +   Ambulatory Poor   Endurance Deficit   Endurance Deficit Yes   Activity Tolerance   Activity Tolerance Patient limited by fatigue;Treatment limited secondary to medical complications (Comment)  (lightheadedness)   Nurse Made Aware Yes, nursing staff was informed of assessment outcome  Assessment   Prognosis Good   Problem List Decreased strength;Decreased endurance; Impaired balance;Decreased mobility; Decreased coordination;Decreased cognition;Decreased safety awareness; Impaired hearing   Assessment Pt is 80 y o  female seen for PT evaluation s/p admit to [facility] on 11/14/2022 w/ Syncope  PT consulted to assess pt's functional mobility and d/c needs  Order placed for PT eval and tx, w/ up w/ A order  Comorbidities affecting pt's physical performance at time of assessment include: weakness,syncope, acute cystitis without hematuria, HTN,hyopnatremia, hx of CVA  PTA, pt was residing in [facility]  Personal factors affecting pt at time of IE include: inability to ambulate household distances, inability to navigate community distances, inability to navigate level surfaces w/o external assistance, unable to perform dynamic tasks in community, decreased cognition, hearing impairments, unable to perform physical activity, limited insight into impairments and inability to perform ADLs  Please find objective findings from PT assessment regarding body systems outlined above with impairments and limitations including weakness, impaired balance, decreased endurance, impaired coordination, gait deviations, decreased activity tolerance, decreased functional mobility tolerance, decreased safety awareness, impaired judgement, fall risk and decreased cognition  From PT/mobility standpoint, recommendation at time of d/c would be post acute rehabilitation services pending progress in order to facilitate return to PLOF  Goals   Patient Goals to warm up  (provided multiple blankets upon conclusion)   LTG Expiration Date 11/25/22   Long Term Goal #1 1 )Patient will complete bed mobility supervision of 1 for decrease need for caregiver assistance, decrease burden of care  2 ) Patient will complete transfers supervision of 1 to decrease risk of falls, facilitate upright standing posture  3 ) BLE strength to greater than/equal to 4/5 gross musculature to increase ability to safely transfer, control descent to chair  4 ) Patient will exhibit increase dynamic standing to Fair 3-4 minutes without LOB CGA of 1 to improve activity tolerance  5 ) Patient will exhibit increase dynamic ambulatory balance to Fair 50-75 feet w/AD CGA of 1 to improve ability to mobilize to toilet, chair and decrease risk for additional medical complications  6 ) Patient will exhibit good self monitoring and ability to follow 2 step commands to increase complexity of tasks and resume ADL's without LOB  PT Treatment Day 0   Plan   Treatment/Interventions LE strengthening/ROM; Functional transfer training;Elevations; Therapeutic exercise; Endurance training;Cognitive reorientation;Patient/family training;Equipment eval/education; Bed mobility;Gait training;Spoke to nursing   PT Frequency 3-5x/wk   Recommendation   PT Discharge Recommendation (S)  Post acute rehabilitation services   Equipment Recommended [facility] Recommended Wheeled walker   Change/add to [place]? No   Additional Comments Upon conclusion, pt  was sitting out of bed on recliner  All needs were within reach  Additional Comments 2 Pt's raw score on the AM-[facility] Basic Mobility inpatient short form is 14, standardized score is 35 55  Patients at this level are likely to benefit from DC to [facility]  However, please refer to therapist recommendation for safe DC planning     AM-PAC Basic Mobility Inpatient   Turning in Bed Without Bedrails 3   Lying on Back to Sitting on Edge of Flat Bed 3   Moving Bed to Chair 2   Standing Up From Chair 2   Walk in Room 2   Climb 3-5 Stairs 2   Basic Mobility Inpatient Raw Score 14   Basic Mobility Standardized Score 35 55   Highest Level Of Mobility   -HLM Goal 4: Move to chair/commode   -HLM Achieved 4: Move to chair/commode     History/Personal Factors/Comorbidities: weakness,syncope, acute cystitis without hematuria, HTN,hyopnatremia, hx of CVA    # of body structures/limitations: muscle weakness, activity intolerance,decreased endurance, impaired balance, gait deviations,impaired cognition    Clinical presentation: unstable as seen in hearing impairment, high fall risk, progressive symptoms prior to hospitalization,impaired cognition    Initial Assessment Time: [id number]    Tyesha Odom, PT

## 2022-11-15 NOTE — PLAN OF CARE
Problem: OCCUPATIONAL THERAPY ADULT  Goal: Performs self-care activities at highest level of function for planned discharge setting  See evaluation for individualized goals  Description: Treatment Interventions: ADL retraining, Functional transfer training, UE strengthening/ROM, Patient/family training, Endurance training, Energy conservation     See flowsheet documentation for full assessment, interventions and recommendations  Note: Limitation: Decreased ADL status, Decreased UE ROM, Decreased UE strength, Decreased Safe judgement during ADL, Decreased cognition, Decreased endurance  Prognosis: Good  Assessment: Pt is a 80 y o  female seen for OT evaluation s/p admit to [place] [address] on 11/14/2022 w/ Syncope  Comorbidities affecting pt's functional performance at time of assessment include: HTN, hx of CVA, MDD, hearing loss, OA  Personal factors affecting pt at time of IE include:difficulty performing ADLS, difficulty performing IADLS , limited insight into deficits and health management   Prior to admission, pt needed A for ADLs and IADLs  Upon evaluation: the following deficits impact occupational performance: weakness, decreased ROM, decreased strength, decreased balance, decreased tolerance and decreased safety awareness  Pt to benefit from continued skilled OT tx while in the hospital to address deficits as defined above and maximize level of functional independence w ADL's and functional mobility  Occupational Performance areas to address include: bathing/shower, toilet hygiene, dressing, medication management, functional mobility and community mobility  From OT standpoint, recommendation at time of d/c would be STR       OT Discharge Recommendation: Post acute rehabilitation services     Jaye Cruz MS, OTR/L

## 2022-11-15 NOTE — ED NOTES
Received pt asleep  No distress noted  Georgina JIANG   [address] [place] [address], [address] [place] [address]  11/15/22 5801

## 2022-11-15 NOTE — ASSESSMENT & PLAN NOTE
· The patient was brought to the ED after having and “passed out” episode at [facility]  · Suspect to be due to vasovagal response and possible orthostatic change in BP  · The patient does not remember the event  She denies any prodrome of symptoms  · No significant arrhythmia noted on telemetry    · Echocardiogram shows LVEF of 55-60%  · + orthostatic change in BP but no evidence of hypotension   · The patient with feces upon arrival to the ED  Discussed with her daughterJonhkomalcarroll Ray  Recommend to follow up with neurology or PCP to obtain a routine EEG to rule out possible seizure disorder  · No prior seizures history  No seizures activities during the hospital stay  No AED upon discharge  · PT/OT recommend short-term rehab  However the patient and her daughter would prefer to return [facility] with [facility]

## 2022-11-15 NOTE — ASSESSMENT & PLAN NOTE
· CT a/p shows incidental 8 mm cystic pancreatic lesion in the distal body of the pancreas  Recommend to follow-up with nonemergent MRI     · Discussed finding with her daughter

## 2022-11-15 NOTE — PLAN OF CARE
Problem: PHYSICAL THERAPY ADULT  Goal: Performs mobility at highest level of function for planned discharge setting  See evaluation for individualized goals  Description: Treatment/Interventions: LE strengthening/ROM, Functional transfer training, Elevations, Therapeutic exercise, Endurance training, Cognitive reorientation, Patient/family training, Equipment eval/education, Bed mobility, Gait training, Spoke to nursing  Equipment Recommended: Mer[facility]       See flowsheet documentation for full assessment, interventions and recommendations  Note: Prognosis: Good  Problem List: Decreased strength, Decreased endurance, Impaired balance, Decreased mobility, Decreased coordination, Decreased cognition, Decreased safety awareness, Impaired hearing  Assessment: Pt is 80 y o  female seen for PT evaluation s/p admit to [facility] on 11/14/2022 w/ Syncope  PT consulted to assess pt's functional mobility and d/c needs  Order placed for PT eval and tx, w/ up w/ A order  Comorbidities affecting pt's physical performance at time of assessment include: weakness,syncope, acute cystitis without hematuria, HTN,hyopnatremia, hx of CVA  PTA, pt was residing in [facility]  Personal factors affecting pt at time of IE include: inability to ambulate household distances, inability to navigate community distances, inability to navigate level surfaces w/o external assistance, unable to perform dynamic tasks in community, decreased cognition, hearing impairments, unable to perform physical activity, limited insight into impairments and inability to perform ADLs   Please find objective findings from PT assessment regarding body systems outlined above with impairments and limitations including weakness, impaired balance, decreased endurance, impaired coordination, gait deviations, decreased activity tolerance, decreased functional mobility tolerance, decreased safety awareness, impaired judgement, fall risk and decreased cognition  From PT/mobility standpoint, recommendation at time of d/c would be post acute rehabilitation services pending progress in order to facilitate return to PLOF  PT Discharge Recommendation: (S) Post acute rehabilitation services    See flowsheet documentation for full assessment

## 2022-11-15 NOTE — PROGRESS NOTES
-- Patient:  -- MRN: [id number]  -- Aidin Request ID: [id number]  -- Level of care reserved: [facility]  -- Partner Reserved: [facility] [place], [address] [place] [address] [phone]  -- Clinical needs requested:  -- Geography searched: [phone]  -- Start of Service:  -- Request sent: 2:16pm EST on 11/15/2022 by Gilberto Joya  -- Partner reserved: 3:22pm EST on 11/15/2022 by Gilberto Joya  -- Choice list shared:

## 2022-11-15 NOTE — CASE MANAGEMENT
Case Management Assessment & Discharge Planning Note    Patient name Kaykay Meyer  Location ED 12/ED 12 MRN [id number]  : 1930 Date 11/15/2022       Current Admission Date: 2022  Current Admission Diagnosis:Syncope   Patient Active Problem List    Diagnosis Date Noted   • Syncope 2022   • Acute cystitis without hematuria 2022   • Abnormal CT scan 2022   • Gram-positive cocci bacteremia 10/12/2022   • Constipation 10/11/2022   • Gastric distention 10/11/2022   • TANNER (acute kidney injury) ([facility] ) 10/11/2022   • Generalized weakness    • Metabolic encephalopathy    • Hyponatremia 2022   • Current moderate episode of major depressive disorder without prior episode ([facility] ) 2020   • History of suicidal ideation 2020   • History of CVA (cerebrovascular accident) 2020   • Right ankle sprain 2020   • Venous insufficiency of both lower extremities 2020   • Unspecified mood (affective) disorder, r/o major depressive disorder (single episode) 2020   • Mild neurocognitive disorder 2020   • Benign paroxysmal positional vertigo 2019   • Urinary incontinence 2019   • Bilateral lower extremity edema 2019   • Essential hypertension 2019   • Mediastinal mass 2019   • Spinal stenosis at L4-L5 level 2019   • Gait abnormality 2015   • Hearing loss 2013   • Osteoarthritis 2012      LOS (days): 0  Geometric Mean LOS (GMLOS) (days):   Days to GMLOS:     OBJECTIVE:              Current admission status: Observation       Preferred Pharmacy:   [address] [place], [place] [address] [place] [address]  Phone: [phone] Fax: [phone]    Primary Care Provider: Vijay Vanessa MD    Primary Insurance: MEDICARE  Secondary Insurance: [facility] HEALTH OPTIONS PROGRAM    ASSESSMENT:  [facility], [address] [place] [address] Daughter   Primary Phone: [phone] (Mobile)               Advance Directives  Does patient have a [address]?: Yes  Does patient have Advance Directives?: Yes  Advance Directives: Living will, Power of  for health care  Primary Contact: Dottie Castle (daughter) [phone]         Readmission Root Cause  30 Day Readmission: No    Patient Information  Admitted from[de-identified] Other (comment) ([facility])  Mental Status: Alert  During Assessment patient was accompanied by: Not accompanied during assessment  Assessment information provided by[de-identified] Patient  Primary Caregiver: Self  Support Systems: Son, Daughter  [place] of Residence: [address] do you live in?: Via [address]is[address] entry access options   Select all that apply : No steps to enter home  Type of Current Residence: Other (Comment) ([facility])  In the last 12 months, was there a time when you were not able to pay the mortgage or rent on time?: No  In the last 12 months, how many places have you lived?: 1  In the last 12 months, was there a time when you did not have a steady place to sleep or slept in a shelter (including now)?: No  Homeless/housing insecurity resource given?: N/A  Living Arrangements: Lives Alone  Is patient a ?: No    Activities of Daily Living Prior to Admission  Functional Status: Independent  Completes ADLs independently?: Yes  Ambulates independently?: No  Level of ambulatory dependence: Assistance  Does patient use assisted devices?: Yes  Assisted Devices (DME) used: Roxcaro Herron  Does patient currently own DME?: Yes  What DME does the patient currently own?: Rox Herron  Does patient have a history of Outpatient Therapy (PT/OT)?: No  Does the patient have a history of Short-Term Rehab?: Yes  Does patient have a history of HHC?: Yes ([facility])  Does patient currently have [facility]?: No         Patient Information Continued  Income Source: SSI/SSD  Does patient have prescription coverage?: Yes  Within the past 12 months, you worried that your food would run out before you got the money to buy more : Never true  Within the past 12 months, the food you bought just didn't last and you didn't have money to get more : Never true  Food insecurity resource given?: N/A  Does patient receive dialysis treatments?: No  Does patient have a history of substance abuse?: No  Does patient have a history of Mental Health Diagnosis?: Yes (major depressive disorder; suicial ideation; unspecified mood affective disorder)  Is patient receiving treatment for mental health?: Yes (medication management)  Has patient received inpatient treatment related to mental health in the last 2 years?: No         Means of Transportation  Means of Transport to Appts[de-identified] Family transport  In the past 12 months, has lack of transportation kept you from medical appointments or from getting medications?: No  In the past 12 months, has lack of transportation kept you from meetings, work, or from getting things needed for daily living?: No  Was application for public transport provided?: N/A        DISCHARGE DETAILS:    Discharge planning discussed with[de-identified] Patient, daughter in law, daughter Tadeo [place] of Choice: Yes  Comments - Freedom of Choice: CM reviewed rehab recommendation with pt and daughter   Family not in agreement with rehab and would like d/c to [facility]  CM contacted family/caregiver?: Yes (daughter in law, daughter, CM left for brother)  Were Treatment Team discharge recommendations reviewed with patient/caregiver?: Yes  Did patient/caregiver verbalize understanding of patient care needs?: Yes  Were patient/caregiver advised of the risks associated with not following Treatment Team discharge recommendations?: Yes    Contacts  Patient Contacts: Kecia Phillips  Relationship to Patient[de-identified] Family (daughter)  Contact Method: Phone  Phone Number: [phone]  Reason/Outcome: Emergency Contact, Discharge [address]         Is the patient interested in [facility] at discharge?: Yes  Home Health Discipline requested[de-identified] Occupational Therapy, Physical [address] Agency Name[de-identified] [facility] Provider[de-identified] PCP  Home Health Services Needed[de-identified] Evaluate Functional Status and Safety, Gait/ADL Training, Strengthening/Theraputic Exercises to Improve Function  Homebound Criteria Met[de-identified] Uses an Assist Device (i e  cane, walker, etc), Requires the Assistance of Another Person for Safe Ambulation or to Leave the Home  Supporting Clincal Findings[de-identified] Fatigues Easliy in [employer], Limited Endurance    DME Referral Provided  Referral made for DME?: No    Other Referral/Resources/Interventions Provided:  Interventions: HHC  Referral Comments: Referral for [facility] sent via A[facility]         Treatment Team Recommendation: Short Term Rehab  Discharge Destination Plan[de-identified] Home with [place] at Discharge : Family                                      Additional Comments: CM met with pt at bedside  CM reviewed recommendation for rehab with pt  Pt agreeable to blanket referrals for rehab  CM called son Lisandro Lester but Lisandro Lester' wife answered  She reported Lisandro Lester would not be home until 430pm and requested CM call daughter Regan Elliott  CM called Reganshaw Elliott and reviewed rehab recommendation  Regan Elliott not in agreement with rehab  Regan Elliott would like [address]  Pt's phone dead, so CM put phone in pt's room so Regan Elliott could discuss with pt  CM returned a few minutes later and pt reported she did not want rehab and wanted to return to [facility] with [address]  Pt reported her brother was able to transport upon d/c  [facility] will be able to receive [address] at [facility]  CM placed referral for [facility]  CM left VM for brother Jean Phan requesting callback to discuss transportation arrangements  CM to follow

## 2022-11-15 NOTE — ED NOTES
Echo tech is at pt bedside  [facility], [address] [place] [address]  11/15/22 9194

## 2022-11-15 NOTE — CASE MANAGEMENT
Case Management Discharge Planning Note    Patient name Sam Honeycutt  Location ED 12/ED 12 MRN [id number]  : 1930 Date 11/15/2022       Current Admission Date: 2022  Current Admission Diagnosis:Syncope   Patient Active Problem List    Diagnosis Date Noted   • Syncope 2022   • Acute cystitis without hematuria 2022   • Abnormal CT scan 2022   • Gram-positive cocci bacteremia 10/12/2022   • Constipation 10/11/2022   • Gastric distention 10/11/2022   • [facility]ANNER (acute kidney [facility]) ([facility] ) 10/11/2022   • Generalized weakness    • Metabolic encephalopathy    • Hyponatremia 2022   • Current moderate episode of major depressive disorder without prior episode ([facility] ) 2020   • History of suicidal ideation 2020   • History of CVA (cerebrovascular accident) 2020   • Right ankle sprain 2020   • Venous insufficiency of both lower extremities 2020   • Unspecified mood (affective) disorder, r/o major depressive disorder (single episode) 2020   • Mild neurocognitive disorder 2020   • Benign paroxysmal positional vertigo 2019   • Urinary incontinence 2019   • Bilateral lower extremity edema 2019   • Essential hypertension 2019   • Mediastinal mass 2019   • Spinal stenosis at L4-L5 level 2019   • Gait abnormality 2015   • Hearing loss 2013   • Osteoarthritis 2012      LOS (days): 0  Geometric Mean LOS (GMLOS) (days):   Days to GMLOS:     OBJECTIVE:            Current admission status: Observation   Preferred Pharmacy:   [address] [place] [address]  Phone: [phone] Fax: [phone]    Primary Care Provider: Jasmine Stearns MD    Primary Insurance: MEDICARE  Secondary Insurance: [facility] HEALTH OPTIONS PROGRAM    DISCHARGE DETAILS:    Discharge planning discussed with[de-identified] Patient, daughter in law, daughter Corey Boggs, brother Lisa Oneill at [facility] of Choice: Yes  Comments - Freedom of Choice: CM reviewed rehab recommendation with pt and daughter  Family not in agreement with rehab and would like d/c to [facility] with [facility] for [address]  CM contacted family/caregiver?: Yes (daughter in law, daughter Elyse Mata, brother Reema Forrester)  Were Treatment Team discharge recommendations reviewed with patient/caregiver?: Yes  Did patient/caregiver verbalize understanding of patient care needs?: Yes  Were patient/caregiver advised of the risks associated with not following Treatment Team discharge recommendations?: Yes    Contacts  Patient Contacts: Elyse Mata  Relationship to Patient[de-identified] Family  Contact Method: Phone  Phone Number: [phone]  Reason/Outcome: Emergency Contact, Discharge [address]         Is the patient interested in [address] at discharge?: Yes  [address] requested[de-identified] Occupational Therapy, Physical [address] Name[de-identified] [address] [place] [address] Provider[de-identified] PCP  Home Health Services Needed[de-identified] Evaluate Functional Status and Safety, Gait/ADL Training, Strengthening/Theraputic Exercises to Improve Function  Homebound Criteria Met[de-identified] Uses an Assist Device (i e  cane, walker, etc), Requires the Assistance of Another Person for Safe Ambulation or to Leave the Home  Supporting Clincal Findings[de-identified] Limited Endurance, Fatigues Easliy in [employer]    DME Referral Provided  Referral made for DME?: No    Other Referral/Resources/Interventions Provided:  Interventions: [facility]  Referral Comments: [facility] able to accept pt         Treatment Team Recommendation: Short Term Rehab  Discharge Destination Plan[de-identified] Home with [place] at Discharge : Family                                      Additional Comments: CM reviewed pt with attending  Pt is cleared for d/c today  CM spoke with brothjayy Forrester via phone   Reema Forrester able to transport pt to [facility] around 430pm today  [facility]  CM called [facility] to inform them of pt's d/c home today

## 2022-11-15 NOTE — OCCUPATIONAL THERAPY NOTE
Occupational Therapy Evaluation      Ada ODESSA Smiley    11/15/2022    Principal Problem:    Syncope  Active Problems:    Essential hypertension    Benign paroxysmal positional vertigo    History of CVA (cerebrovascular accident)    Current moderate episode of major depressive disorder without prior episode ([facility] )    Hyponatremia    Acute cystitis without hematuria    Abnormal CT scan      Past Medical History:   Diagnosis Date    TANNER (acute kidney injury) ([facility] ) 10/11/2022    Arthritis     Cataract     LAST ASSESSED: [id number]    Depression     Dislocation of left shoulder joint     LAST ASSESSSED: [id number]    Essential hypertension 4/3/2019    Gait disorder     LAST ASSESSED: [id number]    Herpes zoster     LAST ASSESSED: [id number]    HL (hearing loss)     Impacted cerumen of both ears     LAST ASSESSED: [id number]    Impacted cerumen of right ear     LAST ASSESSED: [id number]    Lightheadedness     MILD AND PT REFUSES EKG ETC  PT PROMISES TO CALL IF WORSENS  LAST ASSESSED: [id number]    Multiple falls     LAST ASSESSED: 89REU0120    Near syncope     LAST ASSESSED: 15IWQ4729    Postherpetic polyneuropathy     LAST ASSESSED: 14TJN5307       Past Surgical History:   Procedure Laterality Date    CATARACT EXTRACTION, BILATERAL      HERNIA REPAIR      TONSILLECTOMY      TUBAL LIGATION          11/15/22 0800   OT Last Visit   OT Visit Date 11/15/22   Note Type   Note type Evaluation   Additional Comments Pt seen as a co-eval with PT due to the patient's co-morbidities, clinically unstable presentation, and present impairments which are a regression from the patient's baseline  Pain Assessment   Pain Assessment Tool 0-10   Pain Score No Pain   Restrictions/Precautions   Weight Bearing Precautions Per Order No   Other Precautions Cognitive;Multiple lines;Telemetry; Fall Risk;Hard of hearing   Home Living   Type of Home Assisted living  [facility]   Home Layout Performs ADLs on one level; Able to live on main level with bedroom/bathroom; Access; Ramped entrance   i[employer] Grab bars in shower; Shower chair;Grab bars around toilet   [facility]  (baseline rollator usage)   Prior Function   Level of [place] Needs assistance with IADLS; Independent with functional mobility; Needs assistance with ADLs   Lives With Facility staff   Receives Help From Personal care attendant   IADLs Family/Friend/Other provides transportation; Family/Friend/Other provides meals; Independent with medication management   Falls in the last 6 months 0  (Pt denies falls, pt reports being very careful )   Vocational Retired   ADL   UB Bathing Assistance 5  [address]  [address] [facility] 5  [address] 4  Minimal Assistance   Bed Mobility   Supine to Sit 4  Minimal assistance   Additional items Assist x 1;HOB elevated; Bedrails; Increased time required;Verbal cues;LE management   Sit to Supine   (DNT, pt was sitting OOB on recliner upon conclusion of session )   Additional Comments BP taken supine 184/84-> sitting 178/87->standing 167/88  Increasing lightheadedness reported upon sitting edge of bed  Increased time provided for resolution > 2 minutes  Transfers   Sit to Stand 4  Minimal assistance   Additional items Assist x 2;Bedrails; Increased time required;Verbal cues  (RW usage)   Stand to Sit 4  Minimal assistance   Additional items Assist x 2;Bedrails; Increased time required;Verbal cues   Stand pivot 4  Minimal assistance   Additional items Assist x 2; Increased time required;Verbal cues   Additional Comments verbal cues for safe hand placement during transfers   Balance   Static Sitting Fair   Dynamic Sitting Fair -   Static Standing Poor +   Dynamic Standing Poor +   Ambulatory Poor +   Activity Tolerance   Activity Tolerance Patient limited by fatigue;Treatment limited secondary to medical complications (Comment)  (Pt reported lightheadedness)   Nurse Made Aware Yes nursing staff was informed of assessment outcome  RUE Assessment   RUE Assessment WFL   RUE Strength   RUE Overall Strength   (3/5)   LUE Assessment   LUE Assessment WFL   LUE Strength   LUE Overall Strength   (3/5)   Vision-Basic Assessment   Current Vision Wears glasses only for reading   Psychosocial   Psychosocial (WDL) WDL   Cognition   Overall Cognitive Status Impaired   Arousal/Participation Alert; Cooperative   Attention Within functional limits   Orientation Level Oriented to person;Disoriented to situation;Oriented to time;Disoriented to place   Memory Decreased recall of recent events;Decreased short term memory   Following Commands Follows one step commands with increased time or repetition   Comments Pt was agreeable to OT assessment  Assessment   Limitation Decreased ADL status; Decreased UE ROM; Decreased UE strength;Decreased Safe judgement during ADL;Decreased cognition;Decreased endurance   Prognosis Good   Assessment Pt is a 80 y o  female seen for OT evaluation s/p admit to [place] [address] on 11/14/2022 w/ Syncope  Comorbidities affecting pt's functional performance at time of assessment include: HTN, hx of CVA, MDD, hearing loss, OA  Personal factors affecting pt at time of IE include:difficulty performing ADLS, difficulty performing IADLS , limited insight into deficits and health management   Prior to admission, pt needed A for ADLs and IADLs  Upon evaluation: the following deficits impact occupational performance: weakness, decreased ROM, decreased strength, decreased balance, decreased tolerance and decreased safety awareness  Pt to benefit from continued skilled OT tx while in the hospital to address deficits as defined above and maximize level of functional independence w ADL's and functional mobility   Occupational Performance areas to address include: bathing/shower, toilet hygiene, dressing, medication management, functional mobility and community mobility  From OT standpoint, recommendation at time of d/c would be STR  High complexity eval d/t cognitive status and current physical status  Goals   Patient Goals To feel better   Plan   Treatment Interventions ADL retraining;Functional transfer training;UE strengthening/ROM; Patient/family training; Endurance training;Energy conservation   Goal Expiration Date 11/25/22   OT Treatment Day 0   OT Frequency 3-5x/wk   Recommendation   OT Discharge Recommendation Post acute rehabilitation services   Additional Comments  The patient's raw score on the AM-PAC Daily Activity inpatient short form is 16, standardized score is 35 96, less than 39 4  Patients at this level are likely to benefit from discharge to post-acute rehabilitation services  Please refer to the recommendation of the Occupational Therapist for safe discharge planning     AM-PAC Daily Activity Inpatient   Lower Body Dressing 2   Bathing 2   Toileting 2   Upper Body Dressing 3   Grooming 3   Eating 4   Daily Activity Raw Score 16   Daily Activity Standardized Score (Calc for Raw Score >=11) 35 96   AM-PAC Applied Cognition Inpatient   Following a Speech/Presentation 3   Understanding Ordinary Conversation 3   Taking Medications 2   Remembering Where Things Are Placed or Put Away 3   Remembering List of 4-5 Errands 3   Taking Care of Complicated Tasks 2   Applied Cognition Raw Score 16   Applied Cognition Standardized Score 35 03     GOALS:    Pt will achieve the following within specified time frame: STG  Pt will achieve the following goals within 5 days    *ADL transfers with CGA for inc'd independence with ADLs/purposeful tasks    *UB ADL with (S) for inc'd independence with self cares    *LB ADL with CGA using AE prn for inc'd independence with self cares    *Toileting with CGA for clothing management and hygiene for return to PLOF with personal care    *Increase static stand balance and dyn stand balance to F for inc'd safety with standing purposeful tasks    *Increase stand tolerance x3 m for inc'd tolerance with standing purposeful tasks    *Participate in 10m UE therex to increase overall stamina/activity tolerance for purposeful tasks    *Bed mobility- (I) for inc'd independence to manage own comfort and initiate EOB & OOB purposeful tasks    Pt will achieve the following within specified time frame: LTG  Pt will achieve the following goals within 10 days    *ADL transfers with (S) for inc'd independence with ADLs/purposeful tasks    *UB ADL with (I) for inc'd independence with self cares    *LB ADL with (S) using AE prn for inc'd independence with self cares    *Toileting with (S) for clothing management and hygiene for return to PLOF with personal care    *Increase static stand balance and dyn stand balance to G for inc'd safety with standing purposeful tasks    *Increase stand tolerance x7 m for inc'd tolerance with standing purposeful tasks      Collette Marie, MS, OTR/L

## 2022-11-15 NOTE — DISCHARGE SUMMARY
[address]  Discharge- Evelyn 1/28/1930, 80 y o  female MRN: [id number]  Unit/Bed#: ED 12 Encounter: [id number]  Primary Care Provider: River Garvey MD   Date and time admitted to hospital: 11/14/2022 11:50 AM    * Syncope  Assessment & Plan  · The patient was brought to the ED after having and “passed out” episode at bin  · Suspect to be due to vasovagal response and possible orthostatic change in BP  · The patient does not remember the event  She denies any prodrome of symptoms  · No significant arrhythmia noted on telemetry    · Echocardiogram shows LVEF of 55-60%  · + orthostatic change in BP but no evidence of hypotension   · The patient with feces upon arrival to the ED  Discussed with her daughterDeyanira Mckeon  Recommend to follow up with neurology or PCP to obtain a routine EEG to rule out possible seizure disorder  · No prior seizures history  No seizures activities during the hospital stay  No AED upon discharge  · PT/OT recommend short-term rehab  However the patient and her daughter would prefer to [facility]  Abnormal CT scan  Assessment & Plan  · CT a/p shows incidental 8 mm cystic pancreatic lesion in the distal body of the pancreas  Recommend to follow-up with nonemergent MRI     · Discussed finding with her daughter       Acute cystitis without hematuria  Assessment & Plan  · The patient complains of increasing frequency  · Urinalysis shows possible urinary tract infection  · Follow-up with urine culture  · Received 2 doses of ceftriaxone; will discharge on 1 more day of Keflex    Hyponatremia  Assessment & Plan  · Mild   · No neurological deficit  · Continue to encourage oral intake      Current moderate episode of major depressive disorder without prior episode ([facility] [address] )  Assessment & Plan  · Continue with Zoloft     History of CVA (cerebrovascular accident)  Assessment & Plan  · Without significant deficit  · Continue with aspirin and statin     Benign paroxysmal positional vertigo  Assessment & Plan  · Denies any symptoms currently  · Continue supportive care     Essential hypertension  Assessment & Plan  · BP appears to be controlled  · Continue with amlodipine         Discharging Physician / Practitioner: ANTONIA Pereira  PCP: Tito Shaw MD  Admission Date:   Admission Orders (From admission, onward)     Ordered        11/14/22 1713  Place in Observation  Once            11/14/22 1715  Place in Observation  Once                      Discharge Date: 11/15/22    Medical Problems             Resolved Problems  Date Reviewed: 11/15/2022   None                 Consultations During Hospital Stay:  · IP CONSULT TO CASE MANAGEMENT      Procedures Performed:   X-ray chest 1 view portable    Result Date: 11/14/2022  CHEST INDICATION:   chest pain  COMPARISON:  10/11/2022 EXAM PERFORMED/VIEWS:  XR CHEST PORTABLE FINDINGS: Cardiomediastinal silhouette appears unremarkable  The lungs are clear  Mild chronic interstitial accentuation  No pneumothorax or pleural effusion  Left shoulder surgical anchors and an known left enlarged thyroid     No acute cardiopulmonary disease  Workstation performed: [id number]     CT head wo contrast    Result Date: 11/15/2022  CT BRAIN - WITHOUT CONTRAST INDICATION:   Syncope, simple, normal neuro exam syncope/unresponsive  COMPARISON:  CT head without contrast April 30, 2020  TECHNIQUE:  CT examination of the brain was performed  In addition to axial images, sagittal and coronal 2D reformatted images were created and submitted for interpretation  Radiation dose length product (DLP) for this visit:  828 85 mGy-cm   This examination, like all CT scans performed in the [facility], was performed utilizing techniques to minimize radiation dose exposure, including the use of iterative  reconstruction and automated exposure control  IMAGE QUALITY:  Diagnostic   FINDINGS: PARENCHYMA: Decreased attenuation is noted in periventricular and subcortical white matter demonstrating an appearance that is statistically most likely to represent advanced microangiopathic change  Unchanged chronic small infarcts in right caudate and  bilateral cerebellum  No CT signs of acute infarction  No intracranial mass, mass effect or midline shift  No acute parenchymal hemorrhage  Arterial calcifications of carotid siphons  VENTRICLES AND EXTRA-AXIAL SPACES:  Stable mild ventriculomegaly with prominent bilateral sylvian fissures  No obstructive hydrocephalus  No acute intraventricular or extra-axial hemorrhage  VISUALIZED ORBITS AND PARANASAL SINUSES: Sequela of bilateral cataract surgery  Severe mucosal thickening in left sphenoid sinus with chronic osteitis  CALVARIUM AND EXTRACRANIAL SOFT TISSUES:  Normal      No acute intracranial abnormality  Workstation performed: [id number]     CT abdomen pelvis with contrast    Result Date: 11/14/2022  CT ABDOMEN AND PELVIS WITH IV CONTRAST INDICATION:   abdominal pain  COMPARISON:  None  TECHNIQUE:  CT examination of the abdomen and pelvis was performed  Axial, sagittal, and coronal 2D reformatted images were created from the source data and submitted for interpretation  Radiation dose length product (DLP) for this visit:  557 23 mGy-cm   This examination, like all CT scans performed in the [facility], was performed utilizing techniques to minimize radiation dose exposure, including the use of iterative  reconstruction and automated exposure control  IV Contrast:  100 mL of iohexol (OMNIPAQUE) Enteric Contrast:  Enteric contrast was not administered  FINDINGS: ABDOMEN LOWER CHEST:  Ascending aorta measures 4 3 cm LIVER/BILIARY TREE:  No focal liver lesion seen Mild prominence of the intrahepatic ducts seen GALLBLADDER:  No calcified gallstones  No pericholecystic inflammatory change   The common bile duct measures 8 mm SPLEEN: Unremarkable  PANCREAS:  A cystic pancreatic lesion in the tail of the pancreas, measuring 8 mm, seen in image 39 series 601 ADRENAL GLANDS:  Unremarkable  KIDNEYS/URETERS:  Dilation of the right renal pelvis, left renal pelvis seen STOMACH AND BOWEL:  Diverticulosis seen No abnormal dilation of the small bowel loops seen Ileocecal junction appear unremarkable APPENDIX:  No findings to suggest appendicitis  ABDOMINOPELVIC CAVITY:  No ascites  No pneumoperitoneum  No lymphadenopathy  VESSELS:  Celiac trunk appear unremarkable SMA appear unremarkable RICHAR appear unremarkable PELVIS REPRODUCTIVE ORGANS:  Uterus appears unremarkable No adnexal mass seen URINARY BLADDER:  Unremarkable  ABDOMINAL WALL/INGUINAL REGIONS: There are 2 ventral hernia containing fat, image 39 series 2, image 49 series 2 OSSEOUS STRUCTURES:  No acute compression collapse of the vertebra No gross lytic lesion Degenerative disc disease at L5-S1, L4-5, L3-4: Convexity and L1 S2     No bowel obstruction No acute inflammatory stranding Ventral hernia has containing fat Incidental 8 mm cystic pancreatic lesion in the distal body of the pancreas, image 39 series 601 this may be evaluated with the nonemergent MRI Workstation performed: [id number]     Echo complete w/ contrast if indicated    Result Date: 11/15/2022  •  Left Ventricle: Left ventricular cavity size is normal  Wall thickness is mildly increased  The left ventricular ejection fraction is 55-60%  Systolic function is normal  Wall motion is normal  Diastolic function is mildly abnormal, consistent with grade I (abnormal) relaxation  •  Aortic Valve: There is aortic sclerosis  •  Mitral Valve: There is annular calcification  There is mild regurgitation  •  Tricuspid Valve: There is mild regurgitation  •      Significant Findings / Test Results:   · Refer to above     Incidental Findings:   · Pancreatic cyst     Test Results Pending at Discharge (will require follow up):    · Final urine culture      Outpatient Tests Requested:  · Follow-up with PCP and Neurology    Complications:  None    Reason for Admission: Syncope (While at [facility] Hospital Course:     Kaila Hernandez is a 80 y o  female patient who originally presented to the hospital on 11/14/2022 due to a syncope while that [facility]  The patient subsequently was admitted for syncope  The patient was feeling significantly improved by the time that she was admitted  She was started with ceftriaxone for suspect urinary tract infection  Currently urine cultures pending  Echocardiogram with LVEF of 55-60%  Telemetry without significant arrhythmia  The patient found to have changes in orthostatics blood pressure without hypotension  Suspect that her syncopal episode is likely related to vasovagal response, possible volume depletion and positional change in blood pressure as she was sitting for a while at [employer]  She was noted to have feces upon arrival to the ED  CT head without any acute finding  No seizures history or seizures activity during the hospital stay  Her daughter-Kesha is okay to follow-up with neurology and possible obtain an EEG as an outpatient to rule out any possible seizure activities/seizure disorder  No need for anti seizure medication upon discharge  She will need 1 more day of Keflex for UTI  PT/OT evaluation done and recommended short-term rehab  However at this time the patient and her daughter prefer for the patient return to [facility] [place] [phone]  Please see above list of diagnoses and related plan for additional information  Condition at Discharge: good     Discharge Day Visit / Exam:     Subjective:  Feeling much better  Would like to go home     Vitals: Blood Pressure: 157/84 (11/15/22 1525)  Pulse: 87 (11/15/22 1525)  Temperature: 97 5 °F (36 4 °C) (11/15/22 0815)  Temp Source: Oral (11/15/22 0815)  Respirations: 18 (11/15/22 1525)  Height: 5' 2" (157 5 cm) (11/15/22 0802)  Weight - Scale: 60 3 kg (133 lb) (11/15/22 0802)  SpO2: 92 % (11/15/22 1525)  Exam:   Physical Exam  Vitals and nursing note reviewed  Constitutional:       General: She is not in acute distress  Appearance: Normal appearance  HENT:      Head: Normocephalic and atraumatic  Right Ear: External ear normal       Left Ear: External ear normal       Nose: Nose normal  No rhinorrhea  Mouth/Throat:      Mouth: Mucous membranes are moist       Pharynx: Oropharynx is clear  Eyes:      General:         Right eye: No discharge  Left eye: No discharge  Pupils: Pupils are equal, round, and reactive to light  Cardiovascular:      Rate and Rhythm: Normal rate and regular rhythm  Pulses: Normal pulses  Heart sounds: Normal heart sounds  No murmur heard  Pulmonary:      Effort: Pulmonary effort is normal  No respiratory distress  Breath sounds: Normal breath sounds  Abdominal:      General: Bowel sounds are normal  There is no distension  Palpations: Abdomen is soft  There is no mass  Tenderness: There is no abdominal tenderness  Musculoskeletal:         General: No swelling or tenderness  Normal range of motion  Cervical back: Normal range of motion and neck supple  No muscular tenderness  Skin:     General: Skin is warm and dry  Capillary Refill: Capillary refill takes less than 2 seconds  Findings: No erythema or rash  Neurological:      General: No focal deficit present  Mental Status: She is alert and oriented to person, place, and time  Mental status is at baseline  Psychiatric:         Mood and Affect: Mood normal          Behavior: Behavior normal          Thought Content: Thought content normal          Judgment: Judgment normal        Discussion with Family: daughterkanika via phone     Discharge instructions/Information to patient and family:   See after visit summary for information provided to patient and family        Provisions for Follow-Up Care:  See after visit summary for information related to follow-up care and any pertinent home health orders  Disposition:     [address] at [facility] Readmission: no      Discharge Statement:  I spent 38 minutes discharging the patient  This time was spent on the day of discharge  I had direct contact with the patient on the day of discharge  Greater than 50% of the total time was spent examining patient, answering all patient questions, arranging and discussing plan of care with patient as well as directly providing post-discharge instructions  Additional time then spent on discharge activities  Discharge Medications:  See after visit summary for reconciled discharge medications provided to patient and family        ** Please Note: This note has been constructed using a voice recognition system **

## 2022-11-15 NOTE — INCIDENTAL FINDINGS
The following findings require follow up:  Radiographic finding   Finding: Incidental 8 mm cystic pancreatic lesion in the distal body of the pancreas   Follow up required: nonemergent MRI    Follow up should be done within 4-6 week(s)    Please notify the following clinician to assist with the follow up:   Dr Salma Macario, PCP

## 2022-11-15 NOTE — ED NOTES
Pt sitting on the  recliner   Pt  removed her IV,states she is going home   Writer reoriented pt and place new IV line  Pt verbalized understanding  Will continue to monitor  Georgina Estrada  11/15/22 0185

## 2022-11-15 NOTE — DISCHARGE INSTR - AVS FIRST PAGE
Dear Carl Martinez,     It was our pleasure to care for you here at [facility]  It is our hope that we were always able to exceed the expected standards for your care during your stay  You were hospitalized due to syncope  You were cared for on the 2nd floor by ANTONIA Carrillo with the [facility] who covers for your primary care physician (PCP), Angelica Moreira MD, while you were hospitalized  If you have any questions or concerns related to this hospitalization, you may contact us at [phone]  For follow up as well as any medication refills, we recommend that you follow up with your primary care physician  A registered nurse will reach out to you by phone within a few days after your discharge to answer any additional questions that you may have after going home  However, at this time we provide for you here, the most important instructions / recommendations at discharge:     Notable Medication Adjustments -   None   Testing Required after Discharge -   EEG- please discuss this with your PCP  Important follow up information -   Follow up with PCP   Other Instructions -   Please refer to discharge instructions   Please review this entire after visit summary as additional general instructions including medication list, appointments, activity, diet, any pertinent wound care, and other additional recommendations from your care team that may be provided for you        Sincerely,     ANTONIA Carrillo

## 2022-11-15 NOTE — ASSESSMENT & PLAN NOTE
· The patient complains of increasing frequency  · Urinalysis shows possible urinary tract infection  · Follow-up with urine culture  · Received 2 doses of ceftriaxone; will discharge on 1 more day of Keflex

## 2022-11-17 ENCOUNTER — HOME CARE VISIT (OUTPATIENT)
Dept: HOME HEALTH SERVICES | Facility: HOME HEALTHCARE | Age: 87
End: 2022-11-17

## 2022-11-17 VITALS — OXYGEN SATURATION: 95 % | SYSTOLIC BLOOD PRESSURE: 120 MMHG | HEART RATE: 68 BPM | DIASTOLIC BLOOD PRESSURE: 76 MMHG

## 2022-11-17 LAB
BACTERIA UR CULT: ABNORMAL
BACTERIA UR CULT: ABNORMAL

## 2022-11-18 ENCOUNTER — HOME CARE VISIT (OUTPATIENT)
Dept: HOME HEALTH SERVICES | Facility: HOME HEALTHCARE | Age: 87
End: 2022-11-18

## 2022-11-18 VITALS — SYSTOLIC BLOOD PRESSURE: 120 MMHG | HEART RATE: 74 BPM | DIASTOLIC BLOOD PRESSURE: 82 MMHG | OXYGEN SATURATION: 96 %

## 2022-11-18 NOTE — CASE COMMUNICATION
[facility] has admitted your patient to [address] [place] [address] service with the following disciplines:      PT and OT  This report is informational only, no responses is needed  Primary focus of home health care    Syncope, UTI  Musculoskeletal  Patient stated goals of care     I want to be able to walk a little bit better and a little easier  I want to be able to get back to the [facility]  Anticipated visit pattern and next visit date     [id number], 1xwk1, [id number]  Next visit 11 18 2022  See medication list - meds in home differ from AVS  Pt currently taking Hydrochlorothiazide 25 mg daily which was not on med list   Staff reports she has been taking this medication regularly for a while  Pt also reports no longer taking Zoloft or amlodipine  Significant clinical findings    Pt with significant muscle weakness, gait deficits, balance deficits with pt requiring assi stance for bed mobility and transfers  Demos rapid fatigue, nausea, sob during mobility  Potential barriers to goal achievement    Difficulty with participation with therapy due to not feeling well  Questionable ability to retain education provided  Thank you for allowing us to participate in the care of your patient      Belinda Hall, PT

## 2022-11-20 VITALS — DIASTOLIC BLOOD PRESSURE: 62 MMHG | HEART RATE: 75 BPM | SYSTOLIC BLOOD PRESSURE: 118 MMHG | OXYGEN SATURATION: 95 %

## 2022-11-21 ENCOUNTER — HOME CARE VISIT (OUTPATIENT)
Dept: HOME HEALTH SERVICES | Facility: HOME HEALTHCARE | Age: 87
End: 2022-11-21

## 2022-11-21 VITALS — HEART RATE: 66 BPM | OXYGEN SATURATION: 95 % | DIASTOLIC BLOOD PRESSURE: 82 MMHG | SYSTOLIC BLOOD PRESSURE: 120 MMHG

## 2022-11-21 NOTE — CASE COMMUNICATION
Occupational Therapy evaluation and discharge 11 18 22   OT included ADL transfer training to increase safety and decrease fall risk during toilet transfer  OT goal achieved in one visit  Instructed patient on OT discharge with good understanding demonstrated by patient

## 2022-11-23 ENCOUNTER — HOME CARE VISIT (OUTPATIENT)
Dept: HOME HEALTH SERVICES | Facility: HOME HEALTHCARE | Age: 87
End: 2022-11-23

## 2022-11-30 ENCOUNTER — HOME CARE VISIT (OUTPATIENT)
Dept: HOME HEALTH SERVICES | Facility: HOME HEALTHCARE | Age: 87
End: 2022-11-30

## 2022-11-30 VITALS — HEART RATE: 69 BPM | OXYGEN SATURATION: 94 %

## 2022-12-02 ENCOUNTER — HOME CARE VISIT (OUTPATIENT)
Dept: HOME HEALTH SERVICES | Facility: HOME HEALTHCARE | Age: 87
End: 2022-12-02

## 2022-12-04 NOTE — CASE COMMUNICATION
Pt discharged from [facility]  Pt plans to return to [facility] on Monday  Provided pt with contact information for [facility] your home Physical Therapy, but pt uncertain if she plans to continue with therapy services

## 2023-01-13 PROBLEM — N30.00 ACUTE CYSTITIS WITHOUT HEMATURIA: Status: RESOLVED | Noted: 2022-11-14 | Resolved: 2023-01-13

## 2023-02-11 ENCOUNTER — APPOINTMENT (EMERGENCY)
Dept: RADIOLOGY | Facility: HOSPITAL | Age: 88
End: 2023-02-11

## 2023-02-11 ENCOUNTER — HOSPITAL ENCOUNTER (INPATIENT)
Facility: HOSPITAL | Age: 88
LOS: 2 days | Discharge: HOME HEALTH | End: 2023-02-13
Attending: FAMILY MEDICINE | Admitting: STUDENT IN AN ORGANIZED HEALTH CARE EDUCATION/TRAINING PROGRAM

## 2023-02-11 DIAGNOSIS — R09.02 HYPOXIA: Primary | ICD-10-CM

## 2023-02-11 DIAGNOSIS — K59.00 CONSTIPATION: ICD-10-CM

## 2023-02-11 DIAGNOSIS — E87.1 HYPONATREMIA: ICD-10-CM

## 2023-02-11 DIAGNOSIS — J96.01 ACUTE RESPIRATORY FAILURE WITH HYPOXIA (HCC): ICD-10-CM

## 2023-02-11 DIAGNOSIS — J21.0 RSV (ACUTE BRONCHIOLITIS DUE TO RESPIRATORY SYNCYTIAL VIRUS): ICD-10-CM

## 2023-02-11 LAB
ANION GAP SERPL CALCULATED.3IONS-SCNC: 6 MMOL/L (ref 4–13)
BASOPHILS # BLD AUTO: 0.03 THOUSANDS/ÂΜL (ref 0–0.1)
BASOPHILS NFR BLD AUTO: 0 % (ref 0–1)
BNP SERPL-MCNC: 42 PG/ML (ref 0–100)
BUN SERPL-MCNC: 16 MG/DL (ref 5–25)
CALCIUM SERPL-MCNC: 9.4 MG/DL (ref 8.4–10.2)
CHLORIDE SERPL-SCNC: 95 MMOL/L (ref 96–108)
CO2 SERPL-SCNC: 29 MMOL/L (ref 21–32)
CREAT SERPL-MCNC: 0.92 MG/DL (ref 0.6–1.3)
EOSINOPHIL # BLD AUTO: 0.48 THOUSAND/ÂΜL (ref 0–0.61)
EOSINOPHIL NFR BLD AUTO: 7 % (ref 0–6)
ERYTHROCYTE [DISTWIDTH] IN BLOOD BY AUTOMATED COUNT: 13.1 % (ref 11.6–15.1)
FLUAV RNA RESP QL NAA+PROBE: NEGATIVE
FLUBV RNA RESP QL NAA+PROBE: NEGATIVE
GFR SERPL CREATININE-BSD FRML MDRD: 53 ML/MIN/1.73SQ M
GLUCOSE SERPL-MCNC: 82 MG/DL (ref 65–140)
HCT VFR BLD AUTO: 41.9 % (ref 34.8–46.1)
HGB BLD-MCNC: 13.8 G/DL (ref 11.5–15.4)
IMM GRANULOCYTES # BLD AUTO: 0.02 THOUSAND/UL (ref 0–0.2)
IMM GRANULOCYTES NFR BLD AUTO: 0 % (ref 0–2)
LYMPHOCYTES # BLD AUTO: 2.48 THOUSANDS/ÂΜL (ref 0.6–4.47)
LYMPHOCYTES NFR BLD AUTO: 34 % (ref 14–44)
MCH RBC QN AUTO: 30.8 PG (ref 26.8–34.3)
MCHC RBC AUTO-ENTMCNC: 32.9 G/DL (ref 31.4–37.4)
MCV RBC AUTO: 94 FL (ref 82–98)
MONOCYTES # BLD AUTO: 0.78 THOUSAND/ÂΜL (ref 0.17–1.22)
MONOCYTES NFR BLD AUTO: 11 % (ref 4–12)
NEUTROPHILS # BLD AUTO: 3.62 THOUSANDS/ÂΜL (ref 1.85–7.62)
NEUTS SEG NFR BLD AUTO: 48 % (ref 43–75)
NRBC BLD AUTO-RTO: 0 /100 WBCS
PLATELET # BLD AUTO: 153 THOUSANDS/UL (ref 149–390)
PMV BLD AUTO: 9.6 FL (ref 8.9–12.7)
POTASSIUM SERPL-SCNC: 3.8 MMOL/L (ref 3.5–5.3)
PROCALCITONIN SERPL-MCNC: 0.07 NG/ML
RBC # BLD AUTO: 4.48 MILLION/UL (ref 3.81–5.12)
RSV RNA RESP QL NAA+PROBE: POSITIVE
SARS-COV-2 RNA RESP QL NAA+PROBE: NEGATIVE
SODIUM SERPL-SCNC: 130 MMOL/L (ref 135–147)
WBC # BLD AUTO: 7.41 THOUSAND/UL (ref 4.31–10.16)

## 2023-02-11 RX ORDER — ACETAMINOPHEN 325 MG/1
650 TABLET ORAL EVERY 6 HOURS PRN
Status: DISCONTINUED | OUTPATIENT
Start: 2023-02-11 | End: 2023-02-13 | Stop reason: HOSPADM

## 2023-02-11 RX ORDER — ENOXAPARIN SODIUM 100 MG/ML
40 INJECTION SUBCUTANEOUS DAILY
Status: DISCONTINUED | OUTPATIENT
Start: 2023-02-12 | End: 2023-02-13 | Stop reason: HOSPADM

## 2023-02-11 RX ORDER — FUROSEMIDE 10 MG/ML
40 INJECTION INTRAMUSCULAR; INTRAVENOUS ONCE
Status: COMPLETED | OUTPATIENT
Start: 2023-02-11 | End: 2023-02-11

## 2023-02-11 RX ORDER — ASPIRIN 81 MG/1
81 TABLET, CHEWABLE ORAL DAILY
Status: DISCONTINUED | OUTPATIENT
Start: 2023-02-12 | End: 2023-02-13 | Stop reason: HOSPADM

## 2023-02-11 RX ORDER — ALBUTEROL SULFATE 2.5 MG/3ML
2.5 SOLUTION RESPIRATORY (INHALATION) ONCE
Status: COMPLETED | OUTPATIENT
Start: 2023-02-11 | End: 2023-02-11

## 2023-02-11 RX ORDER — ATORVASTATIN CALCIUM 40 MG/1
40 TABLET, FILM COATED ORAL EVERY EVENING
Status: DISCONTINUED | OUTPATIENT
Start: 2023-02-11 | End: 2023-02-13 | Stop reason: HOSPADM

## 2023-02-11 RX ORDER — HYDROCHLOROTHIAZIDE 25 MG/1
25 TABLET ORAL DAILY
Status: DISCONTINUED | OUTPATIENT
Start: 2023-02-12 | End: 2023-02-13 | Stop reason: HOSPADM

## 2023-02-11 RX ORDER — PREDNISONE 20 MG/1
40 TABLET ORAL DAILY
Qty: 10 TABLET | Refills: 0 | Status: SHIPPED | OUTPATIENT
Start: 2023-02-11 | End: 2023-02-16

## 2023-02-11 RX ORDER — METHYLPREDNISOLONE SODIUM SUCCINATE 125 MG/2ML
125 INJECTION, POWDER, LYOPHILIZED, FOR SOLUTION INTRAMUSCULAR; INTRAVENOUS ONCE
Status: COMPLETED | OUTPATIENT
Start: 2023-02-11 | End: 2023-02-11

## 2023-02-11 RX ADMIN — ALBUTEROL SULFATE 2.5 MG: 2.5 SOLUTION RESPIRATORY (INHALATION) at 11:11

## 2023-02-11 RX ADMIN — SODIUM CHLORIDE 500 ML: 0.9 INJECTION, SOLUTION INTRAVENOUS at 16:36

## 2023-02-11 RX ADMIN — SODIUM CHLORIDE 500 ML: 0.9 INJECTION, SOLUTION INTRAVENOUS at 18:30

## 2023-02-11 RX ADMIN — METHYLPREDNISOLONE SODIUM SUCCINATE 125 MG: 125 INJECTION, POWDER, FOR SOLUTION INTRAMUSCULAR; INTRAVENOUS at 11:33

## 2023-02-11 RX ADMIN — ATORVASTATIN CALCIUM 40 MG: 40 TABLET, FILM COATED ORAL at 18:29

## 2023-02-11 RX ADMIN — FUROSEMIDE 40 MG: 10 INJECTION, SOLUTION INTRAMUSCULAR; INTRAVENOUS at 11:27

## 2023-02-11 NOTE — RESPIRATORY THERAPY NOTE
Home Oxygen Qualifying Test     Patient name: Ngoc Morfin        : 1930   Date of Test:  2023  Diagnosis:    Home Oxygen Test:    **Medicare Guidelines require item(s) 1-5 on all ambulatory patients or 1 and 2 on non-ambulatory patients  1  Baseline SPO2 on Room Air at rest 85 %   a  If <= 88% on Room Air add O2 via NC to obtain SpO2 >=88%  If LPM needed, document LPM 1 needed to reach =>88%    2  SPO2 during exertion on Room Air 87  %  a  During exertion monitor SPO2  If SPO2 increases >=89%, do not add supplemental oxygen    3  SPO2 on Oxygen at Rest 96 % at 1 LPM    4  SPO2 during exertion on Oxygen 96 % at 1 LPM    5  Test performed during exertion activity  [x]  Supplemental Home Oxygen is indicated  []  Client does not qualify for home oxygen  Respiratory Additional Notes- pt at this point in time is qualified to need supplemental home oxygen  Pt desaturated at rest and upon ambulation when on room air    Flora Pulido, RT

## 2023-02-11 NOTE — ASSESSMENT & PLAN NOTE
Presenting with acute hypoxic respiratory failure secondary to RSV  CXR revealed no acute pulmonary processes  Desaturated on room air to 88%  Requiring 1 L nasal cannula  Was going to be discharged home ED however personal-care home could not set up oxygen this weekend    -Titrate oxygen as tolerated  -Supportive care

## 2023-02-11 NOTE — ED PROVIDER NOTES
History  Chief Complaint   Patient presents with   • Shortness of Breath     Cough since Monday, non productive, gasping for air       HPI  Is a 68-year-old female presented to ED from a assisted living with a complaint of shortness of breath  Patient states she woke up this morning was feeling short of breath and felt like she was gasping for air  She also states that she has been coughing since Monday but it has been nonproductive  Denies any chest pain  Patient was found to be hypoxic per EMS and was placed on oxygen  Denies abdominal pain nausea vomiting denies any sick contacts  Prior to Admission Medications   Prescriptions Last Dose Informant Patient Reported? Taking?   acetaminophen (TYLENOL) 325 mg tablet   No No   Sig: Take 2 tablets (650 mg total) by mouth every 6 (six) hours as needed for mild pain   amLODIPine (NORVASC) 5 mg tablet   No No   Sig: Take 1 tablet (5 mg total) by mouth in the morning   Patient not taking: Reported on 11/18/2022   aspirin 81 mg chewable tablet   No No   Sig: Chew 1 tablet (81 mg total) daily   atorvastatin (LIPITOR) 40 mg tablet   No No   Sig: Take 1 tablet (40 mg total) by mouth every evening   docusate sodium (COLACE) 100 mg capsule   No No   Sig: Take 1 capsule (100 mg total) by mouth 2 (two) times a day   Patient not taking: Reported on 11/23/2022   hydrochlorothiazide (HYDRODIURIL) 25 mg tablet   Yes No   Sig: Take 25 mg by mouth daily  Indications: Edema   polyethylene glycol (MIRALAX) 17 g packet   No No   Sig: Take 17 g by mouth daily Do not start before October 14, 2022     sertraline (ZOLOFT) 50 mg tablet   No No   Sig: Take 1 tablet (50 mg total) by mouth in the morning   Patient not taking: Reported on 11/18/2022      Facility-Administered Medications: None       Past Medical History:   Diagnosis Date   • TANNER (acute kidney injury) ([facility] [address] ) 10/11/2022   • Arthritis    • Cataract     LAST ASSESSED: [id number]   • Depression    • Dislocation of left shoulder joint LAST ASSESSSED: [id number]   • Essential hypertension 4/3/2019   • Gait disorder     LAST ASSESSED: 95APQ0128   • Herpes zoster     LAST ASSESSED: 38NVS8811   • HL (hearing loss)    • Impacted cerumen of both ears     LAST ASSESSED: [id number]   • Impacted cerumen of right ear     LAST ASSESSED: 42ZEU5815   • Lightheadedness     MILD AND PT REFUSES EKG ETC  PT PROMISES TO CALL IF WORSENS  LAST ASSESSED: [id number]   • Multiple falls     LAST ASSESSED: 70NOX8138   • Near syncope     LAST ASSESSED: 69QPN5925   • Postherpetic polyneuropathy     LAST ASSESSED: 44CSE5466       Past Surgical History:   Procedure Laterality Date   • CATARACT EXTRACTION, BILATERAL     • HERNIA REPAIR     • TONSILLECTOMY     • TUBAL LIGATION         Family History   Problem Relation Age of Onset   • Cirrhosis Father    • Heart disease Son    • Mental illness Son      I have reviewed and agree with the history as documented  E-Cigarette/Vaping   • E-Cigarette Use Never User      E-Cigarette/Vaping Substances     Social History     Tobacco Use   • Smoking status: Never   • Smokeless tobacco: Never   Vaping Use   • Vaping Use: Never used   Substance Use Topics   • Alcohol use: Never   • Drug use: No       Review of Systems   Constitutional: Negative  HENT: Positive for congestion  Eyes: Negative  Respiratory: Positive for cough and shortness of breath  Cardiovascular: Negative  Gastrointestinal: Negative  Endocrine: Negative  Genitourinary: Negative  Musculoskeletal: Negative  Skin: Negative  Allergic/Immunologic: Negative  Neurological: Negative  Hematological: Negative  Psychiatric/Behavioral: Negative  Physical Exam  Physical Exam  Vitals and nursing note reviewed  Constitutional:       Appearance: She is well-developed  HENT:      Head: Normocephalic and atraumatic        Right Ear: External ear normal       Left Ear: External ear normal       Nose: Nose normal       Mouth/Throat: Mouth: Mucous membranes are moist       Pharynx: No oropharyngeal exudate  Eyes:      General: No scleral icterus  Right eye: No discharge  Left eye: No discharge  Conjunctiva/sclera: Conjunctivae normal       Pupils: Pupils are equal, round, and reactive to light  Cardiovascular:      Rate and Rhythm: Normal rate and regular rhythm  Pulses: Normal pulses  Heart sounds: Normal heart sounds  Pulmonary:      Effort: Pulmonary effort is normal  No respiratory distress  Breath sounds: Wheezing present  Abdominal:      General: Bowel sounds are normal       Palpations: Abdomen is soft  Musculoskeletal:         General: Normal range of motion  Cervical back: Normal range of motion and neck supple  Lymphadenopathy:      Cervical: No cervical adenopathy  Skin:     General: Skin is warm and dry  Capillary Refill: Capillary refill takes less than 2 seconds  Neurological:      General: No focal deficit present  Mental Status: She is alert and oriented to person, place, and time     Psychiatric:         Mood and Affect: Mood normal          Behavior: Behavior normal          Vital Signs  ED Triage Vitals [02/11/23 1052]   Temperature Pulse Respirations Blood Pressure SpO2   (!) 97 1 °F (36 2 °C) 58 22 131/63 96 %      Temp Source Heart Rate Source Patient Position - Orthostatic VS BP Location FiO2 (%)   Tympanic Monitor Sitting Left arm --      Pain Score       --           Vitals:    02/11/23 1600 02/11/23 1630 02/11/23 1700 02/11/23 1730   BP: 144/82 140/69 139/85 141/76   Pulse: 72 85 87 86   Patient Position - Orthostatic VS:  Lying Lying Lying         Visual Acuity      ED Medications  Medications   furosemide (LASIX) injection 40 mg (40 mg Intravenous Given 2/11/23 1127)   albuterol inhalation solution 2 5 mg (2 5 mg Nebulization Given 2/11/23 1111)   methylPREDNISolone sodium succinate (Solu-MEDROL) injection 125 mg (125 mg Intravenous Given 2/11/23 1133) sodium chloride 0 9 % bolus 500 mL (500 mL Intravenous New Bag 2/11/23 1636)       Diagnostic Studies  Results Reviewed     Procedure Component Value Units Date/Time    FLU/RSV/COVID - if FLU/RSV clinically relevant [[id number]]  (Abnormal) Collected: 02/11/23 1106    Lab Status: Final result Specimen: Nares from Nose Updated: 02/11/23 1150     SARS-CoV-2 Negative     INFLUENZA A PCR Negative     INFLUENZA B PCR Negative     RSV PCR Positive    Narrative:      FOR PEDIATRIC PATIENTS - copy/paste COVID Guidelines URL to browser: [email or web address]    SARS-CoV-2 assay is a Nucleic Acid Amplification assay intended for the  qualitative detection of nucleic acid from SARS-CoV-2 in nasopharyngeal  swabs  Results are for the presumptive identification of SARS-CoV-2 RNA  Positive results are indicative of infection with SARS-CoV-2, the virus  causing COVID-19, but do not rule out bacterial infection or co-infection  with other viruses  Laboratories within the [place] and its  territories are required to report all positive results to the appropriate  public health authorities  Negative results do not preclude SARS-CoV-2  infection and should not be used as the sole basis for treatment or other  patient management decisions  Negative results must be combined with  clinical observations, patient history, and epidemiological information  This test has not been FDA cleared or approved  This test has been authorized by FDA under an Emergency Use Authorization  (EUA)  This test is only authorized for the duration of time the  declaration that circumstances exist justifying the authorization of the  emergency use of an in vitro diagnostic tests for detection of SARS-CoV-2  virus and/or diagnosis of COVID-19 infection under section 564(b)(1) of  the Act, 21 U  S C  788HMD-9(B)(0), unless the authorization is terminated  or revoked sooner   The test has been validated but independent review by FDA  and CLIA is pending  Test performed using [employer] GeneXpert: This RT-PCR assay targets N2,  a region unique to SARS-CoV-2  A conserved region in the E-gene was chosen  for pan-Sarbecovirus detection which includes SARS-CoV-2  According to CMS-2020-01-R, this platform meets the definition of high-throughput technology      B-Type Natriuretic Peptide(BNP) [[id number]]  (Normal) Collected: 02/11/23 1106    Lab Status: Final result Specimen: Blood from Arm, Left Updated: 02/11/23 1146     BNP 42 pg/mL     Basic metabolic panel [[id number]]  (Abnormal) Collected: 02/11/23 1106    Lab Status: Final result Specimen: Blood from Arm, Left Updated: 02/11/23 1143     Sodium 130 mmol/L      Potassium 3 8 mmol/L      Chloride 95 mmol/L      CO2 29 mmol/L      ANION GAP 6 mmol/L      BUN 16 mg/dL      Creatinine 0 92 mg/dL      Glucose 82 mg/dL      Calcium 9 4 mg/dL      eGFR 53 ml/min/1 73sq m     Narrative:      [place] guidelines for Chronic Kidney Disease (CKD):   •  Stage 1 with normal or high GFR (GFR > 90 mL/min/1 73 square meters)  •  Stage 2 Mild CKD (GFR = 60-89 mL/min/1 73 square meters)  •  Stage 3A Moderate CKD (GFR = 45-59 mL/min/1 73 square meters)  •  Stage 3B Moderate CKD (GFR = 30-44 mL/min/1 73 square meters)  •  Stage 4 Severe CKD (GFR = 15-29 mL/min/1 73 square meters)  •  Stage 5 End Stage CKD (GFR <15 mL/min/1 73 square meters)  Note: GFR calculation is accurate only with a steady state creatinine    CBC and differential [[id number]]  (Abnormal) Collected: 02/11/23 1106    Lab Status: Final result Specimen: Blood from Arm, Left Updated: 02/11/23 1113     WBC 7 41 Thousand/uL      RBC 4 48 Million/uL      Hemoglobin 13 8 g/dL      Hematocrit 41 9 %      MCV 94 fL      MCH 30 8 pg      MCHC 32 9 g/dL      RDW 13 1 %      MPV 9 6 fL      Platelets 540 Thousands/uL      nRBC 0 /100 WBCs      Neutrophils Relative 48 %      Immat GRANS % 0 % Lymphocytes Relative 34 %      Monocytes Relative 11 %      Eosinophils Relative 7 %      Basophils Relative 0 %      Neutrophils Absolute 3 62 Thousands/µL      Immature Grans Absolute 0 02 Thousand/uL      Lymphocytes Absolute 2 48 Thousands/µL      Monocytes Absolute 0 78 Thousand/µL      Eosinophils Absolute 0 48 Thousand/µL      Basophils Absolute 0 03 Thousands/µL                  XR chest 1 view portable   Final Result by Ivelisse Gibson MD (02/11 1137)      No acute cardiopulmonary disease  Workstation performed: [id number]                    Procedures  CriticalCare Time  Performed by: Lukasz De Oliveira MD  Authorized by: Lukasz De Oliveira MD     Critical care provider statement:     Critical care time (minutes):  80    Critical care start time:  2/11/2023 11:00 AM    Critical care end time:  2/11/2023 5:13 PM    Critical care time was exclusive of:  Separately billable procedures and treating other patients    Critical care was necessary to treat or prevent imminent or life-threatening deterioration of the following conditions:  Respiratory failure    Critical care was time spent personally by me on the following activities:  Blood draw for specimens, obtaining history from patient or surrogate, development of treatment plan with patient or surrogate, discussions with consultants, discussions with primary provider, evaluation of patient's response to treatment, examination of patient, review of old charts, re-evaluation of patient's condition, ordering and review of radiographic studies, ordering and review of laboratory studies, ordering and performing treatments and interventions and interpretation of cardiac output measurements    I assumed direction of critical care for this patient from another provider in my specialty: no               ED Course  ED Course as of 02/11/23 1736   Sat Feb 11, 2023   1152 No acute cardiopulmonary disease     1208 Lungs are clear to auscultation after the breathing treatment  States feeling much better  Patient was found to have a RSV positive we will do ambulatory pulse ox if above 90 will discharge home a with steroid and follow-up  1422 Pt is hypoxic on ambulation with oxygen saturation 88%   1706 RT walked patient for desaturation study patient Respiratory Additional Notes- pt at this point in time is qualified to need supplemental home oxygen  Pt desaturated at rest and upon ambulation when on room air                                             Medical Decision Making  Is a 80year-old female presented with complaint of cough and intermittent shortness of breath  Cough started on Monday  Patient is found to have RSV positive chest x-ray within normal limits  Labs within normal limits will start patient on prednisone  Amatory pulse ox is obtained  Patient with history as above presented with flu like symptoms  History obtained from patient and EMS  Respiratory Additional Notes- pt at this point in time is qualified to need supplemental home oxygen  Pt desaturated at rest and upon ambulation when on room air  Discussed with Kasey Brown was admitted under    Hypoxia: acute illness or injury  RSV (acute bronchiolitis due to respiratory syncytial virus): acute illness or injury  Amount and/or Complexity of Data Reviewed  Labs: ordered  Decision-making details documented in ED Course  Radiology: ordered  Decision-making details documented in ED Course  Risk  Prescription drug management  Decision regarding hospitalization            Disposition  Final diagnoses:   Hypoxia   RSV (acute bronchiolitis due to respiratory syncytial virus)   Hyponatremia     Time reflects when diagnosis was documented in both MDM as applicable and the Disposition within this note     Time User Action Codes Description Comment    2/11/2023 12:08 PM Ashley Mendoza Add [R09 02] Hypoxia     2/11/2023 12:08 PM Josh Benton Add [J21 0] RSV (acute bronchiolitis due to respiratory syncytial virus)     2/11/2023  5:35 PM Abundio Thomas Add [E87 1] Hyponatremia       ED Disposition     ED Disposition   Admit    Condition   Stable    Date/Time   Sat Feb 11, 2023  5:31 PM    Comment   Case was discussed with Dr Guillermo Cano the patient's admission status was agreed to be Admission Status: inpatient status to the service of [address] [place] [address]    Follow-up Information     Follow up With Specialties Details Why Paula Becerra MD Nephrology, Internal Medicine Schedule an appointment as soon as possible for a visit in 2 days If symptoms worsen [address] [place] [address],[facility]  [phone]            Patient's Medications   Discharge Prescriptions    PREDNISONE 20 MG TABLET    Take 2 tablets (40 mg total) by mouth daily for 5 days       Start Date: 2/11/2023 End Date: 2/16/2023       Order Dose: 40 mg       Quantity: 10 tablet    Refills: 0       No discharge procedures on file      PDMP Review     None          ED Provider  Electronically Signed by           Cindi Ponce MD  02/11/23 200 Beverley Coughlin MD  02/11/23 0709

## 2023-02-11 NOTE — H&P
[facility] 1/28/1930, 80 y o  female MRN: [id number]  Unit/Bed#: ED 23 Encounter: [id number]  Primary Care Provider: Edward Melendez MD   Date and time admitted to hospital: 2/11/2023 10:45 AM    * Acute respiratory failure with hypoxia [facility])  Assessment & Plan  Presenting with acute hypoxic respiratory failure secondary to RSV  CXR revealed no acute pulmonary processes  Desaturated on room air to 88%  Requiring 1 L nasal cannula  Was going to be discharged home ED however personal-care home could not set up oxygen this weekend    -Titrate oxygen as tolerated  -Supportive care    Generalized weakness  Assessment & Plan  PT/OT    RSV (acute bronchiolitis due to respiratory syncytial virus)  Assessment & Plan  -plan as above    Hyponatremia  Assessment & Plan  Likely due to dehydration    -Give IV fluid bolus  -Reassess in a m  Essential hypertension  Assessment & Plan  - Continue prior to admission HCTZ    VTE Pharmacologic Prophylaxis: VTE Score: 6 High Risk (Score >/= 5) - Pharmacological DVT Prophylaxis Ordered: enoxaparin (Lovenox)  Sequential Compression Devices Ordered  Code Status: Level 3 - DNAR and DNI   Discussion with family: Discussed with daughter over the phone    Anticipated Length of Stay: Patient will be admitted on an inpatient basis with an anticipated length of stay of greater than 2 midnights secondary to RSV requiring administration of supplemental oxygen  Total Time for Visit, including Counseling / Coordination of Care: 30 minutes Greater than 50% of this total time spent on direct patient counseling and coordination of care  Chief Complaint: Shortness of breath    History of Present Illness:  Courtney Guidry is a 80 y o  female with a PMH of depression, hypertension, arthritis who presents with orders of breath  Patient scented to ED for evaluation of shortness of breath  Was found to be RSV positive in ED    Patient's lab work largely unremarkable  Patient desaturated to 88% on room air and will require supplemental oxygen  Patient unable to be sent home until Monday    Review of Systems:  Review of Systems   Constitutional: Negative  HENT: Negative  Eyes: Negative  Respiratory: Positive for shortness of breath  Cardiovascular: Negative  Gastrointestinal: Negative  Genitourinary: Negative  Musculoskeletal: Negative  Skin: Negative  Neurological: Negative  Hematological: Negative  Psychiatric/Behavioral: Negative  Past Medical and Surgical History:   Past Medical History:   Diagnosis Date   • TANNER (acute kidney injury) ([facility] [address] ) 10/11/2022   • Arthritis    • Cataract     LAST ASSESSED: [id number]   • Depression    • Dislocation of left shoulder joint     LAST ASSESSSED: [id number]   • Essential hypertension 4/3/2019   • Gait disorder     LAST ASSESSED: [id number]   • Herpes zoster     LAST ASSESSED: [id number]   • HL (hearing loss)    • Impacted cerumen of both ears     LAST ASSESSED: [id number]   • Impacted cerumen of right ear     LAST ASSESSED: 68EDM5946   • Lightheadedness     MILD AND PT REFUSES EKG ETC  PT PROMISES TO CALL IF WORSENS  LAST ASSESSED: [id number]   • Multiple falls     LAST ASSESSED: [id number]   • Near syncope     LAST ASSESSED: 88BUX0069   • Postherpetic polyneuropathy     LAST ASSESSED: 40BGV2992       Past Surgical History:   Procedure Laterality Date   • CATARACT EXTRACTION, BILATERAL     • HERNIA REPAIR     • TONSILLECTOMY     • TUBAL LIGATION         Meds/Allergies:  Prior to Admission medications    Medication Sig Start Date End Date Taking?  Authorizing Provider   predniSONE 20 mg tablet Take 2 tablets (40 mg total) by mouth daily for 5 days 2/11/23 2/16/23 Yes Aiden Castellanos MD   acetaminophen (TYLENOL) 325 mg tablet Take 2 tablets (650 mg total) by mouth every 6 (six) hours as needed for mild pain 8/2/21   Juan Singh DO   amLODIPine (NORVASC) 5 mg tablet Take 1 tablet (5 mg total) by mouth in the morning  Patient not taking: Reported on 11/18/2022 6/6/22   Andre Jones, DO   aspirin 81 mg chewable tablet Chew 1 tablet (81 mg total) daily 8/4/22   Andre Jones, DO   atorvastatin (LIPITOR) 40 mg tablet Take 1 tablet (40 mg total) by mouth every evening 8/4/22   Andre Jones, DO   docusate sodium (COLACE) 100 mg capsule Take 1 capsule (100 mg total) by mouth 2 (two) times a day  Patient not taking: Reported on 11/23/2022 8/2/21   Andre Jones, DO   hydrochlorothiazide (HYDRODIURIL) 25 mg tablet Take 25 mg by mouth daily  Indications: Edema    Historical Provider, MD   polyethylene glycol (MIRALAX) 17 g packet Take 17 g by mouth daily Do not start before October 14, 2022  10/14/22 11/13/22  Kamar Velasco [place], DO   sertraline (ZOLOFT) 50 mg tablet Take 1 tablet (50 mg total) by mouth in the morning  Patient not taking: Reported on 11/18/2022 6/6/22   Andre Jones DO     I have reviewed home medications with patient personally  Allergies: Allergies   Allergen Reactions   • Vancomycin Hives and Itching       Social History:  Marital Status:     Occupation: retired  Patient Pre-hospital Living Situation: Personal-care home  Patient Pre-hospital Level of Mobility: walks  Patient Pre-hospital Diet Restrictions: none  Substance Use History:   Social History     Substance and Sexual Activity   Alcohol Use Never     Social History     Tobacco Use   Smoking Status Never   Smokeless Tobacco Never     Social History     Substance and Sexual Activity   Drug Use No       Family History:  Family History   Problem Relation Age of Onset   • Cirrhosis Father    • Heart disease Son    • Mental illness Son        Physical Exam:     Vitals:   Blood Pressure: 141/76 (02/11/23 1730)  Pulse: 86 (02/11/23 1730)  Temperature: (!) 97 1 °F (36 2 °C) (02/11/23 1052)  Temp Source: Tympanic (02/11/23 1052)  Respirations: 22 (02/11/23 1730)  SpO2: 91 % (02/11/23 1730)    Physical Exam  HENT: Head: Normocephalic  Cardiovascular:      Rate and Rhythm: Normal rate and regular rhythm  Pulses: Normal pulses  Heart sounds: Normal heart sounds  Pulmonary:      Effort: Pulmonary effort is normal       Breath sounds: Wheezing present  Abdominal:      General: Abdomen is flat  Bowel sounds are normal       Palpations: Abdomen is soft  Musculoskeletal:         General: Normal range of motion  Cervical back: Normal range of motion  Skin:     General: Skin is warm  Neurological:      General: No focal deficit present  Mental Status: She is alert and oriented to person, place, and time  Mental status is at baseline  Psychiatric:         Mood and Affect: Mood normal          Thought Content: Thought content normal          Judgment: Judgment normal           Additional Data:     Lab Results:  Results from last 7 days   Lab Units 02/11/23  1106   WBC Thousand/uL 7 41   HEMOGLOBIN g/dL 13 8   HEMATOCRIT % 41 9   PLATELETS Thousands/uL 153   NEUTROS PCT % 48   LYMPHS PCT % 34   MONOS PCT % 11   EOS PCT % 7*     Results from last 7 days   Lab Units 02/11/23  1106   SODIUM mmol/L 130*   POTASSIUM mmol/L 3 8   CHLORIDE mmol/L 95*   CO2 mmol/L 29   BUN mg/dL 16   CREATININE mg/dL 0 92   ANION GAP mmol/L 6   CALCIUM mg/dL 9 4   GLUCOSE RANDOM mg/dL 82                       Lines/Drains:  Invasive Devices     Peripheral Intravenous Line  Duration           Peripheral IV 02/11/23 Left Antecubital <1 day                    Imaging: No pertinent imaging reviewed  XR chest 1 view portable   Final Result by Damien Rowell MD (02/11 1137)      No acute cardiopulmonary disease  Workstation performed: [id number]             EKG and Other Studies Reviewed on Admission:   · EKG: No EKG obtained  ** Please Note: This note has been constructed using a voice recognition system   **

## 2023-02-11 NOTE — ED NOTES
Ambulated pt w/ walker O2 sat ranges from 84- 88% RA, while resting O2 sat 920-94% RA   Georgina Gimenez  02/11/23 2756

## 2023-02-11 NOTE — CASE MANAGEMENT
Case Management Progress Note    Patient name Mitul Fernandez  Location ED 23/ED 23 MRN [id number]  : 1930 Date 2023       LOS (days): 0  Geometric Mean LOS (GMLOS) (days):   Days to [facility]:        OBJECTIVE:        Current admission status: Emergency  Preferred Pharmacy:   [address] [place] [address]  Phone: [phone] Fax: [address], [address] [address]  Phone: [phone] Fax: [phone]    Primary Care Provider: Madeline Malcolm MD    Primary Insurance: MEDICARE  Secondary Insurance: [facility] HEALTH OPTIONS PROGRAM    PROGRESS NOTE:  SLIM requesting oxygen to be ordered as the pt desats with ambulation  Will need an ambulatory home qualifying desat study  Notified Dr Bacilio Marcial of same  Unable to order anything via parachute at this time

## 2023-02-12 PROBLEM — E83.42 HYPOMAGNESEMIA: Status: ACTIVE | Noted: 2023-02-12

## 2023-02-12 LAB
ALBUMIN SERPL BCP-MCNC: 3.6 G/DL (ref 3.5–5)
ALP SERPL-CCNC: 70 U/L (ref 34–104)
ALT SERPL W P-5'-P-CCNC: 16 U/L (ref 7–52)
ANION GAP SERPL CALCULATED.3IONS-SCNC: 8 MMOL/L (ref 4–13)
AST SERPL W P-5'-P-CCNC: 22 U/L (ref 13–39)
ATRIAL RATE: 72 BPM
BILIRUB SERPL-MCNC: 0.69 MG/DL (ref 0.2–1)
BUN SERPL-MCNC: 19 MG/DL (ref 5–25)
CALCIUM SERPL-MCNC: 8.7 MG/DL (ref 8.4–10.2)
CHLORIDE SERPL-SCNC: 95 MMOL/L (ref 96–108)
CO2 SERPL-SCNC: 27 MMOL/L (ref 21–32)
CREAT SERPL-MCNC: 0.81 MG/DL (ref 0.6–1.3)
ERYTHROCYTE [DISTWIDTH] IN BLOOD BY AUTOMATED COUNT: 13 % (ref 11.6–15.1)
GFR SERPL CREATININE-BSD FRML MDRD: 62 ML/MIN/1.73SQ M
GLUCOSE SERPL-MCNC: 128 MG/DL (ref 65–140)
HCT VFR BLD AUTO: 40.1 % (ref 34.8–46.1)
HGB BLD-MCNC: 14 G/DL (ref 11.5–15.4)
MAGNESIUM SERPL-MCNC: 1.8 MG/DL (ref 1.9–2.7)
MCH RBC QN AUTO: 31.7 PG (ref 26.8–34.3)
MCHC RBC AUTO-ENTMCNC: 34.9 G/DL (ref 31.4–37.4)
MCV RBC AUTO: 91 FL (ref 82–98)
PLATELET # BLD AUTO: 156 THOUSANDS/UL (ref 149–390)
PMV BLD AUTO: 10.1 FL (ref 8.9–12.7)
POTASSIUM SERPL-SCNC: 3.6 MMOL/L (ref 3.5–5.3)
PR INTERVAL: 136 MS
PROT SERPL-MCNC: 6.5 G/DL (ref 6.4–8.4)
QRS AXIS: 204 DEGREES
QRSD INTERVAL: 80 MS
QT INTERVAL: 400 MS
QTC INTERVAL: 438 MS
RBC # BLD AUTO: 4.41 MILLION/UL (ref 3.81–5.12)
SODIUM SERPL-SCNC: 130 MMOL/L (ref 135–147)
T WAVE AXIS: 149 DEGREES
VENTRICULAR RATE: 72 BPM
WBC # BLD AUTO: 8.46 THOUSAND/UL (ref 4.31–10.16)

## 2023-02-12 RX ORDER — MAGNESIUM SULFATE HEPTAHYDRATE 40 MG/ML
2 INJECTION, SOLUTION INTRAVENOUS ONCE
Status: COMPLETED | OUTPATIENT
Start: 2023-02-12 | End: 2023-02-12

## 2023-02-12 RX ADMIN — ATORVASTATIN CALCIUM 40 MG: 40 TABLET, FILM COATED ORAL at 17:20

## 2023-02-12 RX ADMIN — SODIUM CHLORIDE 1000 ML: 0.9 INJECTION, SOLUTION INTRAVENOUS at 11:45

## 2023-02-12 RX ADMIN — ASPIRIN 81 MG CHEWABLE TABLET 81 MG: 81 TABLET CHEWABLE at 08:21

## 2023-02-12 RX ADMIN — HYDROCHLOROTHIAZIDE 25 MG: 25 TABLET ORAL at 08:21

## 2023-02-12 RX ADMIN — ENOXAPARIN SODIUM 40 MG: 100 INJECTION SUBCUTANEOUS at 08:21

## 2023-02-12 RX ADMIN — MAGNESIUM SULFATE HEPTAHYDRATE 2 G: 40 INJECTION, SOLUTION INTRAVENOUS at 11:46

## 2023-02-12 NOTE — RESPIRATORY THERAPY NOTE
RT Protocol Note  Buck Bone Smiley 80 y o  female MRN: [id number]  Unit/Bed#: ED 23 Encounter: [id number]    Assessment    Principal Problem:    Acute respiratory failure with hypoxia ([facility] )  Active Problems:    Essential hypertension    Generalized weakness    Hyponatremia    RSV (acute bronchiolitis due to respiratory syncytial virus)      Home Pulmonary Medications:  none  Home Devices/Therapy: Home O2 (recently qualitified for 1L oxygen)    Past Medical History:   Diagnosis Date    TANNER (acute kidney injury) ([facility] ) 10/11/2022    Arthritis     Cataract     LAST ASSESSED: [id number]    Depression     Dislocation of left shoulder joint     LAST ASSESSSED: [id number]    Essential hypertension 4/3/2019    Gait disorder     LAST ASSESSED: [id number]    Herpes zoster     LAST ASSESSED: 46ESQ3100    HL (hearing loss)     Impacted cerumen of both ears     LAST ASSESSED: [id number]    Impacted cerumen of right ear     LAST ASSESSED: 98NKM5963    Lightheadedness     MILD AND PT REFUSES EKG ETC  PT PROMISES TO CALL IF WORSENS  LAST ASSESSED: [id number]    Multiple falls     LAST ASSESSED: 83PVO1020    Near syncope     LAST ASSESSED: 90KBZ7629    Postherpetic polyneuropathy     LAST ASSESSED: 03KAL0770     Social History     Socioeconomic History    Marital status:       Spouse name: None    Number of children: None    Years of education: None    Highest education level: None   Occupational History    Occupation: retired   Tobacco Use    Smoking status: Never    Smokeless tobacco: Never   Vaping Use    Vaping Use: Never used   Substance and Sexual Activity    Alcohol use: Never    Drug use: No    Sexual activity: Not Currently   Other Topics Concern    None   Social History Narrative    CAFFEINE USE    USES SAFETY EQUIPMENT     Social Determinants of Health     Financial Resource Strain: Not on file   Food Insecurity: No Food Insecurity    Worried About Running Out of Food in the Last Year: Never true    Ran Out of Food in the Last Year: Never true   Transportation Needs: No Transportation Needs    Lack of Transportation (Medical): No    Lack of Transportation (Non-Medical): No   Physical Activity: Not on file   Stress: Not on file   Social Connections: Not on file   Intimate Partner Violence: Not on file   Housing Stability: Low Risk     Unable to Pay for Housing in the Last Year: No    Number of Places Lived in the Last Year: 1    Unstable Housing in the Last Year: No       Subjective         Objective    Physical Exam:   Assessment Type: Assess only  General Appearance: Alert, Awake  Respiratory Pattern: Normal  Chest Assessment: Chest expansion symmetrical  Bilateral Breath Sounds: Diminished, Clear  Cough: Dry, Non-productive  O2 Device: nc    Vitals:  Blood pressure 109/58, pulse 75, temperature (!) 97 1 °F (36 2 °C), temperature source Tympanic, resp  rate 18, SpO2 92 %, not currently breastfeeding  Imaging and other studies: I have personally reviewed pertinent reports  02/12/23 0107   Respiratory Protocol   Protocol Initiated? No   Language Barrier? No   Medical & Social History Reviewed? Yes   Diagnostic Studies Reviewed? Yes   Physical Assessment Performed? Yes   Home Devices/Therapy Home O2  (recently qualitified for 1L oxygen)   Respiratory Assessment   Assessment Type Assess only   General Appearance Alert; Awake   Respiratory Pattern Normal   Chest Assessment Chest expansion symmetrical   Bilateral Breath Sounds Diminished;Clear   Cough Dry;Non-productive   Resp Comments   (pt assessed as per protocol  BBS diminished clear  no home med therapy  protocol to be dcd)   O2 Device nc   Additional Assessments   Pulse 75   Respirations 18         O2 Device: nc     Plan             Resp Comments:  (pt assessed as per protocol  BBS diminished clear  no home med therapy   protocol to be dcd)

## 2023-02-12 NOTE — PROGRESS NOTES
[address]  Progress Note - Godfrey Reis 1/28/1930, 80 y o  female MRN: [id number]  Unit/Bed#: ED 23 Encounter: [id number]  Primary Care Provider: Bettina Bradshaw MD   Date and time admitted to hospital: 2/11/2023 10:45 AM    * Acute respiratory failure with hypoxia [facility])  Assessment & Plan  Presenting with acute hypoxic respiratory failure secondary to RSV  CXR revealed no acute pulmonary processes  Desaturated on room air to 88%  Requiring 1 L nasal cannula  Was going to be discharged home ED however personal-care home could not set up oxygen this weekend    -Titrate oxygen as tolerated  -Supportive care    Generalized weakness  Assessment & Plan  PT/OT    Hypomagnesemia  Assessment & Plan  - Monitor and replete as necessary    RSV (acute bronchiolitis due to respiratory syncytial virus)  Assessment & Plan  -plan as above    Hyponatremia  Assessment & Plan  Likely due to dehydration    -Give IV fluid bolus  -Reassess in a m  Essential hypertension  Assessment & Plan  - Continue prior to admission HCTZ        VTE Pharmacologic Prophylaxis: VTE Score: 6 High Risk (Score >/= 5) - Pharmacological DVT Prophylaxis Ordered: enoxaparin (Lovenox)  Sequential Compression Devices Ordered  Patient Centered Rounds: I performed bedside rounds with nursing staff today  Discussions with Specialists or Other Care Team Provider: None    Education and Discussions with Family / Patient: Discussed with patient all questions answered  Discussed with daughter via phone    Time Spent for Care: 30 minutes  More than 50% of total time spent on counseling and coordination of care as described above      Current Length of Stay: 1 day(s)  Current Patient Status: Inpatient   Certification Statement: The patient will continue to require additional inpatient hospital stay due to Acute hypoxic respiratory failure secondary to RSV  Discharge Plan: Anticipate discharge in 24-48 hrs to Nursing facility with rehab capabilities    Code Status: Level 3 - DNAR and DNI    Subjective:   Patient seen and examined at bedside  Patient resting bed no apparent stress  Patient denies any complaints at this time  Objective:     Vitals:   Temp (24hrs), Av 2 °F (36 8 °C), Min:98 2 °F (36 8 °C), Max:98 2 °F (36 8 °C)    Temp:  [98 2 °F (36 8 °C)] 98 2 °F (36 8 °C)  HR:  [68-92] 72  Resp:  [14-26] 19  BP: (104-155)/(56-90) 122/74  SpO2:  [88 %-97 %] 97 %  Body mass index is 24 69 kg/m²  Input and Output Summary (last 24 hours): Intake/Output Summary (Last 24 hours) at 2023 1116  Last data filed at 2023 0826  Gross per 24 hour   Intake --   Output 1000 ml   Net -1000 ml       Physical Exam:   Physical Exam  HENT:      Head: Normocephalic  Cardiovascular:      Rate and Rhythm: Normal rate and regular rhythm  Pulses: Normal pulses  Heart sounds: Normal heart sounds  Pulmonary:      Effort: Pulmonary effort is normal  No respiratory distress  Comments: 1 L nasal cannula  Abdominal:      General: Abdomen is flat  Bowel sounds are normal    Musculoskeletal:         General: Normal range of motion  Cervical back: Normal range of motion  Skin:     General: Skin is warm  Neurological:      General: No focal deficit present  Mental Status: She is alert and oriented to person, place, and time  Mental status is at baseline  Psychiatric:         Mood and Affect: Mood normal          Thought Content:  Thought content normal          Judgment: Judgment normal           Additional Data:     Labs:  Results from last 7 days   Lab Units 23  0406 23  1106   WBC Thousand/uL 8 46 7 41   HEMOGLOBIN g/dL 14 0 13 8   HEMATOCRIT % 40 1 41 9   PLATELETS Thousands/uL 156 153   NEUTROS PCT %  --  48   LYMPHS PCT %  --  34   MONOS PCT %  --  11   EOS PCT %  --  7*     Results from last 7 days   Lab Units 23  0406   SODIUM mmol/L 130*   POTASSIUM mmol/L 3 6   CHLORIDE mmol/L 95* CO2 mmol/L 27   BUN mg/dL 19   CREATININE mg/dL 0 81   ANION GAP mmol/L 8   CALCIUM mg/dL 8 7   ALBUMIN g/dL 3 6   TOTAL BILIRUBIN mg/dL 0 69   ALK PHOS U/L 70   ALT U/L 16   AST U/L 22   GLUCOSE RANDOM mg/dL 128                 Results from last 7 days   Lab Units 02/11/23  1106   PROCALCITONIN ng/ml 0 07       Lines/Drains:  Invasive Devices     Peripheral Intravenous Line  Duration           Peripheral IV 02/11/23 Left Antecubital 1 day          Drain  Duration           External Urinary Catheter <1 day                      Imaging: No pertinent imaging reviewed  Recent Cultures (last 7 days):         Last 24 Hours Medication List:   Current Facility-Administered Medications   Medication Dose Route Frequency Provider Last Rate   • acetaminophen  650 mg Oral Q6H PRN Evern Bitter, DO     • aspirin  81 mg Oral Daily Evern Bitter, DO     • atorvastatin  40 mg Oral QPM Evern Bitter, DO     • enoxaparin  40 mg Subcutaneous Daily Evern Bitter, DO     • hydrochlorothiazide  25 mg Oral Daily Evern Bitter, DO     • magnesium sulfate  2 g Intravenous Once Evern Bitter, DO     • sodium chloride  1,000 mL Intravenous Once Evern Bitter, DO          Today, Patient Was Seen By: Sarbjit Rhodes DO    **Please Note: This note may have been constructed using a voice recognition system  **

## 2023-02-12 NOTE — ED NOTES
Patient eating breakfast in chair at this time       Chasidy Mcmillan [facility]  02/12/23 8444

## 2023-02-12 NOTE — PLAN OF CARE
Problem: OCCUPATIONAL THERAPY ADULT  Goal: Performs self-care activities at highest level of function for planned discharge setting  See evaluation for individualized goals  Description: Treatment Interventions: ADL retraining, Functional transfer training, UE strengthening/ROM, Endurance training, Patient/family training, Compensatory technique education, Energy conservation, Activityengagement      See flowsheet documentation for full assessment, interventions and recommendations  Note: Limitation: Decreased ADL status, Decreased UE ROM, Decreased UE strength, Decreased Safe judgement during ADL, Decreased endurance  Prognosis: Good  Assessment: Pt is a 80 y o  female seen for OT evaluation s/p admit to [facility] on 2/11/2023 w/ Acute respiratory failure with hypoxia ([facility] )  Comorbidities affecting pt's functional performance at time of assessment include: HTN, generalized weakness, hypoantremia, RSV, gait abnormality, OA, spinal stenosis, bilateral LE edema, hx of CVA, R ankle sprain  Personal factors affecting pt at time of IE include:difficulty performing ADLS, difficulty performing IADLS , limited insight into deficits, decreased initiation and engagement  and health management   Prior to admission, pt required (A) with ADLs and IADLs  Pt lives at [facility] and receives adequate support for safe completion of ADLs and IADLs  Pt receives assistance with bathing and supervision for dressing tasks  Upon evaluation: the following deficits impact occupational performance: weakness, decreased ROM, decreased strength, decreased balance, decreased tolerance and decreased safety awareness  Pt to benefit from continued skilled OT tx while in the hospital to address deficits as defined above and maximize level of functional independence w ADL's and functional mobility   Occupational Performance areas to address include: grooming, bathing/shower, toilet hygiene, dressing, functional mobility, community mobility and clothing management  From OT standpoint, recommendation at time of d/c would be RTF  High complexity eval d/t extensive past medical hx, dizziness/lightheadedness upon standing, mod (A) for safe LB dressing and cognitive status       OT Discharge Recommendation: Return to facility with rehabilitation services     Lucretia Conti MS, OTR/L

## 2023-02-12 NOTE — PHYSICAL THERAPY NOTE
PT Evaluation    Time In: 989 Time Out: 0820 02/12/23 6244   PT Last Visit   PT Visit Date 02/12/23   Note Type   Note type Evaluation   Additional Comments Pt found supine in bed with all needs in reach  Pt was agreeable to PT evaluation  Pain Assessment   Pain Assessment Tool 0-10   Pain Score No Pain   Multiple Pain Sites No   Restrictions/Precautions   Weight Bearing Precautions Per Order No   Other Precautions Contact/isolation; Airborne/isolation;Multiple lines;Telemetry; Fall Risk;O2  (2 L O2 NC )   Home Living   Type of Home Assisted living   Home Layout One level;Performs ADLs on one level;Ramped entrance;Elevator   Bathroom Shower/Tub Walk-in shower   Bathroom Toilet Raised   Bathroom Equipment Shower chair;Grab bars in shower;Grab bars around toilet   [facility]  (RW)   Prior Function   Level of [place] Independent with functional mobility; Needs assistance with ADLs   Lives With Facility staff  (at assisted living facility)   Receives Help From Other (Comment)  (Faculty staff)   IADLs Family/Friend/Other provides meals; Family/Friend/Other provides transportation; Family/Friend/Other provides medication management   Falls in the last 6 months 0   Vocational Retired   Comments Pt reports faculty staff assists with ADLs, including bathing and dressing   General   Additional Pertinent History Co-assessment w/ OT, as pt requires the skilled interventions of two clinicians for appropriate care delivery  Family/Caregiver Present No   Cognition   Overall Cognitive Status Impaired   Arousal/Participation Responsive   Attention Attends with cues to redirect   Orientation Level Oriented to person;Oriented to place; Disoriented to time;Oriented to situation   Memory Decreased long term memory   Following Commands Follows one step commands without difficulty   Comments Pt agreeable to treatment   RLE Assessment   RLE Assessment WFL   LLE Assessment   LLE Assessment WFL   Vestibular   Spontaneous Nystagmus (-) no evidence of nystagmus at rest in room light   Gaze Holding Nystagmus (-) no evidence of nystagmus   Gaze Holding Nystagmus / Lateral Gaze (-) Evidence of nystagmus   Coordination   Movements are Fluid and Coordinated 1   Light Touch   RLE Light Touch Not tested   LLE Light Touch Not tested   Sharp/Dull   RLE Sharp/Dull Not tested   LLE Sharp/Dull Not tested   Proprioception   RLE Proprioception Not tested   LLE Proprioception Not tested   Bed Mobility   Rolling R 5  Supervision   Additional items Assist x 1;Bedrails; Increased time required;Verbal cues  (VC provided to maintain pt safety and appropriate body position )   Supine to Sit 5  Supervision   Additional items Assist x 1;HOB elevated; Increased time required;Verbal cues  (VC provided to maintain pt safety and appropriate body position )   Sit to Supine   (DNT; at conclusion of session, pt remained in recliner chair w/ all needs in reach )   Transfers   Sit to Stand 5  Supervision   Additional items Assist x 1;HOB elevated; Increased time required;Verbal cues  (VC provided to maintain pt safety and appropriate body position )   Stand to Sit 5  Supervision   Additional items Assist x 1; Armrests; Increased time required;Verbal cues  (VC provided to maintain pt safety and appropriate body position )   Stand pivot 5  Supervision   Additional items Assist x 1; Increased time required;Verbal cues  (VC provided to maintain pt safety and appropriate body position )   Additional Comments During sit to stand, ptreported dizziness  Symptoms resolved <1 minute  Ambulation/Elevation   Gait pattern Narrow ERLINDA; Excessively slow; Short stride; Inconsistent dhruv   Gait Assistance 5  Supervision   Additional items Assist x 2;Verbal cues  (VC provided to maintain pt safety and appropriate body position   An additonal staff memeberwas necessary for O2 line managment )   Assistive Device Rolling walker   Distance 10 ft  (To recliner chair)   Stair Management Assistance Not tested   Balance   Static Sitting Good   Dynamic Sitting Fair +   Static Standing Fair   Dynamic Standing Fair   Ambulatory Fair -   Endurance Deficit   Endurance Deficit Yes   Endurance Deficit Description Pt was limited due to fatigue and symptoms of dizziness that resolved in < 1 minute  Activity Tolerance   Activity Tolerance Patient tolerated treatment well;Patient limited by fatigue  (Although pt was limited due to fatigue, [place] tolerated treatment well and cooperated when given increased time )   Nurse Made Aware Yes, JULIETA Robles Early   Assessment   Prognosis Fair   Problem List Impaired balance;Decreased mobility   Assessment Pt is a 80 y o  female admitted to [facility] on [facility]/12/2023 for increasing SOB  PT evaluation necessary at this time for assesment of mobility and D/C needs  Co-morbidies effecting pt outcomes at this time are HTN, generalized weakness, hyponatremia, acute bronchitis, ARF w/ hypoxia, and hearing impairment  PTA, pt reports that she recieves help w/ functional mobility and ADLs such as dressing and bathing at an assisted living facility  [place] also recieves help w/ driving, cooking, and medication management  At time of assessment, pt completed bed mobility, transfers, and ambulation w/ S and VC  Pt would benefit from skilled PT to adress generalized weakness, balance impairment, activity intolerance, decreased endurance, and gait dysfunction  Based on mobility at time of assessment, the reccomedation upon D/C is return to facility w/ rehabilitation services  Goals   Patient Goals To "feel better"   [facility] Expiration Date 02/22/23   Short Term Goal #1 In 10 days, pt will iden ERLINDA during ambulation to derease risk of falls  In 10 days, pt will follow commands in all directions w/o redirect to advance towards prior level of independence   In 10 days, pt will walk 25 ft w/ RW and modified independence to demonstrate increased acitivty tolerance and mobility  PT Treatment Day 0   Plan   Treatment/Interventions Spoke to nursing;Bed mobility;Gait training;Patient/family training   PT Frequency 3-5x/wk   Recommendation   PT Discharge Recommendation Return to facility with rehabilitation services   Equipment Recommended Walker  (RW)   Florent[place] 8 in Flat Bed Without Bedrails 4   Lying on Back to Sitting on Edge of Flat Bed Without Bedrails 4   Moving Bed to Chair 3   Standing Up From Chair Using Arms 3   Walk in Room 3   Climb 3-5 Stairs With Railing 3   Basic Mobility Inpatient Raw Score 20   Basic Mobility Standardized Score 43 99   Highest Level Of Mobility   -HLM Goal 6: Walk 10 steps or more   -HLM Achieved 6: Walk 10 steps or more   End of Consult   Patient Position at End of Consult Bedside chair; All needs within reach  (At session conclusion, pt remained OOB in recliner chair w/ all needs in reach )     Co-morbidities: Generalized weakness, balance impairment, activity intolerance, decreased endurance, and gait dysfunction  Impairments: HTN, generalized weakness, hyponatremia, acute bronchitis, ARF w/ hypoxia, and hearing impairment  Presentation: Presentation at this time is unstable due to increasing SOB,impaired cognition, GOYAL, generalized weakness, and activity intolerance         Seema Olivo, SPT

## 2023-02-12 NOTE — PLAN OF CARE
Problem: PHYSICAL THERAPY ADULT  Goal: Performs mobility at highest level of function for planned discharge setting  See evaluation for individualized goals  Description: Treatment/Interventions: Spoke to nursing, [employer] mobility, Gait training, Patient/family training  Equipment Recommended: Doyne Gosselin (REZA)       See flowsheet documentation for full assessment, interventions and recommendations  Note: Prognosis: Fair  Problem List: Impaired balance, Decreased mobility  Assessment: Pt is a 80 y o  female admitted to [place] [id number] on 02/12/2023 for increasing SOB  PT evaluation necessary at this time for assesment of mobility and D/C needs  Co-morbidies effecting pt outcomes at this time are HTN, generalized weakness, hyponatremia, acute bronchitis, ARF w/ hypoxia, and hearing impairment  PTA, pt reports that she recieves help w/ functional mobility and ADLs such as dressing and bathing at an assisted living facility  [place] also recieves help w/ driving, cooking, and medication management  At time of assessment, pt completed bed mobility, transfers, and ambulation w/ S and VC  Pt would benefit from skilled PT to adress generalized weakness, balance impairment, activity intolerance, decreased endurance, and gait dysfunction  Based on mobility at time of assessment, the reccomedation upon D/C is return to facility w/ rehabilitation services  PT Discharge Recommendation: Return to facility with rehabilitation services    See flowsheet documentation for full assessment

## 2023-02-12 NOTE — OCCUPATIONAL THERAPY NOTE
Occupational Therapy Evaluation     Patient Name: Peg Liner  XPYAT'A Date: 2/12/2023  Problem List  Principal Problem:    Acute respiratory failure with hypoxia [facility])  Active Problems:    Essential hypertension    Generalized weakness    Hyponatremia    RSV (acute bronchiolitis due to respiratory syncytial virus)    Past Medical History  Past Medical History:   Diagnosis Date    TANNER (acute kidney injury) ([facility] [address] ) 10/11/2022    Arthritis     Cataract     LAST ASSESSED: [id number]    Depression     Dislocation of left shoulder joint     LAST ASSESSSED: [id number]    Essential hypertension 4/3/2019    Gait disorder     LAST ASSESSED: [id number]    Herpes zoster     LAST ASSESSED: [id number]    HL (hearing loss)     Impacted cerumen of both ears     LAST ASSESSED: [id number]    Impacted cerumen of right ear     LAST ASSESSED: [id number]    Lightheadedness     MILD AND PT REFUSES EKG ETC  PT PROMISES TO CALL IF WORSENS  LAST ASSESSED: 30OTL5189    Multiple falls     LAST ASSESSED: 29YRV8029    Near syncope     LAST ASSESSED: 89OOV6162    Postherpetic polyneuropathy     LAST ASSESSED: [id number]     Past Surgical History  Past Surgical History:   Procedure Laterality Date    CATARACT EXTRACTION, BILATERAL      HERNIA REPAIR      TONSILLECTOMY      TUBAL LIGATION             02/12/23 0801   OT Last Visit   OT Visit Date 02/12/23   Note Type   Note type Evaluation   Additional Comments Pt seen as a co-eval with PT due to the patient's co-morbidities, clinically unstable presentation, and present impairments which are a regression from the patient's baseline  Pain Assessment   Pain Assessment Tool 0-10   Pain Score No Pain   Multiple Pain Sites No   Restrictions/Precautions   Weight Bearing Precautions Per Order No   Braces or Orthoses   (None reported)   Other Precautions Contact/isolation; Airborne/isolation;Cognitive;Multiple lines;Telemetry;O2;Fall Risk  (1L O2; pt reports not wearing O2 at baseline)   Home Living Type of Home Assisted living  [facility])   Home Layout One level;Performs ADLs on one level;Ramped entrance;Elevator   Bathroom Shower/Tub Walk-in shower   Bathroom Toilet Raised   Bathroom Equipment Grab bars in shower; Shower chair;Grab bars around toilet   [address] Cane;Walker  (RW)   Prior Function   Level of [place] Independent with functional mobility; Needs assistance with ADLs; Needs assistance with IADLS   Lives With Alone  (Facility staff)   Sharyn Bird Help From Other (Comment)  (Facility Staff)   IADLs Family/Friend/Other provides transportation; Family/Friend/Other provides meals; Family/Friend/Other provides medication management   Falls in the last 6 months 0  (Pt denies)   Vocational Retired   Comments Pt reports facility staff assisting with ADLs, including bathing and dressing as needed  Subjective   Subjective "I am very cold"   ADL   Where Assessed Edge of bed   UB Bathing Assistance 5  Supervision/Setup   LB Bathing Assistance 3  Moderate Assistance   UB Dressing Assistance 5  Supervision/Setup   LB Dressing Assistance 3  Moderate Assistance   LB Dressing Deficit Don/doff R sock; Don/doff L sock  (Pt demonstrated functional reach to shin, indicating mod (A) to doff/don LB dressing )   Bed Mobility   Supine to Sit 5  Supervision   Additional items HOB elevated; Bedrails; Increased time required;Verbal cues  (VC for safe hand placement; increased time and effort needed to complete)   Sit to Supine   (DNT; pt seated in recliner upon conclusion of session)   Transfers   Sit to Stand 5  Supervision   Additional items Bedrails; Increased time required;Verbal cues  (VC for safe hand placement)   Stand to Sit 5  Supervision   Additional items Armrests; Increased time required;Verbal cues  (VC for safe hand placement)   Additional Comments Pt reported dizziness upon standing but resolved quickly with rest    Functional Mobility   Functional Mobility 5 Supervision   Additional Comments Pt completed functional mobility w/ RW at (S) level around bed to chair w/ no significant LOB observed  Additional items Rolling walker   Balance   Static Sitting Good   Dynamic Sitting Fair +   Static Standing Fair   Dynamic Standing Fair   Ambulatory Fair -   Activity Tolerance   Activity Tolerance Patient limited by fatigue   Nurse Made Aware Yes, RN aware of recs/outcomes   RUE Assessment   RUE Assessment WFL   RUE Strength   RUE Overall Strength   (3/5)   LUE Assessment   LUE Assessment WFL   LUE Strength   LUE Overall Strength   (3/5)   Hand Function   Gross Motor Coordination Functional   Fine Motor Coordination Functional   Psychosocial   Psychosocial (WDL) WDL   Cognition   Overall Cognitive Status Impaired   Arousal/Participation Alert; Cooperative   Attention Attends with cues to redirect   Orientation Level Disoriented to time;Disoriented to situation   Memory Decreased long term memory   Following Commands Follows one step commands without difficulty   Comments Pt agreeable to OT evaluation   Assessment   Limitation Decreased ADL status; Decreased UE ROM; Decreased UE strength;Decreased Safe judgement during ADL;Decreased endurance   Prognosis Good   Assessment Pt is a 80 y o  female seen for OT evaluation s/p admit to [facility] on 2/11/2023 w/ Acute respiratory failure with hypoxia ([facility] [address] )  Comorbidities affecting pt's functional performance at time of assessment include: HTN, generalized weakness, hypoantremia, RSV, gait abnormality, OA, spinal stenosis, bilateral LE edema, hx of CVA, R ankle sprain  Personal factors affecting pt at time of IE include:difficulty performing ADLS, difficulty performing IADLS , limited insight into deficits, decreased initiation and engagement  and health management   Prior to admission, pt required (A) with ADLs and IADLs  [facility] and receives adequate support for safe completion of ADLs and IADLs   Pt receives assistance with bathing and supervision for dressing tasks  Upon evaluation: the following deficits impact occupational performance: weakness, decreased ROM, decreased strength, decreased balance, decreased tolerance and decreased safety awareness  Pt to benefit from continued skilled OT tx while in the hospital to address deficits as defined above and maximize level of functional independence w ADL's and functional mobility  Occupational Performance areas to address include: grooming, bathing/shower, toilet hygiene, dressing, functional mobility, community mobility and clothing management  From OT standpoint, recommendation at time of d/c would be RTF  High complexity eval d/t extensive past medical hx, dizziness/lightheadedness upon standing, mod (A) for safe LB dressing and cognitive status  Goals   Patient Goals "To feel better"   Plan   Treatment Interventions ADL retraining;Functional transfer training;UE strengthening/ROM; Endurance training;Patient/family training; Compensatory technique education; Energy conservation; Activityengagement   Goal Expiration Date 02/22/23   OT Treatment Day 0   OT Frequency 3-5x/wk   Recommendation   OT Discharge Recommendation Return to facility with rehabilitation services   Additional Comments  The patient's raw score on the AM-PAC Daily Activity inpatient short form is 18, standardized score is 38 66, less than 39 4  Patients at this level are likely to benefit from discharge to return to facility w/ rehab services and appropriate support  Please refer to the recommendation of the Occupational Therapist for safe discharge planning     AM-PAC Daily Activity Inpatient   Lower Body Dressing 2   Bathing 2   Toileting 3   Upper Body Dressing 3   Grooming 4   Eating 4   Daily Activity Raw Score 18   Daily Activity Standardized Score (Calc for Raw Score >=11) 38 66   AM-[facility] Applied Cognition Inpatient   Following a Speech/Presentation 4   Understanding Ordinary Conversation 3 Taking Medications 2   Remembering Where Things Are Placed or Put Away 3   Remembering List of 4-5 Errands 2   Taking Care of Complicated Tasks 2   Applied Cognition Raw Score 16   Applied Cognition Standardized Score 35 03     GOALS:    Pt will achieve the following within specified time frame: STG  Pt will achieve the following goals within 5 days    *ADL transfers with (S) for inc'd independence with ADLs/purposeful tasks    *UB ADL with (I) for inc'd independence with self cares    *LB ADL with Min (A) using AE prn for inc'd independence with self cares    *Toileting with CGA for clothing management and hygiene for return to PLOF with personal care    *Increase static stand balance and dyn stand balance to F+ for inc'd safety with standing purposeful tasks    *Increase stand tolerance x3 m for inc'd tolerance with standing purposeful tasks    *Participate in 10m UE therex to increase overall stamina/activity tolerance for purposeful tasks    *Bed mobility- (I) for inc'd independence to manage own comfort and initiate EOB & OOB purposeful tasks    Pt will achieve the following within specified time frame: LTG  Pt will achieve the following goals within 10 days    *ADL transfers with (I) for inc'd independence with ADLs/purposeful tasks    *LB ADL with (S) using AE prn for inc'd independence with self cares    *Toileting with (S) for clothing management and hygiene for return to PLOF with personal care    *Increase static stand balance and dyn stand balance to G for inc'd safety with standing purposeful tasks    *Increase stand tolerance x7 m for inc'd tolerance with standing purposeful tasks    Saeed Wetzel MS, OTR/L

## 2023-02-12 NOTE — PLAN OF CARE
Problem: Potential for Falls  Goal: Patient will remain free of falls  Description: INTERVENTIONS:  - Educate patient/family on patient safety including physical limitations  - Instruct patient to call for assistance with activity   - Consult OT/PT to assist with strengthening/mobility   - Keep Call bell within reach  - Keep bed low and locked with side rails adjusted as appropriate  - Keep care items and personal belongings within reach  - Initiate and maintain comfort rounds  - Make Fall Risk Sign visible to staff  - Offer Toileting every 2 Hours, in advance of need  - Initiate/Maintain bed alarm  - Obtain necessary fall risk management equipment: walker  - Apply yellow socks and bracelet for high fall risk patients  - Consider moving patient to room near nurses station  Outcome: Progressing     Problem: Prexisting or High Potential for Compromised Skin Integrity  Goal: Skin integrity is maintained or improved  Description: INTERVENTIONS:  - Identify patients at risk for skin breakdown  - Assess and monitor skin integrity  - Assess and monitor nutrition and hydration status  - Monitor labs   - Assess for incontinence   - Turn and reposition patient  - Assist with mobility/ambulation  - Relieve pressure over bony prominences  - Avoid friction and shearing  - Provide appropriate hygiene as needed including keeping skin clean and dry  - Evaluate need for skin moisturizer/barrier cream  - Collaborate with interdisciplinary team   - Patient/family teaching  - Consider wound care consult   Outcome: Progressing     Problem: Potential for Falls  Goal: Patient will remain free of falls  Description: INTERVENTIONS:  - Educate patient/family on patient safety including physical limitations  - Instruct patient to call for assistance with activity   - Consult OT/PT to assist with strengthening/mobility   - Keep Call bell within reach  - Keep bed low and locked with side rails adjusted as appropriate  - Keep care items and personal belongings within reach  - Initiate and maintain comfort rounds  - Make Fall Risk Sign visible to staff  - Offer Toileting every 2 Hours, in advance of need  - Initiate/Maintain bed alarm  - Obtain necessary fall risk management equipment: walker  - Apply yellow socks and bracelet for high fall risk patients  - Consider moving patient to room near nurses station  Outcome: Progressing     Problem: Prexisting or High Potential for Compromised Skin Integrity  Goal: Skin integrity is maintained or improved  Description: INTERVENTIONS:  - Identify patients at risk for skin breakdown  - Assess and monitor skin integrity  - Assess and monitor nutrition and hydration status  - Monitor labs   - Assess for incontinence   - Turn and reposition patient  - Assist with mobility/ambulation  - Relieve pressure over bony prominences  - Avoid friction and shearing  - Provide appropriate hygiene as needed including keeping skin clean and dry  - Evaluate need for skin moisturizer/barrier cream  - Collaborate with interdisciplinary team   - Patient/family teaching  - Consider wound care consult   Outcome: Progressing     Problem: MOBILITY - ADULT  Goal: Maintain or return to baseline ADL function  Description: INTERVENTIONS:  -  Assess patient's ability to carry out ADLs; assess patient's baseline for ADL function and identify physical deficits which impact ability to perform ADLs (bathing, care of mouth/teeth, toileting, grooming, dressing, etc )  - Assess/evaluate cause of self-care deficits   - Assess range of motion  - Assess patient's mobility; develop plan if impaired  - Assess patient's need for assistive devices and provide as appropriate  - Encourage maximum independence but intervene and supervise when necessary  - Involve family in performance of ADLs  - Assess for home care needs following discharge   - Consider OT consult to assist with ADL evaluation and planning for discharge  - Provide patient education as appropriate  Outcome: Progressing  Goal: Maintains/Returns to pre admission functional level  Description: INTERVENTIONS:  - Perform BMAT or MOVE assessment daily    - Set and communicate daily mobility goal to care team and patient/family/caregiver  - Collaborate with rehabilitation services on mobility goals if consulted  - Perform Range of Motion 3 times a day  - Reposition patient every 2  hours    - Dangle patient 3 times a day  - Stand patient 3 times a day  - Ambulate patient 3  times a day  - Out of bed to chair 3 times a day   - Out of bed for meals 3 times a day  - Out of bed for toileting  - Record patient progress and toleration of activity level   Outcome: Progressing     Problem: RESPIRATORY - ADULT  Goal: Achieves optimal ventilation and oxygenation  Description: INTERVENTIONS:  - Assess for changes in respiratory status  - Assess for changes in mentation and behavior  - Position to facilitate oxygenation and minimize respiratory effort  - Oxygen administered by appropriate delivery if ordered  - Initiate smoking cessation education as indicated  - Encourage broncho-pulmonary hygiene including cough, deep breathe, Incentive Spirometry  - Assess the need for suctioning and aspirate as needed  - Assess and instruct to report SOB or any respiratory difficulty  - Respiratory Therapy support as indicated  Outcome: Progressing     Problem: GENITOURINARY - ADULT  Goal: Maintains or returns to baseline urinary function  Description: INTERVENTIONS:  - Assess urinary function  - Encourage oral fluids to ensure adequate hydration if ordered  - Administer IV fluids as ordered to ensure adequate hydration  - Administer ordered medications as needed  - Offer frequent toileting  - Follow urinary retention protocol if ordered  Outcome: Progressing

## 2023-02-13 VITALS
RESPIRATION RATE: 17 BRPM | OXYGEN SATURATION: 93 % | SYSTOLIC BLOOD PRESSURE: 136 MMHG | WEIGHT: 127.98 LBS | BODY MASS INDEX: 23.55 KG/M2 | TEMPERATURE: 97.3 F | DIASTOLIC BLOOD PRESSURE: 93 MMHG | HEIGHT: 62 IN | HEART RATE: 60 BPM

## 2023-02-13 LAB
ANION GAP SERPL CALCULATED.3IONS-SCNC: 7 MMOL/L (ref 4–13)
BUN SERPL-MCNC: 27 MG/DL (ref 5–25)
CALCIUM SERPL-MCNC: 9.2 MG/DL (ref 8.4–10.2)
CHLORIDE SERPL-SCNC: 98 MMOL/L (ref 96–108)
CO2 SERPL-SCNC: 27 MMOL/L (ref 21–32)
CREAT SERPL-MCNC: 0.82 MG/DL (ref 0.6–1.3)
ERYTHROCYTE [DISTWIDTH] IN BLOOD BY AUTOMATED COUNT: 13.2 % (ref 11.6–15.1)
GFR SERPL CREATININE-BSD FRML MDRD: 61 ML/MIN/1.73SQ M
GLUCOSE SERPL-MCNC: 89 MG/DL (ref 65–140)
HCT VFR BLD AUTO: 40.7 % (ref 34.8–46.1)
HGB BLD-MCNC: 13.6 G/DL (ref 11.5–15.4)
MAGNESIUM SERPL-MCNC: 2.2 MG/DL (ref 1.9–2.7)
MCH RBC QN AUTO: 30.4 PG (ref 26.8–34.3)
MCHC RBC AUTO-ENTMCNC: 33.4 G/DL (ref 31.4–37.4)
MCV RBC AUTO: 91 FL (ref 82–98)
PLATELET # BLD AUTO: 178 THOUSANDS/UL (ref 149–390)
PMV BLD AUTO: 9.7 FL (ref 8.9–12.7)
POTASSIUM SERPL-SCNC: 3.4 MMOL/L (ref 3.5–5.3)
RBC # BLD AUTO: 4.47 MILLION/UL (ref 3.81–5.12)
SODIUM SERPL-SCNC: 132 MMOL/L (ref 135–147)
WBC # BLD AUTO: 12.19 THOUSAND/UL (ref 4.31–10.16)

## 2023-02-13 RX ORDER — POLYETHYLENE GLYCOL 3350 17 G/17G
17 POWDER, FOR SOLUTION ORAL DAILY PRN
Qty: 510 G | Refills: 0
Start: 2023-02-13

## 2023-02-13 RX ADMIN — ASPIRIN 81 MG CHEWABLE TABLET 81 MG: 81 TABLET CHEWABLE at 08:29

## 2023-02-13 RX ADMIN — HYDROCHLOROTHIAZIDE 25 MG: 25 TABLET ORAL at 08:29

## 2023-02-13 RX ADMIN — ENOXAPARIN SODIUM 40 MG: 100 INJECTION SUBCUTANEOUS at 08:29

## 2023-02-13 NOTE — NURSING NOTE
Pt DC to home via Uncle in stable condition  All belongings packed and sent with pt  AVS reviewed and sent

## 2023-02-13 NOTE — CASE MANAGEMENT
Case Management Assessment & Discharge Planning Note    Patient name Diana Roth  Location /-85 MRN [id number]  : 1930 Date 2023       Current Admission Date: 2023  Current Admission Diagnosis:Acute respiratory failure with hypoxia [facility])   Patient Active Problem List    Diagnosis Date Noted   • Hypomagnesemia 2023   • RSV (acute bronchiolitis due to respiratory syncytial virus) 2023   • Acute respiratory failure with hypoxia ([facility] [address] ) 2023   • Syncope 2022   • Abnormal CT scan 2022   • Gram-positive cocci bacteremia 10/12/2022   • Constipation 10/11/2022   • Gastric distention 10/11/2022   • TANNER (acute kidney injury) ([facility] [address] ) 10/11/2022   • Generalized weakness    • Metabolic encephalopathy    • Hyponatremia 2022   • Current moderate episode of major depressive disorder without prior episode ([facility] [address] ) 2020   • History of suicidal ideation 2020   • History of CVA (cerebrovascular accident) 2020   • Right ankle sprain 2020   • Venous insufficiency of both lower extremities 2020   • Unspecified mood (affective) disorder, r/o major depressive disorder (single episode) 2020   • Mild neurocognitive disorder 2020   • Benign paroxysmal positional vertigo 2019   • Urinary incontinence 2019   • Bilateral lower extremity edema 2019   • Essential hypertension 2019   • Mediastinal mass 2019   • Spinal stenosis at L4-L5 level 2019   • Gait abnormality 2015   • Hearing loss 2013   • Osteoarthritis 2012      LOS (days): 2  Geometric Mean LOS (GMLOS) (days): 3 50  Days to GMLOS:1 8     OBJECTIVE:    Risk of Unplanned Readmission Score: 15 73         Current admission status: Inpatient  Referral Reason: Other (d/c planning)    Preferred Pharmacy:   [facility] [address] [place]  Phone: [phone] Fax: [address], [address] [facility]  [address] [address]  Phone: [phone] Fax: [phone]    Primary Care Provider: Laura Godwin MD    Primary Insurance: MEDICARE  Secondary Insurance: [facility] HEALTH OPTIONS PROGRAM    ASSESSMENT:  [address], [address] - Daughter   Primary Phone: [phone] (Mobile)               Advance Directives  Does patient have a [address]?: Yes  Does patient have Advance Directives?: Yes         Readmission Root Cause  30 Day Readmission: No    Patient Information  Admitted from[de-identified] Facility [facility])  Mental Status: Alert  During Assessment patient was accompanied by: Not accompanied during assessment  Assessment information provided by[de-identified] Daughter, Other - please comment (staff at [facility])  Primary Caregiver: Other (Comment) (staff)  Caregiver's Name[de-identified] [facility]  Caregiver's Relationship to Patient[de-identified] Other (Specify) (staff)  Caregiver's Telephone Number[de-identified] Pako Delgado    [phone] at 10:19 am  Support Systems: Son, Daughter  [place] of Residence: [address] do you live in?: Via [employer] entry access options   Select all that apply : Ramp  Type of Current Residence: Facility  Upon entering residence, is there a bedroom on the main floor (no further steps)?: Yes  Upon entering residence, is there a bathroom on the main floor (no further steps)?: Yes  In the last 12 months, was there a time when you were not able to pay the mortgage or rent on time?: No  In the last 12 months, how many places have you lived?: 1  In the last 12 months, was there a time when you did not have a steady place to sleep or slept in a shelter (including now)?: No  Homeless/housing insecurity resource given?: N/A  Living Arrangements: Other (Comment) ([facility])  Is patient a ?: No    Activities of Daily Living Prior to Admission  Functional Status: Assistance (cooking, cleaning, transportation, meds)  Completes ADLs independently?: No  Level of ADL dependence: Assistance (bathing & dressing)  Ambulates independently?: No  Level of ambulatory dependence: Assistance  Does patient use assisted devices?: Yes  Assisted Devices (DME) used: Evelia Black  Does patient currently own DME?: Yes  What DME does the patient currently own?: [facility]jul[employer] [address] [address] [facility]  Does patient have a history of Outpatient Therapy (PT/OT)?: Yes  Does the patient have a history of Short-Term Rehab?: No  Does patient have a history of HHC?: Yes (slvna)  Does patient currently have [address]?: No         Patient Information Continued  Income Source: Pension/MCFP  Does patient have prescription coverage?: Yes ([employer])  Within the past 12 months, you worried that your food would run out before you got the money to buy more : Never true  Within the past 12 months, the food you bought just didn't last and you didn't have money to get more : Never true  Food insecurity resource given?: N/A  Does patient receive dialysis treatments?: No  Does patient have a history of substance abuse?: No  Does patient have a history of Mental Health Diagnosis?: No         Means of Transportation  Means of Transport to Appts[de-identified] Family transport  In the past 12 months, has lack of transportation kept you from medical appointments or from getting medications?: No  In the past 12 months, has lack of transportation kept you from meetings, work, or from getting things needed for daily living?: No  Was application for public transport provided?: N/A        DISCHARGE DETAILS:    Discharge planning discussed with[de-identified] Sascha Velasco was called at 10:17 am  and Tawana Benson was called 10:19 am  Freedom of Choice: Yes  Comments - Freedom of Choice: recoommendation is for the pt to return to the facility with rehab services- family & pch have given permission to set up hhc- referrals sent  CM contacted family/caregiver?: Yes Contacts  Patient Contacts: Hayes Leisure  Relationship to Patient[de-identified] Family (daughter)  Contact Method: Phone  Phone Number: [phone]  Reason/Outcome: Discharge [address]         Is the patient interested in [facility] at discharge?: Yes  [address] requested[de-identified] Nursing, Physical Therapy, Occupational [address] Name[de-identified] Other  [facility] Provider[de-identified] PCP  Home Health Services Needed[de-identified] Oxygen Via Nasal Cannula, Strengthening/Theraputic Exercises to Improve Function, Other (comment) (vs, review meds, resp assessment, assess for s/s of infections)  Oxygen LPM Ordered (if applicable based on home health services needed):: 2 LPM (2 liters with exertion)  Homebound Criteria Met[de-identified] Requires the Assistance of Another Person for Safe Ambulation or to Leave the Home, Uses an Assist Device (i e  cane, walker, etc)  Supporting Clincal Findings[de-identified] Requires Oxygen, Limited Endurance    DME Referral Provided  Referral made for DME?: Yes  DME referral completed for the following items[de-identified] Home Oxygen concentrator, Portable Oxygen tanks  DME Supplier Name[de-identified] [employer]    Other Referral/Resources/Interventions Provided:  Interventions: [facility], Assisted Living  Referral Comments: [facility] referrals sent- oxygen ordered- pt to return to [facility] when stable for d/c    Would you like to participate in our [address] service program?  : No - Declined    Treatment Team Recommendation: Assisted Living, Home with [address] [facility] [facility] (return to [facility] with [facility]- family)      cm received a copy of living will and poa paperwork form the [facility]- records will be scanned to the chart

## 2023-02-13 NOTE — PLAN OF CARE
Problem: Potential for Falls  Goal: Patient will remain free of falls  Description: INTERVENTIONS:  - Educate patient/family on patient safety including physical limitations  - Instruct patient to call for assistance with activity   - Consult OT/PT to assist with strengthening/mobility   - Keep Call bell within reach  - Keep bed low and locked with side rails adjusted as appropriate  - Keep care items and personal belongings within reach  - Initiate and maintain comfort rounds  - Make Fall Risk Sign visible to staff  - Offer Toileting every 2 Hours, in advance of need  - Initiate/Maintain bed alarm  - Obtain necessary fall risk management equipment: walker  - Apply yellow socks and bracelet for high fall risk patients  - Consider moving patient to room near nurses station  Outcome: Progressing     Problem: Prexisting or High Potential for Compromised Skin Integrity  Goal: Skin integrity is maintained or improved  Description: INTERVENTIONS:  - Identify patients at risk for skin breakdown  - Assess and monitor skin integrity  - Assess and monitor nutrition and hydration status  - Monitor labs   - Assess for incontinence   - Turn and reposition patient  - Assist with mobility/ambulation  - Relieve pressure over bony prominences  - Avoid friction and shearing  - Provide appropriate hygiene as needed including keeping skin clean and dry  - Evaluate need for skin moisturizer/barrier cream  - Collaborate with interdisciplinary team   - Patient/family teaching  - Consider wound care consult   Outcome: Progressing     Problem: MOBILITY - ADULT  Goal: Maintain or return to baseline ADL function  Description: INTERVENTIONS:  -  Assess patient's ability to carry out ADLs; assess patient's baseline for ADL function and identify physical deficits which impact ability to perform ADLs (bathing, care of mouth/teeth, toileting, grooming, dressing, etc )  - Assess/evaluate cause of self-care deficits   - Assess range of motion  - Assess patient's mobility; develop plan if impaired  - Assess patient's need for assistive devices and provide as appropriate  - Encourage maximum independence but intervene and supervise when necessary  - Involve family in performance of ADLs  - Assess for home care needs following discharge   - Consider OT consult to assist with ADL evaluation and planning for discharge  - Provide patient education as appropriate  Outcome: Progressing  Goal: Maintains/Returns to pre admission functional level  Description: INTERVENTIONS:  - Perform BMAT or MOVE assessment daily    - Set and communicate daily mobility goal to care team and patient/family/caregiver  - Collaborate with rehabilitation services on mobility goals if consulted  - Perform Range of Motion 3 times a day  - Reposition patient every 2  hours    - Dangle patient 3 times a day  - Stand patient 3 times a day  - Ambulate patient 3  times a day  - Out of bed to chair 3 times a day   - Out of bed for meals 3 times a day  - Out of bed for toileting  - Record patient progress and toleration of activity level   Outcome: Progressing     Problem: RESPIRATORY - ADULT  Goal: Achieves optimal ventilation and oxygenation  Description: INTERVENTIONS:  - Assess for changes in respiratory status  - Assess for changes in mentation and behavior  - Position to facilitate oxygenation and minimize respiratory effort  - Oxygen administered by appropriate delivery if ordered  - Initiate smoking cessation education as indicated  - Encourage broncho-pulmonary hygiene including cough, deep breathe, Incentive Spirometry  - Assess the need for suctioning and aspirate as needed  - Assess and instruct to report SOB or any respiratory difficulty  - Respiratory Therapy support as indicated  Outcome: Progressing     Problem: GENITOURINARY - ADULT  Goal: Maintains or returns to baseline urinary function  Description: INTERVENTIONS:  - Assess urinary function  - Encourage oral fluids to ensure adequate hydration if ordered  - Administer IV fluids as ordered to ensure adequate hydration  - Administer ordered medications as needed  - Offer frequent toileting  - Follow urinary retention protocol if ordered  Outcome: Progressing     Problem: PAIN - ADULT  Goal: Verbalizes/displays adequate comfort level or baseline comfort level  Description: Interventions:  - Encourage patient to monitor pain and request assistance  - Assess pain using appropriate pain scale  - Administer analgesics based on type and severity of pain and evaluate response  - Implement non-pharmacological measures as appropriate and evaluate response  - Consider cultural and social influences on pain and pain management  - Notify physician/advanced practitioner if interventions unsuccessful or patient reports new pain  Outcome: Progressing     Problem: DISCHARGE PLANNING  Goal: Discharge to home or other facility with appropriate resources  Description: INTERVENTIONS:  - Identify barriers to discharge w/patient and caregiver  - Arrange for needed discharge resources and transportation as appropriate  - Identify discharge learning needs (meds, wound care, etc )  - Refer to Case Management Department for coordinating discharge planning if the patient needs post-hospital services based on physician/advanced practitioner order or complex needs related to functional status, cognitive ability, or social support system  Outcome: Progressing     Problem: Knowledge Deficit  Goal: Patient/family/caregiver demonstrates understanding of disease process, treatment plan, medications, and discharge instructions  Description: Complete learning assessment and assess knowledge base    Interventions:  - Provide teaching at level of understanding  - Provide teaching via preferred learning methods  Outcome: Progressing

## 2023-02-13 NOTE — RESPIRATORY THERAPY NOTE
Home Oxygen Qualifying Test     Patient name: Kristian Crenshaw        : 1930   Date of Test:  2023  Diagnosis:    Home Oxygen Test:    **Medicare Guidelines require item(s) 1-5 on all ambulatory patients or 1 and 2 on non-ambulatory patients  1  Baseline SPO2 on Room Air at rest 94 %   a  If <= 88% on Room Air add O2 via NC to obtain SpO2 >=88%  If LPM needed, document LPM  needed to reach =>88%    2  SPO2 during exertion on Room Air 84  %  a  During exertion monitor SPO2  If SPO2 increases >=89%, do not add supplemental oxygen    3  SPO2 on Oxygen at Rest n/a % at  N/a LPM    4  SPO2 during exertion on Oxygen 90 % at 2 LPM    5  Test performed during exertion activity  [x]  Supplemental Home Oxygen is indicated  []  Client does not qualify for home oxygen  Respiratory Additional Notes- pt qualifies for supplemental oxygen during ambulation  Pt does not need O2 at rest  Pt walked for five minutes and required to have 2L supplemental oxygen provided to maintain spo2>90%  Pt reported feeling light headed before O2 was provided     Addison Arevalo, RT

## 2023-02-13 NOTE — PLAN OF CARE
Problem: Potential for Falls  Goal: Patient will remain free of falls  Description: INTERVENTIONS:  - Educate patient/family on patient safety including physical limitations  - Instruct patient to call for assistance with activity   - Consult OT/PT to assist with strengthening/mobility   - Keep Call bell within reach  - Keep bed low and locked with side rails adjusted as appropriate  - Keep care items and personal belongings within reach  - Initiate and maintain comfort rounds  - Make Fall Risk Sign visible to staff  - Offer Toileting every 2 Hours, in advance of need  - Initiate/Maintain bed alarm  - Obtain necessary fall risk management equipment: walker  - Apply yellow socks and bracelet for high fall risk patients  - Consider moving patient to room near nurses station  Outcome: Progressing     Problem: Prexisting or High Potential for Compromised Skin Integrity  Goal: Skin integrity is maintained or improved  Description: INTERVENTIONS:  - Identify patients at risk for skin breakdown  - Assess and monitor skin integrity  - Assess and monitor nutrition and hydration status  - Monitor labs   - Assess for incontinence   - Turn and reposition patient  - Assist with mobility/ambulation  - Relieve pressure over bony prominences  - Avoid friction and shearing  - Provide appropriate hygiene as needed including keeping skin clean and dry  - Evaluate need for skin moisturizer/barrier cream  - Collaborate with interdisciplinary team   - Patient/family teaching  - Consider wound care consult   Outcome: Progressing     Problem: RESPIRATORY - ADULT  Goal: Achieves optimal ventilation and oxygenation  Description: INTERVENTIONS:  - Assess for changes in respiratory status  - Assess for changes in mentation and behavior  - Position to facilitate oxygenation and minimize respiratory effort  - Oxygen administered by appropriate delivery if ordered  - Initiate smoking cessation education as indicated  - Encourage broncho-pulmonary hygiene including cough, deep breathe, Incentive Spirometry  - Assess the need for suctioning and aspirate as needed  - Assess and instruct to report SOB or any respiratory difficulty  - Respiratory Therapy support as indicated  Outcome: Progressing     Problem: GENITOURINARY - ADULT  Goal: Maintains or returns to baseline urinary function  Description: INTERVENTIONS:  - Assess urinary function  - Encourage oral fluids to ensure adequate hydration if ordered  - Administer IV fluids as ordered to ensure adequate hydration  - Administer ordered medications as needed  - Offer frequent toileting  - Follow urinary retention protocol if ordered  Outcome: Progressing     Problem: PAIN - ADULT  Goal: Verbalizes/displays adequate comfort level or baseline comfort level  Description: Interventions:  - Encourage patient to monitor pain and request assistance  - Assess pain using appropriate pain scale  - Administer analgesics based on type and severity of pain and evaluate response  - Implement non-pharmacological measures as appropriate and evaluate response  - Consider cultural and social influences on pain and pain management  - Notify physician/advanced practitioner if interventions unsuccessful or patient reports new pain  Outcome: Progressing     Problem: DISCHARGE PLANNING  Goal: Discharge to home or other facility with appropriate resources  Description: INTERVENTIONS:  - Identify barriers to discharge w/patient and caregiver  - Arrange for needed discharge resources and transportation as appropriate  - Identify discharge learning needs (meds, wound care, etc )  - Refer to Case Management Department for coordinating discharge planning if the patient needs post-hospital services based on physician/advanced practitioner order or complex needs related to functional status, cognitive ability, or social support system  Outcome: Progressing     Problem: Knowledge Deficit  Goal: Patient/family/caregiver demonstrates understanding of disease process, treatment plan, medications, and discharge instructions  Description: Complete learning assessment and assess knowledge base    Interventions:  - Provide teaching at level of understanding  - Provide teaching via preferred learning methods  Outcome: Progressing

## 2023-02-13 NOTE — ASSESSMENT & PLAN NOTE
· Presenting with acute hypoxic respiratory failure secondary to RSV  · CXR revealed no acute pulmonary processes  · Patient no longer requiring oxygen at rest   Desaturation study performed and patient qualified for 2 L of oxygen with exertion  · We will continue supportive care  · Prednisone prescribed on discharge

## 2023-02-13 NOTE — DISCHARGE SUMMARY
Luna 45  Discharge- Evelyn 1/28/1930, 80 y o  female MRN: [id number]  Unit/Bed#: -01 Encounter: [id number]  Primary Care Provider: Evy Graham MD   Date and time admitted to hospital: 2/11/2023 10:45 AM    * Acute respiratory failure with hypoxia ([address] )  Assessment & Plan  · Presenting with acute hypoxic respiratory failure secondary to RSV  · CXR revealed no acute pulmonary processes  · Patient no longer requiring oxygen at rest   Desaturation study performed and patient qualified for 2 L of oxygen with exertion  · We will continue supportive care  · Prednisone prescribed on discharge    Hypomagnesemia  Assessment & Plan  Replaced  Continue routine lab monitoring with PCP as outpatient    RSV (acute bronchiolitis due to respiratory syncytial virus)  Assessment & Plan  -plan as above    Hyponatremia  Assessment & Plan  Stable  Continue routine lab monitoring with PCP as outpatient    Generalized weakness  Assessment & Plan  PT/OT recommending home care  Case management set up home care    Essential hypertension  Assessment & Plan  - Continue prior to admission HCTZ      Discharging Physician / Practitioner: Renna Lanes, MD  PCP: Evy Graham MD  Admission Date:   Admission Orders (From admission, onward)     Ordered        02/11/23 1735  INPATIENT ADMISSION  Once                      Discharge Date: 02/13/23    Medical Problems     Resolved Problems  Date Reviewed: 2/12/2023   None         Consultations During Hospital Stay:  · None    Procedures Performed:   · None    Significant Findings / Test Results:   · RSV infection    Incidental Findings:   · None    Test Results Pending at Discharge (will require follow up):    · None     Outpatient Tests Requested:  · Routine labs with PCP as outpatient    Complications:    • None    Reason for Admission: RSV    Hospital Course:     Evelyn is a 80 y o  female patient who originally presented to the hospital on 2/11/2023 due to shortness of breath  Patient found to be positive for RSV  Patient was started on supportive care  Patient received a dose of IV steroids on admission  Patient initially requiring 1 to 2 L of oxygen via nasal cannula  Patient currently titrated off oxygen at rest   Desaturation study performed today recommended 2 L of oxygen with exertion  PT/OT eval recommending home with home care  Case management has arranged for patient to return to facility with rehab services    Please see above list of diagnoses and related plan for additional information  Condition at Discharge: stable     Discharge Day Visit / Exam:     Subjective: Complaints at this time  Patient states she is tolerating diet  No pain complaints  Vitals: Blood Pressure: 136/93 (02/13/23 0739)  Pulse: 60 (02/13/23 0739)  Temperature: (!) 97 3 °F (36 3 °C) (02/13/23 0739)  Temp Source: Oral (02/12/23 1308)  Respirations: 17 (02/13/23 0739)  Height: 5' 2" (157 5 cm) (02/12/23 1308)  Weight - Scale: 58 kg (127 lb 15 6 oz) (02/12/23 1308)  SpO2: 93 % (02/13/23 0739)  Exam:   Physical Exam  Constitutional:       General: She is not in acute distress  HENT:      Head: Normocephalic and atraumatic  Nose: Nose normal       Mouth/Throat:      Mouth: Mucous membranes are moist    Eyes:      Extraocular Movements: Extraocular movements intact  Conjunctiva/sclera: Conjunctivae normal    Cardiovascular:      Rate and Rhythm: Normal rate and regular rhythm  Pulmonary:      Effort: Pulmonary effort is normal  No respiratory distress  Abdominal:      Palpations: Abdomen is soft  Tenderness: There is no abdominal tenderness  Musculoskeletal:         General: Normal range of motion  Cervical back: Normal range of motion and neck supple  Comments: Generalized weakness   Skin:     General: Skin is warm and dry  Neurological:      General: No focal deficit present        Mental Status: She is alert  Mental status is at baseline  Cranial Nerves: No cranial nerve deficit  Psychiatric:         Mood and Affect: Mood normal          Behavior: Behavior normal          Discussion with Family: Spoke with patient's daughter Andrey George over the phone regarding discharge plan    Discharge instructions/Information to patient and family:   See after visit summary for information provided to patient and family  Provisions for Follow-Up Care:  See after visit summary for information related to follow-up care and any pertinent home health orders  Disposition:     Other: Home facility with rehab services      Planned Readmission:   • no     Discharge Statement:  I spent 35 minutes discharging the patient  This time was spent on the day of discharge  I had direct contact with the patient on the day of discharge  Greater than 50% of the total time was spent examining patient, answering all patient questions, arranging and discussing plan of care with patient as well as directly providing post-discharge instructions  Additional time then spent on discharge activities  Discharge Medications:  See after visit summary for reconciled discharge medications provided to patient and family        ** Please Note: This note has been constructed using a voice recognition system **

## 2023-02-13 NOTE — CASE MANAGEMENT
Case Management Discharge Planning Note    Patient name Nathalie Haddad  Location /-61 MRN [id number]  : 1930 Date 2023       Current Admission Date: 2023  Current Admission Diagnosis:Acute respiratory failure with hypoxia [facility])   Patient Active Problem List    Diagnosis Date Noted   • Hypomagnesemia 2023   • RSV (acute bronchiolitis due to respiratory syncytial virus) 2023   • Acute respiratory failure with hypoxia ([facility] ) 2023   • Syncope 2022   • Abnormal CT scan 2022   • Gram-positive cocci bacteremia 10/12/2022   • Constipation 10/11/2022   • Gastric distention 10/11/2022   • TANNER (acute kidney injury) ([facility] ) 10/11/2022   • Generalized weakness 15/34/7538   • Metabolic encephalopathy    • Hyponatremia 2022   • Current moderate episode of major depressive disorder without prior episode ([facility] ) 2020   • History of suicidal ideation 2020   • History of CVA (cerebrovascular accident) 2020   • Right ankle sprain 2020   • Venous insufficiency of both lower extremities 2020   • Unspecified mood (affective) disorder, r/o major depressive disorder (single episode) 2020   • Mild neurocognitive disorder 2020   • Benign paroxysmal positional vertigo 2019   • Urinary incontinence 2019   • Bilateral lower extremity edema 2019   • Essential hypertension 2019   • Mediastinal mass 2019   • Spinal stenosis at L4-L5 level 2019   • Gait abnormality 2015   • Hearing loss 2013   • Osteoarthritis 2012      LOS (days): 2  Geometric Mean LOS (GMLOS) (days): 3 50  Days to GMLOS:1 6     OBJECTIVE:  Risk of Unplanned Readmission Score: 15 94         Current admission status: Inpatient   Preferred Pharmacy:   [address] [place] [address]  Phone: [phone] Fax: [phone] - FERNANDO Corcoran - [address] [address]  Phone: [phone] Fax: [phone]    Primary Care Provider: Moiz Weir MD    Primary Insurance: MEDICARE  Secondary Insurance: [facility] HEALTH OPTIONS PROGRAM    DISCHARGE DETAILS:    Discharge planning discussed with[de-identified] [facility] at 10:15 am & 12:15 pm & 12:40pm   [facility] at 12:47 pm & 14:50pm   Yuki Bailey  10:09 am & 14:59 pm     Comments - Freedom of Choice: recommendation is for [facility] pt to returnto [facility] facility with rehab services- son was made aware of the agenices that I sent to and [facility] accept agency- [facility] - son 's choice  CM contacted family/caregiver?: Yes  Were Treatment Team discharge recommendations reviewed with patient/caregiver?: Yes  Did patient/caregiver verbalize understanding of patient care needs?: Yes  Were patient/caregiver advised of the risks associated with not following Treatment Team discharge recommendations?: Yes    Contacts  Patient Contacts: Brunilda Mckeonk and Xuan Little  Relationship to Patient[de-identified] Family (daughter & son)  Contact Method: Phone  Phone Number: [phone] Lazarocathi Tomas)   [phone] Lou Joel  Reason/Outcome: Discharge [address]         Is the patient interested in [facility] at discharge?: Yes    DME Referral Provided  Referral made for DME?: Yes    Other Referral/Resources/Interventions Provided:  Interventions: DME, HHC  Referral Comments: permission was given- pt was accepted to [facility] family's choice- avs was faxed to # [phone] and avs was faxed to the [facility]  [phone]    Would you like to participate in our [address] service program?  : No - Declined    Treatment Team Recommendation: Home with [address], Assisted Living [facility] [address]- family)  Discharge Destination Plan[de-identified] Home, Home with [address] [employer] [address] follow up- family)  Transport at Discharge : Family, Automobile         pt and family are in agreement with the d/c and d/c plan              Accompanied by: Family member

## 2023-02-14 LAB — MRSA NOSE QL CULT: NORMAL

## 2023-02-17 LAB
DME PARACHUTE DELIVERY DATE ACTUAL: NORMAL
DME PARACHUTE DELIVERY DATE REQUESTED: NORMAL
DME PARACHUTE ITEM DESCRIPTION: NORMAL
DME PARACHUTE ORDER STATUS: NORMAL
DME PARACHUTE SUPPLIER NAME: NORMAL
DME PARACHUTE SUPPLIER PHONE: NORMAL

## 2023-02-24 ENCOUNTER — HOSPITAL ENCOUNTER (INPATIENT)
Facility: HOSPITAL | Age: 88
LOS: 3 days | Discharge: HOME HEALTH | End: 2023-02-28
Attending: FAMILY MEDICINE | Admitting: INTERNAL MEDICINE

## 2023-02-24 ENCOUNTER — APPOINTMENT (EMERGENCY)
Dept: RADIOLOGY | Facility: HOSPITAL | Age: 88
End: 2023-02-24

## 2023-02-24 DIAGNOSIS — R53.1 WEAKNESS: ICD-10-CM

## 2023-02-24 DIAGNOSIS — I48.91 NEW ONSET ATRIAL FIBRILLATION (HCC): Primary | ICD-10-CM

## 2023-02-24 DIAGNOSIS — D72.829 LEUKOCYTOSIS: ICD-10-CM

## 2023-02-24 DIAGNOSIS — R26.9 GAIT ABNORMALITY: ICD-10-CM

## 2023-02-24 LAB
2HR DELTA HS TROPONIN: 2 NG/L
4HR DELTA HS TROPONIN: 31 NG/L
ANION GAP SERPL CALCULATED.3IONS-SCNC: 11 MMOL/L (ref 4–13)
ATRIAL RATE: 441 BPM
BASOPHILS # BLD AUTO: 0.04 THOUSANDS/ÂΜL (ref 0–0.1)
BASOPHILS NFR BLD AUTO: 0 % (ref 0–1)
BILIRUB UR QL STRIP: ABNORMAL
BUN SERPL-MCNC: 31 MG/DL (ref 5–25)
CALCIUM SERPL-MCNC: 9.4 MG/DL (ref 8.4–10.2)
CARDIAC TROPONIN I PNL SERPL HS: 37 NG/L
CARDIAC TROPONIN I PNL SERPL HS: 39 NG/L
CARDIAC TROPONIN I PNL SERPL HS: 68 NG/L
CHLORIDE SERPL-SCNC: 95 MMOL/L (ref 96–108)
CLARITY UR: CLEAR
CO2 SERPL-SCNC: 25 MMOL/L (ref 21–32)
COLOR UR: YELLOW
CREAT SERPL-MCNC: 1.03 MG/DL (ref 0.6–1.3)
EOSINOPHIL # BLD AUTO: 0.08 THOUSAND/ÂΜL (ref 0–0.61)
EOSINOPHIL NFR BLD AUTO: 1 % (ref 0–6)
ERYTHROCYTE [DISTWIDTH] IN BLOOD BY AUTOMATED COUNT: 12.8 % (ref 11.6–15.1)
GFR SERPL CREATININE-BSD FRML MDRD: 46 ML/MIN/1.73SQ M
GLUCOSE SERPL-MCNC: 85 MG/DL (ref 65–140)
GLUCOSE UR STRIP-MCNC: NEGATIVE MG/DL
HCT VFR BLD AUTO: 48.7 % (ref 34.8–46.1)
HGB BLD-MCNC: 16.5 G/DL (ref 11.5–15.4)
HGB UR QL STRIP.AUTO: NEGATIVE
IMM GRANULOCYTES # BLD AUTO: 0.06 THOUSAND/UL (ref 0–0.2)
IMM GRANULOCYTES NFR BLD AUTO: 0 % (ref 0–2)
KETONES UR STRIP-MCNC: ABNORMAL MG/DL
LEUKOCYTE ESTERASE UR QL STRIP: NEGATIVE
LYMPHOCYTES # BLD AUTO: 1.91 THOUSANDS/ÂΜL (ref 0.6–4.47)
LYMPHOCYTES NFR BLD AUTO: 11 % (ref 14–44)
MCH RBC QN AUTO: 30.6 PG (ref 26.8–34.3)
MCHC RBC AUTO-ENTMCNC: 33.9 G/DL (ref 31.4–37.4)
MCV RBC AUTO: 90 FL (ref 82–98)
MONOCYTES # BLD AUTO: 0.96 THOUSAND/ÂΜL (ref 0.17–1.22)
MONOCYTES NFR BLD AUTO: 6 % (ref 4–12)
NEUTROPHILS # BLD AUTO: 13.9 THOUSANDS/ÂΜL (ref 1.85–7.62)
NEUTS SEG NFR BLD AUTO: 82 % (ref 43–75)
NITRITE UR QL STRIP: NEGATIVE
NRBC BLD AUTO-RTO: 0 /100 WBCS
PH UR STRIP.AUTO: 6 [PH]
PLATELET # BLD AUTO: 217 THOUSANDS/UL (ref 149–390)
PMV BLD AUTO: 9.3 FL (ref 8.9–12.7)
POTASSIUM SERPL-SCNC: 4.3 MMOL/L (ref 3.5–5.3)
PROT UR STRIP-MCNC: NEGATIVE MG/DL
QRS AXIS: -20 DEGREES
QRSD INTERVAL: 80 MS
QT INTERVAL: 392 MS
QTC INTERVAL: 492 MS
RBC # BLD AUTO: 5.4 MILLION/UL (ref 3.81–5.12)
SODIUM SERPL-SCNC: 131 MMOL/L (ref 135–147)
SP GR UR STRIP.AUTO: >=1.03
T WAVE AXIS: 56 DEGREES
UROBILINOGEN UR QL STRIP.AUTO: 1 E.U./DL
VENTRICULAR RATE: 95 BPM
WBC # BLD AUTO: 16.95 THOUSAND/UL (ref 4.31–10.16)

## 2023-02-24 RX ORDER — ENOXAPARIN SODIUM 100 MG/ML
30 INJECTION SUBCUTANEOUS DAILY
Status: DISCONTINUED | OUTPATIENT
Start: 2023-02-25 | End: 2023-02-28 | Stop reason: HOSPADM

## 2023-02-24 RX ORDER — ACETAMINOPHEN 325 MG/1
650 TABLET ORAL EVERY 6 HOURS PRN
Status: DISCONTINUED | OUTPATIENT
Start: 2023-02-24 | End: 2023-02-28 | Stop reason: HOSPADM

## 2023-02-24 RX ORDER — ASPIRIN 81 MG/1
81 TABLET, CHEWABLE ORAL DAILY
Status: DISCONTINUED | OUTPATIENT
Start: 2023-02-25 | End: 2023-02-28 | Stop reason: HOSPADM

## 2023-02-24 RX ORDER — METOPROLOL TARTRATE 5 MG/5ML
5 INJECTION INTRAVENOUS EVERY 6 HOURS PRN
Status: DISCONTINUED | OUTPATIENT
Start: 2023-02-24 | End: 2023-02-28 | Stop reason: HOSPADM

## 2023-02-24 RX ORDER — ONDANSETRON 2 MG/ML
4 INJECTION INTRAMUSCULAR; INTRAVENOUS EVERY 6 HOURS PRN
Status: DISCONTINUED | OUTPATIENT
Start: 2023-02-24 | End: 2023-02-28 | Stop reason: HOSPADM

## 2023-02-24 RX ORDER — ENOXAPARIN SODIUM 100 MG/ML
40 INJECTION SUBCUTANEOUS DAILY
Status: DISCONTINUED | OUTPATIENT
Start: 2023-02-25 | End: 2023-02-24

## 2023-02-24 RX ORDER — DILTIAZEM HYDROCHLORIDE 5 MG/ML
15 INJECTION INTRAVENOUS ONCE
Status: COMPLETED | OUTPATIENT
Start: 2023-02-24 | End: 2023-02-24

## 2023-02-24 RX ORDER — ATORVASTATIN CALCIUM 40 MG/1
40 TABLET, FILM COATED ORAL EVERY EVENING
Status: DISCONTINUED | OUTPATIENT
Start: 2023-02-24 | End: 2023-02-28 | Stop reason: HOSPADM

## 2023-02-24 RX ORDER — DOCUSATE SODIUM 100 MG/1
100 CAPSULE, LIQUID FILLED ORAL 2 TIMES DAILY
Status: DISCONTINUED | OUTPATIENT
Start: 2023-02-24 | End: 2023-02-28 | Stop reason: HOSPADM

## 2023-02-24 RX ORDER — HYDROCHLOROTHIAZIDE 25 MG/1
25 TABLET ORAL DAILY
Status: DISCONTINUED | OUTPATIENT
Start: 2023-02-25 | End: 2023-02-27

## 2023-02-24 RX ADMIN — DILTIAZEM HYDROCHLORIDE 15 MG: 5 INJECTION INTRAVENOUS at 15:50

## 2023-02-24 RX ADMIN — SODIUM CHLORIDE 1000 ML: 0.9 INJECTION, SOLUTION INTRAVENOUS at 12:33

## 2023-02-24 RX ADMIN — ATORVASTATIN CALCIUM 40 MG: 40 TABLET, FILM COATED ORAL at 18:48

## 2023-02-24 RX ADMIN — SODIUM CHLORIDE 1000 ML: 0.9 INJECTION, SOLUTION INTRAVENOUS at 16:34

## 2023-02-24 RX ADMIN — DOCUSATE SODIUM 100 MG: 100 CAPSULE, LIQUID FILLED ORAL at 18:48

## 2023-02-24 NOTE — RESPIRATORY THERAPY NOTE
RT Protocol Note  Sampson Reis 80 y o  female MRN: [id number]  Unit/Bed#: ED 15 Encounter: [id number]    Assessment    Principal Problem:    New onset atrial fibrillation ([facility] )  Active Problems:    Essential hypertension    History of CVA (cerebrovascular accident)    Mild neurocognitive disorder    Generalized weakness    RSV (acute bronchiolitis due to respiratory syncytial virus)      Home Pulmonary Medications:  none       Past Medical History:   Diagnosis Date    TANNER (acute kidney injury) ([facility] ) 10/11/2022    Arthritis     Cataract     LAST ASSESSED: [id number]    Depression     Dislocation of left shoulder joint     LAST ASSESSSED: [id number]    Essential hypertension 4/3/2019    Gait disorder     LAST ASSESSED: [id number]    Herpes zoster     LAST ASSESSED: [id number]    HL (hearing loss)     Impacted cerumen of both ears     LAST ASSESSED: [id number]    Impacted cerumen of right ear     LAST ASSESSED: [id number]    Lightheadedness     MILD AND PT REFUSES EKG ETC  PT PROMISES TO CALL IF WORSENS  LAST ASSESSED: [id number]    Multiple falls     LAST ASSESSED: [id number]    Near syncope     LAST ASSESSED: [id number]    Postherpetic polyneuropathy     LAST ASSESSED: [id number]     Social History     Socioeconomic History    Marital status:       Spouse name: None    Number of children: None    Years of education: None    Highest education level: None   Occupational History    Occupation: retired   Tobacco Use    Smoking status: Never    Smokeless tobacco: Never   Vaping Use    Vaping Use: Never used   Substance and Sexual Activity    Alcohol use: Never    Drug use: No    Sexual activity: Not Currently   Other Topics Concern    None   Social History Narrative    CAFFEINE USE    USES SAFETY EQUIPMENT     Social Determinants of Health     Financial Resource Strain: Not on file   Food Insecurity: No Food Insecurity    Worried About Running Out of Food in the Last Year: Never true    [address] in the Last Year: Never true   Transportation Needs: No Transportation Needs    Lack of Transportation (Medical): No    Lack of Transportation (Non-Medical): No   Physical Activity: Not on file   Stress: Not on file   Social Connections: Not on file   Intimate Partner Violence: Not on file   Housing Stability: Low Risk     Unable to Pay for Housing in the Last Year: No    Number of Places Lived in the Last Year: 1    Unstable Housing in the Last Year: No       Subjective         Objective    Physical Exam:   Assessment Type: Assess only  General Appearance: Awake  Respiratory Pattern: Normal  Chest Assessment: Chest expansion symmetrical  Bilateral Breath Sounds: Clear    Vitals:  Blood pressure 108/63, pulse 90, temperature 97 7 °F (36 5 °C), temperature source Oral, resp  rate 18, weight 58 1 kg (128 lb), SpO2 93 %, not currently breastfeeding  Imaging and other studies: I have personally reviewed pertinent reports  Plan    Respiratory Plan: Discontinue Protocol        Resp Comments: Pt evaluated per respiratory protocal  Pt recently diagnosed with RSV 2 weeks ago  Pt returned to ED today stating she does not feel she is getting better  Pt found to have new onset A-fib  Pt observed on rma with clear BS  CXR was clear as well  No UDN therapy indicated at this time

## 2023-02-24 NOTE — H&P
[address] [facility] 1/28/1930, 80 y o  female MRN: [id number]  Unit/Bed#: ED 15 Encounter: [id number]  Primary Care Provider: Edward Melendez MD   Date and time admitted to hospital: 2/24/2023 11:31 AM    * New onset atrial fibrillation [facility])  Assessment & Plan  · Admit to the observation unit  · Patient noted to have atrial fibrillation with rapid ventricular response in the emergency room, she is status post treatment with 15 mg of IV Cardizem x1 dose in the ED  · Monitor on telemetry  · Echocardiogram from November 15, 2022-EF of 55 to 60%, no gross structural and or valvular abnormality  · Consult cardiology  · Implement rate control with Cardizem 30 mg p o  every 6 hours  · Patient's [id number] score is 6 -defer anticoagulation initiation to cardiology, patient is at high risk for falls-we will use aspirin 81 mg daily by mouth in the interim  · Potential discharge planning for 2/25/2023 if cleared from a cardiology standpoint    RSV (acute bronchiolitis due to respiratory syncytial virus)  Assessment & Plan  · Patient was recently discharged from this facility after a stay from February 11, 2023 through February 13, 2023 and was treated for RSV  · Suspect that this is just a residual positive test  · Patient is not requiring supplemental oxygen  · Patient was discharged on prednisone -suspect what we are seeing is a steroid-induced leukocytosis  · Supportive therapy only  · Repeat a CBC in the a m      History of CVA (cerebrovascular accident)  Assessment & Plan  · Continue aspirin, and Lipitor for secondary prevention    Essential hypertension  Assessment & Plan  · Continue home hydrochlorothiazide dosing with hold parameters    Generalized weakness  Assessment & Plan  · We will request a PT and OT evaluation    Mild neurocognitive disorder  Assessment & Plan  · At baseline, continue supportive therapy    VTE Prophylaxis: Enoxaparin (Lovenox)  Code Status: DNR/DNI level 3  POLST: POLST is not applicable to this patient  Discussion with family: Attempts to call the patient's daughter Kenton Juarez at 10 PM-no answer    Anticipated Length of Stay:  Patient will be admitted on an Observation basis with an anticipated length of stay of  < 2 midnights  Justification for Hospital Stay: Monitoring on telemetry for recurrent A-fib with RVR    Chief Complaint:   Generalized weakness x2 weeks    History of Present Illness:    Rowena Hanna is a 80 y o  female who presents with generalized weakness x2 weeks  In brief, the patient is a 70-year-old female, that was discharged from the hospital after a brief stay from 2/11/2023 through 2/13/2023 at which point she was treated for RSV  She was discharged with a prednisone taper  She was sent back to the ER today by her provider because the provider felt that the patient was not getting any better  There was also concern for the possibility of a urinary tract infection  At the time of my evaluation, the patient is completely asymptomatic, denies any pain or discomfort  On specific questioning, she is alert, awake, and oriented to person and place, but slightly off to time  She has a known history of cognitive disorder  She denies any chest pain, nausea, vomiting, diarrhea, fevers, chills, palpitations, shortness of breath, numbness, tingling, but does endorse weakness  The emergency room was initially planning on discharging her, however at 1 point during her ER visit, she went into A-fib with RVR  This would be a new onset atrial fibrillation since the patient has never had a history of atrial fibrillation  Patient remained asymptomatic at that point, and currently remains asymptomatic  She was treated with 15 mg of IV Cardizem x1 dose, after which she was noted to be slightly hypotensive  At that point, the discharge planning from the ER was canceled, and the patient was admitted to [place] [address] observation      Review of Systems:  Review of Systems   Constitutional: Positive for activity change and appetite change  Neurological: Positive for weakness  All other systems reviewed and are negative  Past Medical and Surgical History:   Past Medical History:   Diagnosis Date   • TANNER (acute kidney injury) ([facility] [address] ) 10/11/2022   • Arthritis    • Cataract     LAST ASSESSED: [id number]   • Depression    • Dislocation of left shoulder joint     LAST ASSESSSED: [id number]   • Essential hypertension 4/3/2019   • Gait disorder     LAST ASSESSED: [id number]   • Herpes zoster     LAST ASSESSED: 88JFJ1281   • HL (hearing loss)    • Impacted cerumen of both ears     LAST ASSESSED: 67GLM3682   • Impacted cerumen of right ear     LAST ASSESSED: 26XOR2497   • Lightheadedness     MILD AND PT REFUSES EKG ETC  PT PROMISES TO CALL IF WORSENS  LAST ASSESSED: [id number]   • Multiple falls     LAST ASSESSED: 13YVN9300   • Near syncope     LAST ASSESSED: 35OFH7835   • Postherpetic polyneuropathy     LAST ASSESSED: [id number]       Past Surgical History:   Procedure Laterality Date   • CATARACT EXTRACTION, BILATERAL     • HERNIA REPAIR     • TONSILLECTOMY     • TUBAL LIGATION         Meds/Allergies:  Prior to Admission medications    Medication Sig Start Date End Date Taking? Authorizing Provider   acetaminophen (TYLENOL) 325 mg tablet Take 2 tablets (650 mg total) by mouth every 6 (six) hours as needed for mild pain 8/2/21   Francine Bleacher, DO   aspirin 81 mg chewable tablet Chew 1 tablet (81 mg total) daily 8/4/22   Francine Bleacher, DO   atorvastatin (LIPITOR) 40 mg tablet Take 1 tablet (40 mg total) by mouth every evening 8/4/22   Francine Bleacher, DO   hydrochlorothiazide (HYDRODIURIL) 25 mg tablet Take 25 mg by mouth daily   Indications: Edema    Historical Provider, MD   polyethylene glycol (MIRALAX) 17 g packet Take 17 g by mouth daily as needed (constipation) 2/13/23   Blanca Samuels MD     I have reviewed home medications with patient personally  Allergies: Allergies   Allergen Reactions   • Vancomycin Hives and Itching       Social History:  Marital Status:    Occupation: Retired  Patient Pre-hospital Living Situation: Lives at a personal care facility  Patient Pre-hospital Level of Mobility: Has a walker at baseline  Patient Pre-hospital Diet Restrictions: None  Substance Use History:   Social History     Substance and Sexual Activity   Alcohol Use Never     Social History     Tobacco Use   Smoking Status Never   Smokeless Tobacco Never     Social History     Substance and Sexual Activity   Drug Use No       Family History:  I have reviewed the patients family history -noncontributory in view of the patient being 80years old    Physical Exam:   Vitals:   Blood Pressure: 113/64 (02/24/23 1800)  Pulse: 92 (02/24/23 1800)  Temperature: 97 7 °F (36 5 °C) (02/24/23 1700)  Temp Source: Oral (02/24/23 1700)  Respirations: 18 (02/24/23 1800)  Weight - Scale: 58 1 kg (128 lb) (02/24/23 1136)  SpO2: 92 % (02/24/23 1800)    Physical Exam  Constitutional:       General: She is not in acute distress  Appearance: Normal appearance  She is normal weight  She is not ill-appearing  HENT:      Head: Normocephalic and atraumatic  Nose: Nose normal       Mouth/Throat:      Mouth: Mucous membranes are moist    Eyes:      Extraocular Movements: Extraocular movements intact  Pupils: Pupils are equal, round, and reactive to light  Cardiovascular:      Pulses: Normal pulses  Heart sounds: Normal heart sounds  No murmur heard  No friction rub  No gallop  Comments: S1 plus S2, irregularly irregular  Pulmonary:      Effort: Pulmonary effort is normal  No respiratory distress  Breath sounds: No wheezing, rhonchi or rales  Comments: Decreased breath sounds bilaterally at the bases  Abdominal:      General: There is no distension  Palpations: Abdomen is soft  There is no mass  Tenderness:  There is no abdominal tenderness  Hernia: No hernia is present  Musculoskeletal:         General: No swelling or tenderness  Normal range of motion  Cervical back: Normal range of motion and neck supple  No rigidity  Right lower leg: No edema  Left lower leg: No edema  Skin:     General: Skin is warm  Capillary Refill: Capillary refill takes less than 2 seconds  Findings: No erythema or rash  Neurological:      General: No focal deficit present  Mental Status: She is alert and oriented to person, place, and time  Mental status is at baseline  Cranial Nerves: No cranial nerve deficit  Motor: No weakness  Psychiatric:         Mood and Affect: Mood normal          Behavior: Behavior normal          Additional Data:   Lab Results: I have personally reviewed pertinent reports  Results from last 7 days   Lab Units 02/24/23  1234   WBC Thousand/uL 16 95*   HEMOGLOBIN g/dL 16 5*   HEMATOCRIT % 48 7*   PLATELETS Thousands/uL 217   NEUTROS PCT % 82*   LYMPHS PCT % 11*   MONOS PCT % 6   EOS PCT % 1     Results from last 7 days   Lab Units 02/24/23  1234   SODIUM mmol/L 131*   POTASSIUM mmol/L 4 3   CHLORIDE mmol/L 95*   CO2 mmol/L 25   BUN mg/dL 31*   CREATININE mg/dL 1 03   CALCIUM mg/dL 9 4                   Imaging: I have personally reviewed pertinent reports  XR chest 1 view portable   Final Result by Thalia Doyle MD (02/24 0428)      No acute cardiopulmonary disease  Workstation performed: [id number]             EKG, Pathology, and Other Studies Reviewed on Admission:   · EKG: Atrial fibrillation    Epic Records Reviewed: Yes     ** Please Note: This note has been constructed using a voice recognition system   **

## 2023-02-24 NOTE — ASSESSMENT & PLAN NOTE
· Admit to the observation unit  · Patient noted to have atrial fibrillation with rapid ventricular response in the emergency room, she is status post treatment with 15 mg of IV Cardizem x1 dose in the ED  · Monitor on telemetry  · Echocardiogram from November 15, 2022-EF of 55 to 60%, no gross structural and or valvular abnormality  · Consult cardiology  · Implement rate control with Cardizem 30 mg p o  every 6 hours  · Patient's [id number] score is 6 -defer anticoagulation initiation to cardiology, patient is at high risk for falls-we will use aspirin 81 mg daily by mouth in the interim  · Potential discharge planning for 2/25/2023 if cleared from a cardiology standpoint

## 2023-02-24 NOTE — ED PROVIDER NOTES
History  Chief Complaint   Patient presents with   • Medical Problem     Patient diagnosed with RSV 2 weeks ago and was sent to the ED today because the provider felt as if she isn't getting better  The home also reports foul smelling urine  HPI  Is a 77-year-old female recently diagnosed with RSV presented to ED states that she is still not getting any better  Denies any chest pain at this time  Patient is awake alert oriented GCS 15  Oxygen saturation 98% on room air  Also had some urinary symptoms  Denies any fever  Does have a somewhat shortness of breath  Labs are stable  Prior to Admission Medications   Prescriptions Last Dose Informant Patient Reported? Taking?   acetaminophen (TYLENOL) 325 mg tablet   No No   Sig: Take 2 tablets (650 mg total) by mouth every 6 (six) hours as needed for mild pain   aspirin 81 mg chewable tablet   No No   Sig: Chew 1 tablet (81 mg total) daily   atorvastatin (LIPITOR) 40 mg tablet   No No   Sig: Take 1 tablet (40 mg total) by mouth every evening   hydrochlorothiazide (HYDRODIURIL) 25 mg tablet   Yes No   Sig: Take 25 mg by mouth daily  Indications: Edema   polyethylene glycol (MIRALAX) 17 g packet   No No   Sig: Take 17 g by mouth daily as needed (constipation)      Facility-Administered Medications: None       Past Medical History:   Diagnosis Date   • TANNER (acute kidney injury) ([facility] [address] ) 10/11/2022   • Arthritis    • Cataract     LAST ASSESSED: [id number]   • Depression    • Dislocation of left shoulder joint     LAST ASSESSSED: [id number]   • Essential hypertension 4/3/2019   • Gait disorder     LAST ASSESSED: [id number]   • Herpes zoster     LAST ASSESSED: 08ZVN1303   • HL (hearing loss)    • Impacted cerumen of both ears     LAST ASSESSED: 87DNB2757   • Impacted cerumen of right ear     LAST ASSESSED: 83WLC2514   • Lightheadedness     MILD AND PT REFUSES EKG ETC  PT PROMISES TO CALL IF WORSENS   LAST ASSESSED: [id number]   • Multiple falls     LAST ASSESSED: 12DZR5985 • Near syncope     LAST ASSESSED: 57MXA7841   • Postherpetic polyneuropathy     LAST ASSESSED: 08GKM3435       Past Surgical History:   Procedure Laterality Date   • CATARACT EXTRACTION, BILATERAL     • HERNIA REPAIR     • TONSILLECTOMY     • TUBAL LIGATION         Family History   Problem Relation Age of Onset   • Cirrhosis Father    • Heart disease Son    • Mental illness Son      I have reviewed and agree with the history as documented  E-Cigarette/Vaping   • E-Cigarette Use Never User      E-Cigarette/Vaping Substances     Social History     Tobacco Use   • Smoking status: Never   • Smokeless tobacco: Never   Vaping Use   • Vaping Use: Never used   Substance Use Topics   • Alcohol use: Never   • Drug use: No       Review of Systems   Constitutional: Negative  HENT: Negative  Eyes: Negative  Respiratory: Negative  Cardiovascular: Negative  Gastrointestinal: Negative  Endocrine: Negative  Genitourinary: Negative  Musculoskeletal: Negative  Skin: Negative  Allergic/Immunologic: Negative  Neurological: Positive for weakness  Psychiatric/Behavioral: Negative  Physical Exam  Physical Exam  Vitals and nursing note reviewed  Constitutional:       Appearance: She is well-developed  HENT:      Head: Normocephalic and atraumatic  Right Ear: External ear normal       Left Ear: External ear normal       Nose: Nose normal       Mouth/Throat:      Mouth: Mucous membranes are moist       Pharynx: No oropharyngeal exudate  Eyes:      General: No scleral icterus  Right eye: No discharge  Left eye: No discharge  Conjunctiva/sclera: Conjunctivae normal       Pupils: Pupils are equal, round, and reactive to light  Cardiovascular:      Rate and Rhythm: Normal rate and regular rhythm  Pulses: Normal pulses  Heart sounds: Normal heart sounds  Pulmonary:      Effort: Pulmonary effort is normal  No respiratory distress        Breath sounds: Normal breath sounds  No wheezing  Abdominal:      General: Bowel sounds are normal       Palpations: Abdomen is soft  Musculoskeletal:         General: Normal range of motion  Cervical back: Normal range of motion and neck supple  Lymphadenopathy:      Cervical: No cervical adenopathy  Skin:     General: Skin is warm and dry  Capillary Refill: Capillary refill takes less than 2 seconds  Neurological:      General: No focal deficit present  Mental Status: She is alert and oriented to person, place, and time  Psychiatric:         Mood and Affect: Mood normal          Behavior: Behavior normal          Vital Signs  ED Triage Vitals [02/24/23 1136]   Temperature Pulse Respirations Blood Pressure SpO2   98 °F (36 7 °C) 67 16 146/67 98 %      Temp Source Heart Rate Source Patient Position - Orthostatic VS BP Location FiO2 (%)   Tympanic Monitor Sitting Left arm --      Pain Score       No Pain           Vitals:    02/24/23 1430 02/24/23 1500 02/24/23 1530 02/24/23 1600   BP: 166/93 136/86 136/61 96/60   Pulse: 97 (!) 114 (!) 132 83   Patient Position - Orthostatic VS: Lying Lying Lying          Visual Acuity      ED Medications  Medications   sodium chloride 0 9 % bolus 1,000 mL (1,000 mL Intravenous New Bag 2/24/23 1634)   sodium chloride 0 9 % bolus 1,000 mL (0 mL Intravenous Stopped 2/24/23 1414)   diltiazem (CARDIZEM) injection 15 mg (15 mg Intravenous Given 2/24/23 1550)       Diagnostic Studies  Results Reviewed     Procedure Component Value Units Date/Time    HS Troponin 0hr (reflex protocol) [[id number]] Collected: 02/24/23 1637    Lab Status:  In process Specimen: Blood from Arm, Right Updated: 02/24/23 1642    UA w Reflex to Microscopic w Reflex to Culture [[id number]]  (Abnormal) Collected: 02/24/23 1415    Lab Status: Final result Specimen: Urine, Straight Cath Updated: 02/24/23 1421     Color, UA Yellow     Clarity, UA Clear     Specific Gravity, UA >=1 030     pH, UA 6 0     Leukocytes, UA Negative     Nitrite, UA Negative     Protein, UA Negative mg/dl      Glucose, UA Negative mg/dl      Ketones, UA 15 (1+) mg/dl      Urobilinogen, UA 1 0 E U /dl      Bilirubin, UA 1+     Occult Blood, UA Negative    Basic metabolic panel [[id number]]  (Abnormal) Collected: 02/24/23 1234    Lab Status: Final result Specimen: Blood from Arm, Right Updated: 02/24/23 1255     Sodium 131 mmol/L      Potassium 4 3 mmol/L      Chloride 95 mmol/L      CO2 25 mmol/L      ANION GAP 11 mmol/L      BUN 31 mg/dL      Creatinine 1 03 mg/dL      Glucose 85 mg/dL      Calcium 9 4 mg/dL      eGFR 46 ml/min/1 73sq m     Narrative:      [place] guidelines for Chronic Kidney Disease (CKD):   •  Stage 1 with normal or high GFR (GFR > 90 mL/min/1 73 square meters)  •  Stage 2 Mild CKD (GFR = 60-89 mL/min/1 73 square meters)  •  Stage 3A Moderate CKD (GFR = 45-59 mL/min/1 73 square meters)  •  Stage 3B Moderate CKD (GFR = 30-44 mL/min/1 73 square meters)  •  Stage 4 Severe CKD (GFR = 15-29 mL/min/1 73 square meters)  •  Stage 5 End Stage CKD (GFR <15 mL/min/1 73 square meters)  Note: GFR calculation is accurate only with a steady state creatinine    CBC and differential [[id number]]  (Abnormal) Collected: 02/24/23 1234    Lab Status: Final result Specimen: Blood from Arm, Right Updated: 02/24/23 1239     WBC 16 95 Thousand/uL      RBC 5 40 Million/uL      Hemoglobin 16 5 g/dL      Hematocrit 48 7 %      MCV 90 fL      MCH 30 6 pg      MCHC 33 9 g/dL      RDW 12 8 %      MPV 9 3 fL      Platelets 611 Thousands/uL      nRBC 0 /100 WBCs      Neutrophils Relative 82 %      Immat GRANS % 0 %      Lymphocytes Relative 11 %      Monocytes Relative 6 %      Eosinophils Relative 1 %      Basophils Relative 0 %      Neutrophils Absolute 13 90 Thousands/µL      Immature Grans Absolute 0 06 Thousand/uL      Lymphocytes Absolute 1 91 Thousands/µL      Monocytes Absolute 0 96 Thousand/µL      Eosinophils Absolute 0 08 Thousand/µL      Basophils Absolute 0 04 Thousands/µL                  XR chest 1 view portable   Final Result by Lucero Merlos MD (02/24 1438)      No acute cardiopulmonary disease  Workstation performed: [id number]                    Procedures  Procedures         ED Course  ED Course as of 02/24/23 1656   Fri Feb 24, 2023   1513 No acute cardiopulmonary disease  0 Pt went into As-fib with RVR   Cardizam is order   Medical Decision Making  80year-old female presented with generalized weakness after RSV had some shortness of breath denies any chest pain  Labs were obtained as well as chest x-ray to rule out any pneumonia pneumothorax pericarditis  Further work-up shows and new onset of A-fib patient was given Cardizem due to new onset and given her age to patient will be admitted for observation  Labs EKG imaging reviewed  Leukocytosis: acute illness or injury  New onset atrial fibrillation [facility]): acute illness or injury  Weakness: acute illness or injury  Amount and/or Complexity of Data Reviewed  Labs: ordered  Decision-making details documented in ED Course  Radiology: ordered  Decision-making details documented in ED Course  ECG/medicine tests:  Decision-making details documented in ED Course  Discussion of management or test interpretation with external provider(s): Discussed with hospitalist will admit    Risk  Prescription drug management  Decision regarding hospitalization            Disposition  Final diagnoses:   New onset atrial fibrillation ([address] )   Leukocytosis   Weakness     Time reflects when diagnosis was documented in both MDM as applicable and the Disposition within this note     Time User Action Codes Description Comment    2/24/2023  4:44 PM [address] [I48 91] New onset atrial fibrillation ([address] )     2/24/2023  4:44 PM Carey Longs Add [M49 297] Leukocytosis     2/24/2023  4:44 PM Carey Longs Add [R53 1] Weakness ED Disposition     ED Disposition   Admit    Condition   Stable    Date/Time   Fri Feb 24, 2023  4:44 PM    Comment   Case was discussed withBladimir Dickerson and the patient's admission status was agreed to be Admission Status: observation status to the service of Dr Erika Ruffin    Follow-up Information    None         Patient's Medications   Discharge Prescriptions    No medications on file       No discharge procedures on file      PDMP Review     None          ED Provider  Electronically Signed by           Shelton Lugo MD  02/24/23 0761

## 2023-02-24 NOTE — ASSESSMENT & PLAN NOTE
· Patient was recently discharged from this facility after a stay from February 11, 2023 through February 13, 2023 and was treated for RSV  · Suspect that this is just a residual positive test  · Patient is not requiring supplemental oxygen  · Patient was discharged on prednisone -suspect what we are seeing is a steroid-induced leukocytosis  · Supportive therapy only  · Repeat a CBC in the a m

## 2023-02-25 PROBLEM — E87.6 HYPOKALEMIA: Status: ACTIVE | Noted: 2023-02-25

## 2023-02-25 LAB
ALBUMIN SERPL BCP-MCNC: 3.4 G/DL (ref 3.5–5)
ALP SERPL-CCNC: 67 U/L (ref 34–104)
ALT SERPL W P-5'-P-CCNC: 16 U/L (ref 7–52)
ANION GAP SERPL CALCULATED.3IONS-SCNC: 15 MMOL/L (ref 4–13)
AST SERPL W P-5'-P-CCNC: 26 U/L (ref 13–39)
BASOPHILS # BLD AUTO: 0.05 THOUSANDS/ÂΜL (ref 0–0.1)
BASOPHILS NFR BLD AUTO: 0 % (ref 0–1)
BILIRUB SERPL-MCNC: 1.51 MG/DL (ref 0.2–1)
BUN SERPL-MCNC: 22 MG/DL (ref 5–25)
CALCIUM ALBUM COR SERPL-MCNC: 9.3 MG/DL (ref 8.3–10.1)
CALCIUM SERPL-MCNC: 8.8 MG/DL (ref 8.4–10.2)
CHLORIDE SERPL-SCNC: 99 MMOL/L (ref 96–108)
CO2 SERPL-SCNC: 22 MMOL/L (ref 21–32)
CREAT SERPL-MCNC: 0.78 MG/DL (ref 0.6–1.3)
EOSINOPHIL # BLD AUTO: 0.07 THOUSAND/ÂΜL (ref 0–0.61)
EOSINOPHIL NFR BLD AUTO: 0 % (ref 0–6)
ERYTHROCYTE [DISTWIDTH] IN BLOOD BY AUTOMATED COUNT: 12.8 % (ref 11.6–15.1)
GFR SERPL CREATININE-BSD FRML MDRD: 65 ML/MIN/1.73SQ M
GLUCOSE P FAST SERPL-MCNC: 63 MG/DL (ref 65–99)
GLUCOSE SERPL-MCNC: 63 MG/DL (ref 65–140)
GLUCOSE SERPL-MCNC: 63 MG/DL (ref 65–140)
GLUCOSE SERPL-MCNC: 72 MG/DL (ref 65–140)
HCT VFR BLD AUTO: 44.5 % (ref 34.8–46.1)
HGB BLD-MCNC: 15 G/DL (ref 11.5–15.4)
IMM GRANULOCYTES # BLD AUTO: 0.1 THOUSAND/UL (ref 0–0.2)
IMM GRANULOCYTES NFR BLD AUTO: 1 % (ref 0–2)
LYMPHOCYTES # BLD AUTO: 1.74 THOUSANDS/ÂΜL (ref 0.6–4.47)
LYMPHOCYTES NFR BLD AUTO: 8 % (ref 14–44)
MAGNESIUM SERPL-MCNC: 1.8 MG/DL (ref 1.9–2.7)
MCH RBC QN AUTO: 30.8 PG (ref 26.8–34.3)
MCHC RBC AUTO-ENTMCNC: 33.7 G/DL (ref 31.4–37.4)
MCV RBC AUTO: 91 FL (ref 82–98)
MONOCYTES # BLD AUTO: 0.9 THOUSAND/ÂΜL (ref 0.17–1.22)
MONOCYTES NFR BLD AUTO: 4 % (ref 4–12)
NEUTROPHILS # BLD AUTO: 17.85 THOUSANDS/ÂΜL (ref 1.85–7.62)
NEUTS SEG NFR BLD AUTO: 87 % (ref 43–75)
NRBC BLD AUTO-RTO: 0 /100 WBCS
PHOSPHATE SERPL-MCNC: 2.7 MG/DL (ref 2.3–4.1)
PLATELET # BLD AUTO: 206 THOUSANDS/UL (ref 149–390)
PMV BLD AUTO: 9.3 FL (ref 8.9–12.7)
POTASSIUM SERPL-SCNC: 3.4 MMOL/L (ref 3.5–5.3)
PROT SERPL-MCNC: 6 G/DL (ref 6.4–8.4)
RBC # BLD AUTO: 4.87 MILLION/UL (ref 3.81–5.12)
SODIUM SERPL-SCNC: 136 MMOL/L (ref 135–147)
WBC # BLD AUTO: 20.71 THOUSAND/UL (ref 4.31–10.16)

## 2023-02-25 RX ORDER — CEFAZOLIN SODIUM 2 G/50ML
2000 SOLUTION INTRAVENOUS EVERY 8 HOURS
Status: DISCONTINUED | OUTPATIENT
Start: 2023-02-25 | End: 2023-02-26

## 2023-02-25 RX ADMIN — DOCUSATE SODIUM 100 MG: 100 CAPSULE, LIQUID FILLED ORAL at 17:15

## 2023-02-25 RX ADMIN — ATORVASTATIN CALCIUM 40 MG: 40 TABLET, FILM COATED ORAL at 17:15

## 2023-02-25 RX ADMIN — HYDROCHLOROTHIAZIDE 25 MG: 25 TABLET ORAL at 08:25

## 2023-02-25 RX ADMIN — CEFAZOLIN SODIUM 2000 MG: 2 SOLUTION INTRAVENOUS at 22:10

## 2023-02-25 RX ADMIN — DOCUSATE SODIUM 100 MG: 100 CAPSULE, LIQUID FILLED ORAL at 08:25

## 2023-02-25 RX ADMIN — ASPIRIN 81 MG CHEWABLE TABLET 81 MG: 81 TABLET CHEWABLE at 08:25

## 2023-02-25 NOTE — CONSULTS
[facility] 1/28/1930, 80 y o  female MRN: [id number]  Unit/Bed#: ED 15 Encounter: [id number]  Primary Care Provider: Brett Mills MD   Date and time admitted to hospital: 2/24/2023 11:31 AM    Inpatient consult to Cardiology  Consult performed by: Tesha Doyle PA-C  Consult ordered by: Boniafcio Mills MD    Consult to cardiology  Consult performed by: Tesha Doyle PA-C  Consult ordered by: Lukasz De Oliveira MD          Hypokalemia  Assessment & Plan  Ideally should be maintained at 4 0    RSV (acute bronchiolitis due to respiratory syncytial virus)  Assessment & Plan  Although RSV is positive this may be residual from recent infection 2/13/2023  Treatment management per primary care team    Generalized weakness  Assessment & Plan  In the setting of elevated white blood cell count  Possibly due to infectious process  Treatment per primary medical team      History of CVA (cerebrovascular accident)  Assessment & Plan  Continue aspirin and Lipitor    Essential hypertension  Assessment & Plan  Controlled on current medical regimen  Continue outpatient hydrochlorothiazide 25 mg daily      * New onset atrial fibrillation (HCC)  Assessment & Plan  Transient atrial fibrillation which is new onset  Patient received 1 dose of IV Cardizem 15 mg in the ED  Likely precipitated by infectious process in the setting of elevated WBC and generalized weakness   Patient is positive for RSV    however this may be residual from her recent infection/treatment 2/13/2023   Possible UTI or other infectious process   Chest x-ray negative  Converted to sinus rhythm  Rate controlled  Agree with Lopressor as needed  Although [id number] score is high at 6, patient only had transient atrial fibrillation in the setting of infectious process and has a high risk of falling  She has now converted to sinus rhythm    If there is recurrence of atrial fibrillation we will reconsider anticoagulation therapy  At this time aspirin will be continued  We will arrange outpatient monitoring and if there is recurrence of atrial fibrillation, will consider placing patient on full anticoagulation  We will arrange for outpatient testing and follow-up    Summary Comments: The patient presents with generalized fatigue and weakness and is found to be RSV positive with elevated white blood cell count  She recently was diagnosed with RSV and treated on 2/13/2023 and this may be residual   Patient was about to be discharged from the ED when she was noted to be in atrial fibrillation with RVR  Cardizem IV was given  I suspect atrial fibrillation was precipitated by infectious process  This was transient and currently she is in sinus rhythm  Given her frailty and high propensity for falls, full anticoagulation is not recommended at this time unless atrial fibrillation recurs  Aspirin and statin will continue given history of CVA  Thank you for allowing us to see this pleasant patient in consult  We will follow course along with you and make outpatient plans outlined above with all arrangements  Chief Complaint:  Chief Complaint   Patient presents with   • Medical Problem     Patient diagnosed with RSV 2 weeks ago and was sent to the ED today because the provider felt as if she isn't getting better  The home also reports foul smelling urine  History of Present Illness: The patient is a 66-year-old female who has a PMH of HTN and Hx of CVA and was recently treated for RSV  She was discharged on 2/13/2023 with a prednisone taper  Her primary care provider did not think that she was getting better and sent her to the emergency room for further treatment  There was a concern that there was a possibility of UTI  In the emergency room she was feeling improved and was about to be discharged when she was noted to have atrial fibrillation with RVR    She was given IV Cardizem 15 mg  She became slightly hypotensive and patient was admitted for observation  We were asked to comment with regard  Patient states that she has been feeling generally fatigued and lightheaded  She denies any chest pain shortness of breath or palpitations  She denies any TIA or CVA symptoms  She has no edema orthopnea or PND  She denies any nausea vomiting or diarrhea  This morning she states that she feels improved  Review of Systems: a 10 point review of systems was conducted and is negative except for as mentioned in the HPI or as below  Review of Systems   Constitutional: Positive for malaise/fatigue  HENT: Negative  Eyes: Negative  Cardiovascular: Negative  Negative for chest pain, claudication, cyanosis, dyspnea on exertion, irregular heartbeat, leg swelling, near-syncope, orthopnea, palpitations, paroxysmal nocturnal dyspnea and syncope  Respiratory: Negative  Negative for cough, hemoptysis, shortness of breath, sleep disturbances due to breathing, snoring, sputum production and wheezing  Endocrine: Negative  Hematologic/Lymphatic: Negative  Skin: Negative  Musculoskeletal: Negative  Gastrointestinal: Negative  Genitourinary: Negative  Neurological: Positive for light-headedness  Psychiatric/Behavioral: Negative  Allergic/Immunologic: Negative          Past Medical and Surgical History:  Past Medical History:   Diagnosis Date   • TANNER (acute kidney injury) ([facility] [address] ) 10/11/2022   • Arthritis    • Cataract     LAST ASSESSED: [id number]   • Depression    • Dislocation of left shoulder joint     LAST ASSESSSED: [id number]   • Essential hypertension 4/3/2019   • Gait disorder     LAST ASSESSED: [id number]   • Herpes zoster     LAST ASSESSED: [id number]   • HL (hearing loss)    • Impacted cerumen of both ears     LAST ASSESSED: [id number]   • Impacted cerumen of right ear     LAST ASSESSED: 81HFQ0946   • Lightheadedness     MILD AND PT REFUSES EKG ETC  PT PROMISES TO CALL IF WORSENS  LAST ASSESSED: [id number]   • Multiple falls     LAST ASSESSED: 53WMO0600   • Near syncope     LAST ASSESSED: 69XIE3363   • Postherpetic polyneuropathy     LAST ASSESSED: 51EUO5667     Past Surgical History:   Procedure Laterality Date   • CATARACT EXTRACTION, BILATERAL     • HERNIA REPAIR     • TONSILLECTOMY     • TUBAL LIGATION       Social History     Substance and Sexual Activity   Alcohol Use Never     Social History     Substance and Sexual Activity   Drug Use No     Social History     Tobacco Use   Smoking Status Never   Smokeless Tobacco Never       Family History:  Family History   Problem Relation Age of Onset   • Cirrhosis Father    • Heart disease Son    • Mental illness Son        Medication:  (Not in a hospital admission)       Allergies: Allergies   Allergen Reactions   • Vancomycin Hives and Itching       Physical Exam:  Vitals: Blood pressure 123/78, pulse 86, temperature 97 6 °F (36 4 °C), temperature source Oral, resp  rate 16, weight 58 1 kg (128 lb), SpO2 91 %, not currently breastfeeding , Body mass index is 23 41 kg/m² ,   Orthostatic Blood Pressures    Flowsheet Row Most Recent Value   Blood Pressure 123/78 filed at 02/25/2023 0830   Patient Position - Orthostatic VS Lying filed at 02/25/2023 3527      , Systolic (36CEU), WJT:659 , Min:96 , DONA:568   , Diastolic (16QLH), NEV:77, Min:60, Max:93    Physical Exam  Vitals and nursing note reviewed  Constitutional:       Appearance: She is well-developed  HENT:      Head: Normocephalic and atraumatic  Mouth/Throat:      Mouth: Mucous membranes are moist    Eyes:      General: No scleral icterus  Conjunctiva/sclera: Conjunctivae normal    Neck:      Vascular: No JVD  Trachea: No tracheal deviation  Cardiovascular:      Rate and Rhythm: Normal rate and regular rhythm  Pulses: Intact distal pulses  Heart sounds: Normal heart sounds, S1 normal and S2 normal  No murmur heard      No friction rub  No gallop  Pulmonary:      Effort: Pulmonary effort is normal  No respiratory distress  Breath sounds: Normal breath sounds  No wheezing or rales  Chest:      Chest wall: No tenderness  Abdominal:      General: Bowel sounds are normal  There is no distension  Palpations: Abdomen is soft  Tenderness: There is no abdominal tenderness  Comments: Aorta not palpable    Musculoskeletal:         General: No tenderness  Normal range of motion  Cervical back: Normal range of motion and neck supple  Right lower leg: No edema  Left lower leg: No edema  Skin:     General: Skin is warm and dry  Coloration: Skin is not pale  Findings: No erythema or rash  Neurological:      General: No focal deficit present  Mental Status: She is alert and oriented to person, place, and time     Psychiatric:         Mood and Affect: Mood normal          Behavior: Behavior normal          Judgment: Judgment normal            Most Recent Cardiac Imaging:   Echo 11/15/2022 normal LVEF 55 to 60% no WMA G1 DD mild aortic sclerosis mild MAC mild TR    EKG/Telemetry: Telemetry shows runs of atrial fibrillation with RVR currently sinus rhythm controlled rate    2/24/2023: Atrial fibrillation controlled ventricular response, low voltage QRS    Lab Results:   Troponins:   Results from last 7 days   Lab Units 02/24/23  2113 02/24/23  1747 02/24/23  1637   HS TNI 0HR ng/L  --   --  37   HS TNI 2HR ng/L  --  39  --    HSTNI D2 ng/L  --  2  --    HS TNI 4HR ng/L 68*  --   --    HSTNI D4 ng/L 31*  --   --       BNP:  Results from last 6 Months   Lab Units 02/11/23  1106 11/14/22  1324   BNP pg/mL 42 193*     CBC with diff:   Results from last 7 days   Lab Units 02/25/23  0543 02/24/23  1234   WBC Thousand/uL 20 71* 16 95*   HEMOGLOBIN g/dL 15 0 16 5*   HEMATOCRIT % 44 5 48 7*   PLATELETS Thousands/uL 206 217   RBC Million/uL 4 87 5 40*     CMP:  Results from last 7 days   Lab Units 02/25/23  0543 02/24/23  1234   POTASSIUM mmol/L 3 4* 4 3   CHLORIDE mmol/L 99 95*   BUN mg/dL 22 31*   CREATININE mg/dL 0 78 1 03   CALCIUM mg/dL 8 8 9 4   AST U/L 26  --    ALT U/L 16  --    ALK PHOS U/L 67  --    EGFR ml/min/1 73sq m 65 46     Lipid Profile:

## 2023-02-25 NOTE — PROGRESS NOTES
[address]  Progress Note - Juan Luis Reis 1/28/1930, 80 y o  female MRN: [id number]  Unit/Bed#: ED 15 Encounter: [id number]  Primary Care Provider: Marjorie Fuller MD   Date and time admitted to hospital: 2/24/2023 11:31 AM    * New onset atrial fibrillation [facility])  Assessment & Plan  · Patient noted to have atrial fibrillation with rapid ventricular response in the emergency room, she is status post treatment with 15 mg of IV Cardizem x1 dose in the ED  · She is now in NSR; can monitor off rate or rhythm control medications   · Echocardiogram from 11/2022: EF of 55 to 60%, no gross structural and or valvular abnormality  · Appreciate cards input   · [id number] = 6  · Given risk of falls, debility, advanced age, recommend monitoring off AC   · Started on aspirin 81 mg daily in the interim    RSV (acute bronchiolitis due to respiratory syncytial virus)  Assessment & Plan  · Patient was recently discharged from this facility after a stay from February 11, 2023 through February 13, 2023 and was treated for RSV  · Suspect that this is just a residual positive test  · Patient is not requiring supplemental oxygen  · Patient was discharged on prednisone -suspect what we are seeing is a steroid-induced leukocytosis  · Supportive therapy only  · Repeat a CBC in the a m  Generalized weakness  Assessment & Plan  · Daughter reports patient has remained in bed since recent hospital stay  · Consult PT/OT    Mild neurocognitive disorder  Assessment & Plan  · At baseline, continue supportive therapy    History of CVA (cerebrovascular accident)  Assessment & Plan  · Continue aspirin, and Lipitor for secondary prevention    Essential hypertension  Assessment & Plan  · Continue home hydrochlorothiazide dosing with hold parameters    VTE Pharmacologic Prophylaxis:   High Risk (Score >/= 5) - Pharmacological DVT Prophylaxis Ordered: enoxaparin (Lovenox)   Sequential Compression Devices Ordered  Patient Centered Rounds: I performed bedside rounds with nursing staff today  Discussions with Specialists or Other Care Team Provider:     Education and Discussions with Family / Patient: Updated  (daughter) via phone  Total Time Spent on Date of Encounter in care of patient: 35 minutes This time was spent on one or more of the following: performing physical exam; counseling and coordination of care; obtaining or reviewing history; documenting in the medical record; reviewing/ordering tests, medications or procedures; communicating with other healthcare professionals and discussing with patient's family/caregivers  Current Length of Stay: 0 day(s)  Current Patient Status: Observation   Certification Statement: The patient will continue to require additional inpatient hospital stay due to safe discharge planning, monitoring leukocytosis   Discharge Plan: Anticipate discharge later today or tomorrow to home with home services  Code Status: Level 3 - DNAR and DNI    Subjective:   Patient patient seen and examined at bedside  She is eating her breakfast   Has no new complaints  No chest pain or shortness of breath  No palpitations  Objective:     Vitals:   Temp (24hrs), Av 8 °F (36 6 °C), Min:97 6 °F (36 4 °C), Max:98 °F (36 7 °C)    Temp:  [97 6 °F (36 4 °C)-98 °F (36 7 °C)] 97 6 °F (36 4 °C)  HR:  [] 86  Resp:  [16-21] 16  BP: ()/(60-93) 123/78  SpO2:  [90 %-98 %] 91 %  Body mass index is 23 41 kg/m²  Input and Output Summary (last 24 hours): Intake/Output Summary (Last 24 hours) at 2023 0919  Last data filed at 2023 1415  Gross per 24 hour   Intake --   Output 250 ml   Net -250 ml       Physical Exam:   Physical Exam  Constitutional:       Comments: Chronically ill, elderly female, sitting upright in bed eating breakfast    HENT:      Head: Normocephalic and atraumatic        Mouth/Throat:      Mouth: Mucous membranes are moist    Eyes: Extraocular Movements: Extraocular movements intact  Cardiovascular:      Rate and Rhythm: Normal rate and regular rhythm  Comments: NSR   Pulmonary:      Effort: Pulmonary effort is normal       Breath sounds: Normal breath sounds  Abdominal:      General: Abdomen is flat  Palpations: Abdomen is soft  Musculoskeletal:         General: No swelling  Normal range of motion  Cervical back: Normal range of motion and neck supple  Skin:     General: Skin is warm and dry  Neurological:      General: No focal deficit present  Mental Status: She is alert  Mental status is at baseline  Psychiatric:         Mood and Affect: Mood normal          Behavior: Behavior normal         Additional Data:     Labs:  Results from last 7 days   Lab Units 02/25/23  0543   WBC Thousand/uL 20 71*   HEMOGLOBIN g/dL 15 0   HEMATOCRIT % 44 5   PLATELETS Thousands/uL 206   NEUTROS PCT % 87*   LYMPHS PCT % 8*   MONOS PCT % 4   EOS PCT % 0     Results from last 7 days   Lab Units 02/25/23  0543   SODIUM mmol/L 136   POTASSIUM mmol/L 3 4*   CHLORIDE mmol/L 99   CO2 mmol/L 22   BUN mg/dL 22   CREATININE mg/dL 0 78   ANION GAP mmol/L 15*   CALCIUM mg/dL 8 8   ALBUMIN g/dL 3 4*   TOTAL BILIRUBIN mg/dL 1 51*   ALK PHOS U/L 67   ALT U/L 16   AST U/L 26   GLUCOSE RANDOM mg/dL 63*         Results from last 7 days   Lab Units 02/25/23  0658 02/25/23  0631   POC GLUCOSE mg/dl 72 63*               Lines/Drains:  Invasive Devices     Peripheral Intravenous Line  Duration           Peripheral IV 02/25/23 Right;Ventral (anterior) Hand <1 day                      Imaging: Reviewed radiology reports from this admission including: chest xray    Recent Cultures (last 7 days):   Results from last 7 days   Lab Units 02/24/23  1747   BLOOD CULTURE  Received in Microbiology Lab  Culture in Progress  Received in Microbiology Lab  Culture in Progress         Last 24 Hours Medication List:   Current Facility-Administered Medications Medication Dose Route Frequency Provider Last Rate   • acetaminophen  650 mg Oral Q6H PRN Bo Trevino MD     • aspirin  81 mg Oral Daily Bo Trevino MD     • atorvastatin  40 mg Oral QPM Bo Trevino MD     • docusate sodium  100 mg Oral BID Bo Trevino MD     • enoxaparin  30 mg Subcutaneous Daily Bo Trevino MD     • hydrochlorothiazide  25 mg Oral Daily Bo Trevino MD     • metoprolol  5 mg Intravenous Q6H PRN Bo Trevino MD     • ondansetron  4 mg Intravenous Q6H PRN Bo Trevino MD          Today, Patient Was Seen By: Quincy Nieto PA-C    **Please Note: This note may have been constructed using a voice recognition system  **

## 2023-02-25 NOTE — ASSESSMENT & PLAN NOTE
· Patient noted to have atrial fibrillation with rapid ventricular response in the emergency room, she is status post treatment with 15 mg of IV Cardizem x1 dose in the ED  · She is now in NSR; can monitor off rate or rhythm control medications   · Echocardiogram from 11/2022: EF of 55 to 60%, no gross structural and or valvular abnormality  · Appreciate cards input   · [id number] = 6  · Given risk of falls, debility, advanced age, recommend monitoring off AC   · Started on aspirin 81 mg daily in the interim

## 2023-02-25 NOTE — PLAN OF CARE
Problem: Potential for Falls  Goal: Patient will remain free of falls  Description: INTERVENTIONS:  - Educate patient/family on patient safety including physical limitations  - Instruct patient to call for assistance with activity   - Consult OT/PT to assist with strengthening/mobility   - Keep Call bell within reach  - Keep bed low and locked with side rails adjusted as appropriate  - Keep care items and personal belongings within reach  - Initiate and maintain comfort rounds  - Make Fall Risk Sign visible to staff  - Offer Toileting every 2 Hours, in advance of need  - Initiate/Maintain bed alarm  - Obtain necessary fall risk management equipment:   - Apply yellow socks and bracelet for high fall risk patients  - Consider moving patient to room near nurses station  Outcome: Progressing     Problem: Prexisting or High Potential for Compromised Skin Integrity  Goal: Skin integrity is maintained or improved  Description: INTERVENTIONS:  - Identify patients at risk for skin breakdown  - Assess and monitor skin integrity  - Assess and monitor nutrition and hydration status  - Monitor labs   - Assess for incontinence   - Turn and reposition patient  - Assist with mobility/ambulation  - Relieve pressure over bony prominences  - Avoid friction and shearing  - Provide appropriate hygiene as needed including keeping skin clean and dry  - Evaluate need for skin moisturizer/barrier cream  - Collaborate with interdisciplinary team   - Patient/family teaching  - Consider wound care consult   Outcome: Progressing     Problem: MOBILITY - ADULT  Goal: Maintain or return to baseline ADL function  Description: INTERVENTIONS:  -  Assess patient's ability to carry out ADLs; assess patient's baseline for ADL function and identify physical deficits which impact ability to perform ADLs (bathing, care of mouth/teeth, toileting, grooming, dressing, etc )  - Assess/evaluate cause of self-care deficits   - Assess range of motion  - Assess patient's mobility; develop plan if impaired  - Assess patient's need for assistive devices and provide as appropriate  - Encourage maximum independence but intervene and supervise when necessary  - Involve family in performance of ADLs  - Assess for home care needs following discharge   - Consider OT consult to assist with ADL evaluation and planning for discharge  - Provide patient education as appropriate  Outcome: Progressing  Goal: Maintains/Returns to pre admission functional level  Description: INTERVENTIONS:  - Perform BMAT or MOVE assessment daily    - Set and communicate daily mobility goal to care team and patient/family/caregiver  - Collaborate with rehabilitation services on mobility goals if consulted  - Perform Range of Motion 3 times a day  - Reposition patient every 2 hours    - Dangle patient 3 times a day  - Stand patient 3 times a day  - Ambulate patient 3 times a day  - Out of bed to chair 3 times a day   - Out of bed for meals 3 times a day  - Out of bed for toileting  - Record patient progress and toleration of activity level   Outcome: Progressing     Problem: DISCHARGE PLANNING  Goal: Discharge to home or other facility with appropriate resources  Description: INTERVENTIONS:  - Identify barriers to discharge w/patient and caregiver  - Arrange for needed discharge resources and transportation as appropriate  - Identify discharge learning needs (meds, wound care, etc )  - Arrange for interpretive services to assist at discharge as needed  - Refer to Case Management Department for coordinating discharge planning if the patient needs post-hospital services based on physician/advanced practitioner order or complex needs related to functional status, cognitive ability, or social support system  Outcome: Progressing     Problem: Knowledge Deficit  Goal: Patient/family/caregiver demonstrates understanding of disease process, treatment plan, medications, and discharge instructions  Description: Complete learning assessment and assess knowledge base    Interventions:  - Provide teaching at level of understanding  - Provide teaching via preferred learning methods  Outcome: Progressing     Problem: CARDIOVASCULAR - ADULT  Goal: Maintains optimal cardiac output and hemodynamic stability  Description: INTERVENTIONS:  - Monitor I/O, vital signs and rhythm  - Monitor for S/S and trends of decreased cardiac output  - Administer and titrate ordered vasoactive medications to optimize hemodynamic stability  - Assess quality of pulses, skin color and temperature  - Assess for signs of decreased coronary artery perfusion  - Instruct patient to report change in severity of symptoms  Outcome: Progressing  Goal: Absence of cardiac dysrhythmias or at baseline rhythm  Description: INTERVENTIONS:  - Continuous cardiac monitoring, vital signs, obtain 12 lead EKG if ordered  - Administer antiarrhythmic and heart rate control medications as ordered  - Monitor electrolytes and administer replacement therapy as ordered  Outcome: Progressing

## 2023-02-25 NOTE — PLAN OF CARE
Problem: OCCUPATIONAL THERAPY ADULT  Goal: Performs self-care activities at highest level of function for planned discharge setting  See evaluation for individualized goals  Description: Treatment Interventions: ADL retraining, Functional transfer training, UE strengthening/ROM, Endurance training, Cognitive reorientation, Patient/family training, Compensatory technique education, Energy conservation, Activityengagement    See flowsheet documentation for full assessment, interventions and recommendations  Note: Limitation: Decreased ADL status, Decreased UE strength, Decreased Safe judgement during ADL, Decreased cognition, Decreased endurance, Decreased self-care trans, Decreased high-level ADLs  Prognosis: Good  Assessment: Pt is a 80 y o  female seen for OT evaluation s/p admit to [facility] on 2/24/2023 w/ New onset atrial fibrillation ([facility] [address] )  Comorbidities affecting pt's functional performance at time of assessment include: TANNER, Arthritis, Depression, Gait disorder, Mulitple falls  Personal factors affecting pt at time of IE include:difficulty performing ADLS, limited insight into deficits and decreased initiation and engagement   Prior to admission, pt required A with ADLs  Upon evaluation: the following deficits impact occupational performance: decreased strength, decreased balance, decreased tolerance, impaired attention, impaired initiation, impaired memory, impaired sequencing, impaired problem solving, decreased safety awareness, increased pain and decreased coping skills  Pt to benefit from continued skilled OT tx while in the hospital to address deficits as defined above and maximize level of functional independence w ADL's and functional mobility  Occupational Performance areas to address include: bathing/shower, toilet hygiene, dressing, functional mobility and clothing management  From OT standpoint, recommendation at time of d/c would be STR       OT Discharge Recommendation: Post acute rehabilitation services     Blanca Ruiz MS, OTR/L

## 2023-02-25 NOTE — PHYSICAL THERAPY NOTE
Physical Therapy Evaluation       Patient's Name: Jennifer Donnelly    Admitting Diagnosis  Weakness [R53 1]    Problem List  Patient Active Problem List   Diagnosis    Hearing loss    Gait abnormality    Osteoarthritis    Mediastinal mass    Spinal stenosis at L4-L5 level    Essential hypertension    Bilateral lower extremity edema    Benign paroxysmal positional vertigo    Urinary incontinence    History of CVA (cerebrovascular accident)    Right ankle sprain    Venous insufficiency of both lower extremities    Unspecified mood (affective) disorder, r/o major depressive disorder (single episode)    Mild neurocognitive disorder    Current moderate episode of major depressive disorder without prior episode ([facility] [address] )    History of suicidal ideation    Generalized weakness    Metabolic encephalopathy    Hyponatremia    Constipation    Gastric distention    TANNER (acute kidney injury) ([facility])    Gram-positive cocci bacteremia    Syncope    Abnormal CT scan    RSV (acute bronchiolitis due to respiratory syncytial virus)    Acute respiratory failure with hypoxia ([facility])    Hypomagnesemia    [facility] onset [facility] )    Hypokalemia       Past Medical History  Past Medical History:   Diagnosis Date    TANNER (acute kidney injury) ([facility] ) 10/11/2022    Arthritis     Cataract     LAST ASSESSED: [id number]    Depression     Dislocation of left shoulder joint     LAST ASSESSSED: [id number]    Essential hypertension 4/3/2019    Gait disorder     LAST ASSESSED: [id number]    Herpes zoster     LAST ASSESSED: [id number]    HL (hearing loss)     Impacted cerumen of both ears     LAST ASSESSED: [id number]    Impacted cerumen of right ear     LAST ASSESSED: 63QSW0474    Lightheadedness     MILD AND PT REFUSES EKG ETC  PT PROMISES TO CALL IF WORSENS   LAST ASSESSED: [id number]    Multiple falls     LAST ASSESSED: [id number]    Near syncope     LAST ASSESSED: [id number]    Postherpetic polyneuropathy     LAST ASSESSED: [id number]       Past Surgical History  Past Surgical History:   Procedure Laterality Date    CATARACT EXTRACTION, BILATERAL      HERNIA REPAIR      TONSILLECTOMY      TUBAL LIGATION         PT performed at least 2 patient identifiers during session: Name and wristband  02/25/23 1121   PT Last Visit   PT Visit Date 02/25/23   Note Type   Note type Evaluation   Pain Assessment   Pain Assessment Tool 0-10   Pain Score 6   Pain Location/Orientation Location: Head   Pain Onset/Description Descriptor: Headache   Multiple Pain Sites Yes   Pain 2   Pain Location/Orientation 2 Orientation: Right;Location: Foot  (no numeric score provided)   Restrictions/Precautions   Weight Bearing Precautions Per Order No   Other Precautions Cognitive; Bed Alarm;Multiple lines;Telemetry; Fall Risk;Pain   Home Living   Type of Home Assisted living  [facility]   Home Layout One level;Performs ADLs on one level;Ramped entrance;Elevator   Bathroom Shower/Tub Walk-in shower   Bathroom Toilet Raised   Bathroom Equipment Grab bars in shower; Shower chair;Grab bars around toilet   [address] Walker;Cane  (RW used at baseline)   Additional Comments Home S/U and PLOF obtained from chart due to Pt current cognitive status   Prior Function   Level of [place] Needs assistance with ADLs; Needs assistance with functional mobility; Needs assistance with [address] staff   Receives Help From Personal care attendant   IADLs Family/Friend/Other provides transportation; Family/Friend/Other provides meals; Family/Friend/Other provides medication management   Falls in the last 6 months   (Pt unable to report due to cognition)   Vocational Retired   General   Family/Caregiver Present No   Cognition   Overall Cognitive Status Impaired   Arousal/Participation Arousable   Attention Difficulty attending to directions   Orientation Level Oriented to person;Disoriented to place; Disoriented to time;Disoriented to situation  (Pt knew month but not year)   Memory Decreased recall of biographical information;Decreased short term memory;Decreased recall of recent events;Decreased recall of precautions   Following Commands Follows one step commands with increased time or repetition   RLE Assessment   RLE Assessment   (grossly 3/5 observed with functional mobility)   LLE Assessment   LLE Assessment   (grossly 3/5 observed with functional mobility)   Coordination   Movements are Fluid and Coordinated   (difficulty formally evaluating d/t cognitive deficits, recommend continued assessment)   Sensation   (difficulty formally evaluating d/t cognitive deficits, recommend continued assessment)   Bed Mobility   Supine to Sit 2  Maximal assistance   Additional items Assist x 1;HOB elevated; Increased time required;Verbal cues;LE management   Sit to Supine 3  Moderate assistance   Additional items Assist x 2; Increased time required;Verbal cues;LE management   Additional Comments pt c/o feeling increased HA pain while at EOB, BP at EOB: 122/70mmHg   Transfers   Sit to Stand 3  Moderate assistance   Additional items Assist x 2; Increased time required;Verbal cues   Stand to Sit 3  Moderate assistance   Additional items Assist x 2; Increased time required;Verbal cues   Ambulation/Elevation   Gait pattern Improper Weight shift;Decreased foot clearance; Short stride; Foward flexed;Knees flexed;Decreased heel strike;Decreased toe off   Gait Assistance 3  Moderate assist   Additional items Assist x 2;Verbal cues; Tactile cues   Assistive Device Rolling walker   Distance 2' lateral side steps   Balance   Static Sitting Fair   Dynamic Sitting Fair -   Static Standing Poor +   Dynamic Standing Poor   Ambulatory Poor   Endurance Deficit   Endurance Deficit Yes   Activity Tolerance   Activity Tolerance Patient limited by fatigue;Patient limited by pain;Treatment limited secondary to medical complications (Comment)  (decreased cognition)   Medical Staff Made Aware CM Raina RICK [facility]   Assessment   Prognosis Guarded   Problem List Decreased strength;Decreased endurance; Impaired balance;Decreased mobility; Decreased cognition;Pain   Assessment Pt is 80 y o  female seen for PT evaluation on 2/25/2023 s/p admit to [address] on 2/24/2023 w/ New onset atrial fibrillation ([address] )  PT was consulted to assess pt's functional mobility and d/c needs  Order placed for PT eval and tx  PTA, pt resides at [facility], ambulates with walker; pt poor historian, home set up/ PLOF info obtained from previous therapy encounters  At time of eval, pt requiring mod A x2 for transfers and lateral side steps at EOB, max A for supine to sit transfer  Upon evaluation, pt presenting with impaired functional mobility d/t decreased strength, decreased endurance, impaired balance, decreased mobility, decreased cognition, pain and activity intolerance  Pertinent PMHx and current co-morbidities affecting pt's physical performance at time of assessment include: new onset A fib, HTN, h/o CVA, mild neurocognitive disorder, generalized weakness, RSV, hypokalemia, hearing loss, gait abnormality, spinal stenosis, unspecified mood disorder, metabolic encephalopathy, h/o SI, synope  Personal factors affecting pt at time of eval include: ambulating w/ assistive device, inability to ambulate household distances and decreased cognition  The following objective measures performed on IE also reveal limitations: Barthel Index: 20/100, Modified [place]: 5 (severe disability) and AM-PAC 6-Clicks: 2/24  Pt's clinical presentation is currently unstable/unpredictable seen in pt's presentation of advanced age, abnormal lab value(s), need for input for task focus and mobility technique, ongoing medical assessment and on telemetry monitoring   Overall, pt's rehab potential and prognosis to return to PLOF is guarded as impacted by objective findings, warranting pt to receive further skilled PT interventions to address identified impairments, activity limitation(s), and participation restriction(s)  Pt to benefit from continued PT tx to address deficits as defined above and maximize level of functional independent mobility  From PT/mobility standpoint, recommendation at time of d/c would be post acute rehabilitation services pending progress in order to facilitate return to PLOF  Barriers to Discharge Other (Comment)  (pt resides at [facility])   Goals   Patient Goals Pt unable to report due to cognition   STG Expiration Date 03/07/23   Short Term Goal #1 In 7-10 days: Increase bilateral LE strength 1/2 grade to facilitate independent mobility, Perform all bed mobility tasks with min A of 1 to decrease caregiver burden, Perform all transfers with min A of 1 to improve independence, Ambulate > 50 ft  with least restrictive assistive device with min A of 1 w/o LOB and w/ normalized gait pattern 100% of the time, Increase all balance 1/2 grade to decrease risk for falls, Improve Barthel Index score to 35 or greater to facilitate independence, PT provider will perform functional balance assessment to determine fall risk and PT to see and establish goals for elevations/stairs when appropriate   PT Treatment Day 0   Plan   Treatment/Interventions Functional transfer training;LE strengthening/ROM; Therapeutic exercise; Endurance training;Patient/family training;Cognitive reorientation;Equipment eval/education; Bed mobility;Gait training;Spoke to nursing;OT;Spoke to case management   PT Frequency 3-5x/wk   Recommendation   PT Discharge Recommendation Post acute rehabilitation services   Equipment Recommended   (TBD)   AM-PAC Basic Mobility Inpatient   Turning in Flat Bed Without Bedrails 1   Lying on Back to Sitting on Edge of Flat Bed Without Bedrails 1   Moving Bed to Chair 1   Standing Up From Chair Using Arms 1   Walk in Room 1   Climb 3-5 Stairs With Railing 1   Basic Mobility Inpatient Raw Score 6   Turning Head Towards Sound 3   Follow Simple Instructions 2   Low Function Basic Mobility Raw Score 11   Low Function Basic Mobility Standardized Score 16 55   Highest Level Of Mobility   [facility] Goal 2: Bed activities/Dependent transfer   [facility] Achieved 3: Sit at edge of bed   Modified [place] Scale   Modified [place] Scale 5   Barthel Index   Feeding 5   Bathing 0   Grooming Score 0   Dressing Score 5   Bladder Score 0   Bowels Score 0   Toilet Use Score 5   Transfers (Bed/Chair) Score 5   Mobility (Level Surface) Score 0   Stairs Score 0   Barthel Index Score 20       Oz Proper, PT, DPT

## 2023-02-25 NOTE — ED NOTES
Pt given a cup of orange juice and a pack of bubba crackers due to low glucose level this morning     Brielle Pepe, JULIETA  02/25/23 8176

## 2023-02-25 NOTE — OCCUPATIONAL THERAPY NOTE
Occupational Therapy Evaluation      Ada Reis    2/25/2023    Principal Problem:    New onset atrial fibrillation (HCC)  Active Problems:    Essential hypertension    History of CVA (cerebrovascular accident)    Mild neurocognitive disorder    Generalized weakness    RSV (acute bronchiolitis due to respiratory syncytial virus)    Hypokalemia      Past Medical History:   Diagnosis Date    TANNER (acute kidney injury) ([facility] [address] [address] ) 10/11/2022    Arthritis     Cataract     LAST ASSESSED: [id number]    Depression     Dislocation of left shoulder joint     LAST ASSESSSED: [id number]    Essential hypertension 4/3/2019    Gait disorder     LAST ASSESSED: [id number]    Herpes zoster     LAST ASSESSED: [id number]    HL (hearing loss)     Impacted cerumen of both ears     LAST ASSESSED: [id number]    Impacted cerumen of right ear     LAST ASSESSED: 04NZS3034    Lightheadedness     MILD AND PT REFUSES EKG ETC  PT PROMISES TO CALL IF WORSENS  LAST ASSESSED: 46HSV5891    Multiple falls     LAST ASSESSED: 73URC4602    Near syncope     LAST ASSESSED: 80EJZ1311    Postherpetic polyneuropathy     LAST ASSESSED: 85OQY5013       Past Surgical History:   Procedure Laterality Date    CATARACT EXTRACTION, BILATERAL      HERNIA REPAIR      TONSILLECTOMY      TUBAL LIGATION          02/25/23 1131   OT Last Visit   OT Visit Date 02/25/23   Note Type   Note type Evaluation   Pain Assessment   Pain Assessment Tool 0-10   Pain Score 6   Pain Location/Orientation Location: Head   Pain Onset/Description Descriptor: Headache   Multiple Pain Sites Yes   Pain 2   Pain Location/Orientation 2 Orientation: Right;Location: Foot  (no numeric score provided)   Restrictions/Precautions   Weight Bearing Precautions Per Order No   Other Precautions Cognitive; Bed Alarm;Multiple lines;Telemetry; Fall Risk;Pain   Home Living   Type of Home Assisted living  [facility]   Home Layout One level;Performs ADLs on one level;Ramped entrance;Elevator   Bathroom Shower/Tub Walk-in shower   Bathroom Toilet Raised   Bathroom Equipment Grab bars in shower; Shower chair;Grab bars around toilet   [address] Walker;Cane  (RW used at baseline)   Additional Comments Home S/U and PLOF obtained from chart due to Pt current cognitive status   Prior Function   Level of [place] Needs assistance with ADLs; Needs assistance with functional mobility; Needs assistance with [address] staff   Receives Help From Personal care attendant   IADLs Family/Friend/Other provides transportation; Family/Friend/Other provides meals; Family/Friend/Other provides medication management   Falls in the last 6 months   (Pt unable to report due to cognition)   Vocational Retired   ADL   UB Bathing Assistance 4  Minimal Assistance   LB [address] 2  [address] [facility] 3  Moderate Assistance    [facility] 1  Total Assistance   Bed Mobility   Supine to Sit 2  Maximal assistance   Additional items Assist x 1;HOB elevated; Increased time required;Verbal cues;LE management   Sit to Supine 3  Moderate assistance   Additional items Assist x 2; Increased time required;Verbal cues;LE management   Additional Comments pt c/o feeling increased HA pain while at EOB, BP at EOB: 122/70mmHg   Transfers   Sit to Stand 3  Moderate assistance   Additional items Assist x 2; Increased time required;Verbal cues   Stand to Sit 3  Moderate assistance   Additional items Assist x 2; Increased time required;Verbal cues   Stand pivot Unable to assess  (due to safety concerns)   Balance   Static Sitting Fair   Dynamic Sitting Fair -   Static Standing Poor +   Dynamic Standing Poor   Activity Tolerance   Activity Tolerance Patient limited by fatigue; Other (Comment)  (decreased cognition)   Medical Staff Made Aware ZAYDA [place]   RUE Assessment   RUE Assessment X  (PROM WFL)   RUE Strength   RUE Overall Strength Unable to assess;Due to cognitive deficits   LUE Assessment   LUE Assessment X  (PROM WFL)   LUE Strength   LUE Overall Strength Unable to assess;Due to cognitive deficits   Cognition   Overall Cognitive Status Impaired   Arousal/Participation Lethargic;Cooperative   Attention Difficulty attending to directions   Orientation Level Oriented to person;Disoriented to place; Disoriented to time;Disoriented to situation  (Pt knew month but not year)   Memory Decreased recall of biographical information;Decreased short term memory;Decreased recall of recent events;Decreased recall of precautions   Following Commands Follows one step commands with increased time or repetition   Assessment   Limitation Decreased ADL status; Decreased UE strength;Decreased Safe judgement during ADL;Decreased cognition;Decreased endurance;Decreased self-care trans;Decreased high-level ADLs   Prognosis Good   Assessment Pt is a 80 y o  female seen for OT evaluation s/p admit [facility] on 2/24/2023 w/ New onset atrial fibrillation ([facility] [address] )  Comorbidities affecting pt's functional performance at time of assessment include: TANNER, Arthritis, Depression, Gait disorder, Mulitple falls  Personal factors affecting pt at time of IE include:difficulty performing ADLS, limited insight into deficits and decreased initiation and engagement   Prior to admission, pt required A with ADLs  Upon evaluation: the following deficits impact occupational performance: decreased strength, decreased balance, decreased tolerance, impaired attention, impaired initiation, impaired memory, impaired sequencing, impaired problem solving, decreased safety awareness, increased pain and decreased coping skills  Pt to benefit from continued skilled OT tx while in the hospital to address deficits as defined above and maximize level of functional independence w ADL's and functional mobility   Occupational Performance areas to address include: bathing/shower, toilet hygiene, dressing, functional mobility and clothing management  From OT standpoint, recommendation at time of d/c would be STR  Goals   Patient Goals Pt unable to report due to cognition   Plan   Treatment Interventions ADL retraining;Functional transfer training;UE strengthening/ROM; Endurance training;Cognitive reorientation;Patient/family training; Compensatory technique education; Energy conservation; Activityengagement   Goal Expiration Date 03/07/23   OT Treatment Day 0   OT Frequency 3-5x/wk   Recommendation   OT Discharge Recommendation Post acute rehabilitation services   Additional Comments  Pt seen as a co-eval with PT due to the patient's co-morbidities, clinically unstable presentation, and present impairments which are a regression from the patient's baseline  Additional Comments 2 The patient's raw score on the AM-PAC Daily Activity Inpatient Short Form is 12  A raw score of less than 19 suggests the patient may benefit from discharge to post-acute rehabilitation services  Please refer to the recommendation of the Occupational Therapist for safe discharge planning     AM-PAC Daily Activity Inpatient   Lower Body Dressing 1   Bathing 2   Toileting 1   Upper Body Dressing 2   Grooming 3   Eating 3   Daily Activity Raw Score 12   Daily Activity Standardized Score (Calc for Raw Score >=11) 30 6   AM-[facility] Applied Cognition Inpatient   Following a Speech/Presentation 2   Understanding Ordinary Conversation 2   Taking Medications 1   Remembering Where Things Are Placed or Put Away 1   Remembering List of 4-5 Errands 1   Taking Care of Complicated Tasks 1   Applied Cognition Raw Score 8   Applied Cognition Standardized Score 19 32     GOALS:    Pt will achieve the following within specified time frame: STG  Pt will achieve the following goals within 5 days    *ADL transfers with Mod (A) for inc'd independence with ADLs/purposeful tasks    *Self Feeding- (S) for inc'd independence with providing self nourishment    *UB ADL with Min (A) for inc'd independence with self cares    *LB ADL with Max (A) using AE prn for inc'd independence with self cares    *Toileting with Max (A) for clothing management and hygiene for return to PLOF with personal care    *Increase static stand balance to F- and dyn stand balance to P+ for inc'd safety with standing purposeful tasks    *Increase stand tolerance x3 m for inc'd tolerance with standing purposeful tasks    *Participate in 10m UE therex to increase overall stamina/activity tolerance for purposeful tasks    *Bed mobility- Mod (A) for inc'd independence to manage own comfort and initiate EOB & OOB purposeful tasks    Pt will achieve the following within specified time frame: LTG  Pt will achieve the following goals within 10 days    *ADL transfers with Min (A) for inc'd independence with ADLs/purposeful tasks    *Self Feeding- (I) for inc'd independence with providing self nourishment    *UB ADL with CGA for inc'd independence with self cares    *LB ADL with Mod (A) using AE prn for inc'd independence with self cares    *Toileting with Mod (A) for clothing management and hygiene for return to PLOF with personal care    *Increase static stand balance to F and dyn stand balance to F- for inc'd safety with standing purposeful tasks    *Increase stand tolerance x5 m for inc'd tolerance with standing purposeful tasks    *Bed mobility- Min (A) for inc'd independence to manage own comfort and initiate EOB & OOB purposeful tasks      Blanca Soliman, MS, OTR/L

## 2023-02-25 NOTE — ASSESSMENT & PLAN NOTE
Although RSV is positive this may be residual from recent infection 2/13/2023  Treatment management per primary care team

## 2023-02-25 NOTE — CASE MANAGEMENT
Case Management Assessment & Discharge Planning Note    Patient name Ni [facility] [place]  Location /-57 MRN [id number]  : 1930 Date 2023       Current Admission Date: 2023  Current Admission Diagnosis:New onset atrial fibrillation [facility])   Patient Active Problem List    Diagnosis Date Noted   • Hypokalemia 2023   • New onset atrial fibrillation ([address] ) 2023   • Hypomagnesemia 2023   • RSV (acute bronchiolitis due to respiratory syncytial virus) 2023   • Acute respiratory failure with hypoxia ([address] ) 2023   • Syncope 2022   • Abnormal CT scan 2022   • Gram-positive cocci bacteremia 10/12/2022   • Constipation 10/11/2022   • Gastric distention 10/11/2022   • TANNER (acute kidney injury) ([facility] [address] ) 10/11/2022   • Generalized weakness    • Metabolic encephalopathy    • Hyponatremia 2022   • Current moderate episode of major depressive disorder without prior episode ([address] ) 2020   • History of suicidal ideation 2020   • History of CVA (cerebrovascular accident) 2020   • Right ankle sprain 2020   • Venous insufficiency of both lower extremities 2020   • Unspecified mood (affective) disorder, r/o major depressive disorder (single episode) 2020   • Mild neurocognitive disorder 2020   • Benign paroxysmal positional vertigo 2019   • Urinary incontinence 2019   • Bilateral lower extremity edema 2019   • Essential hypertension 2019   • Mediastinal mass 2019   • Spinal stenosis at L4-L5 level 2019   • Gait abnormality 2015   • Hearing loss 2013   • Osteoarthritis 2012      LOS (days): 0  Geometric Mean LOS (GMLOS) (days):   Days to GMLOS:     OBJECTIVE:         Current admission status: Observation  Referral Reason: VNA    Preferred Pharmacy:   [facility] [address] [address]  Phone: [phone] Fax: [address], [address] [facility] [place] [address]  Phone: [phone] Fax: [phone]    Primary Care Provider: Flako Rosario MD    Primary Insurance: MEDICARE  Secondary Insurance: [facility] HEALTH OPTIONS PROGRAM    ASSESSMENT:  Active Health Care Proxies     Lalit Goodwin - Daughter   Primary Phone: [phone] (Mobile)               Advance Directives  Does patient have a [address]?: Yes  Does patient have Advance Directives?: Yes  Advance Directives: Living will, Power of  for health care  Primary Contact: Darnell Mtz Daughter     [phone] POA     xAelvalerianoManjit villanueva Son     [phone] POA    Readmission Root Cause  30 Day Readmission:  (OBS)    Patient Information  Admitted from[de-identified] Facility  Mental Status: Alert  During Assessment patient was accompanied by: Not accompanied during assessment  Assessment information provided by[de-identified] Patient, Daughter  Primary Caregiver: Self  Support Systems: Family members, Self, Daughter, Son, Noah  [address] of Residence: [address] do you live in?: [address] [facility] entry access options   Select all that apply : No steps to enter home  Type of Current Residence: Facility ([address])  Upon entering residence, is there a bedroom on the main floor (no further steps)?: Yes  Upon entering residence, is there a bathroom on the main floor (no further steps)?: Yes  In the last 12 months, was there a time when you were not able to pay the mortgage or rent on time?: No  In the last 12 months, how many places have you lived?: 1  In the last 12 months, was there a time when you did not have a steady place to sleep or slept in a shelter (including now)?: No  Homeless/housing insecurity resource given?: N/A  Living Arrangements: Other (Comment) (Lives in a penitentiary)  Is patient a ?: No    Activities of Daily Living Prior to Admission  Functional Status: Independent  Completes ADLs independently?: Yes  Ambulates independently?: Yes  Does patient use assisted devices?: Yes  Assisted Devices (DME) used: Jaquita Riedel  Does patient currently own DME?: Yes  What DME does the patient currently own?: Jaquita Riedel  Does patient have a history of Outpatient Therapy (PT/OT)?: No  Does the patient have a history of Short-Term Rehab?: Yes ([facility])  Does patient have a history of HHC?: Yes ([facility])  Does patient currently have [facility]?: No    Patient Information Continued  Does patient have prescription coverage?: No  Within the past 12 months, you worried that your food would run out before you got the money to buy more : Never true  Within the past 12 months, the food you bought just didn't last and you didn't have money to get more : Never true  Food insecurity resource given?: N/A  Does patient receive dialysis treatments?: No  Does patient have a history of substance abuse?: No  Does patient have a history of Mental Health Diagnosis?: No    Means of Transportation  Means of Transport to Appts[de-identified] Family transport  In the past 12 months, has lack of transportation kept you from medical appointments or from getting medications?: No  In the past 12 months, has lack of transportation kept you from meetings, work, or from getting things needed for daily living?: No  Was application for public transport provided?: N/A    DISCHARGE DETAILS:    Discharge planning discussed with[de-identified] Patient and dtr [address] of Choice: Yes  Comments - Freedom of Choice: Discussed therapy recs for rehab and Odin Mejia wants patient to return to [facility]  CM contacted family/caregiver?: Yes  Were Treatment Team discharge recommendations reviewed with patient/caregiver?: Yes  Did patient/caregiver verbalize understanding of patient care needs?: Yes  Were patient/caregiver advised of the risks associated with not following Treatment Team discharge recommendations?: Yes    Contacts  Patient Contacts: Hayes Michaud  Relationship to Patient[de-identified] Family  Contact Method: Phone  Phone Number: [phone]  Reason/Outcome: Emergency Contact, Discharge Planning    Treatment Team Recommendation: Short Term Rehab     Additional Comments: Met with patient at bedside and started CM assessment  Call to [facility] and spoke with Andria to complete assessemnt  Call to Medardo Martinez owner of [facility] and call was declined  Call to dtr Eliseo Buchanan and discussed discharge planning  Eliseo Buchanan wants patient to return to [facility] and doesn't want STR, Updated Eliseo Buchanan that patient is in a room and provided with phone number  CM department following

## 2023-02-25 NOTE — PLAN OF CARE
Problem: PHYSICAL THERAPY ADULT  Goal: Performs mobility at highest level of function for planned discharge setting  See evaluation for individualized goals  Description: Treatment/Interventions: Functional transfer training, LE strengthening/ROM, Therapeutic exercise, Endurance training, Patient/family training, Cognitive reorientation, Equipment eval/education, Bed mobility, Gait training, Spoke to nursing, OT, Spoke to case management  Equipment Recommended:  (TBD)       See flowsheet documentation for full assessment, interventions and recommendations  2/25/2023 1425 by Wilfrid Manuel, PT  Note: Prognosis: Guarded  Problem List: Decreased strength, Decreased endurance, Impaired balance, Decreased mobility, Decreased cognition, Pain  Assessment: Pt is 80 y o  female seen for PT evaluation on 2/25/2023 s/p admit [address] on 2/24/2023 w/ New onset atrial fibrillation ([facility] [address] )  PT was consulted to assess pt's functional mobility and d/c needs  Order placed for PT eval and tx  PTA, pt resides at [facility], ambulates with walker; pt poor historian, home set up/ PLOF info obtained from previous therapy encounters  At time of eval, pt requiring mod A x2 for transfers and lateral side steps at EOB, max A for supine to sit transfer  Upon evaluation, pt presenting with impaired functional mobility d/t decreased strength, decreased endurance, impaired balance, decreased mobility, decreased cognition, pain and activity intolerance  Pertinent PMHx and current co-morbidities affecting pt's physical performance at time of assessment include: new onset A fib, HTN, h/o CVA, mild neurocognitive disorder, generalized weakness, RSV, hypokalemia, hearing loss, gait abnormality, spinal stenosis, unspecified mood disorder, metabolic encephalopathy, h/o SI, synope  Personal factors affecting pt at time of eval include: ambulating w/ assistive device, inability to ambulate household distances and decreased cognition   The following objective measures performed on IE also reveal limitations: Barthel Index: 20/100, Modified Frederick: 5 (severe disability) and AM-PAC 6-Clicks: 4/14  Pt's clinical presentation is currently unstable/unpredictable seen in pt's presentation of advanced age, abnormal lab value(s), need for input for task focus and mobility technique, ongoing medical assessment and on telemetry monitoring  Overall, pt's rehab potential and prognosis to return to PLOF is guarded as impacted by objective findings, warranting pt to receive further skilled PT interventions to address identified impairments, activity limitation(s), and participation restriction(s)  Pt to benefit from continued PT tx to address deficits as defined above and maximize level of functional independent mobility  From PT/mobility standpoint, recommendation at time of d/c would be post acute rehabilitation services pending progress in order to facilitate return to PLOF  Barriers to Discharge: Other (Comment) (pt resides at [facility] [place] [facility])     PT Discharge Recommendation: Post acute rehabilitation services    See flowsheet documentation for full assessment  2/25/2023 1425 by Tito Gallo PT  Note: Prognosis: Guarded  Problem List: Decreased strength, Decreased endurance, Impaired balance, Decreased mobility, Decreased cognition, Pain  Assessment: Pt is 80 y o  female seen for PT evaluation on 2/25/2023 s/p admit [address] on 2/24/2023 w/ New onset atrial fibrillation ([address] )  PT was consulted to assess pt's functional mobility and d/c needs  Order placed for PT eval and tx  PTA, pt resides at [facility], ambulates with walker; pt poor historian, home set up/ PLOF info obtained from previous therapy encounters  At time of eval, pt requiring mod A x2 for transfers and lateral side steps at EOB, max A for supine to sit transfer   Upon evaluation, pt presenting with impaired functional mobility d/t decreased strength, decreased endurance, impaired balance, decreased mobility, decreased cognition, pain and activity intolerance  Pertinent PMHx and current co-morbidities affecting pt's physical performance at time of assessment include: new onset A fib, HTN, h/o CVA, mild neurocognitive disorder, generalized weakness, RSV, hypokalemia, hearing loss, gait abnormality, spinal stenosis, unspecified mood disorder, metabolic encephalopathy, h/o SI, synope  Personal factors affecting pt at time of eval include: ambulating w/ assistive device, inability to ambulate household distances and decreased cognition  The following objective measures performed on IE also reveal limitations: Barthel Index: 20/100, Modified Cherry: 5 (severe disability) and AM-PAC 6-Clicks: 9/99  Pt's clinical presentation is currently unstable/unpredictable seen in pt's presentation of advanced age, abnormal lab value(s), need for input for task focus and mobility technique, ongoing medical assessment and on telemetry monitoring  Overall, pt's rehab potential and prognosis to return to PLOF is guarded as impacted by objective findings, warranting pt to receive further skilled PT interventions to address identified impairments, activity limitation(s), and participation restriction(s)  Pt to benefit from continued PT tx to address deficits as defined above and maximize level of functional independent mobility  From PT/mobility standpoint, recommendation at time of d/c would be post acute rehabilitation services pending progress in order to facilitate return to PLOF  Barriers to Discharge: Other (Comment) (pt resides at [facility])     PT Discharge Recommendation: Post acute rehabilitation services    See flowsheet documentation for full assessment

## 2023-02-25 NOTE — ASSESSMENT & PLAN NOTE
In the setting of elevated white blood cell count  Possibly due to infectious process  Treatment per primary medical team

## 2023-02-25 NOTE — ED NOTES
On recheck of her blood sugar it was only 63; pt given apple juice and apple sauce     Margarita Wiley, JULIETA  02/25/23 [id number]

## 2023-02-25 NOTE — ASSESSMENT & PLAN NOTE
Transient atrial fibrillation which is new onset  Patient received 1 dose of IV Cardizem 15 mg in the ED  Likely precipitated by infectious process in the setting of elevated WBC and generalized weakness   Patient is positive for RSV    however this may be residual from her recent infection/treatment 2/13/2023   Possible UTI or other infectious process   Chest x-ray negative  Converted to sinus rhythm  Rate controlled  Agree with Lopressor as needed  Although [id number] score is high at 6, patient only had transient atrial fibrillation in the setting of infectious process and has a high risk of falling  She has now converted to sinus rhythm  If there is recurrence of atrial fibrillation we will reconsider anticoagulation therapy  At this time aspirin will be continued  We will arrange outpatient monitoring and if there is recurrence of atrial fibrillation, will consider placing patient on full anticoagulation    We will arrange for outpatient testing and follow-up

## 2023-02-26 LAB
BASOPHILS # BLD AUTO: 0.04 THOUSANDS/ÂΜL (ref 0–0.1)
BASOPHILS NFR BLD AUTO: 0 % (ref 0–1)
EOSINOPHIL # BLD AUTO: 0.25 THOUSAND/ÂΜL (ref 0–0.61)
EOSINOPHIL NFR BLD AUTO: 2 % (ref 0–6)
ERYTHROCYTE [DISTWIDTH] IN BLOOD BY AUTOMATED COUNT: 12.8 % (ref 11.6–15.1)
HCT VFR BLD AUTO: 43.3 % (ref 34.8–46.1)
HGB BLD-MCNC: 14.7 G/DL (ref 11.5–15.4)
IMM GRANULOCYTES # BLD AUTO: 0.05 THOUSAND/UL (ref 0–0.2)
IMM GRANULOCYTES NFR BLD AUTO: 0 % (ref 0–2)
LYMPHOCYTES # BLD AUTO: 2.31 THOUSANDS/ÂΜL (ref 0.6–4.47)
LYMPHOCYTES NFR BLD AUTO: 20 % (ref 14–44)
MCH RBC QN AUTO: 30.6 PG (ref 26.8–34.3)
MCHC RBC AUTO-ENTMCNC: 33.9 G/DL (ref 31.4–37.4)
MCV RBC AUTO: 90 FL (ref 82–98)
MONOCYTES # BLD AUTO: 1.06 THOUSAND/ÂΜL (ref 0.17–1.22)
MONOCYTES NFR BLD AUTO: 9 % (ref 4–12)
NEUTROPHILS # BLD AUTO: 8.02 THOUSANDS/ÂΜL (ref 1.85–7.62)
NEUTS SEG NFR BLD AUTO: 69 % (ref 43–75)
NRBC BLD AUTO-RTO: 0 /100 WBCS
PLATELET # BLD AUTO: 187 THOUSANDS/UL (ref 149–390)
PMV BLD AUTO: 10.1 FL (ref 8.9–12.7)
RBC # BLD AUTO: 4.81 MILLION/UL (ref 3.81–5.12)
WBC # BLD AUTO: 11.73 THOUSAND/UL (ref 4.31–10.16)

## 2023-02-26 RX ADMIN — ASPIRIN 81 MG CHEWABLE TABLET 81 MG: 81 TABLET CHEWABLE at 08:42

## 2023-02-26 RX ADMIN — DOCUSATE SODIUM 100 MG: 100 CAPSULE, LIQUID FILLED ORAL at 08:42

## 2023-02-26 RX ADMIN — HYDROCHLOROTHIAZIDE 25 MG: 25 TABLET ORAL at 08:42

## 2023-02-26 RX ADMIN — ATORVASTATIN CALCIUM 40 MG: 40 TABLET, FILM COATED ORAL at 17:09

## 2023-02-26 RX ADMIN — ENOXAPARIN SODIUM 30 MG: 100 INJECTION SUBCUTANEOUS at 08:42

## 2023-02-26 RX ADMIN — CEFAZOLIN SODIUM 2000 MG: 2 SOLUTION INTRAVENOUS at 05:10

## 2023-02-26 RX ADMIN — DOCUSATE SODIUM 100 MG: 100 CAPSULE, LIQUID FILLED ORAL at 17:09

## 2023-02-26 NOTE — PLAN OF CARE
Problem: Potential for Falls  Goal: Patient will remain free of falls  Description: INTERVENTIONS:  - Educate patient/family on patient safety including physical limitations  - Instruct patient to call for assistance with activity   - Consult OT/PT to assist with strengthening/mobility   - Keep Call bell within reach  - Keep bed low and locked with side rails adjusted as appropriate  - Keep care items and personal belongings within reach  - Initiate and maintain comfort rounds  - Make Fall Risk Sign visible to staff  - Offer Toileting every 2 Hours, in advance of need  - Initiate/Maintain bed alarm  - Obtain necessary fall risk management equipment:   - Apply yellow socks and bracelet for high fall risk patients  - Consider moving patient to room near nurses station  Outcome: Progressing     Problem: Prexisting or High Potential for Compromised Skin Integrity  Goal: Skin integrity is maintained or improved  Description: INTERVENTIONS:  - Identify patients at risk for skin breakdown  - Assess and monitor skin integrity  - Assess and monitor nutrition and hydration status  - Monitor labs   - Assess for incontinence   - Turn and reposition patient  - Assist with mobility/ambulation  - Relieve pressure over bony prominences  - Avoid friction and shearing  - Provide appropriate hygiene as needed including keeping skin clean and dry  - Evaluate need for skin moisturizer/barrier cream  - Collaborate with interdisciplinary team   - Patient/family teaching  - Consider wound care consult   Outcome: Progressing     Problem: MOBILITY - ADULT  Goal: Maintain or return to baseline ADL function  Description: INTERVENTIONS:  -  Assess patient's ability to carry out ADLs; assess patient's baseline for ADL function and identify physical deficits which impact ability to perform ADLs (bathing, care of mouth/teeth, toileting, grooming, dressing, etc )  - Assess/evaluate cause of self-care deficits   - Assess range of motion  - Assess patient's mobility; develop plan if impaired  - Assess patient's need for assistive devices and provide as appropriate  - Encourage maximum independence but intervene and supervise when necessary  - Involve family in performance of ADLs  - Assess for home care needs following discharge   - Consider OT consult to assist with ADL evaluation and planning for discharge  - Provide patient education as appropriate  Outcome: Progressing  Goal: Maintains/Returns to pre admission functional level  Description: INTERVENTIONS:  - Perform BMAT or MOVE assessment daily    - Set and communicate daily mobility goal to care team and patient/family/caregiver  - Collaborate with rehabilitation services on mobility goals if consulted  - Perform Range of Motion 3 times a day  - Reposition patient every 2 hours    - Dangle patient 3 times a day  - Stand patient 3 times a day  - Ambulate patient 3 times a day  - Out of bed to chair 3 times a day   - Out of bed for meals 3 times a day  - Out of bed for toileting  - Record patient progress and toleration of activity level   Outcome: Progressing     Problem: DISCHARGE PLANNING  Goal: Discharge to home or other facility with appropriate resources  Description: INTERVENTIONS:  - Identify barriers to discharge w/patient and caregiver  - Arrange for needed discharge resources and transportation as appropriate  - Identify discharge learning needs (meds, wound care, etc )  - Refer to Case Management Department for coordinating discharge planning if the patient needs post-hospital services based on physician/advanced practitioner order or complex needs related to functional status, cognitive ability, or social support system  Outcome: Progressing     Problem: Knowledge Deficit  Goal: Patient/family/caregiver demonstrates understanding of disease process, treatment plan, medications, and discharge instructions  Description: Complete learning assessment and assess knowledge base   Interventions:  - Provide teaching at level of understanding  - Provide teaching via preferred learning methods  Outcome: Progressing     Problem: CARDIOVASCULAR - ADULT  Goal: Maintains optimal cardiac output and hemodynamic stability  Description: INTERVENTIONS:  - Monitor I/O, vital signs and rhythm  - Monitor for S/S and trends of decreased cardiac output  - Administer and titrate ordered vasoactive medications to optimize hemodynamic stability  - Assess quality of pulses, skin color and temperature  - Assess for signs of decreased coronary artery perfusion  - Instruct patient to report change in severity of symptoms  Outcome: Progressing  Goal: Absence of cardiac dysrhythmias or at baseline rhythm  Description: INTERVENTIONS:  - Continuous cardiac monitoring, vital signs, obtain 12 lead EKG if ordered  - Administer antiarrhythmic and heart rate control medications as ordered  - Monitor electrolytes and administer replacement therapy as ordered  Outcome: Progressing

## 2023-02-26 NOTE — ASSESSMENT & PLAN NOTE
· Daughter reports patient has remained in bed since recent hospital stay  · Consult PT/OT-short-term rehab

## 2023-02-26 NOTE — QUICK NOTE
Nursing contacted me that patient had 1 of 2 positive blood cultures for gram positive cocci in clusters   Patient has a known allergy to vancomycin so order was placed for Ancef 2 grams IV Q8 until further results are available

## 2023-02-26 NOTE — ASSESSMENT & PLAN NOTE
· Patient noted to have atrial fibrillation with rapid ventricular response in the emergency room, she is status post treatment with 15 mg of IV Cardizem x1 dose in the ED  · She is now in NSR; can monitor off rate or rhythm control medications   · Echocardiogram from 11/2022: EF of 55 to 60%, no gross structural and or valvular abnormality  · Appreciate cards input   · [id number] = 6  · Given risk of falls, debility, advanced age, recommend monitoring off AC   · Started on aspirin 81 mg daily in the interim

## 2023-02-26 NOTE — ASSESSMENT & PLAN NOTE
· 1 of 2 blood cultures were obtained and showed gram-positive cocci, was initially given IV antibiotics  · Identification panel showed staph, will DC antibiotics given the high likelihood that this result is due to contaminant

## 2023-02-26 NOTE — ASSESSMENT & PLAN NOTE
· Patient was recently discharged from this facility after a stay from February 11, 2023 through February 13, 2023 and was treated for RSV  · Suspect that this is just a residual positive test  · Patient is not requiring supplemental oxygen  · Patient was discharged on prednisone -suspect what we are seeing is a steroid-induced leukocytosis  · Supportive therapy only  · WBC today 11 7

## 2023-02-26 NOTE — PROGRESS NOTES
Luna 45  Progress Note - Godfrey Reis 1/28/1930, 80 y o  female MRN: [id number]  Unit/Bed#: -01 Encounter: [id number]  Primary Care Provider: Bettnia Bradshaw MD   Date and time admitted to hospital: 2/24/2023 11:31 AM    * New onset atrial fibrillation [facility])  Assessment & Plan  · Patient noted to have atrial fibrillation with rapid ventricular response in the emergency room, she is status post treatment with 15 mg of IV Cardizem x1 dose in the ED  · She is now in NSR; can monitor off rate or rhythm control medications   · Echocardiogram from 11/2022: EF of 55 to 60%, no gross structural and or valvular abnormality  · Appreciate cards input   · [id number] = 6  · Given risk of falls, debility, advanced age, recommend monitoring off AC   · Started on aspirin 81 mg daily in the interim    RSV (acute bronchiolitis due to respiratory syncytial virus)  Assessment & Plan  · Patient was recently discharged from this facility after a stay from February 11, 2023 through February 13, 2023 and was treated for RSV  · Suspect that this is just a residual positive test  · Patient is not requiring supplemental oxygen  · Patient was discharged on prednisone -suspect what we are seeing is a steroid-induced leukocytosis  · Supportive therapy only  · WBC today 11 7    Positive blood culture  Assessment & Plan  · 1 of 2 blood cultures were obtained and showed gram-positive cocci, was initially given IV antibiotics  · Identification panel showed staph, will DC antibiotics given the high likelihood that this result is due to contaminant    Hyponatremia  Assessment & Plan  Possible etiology is due to poor intake, A/A/P  on admission, improved to 136 with IV fluids    Generalized weakness  Assessment & Plan  · Daughter reports patient has remained in bed since recent hospital stay  · Consult PT/OT-short-term rehab    Mild neurocognitive disorder  Assessment & Plan  · At baseline, continue supportive therapy    History of CVA (cerebrovascular accident)  Assessment & Plan  · Continue aspirin, and Lipitor for secondary prevention    Essential hypertension  Assessment & Plan  · Continue home hydrochlorothiazide dosing with hold parameters        VTE Pharmacologic Prophylaxis:   Moderate Risk (Score 3-4) - Pharmacological DVT Prophylaxis Ordered: enoxaparin (Lovenox)  Patient Centered Rounds: I performed bedside rounds with nursing staff today  Discussions with Specialists or Other Care Team Provider: Eduard    Education and Discussions with Family / Patient: Patient declined call to   Total Time Spent on Date of Encounter in care of patient: 35 minutes This time was spent on one or more of the following: performing physical exam; counseling and coordination of care; obtaining or reviewing history; documenting in the medical record; reviewing/ordering tests, medications or procedures; communicating with other healthcare professionals and discussing with patient's family/caregivers  Current Length of Stay: 1 day(s)  Current Patient Status: Inpatient   Certification Statement: The patient will continue to require additional inpatient hospital stay due to Awaiting placement  Discharge Plan: Anticipate discharge tomorrow to rehab facility  Code Status: Level 3 - DNAR and DNI    Subjective:   Patient reports feeling significantly improved  She denies shortness of breath, chest pain/pressure, lightheadedness, palpitations, or nausea  Objective:     Vitals:   Temp (24hrs), Av 8 °F (36 6 °C), Min:97 4 °F (36 3 °C), Max:98 3 °F (36 8 °C)    Temp:  [97 4 °F (36 3 °C)-98 3 °F (36 8 °C)] 97 4 °F (36 3 °C)  HR:  [58-77] 58  Resp:  [18-20] 18  BP: (115-122)/(70-75) 121/71  SpO2:  [93 %-95 %] 95 %  Body mass index is 22 42 kg/m²  Input and Output Summary (last 24 hours):      Intake/Output Summary (Last 24 hours) at 2023 1111  Last data filed at 2023 0850  Gross per 24 hour   Intake 540 ml   Output --   Net 540 ml       Physical Exam:   Physical Exam  Vitals and nursing note reviewed  Constitutional:       Appearance: She is normal weight  HENT:      Head: Normocephalic  Nose: Nose normal       Mouth/Throat:      Mouth: Mucous membranes are moist       Pharynx: Oropharynx is clear  Eyes:      General: No scleral icterus  Conjunctiva/sclera: Conjunctivae normal       Pupils: Pupils are equal, round, and reactive to light  Cardiovascular:      Rate and Rhythm: Normal rate and regular rhythm  Heart sounds: No murmur heard  No friction rub  No gallop  Pulmonary:      Effort: Pulmonary effort is normal  No respiratory distress  Breath sounds: Normal breath sounds  No stridor  No wheezing, rhonchi or rales  Abdominal:      General: Abdomen is flat  Palpations: Abdomen is soft  Musculoskeletal:         General: Normal range of motion  Cervical back: Normal range of motion and neck supple  Right lower leg: No edema  Left lower leg: No edema  Lymphadenopathy:      Cervical: No cervical adenopathy  Skin:     General: Skin is warm  Coloration: Skin is not jaundiced or pale  Findings: No bruising, erythema or lesion  Neurological:      General: No focal deficit present  Mental Status: She is alert and oriented to person, place, and time  Mental status is at baseline  Cranial Nerves: No cranial nerve deficit  Motor: No weakness  Psychiatric:         Mood and Affect: Mood normal          Behavior: Behavior normal          Thought Content:  Thought content normal           Additional Data:     Labs:  Results from last 7 days   Lab Units 02/26/23  0443   WBC Thousand/uL 11 73*   HEMOGLOBIN g/dL 14 7   HEMATOCRIT % 43 3   PLATELETS Thousands/uL 187   NEUTROS PCT % 69   LYMPHS PCT % 20   MONOS PCT % 9   EOS PCT % 2     Results from last 7 days   Lab Units 02/25/23  0543   SODIUM mmol/L 136   POTASSIUM mmol/L 3 4*   CHLORIDE mmol/L 99   CO2 mmol/L 22   BUN mg/dL 22   CREATININE mg/dL 0 78   ANION GAP mmol/L 15*   CALCIUM mg/dL 8 8   ALBUMIN g/dL 3 4*   TOTAL BILIRUBIN mg/dL 1 51*   ALK PHOS U/L 67   ALT U/L 16   AST U/L 26   GLUCOSE RANDOM mg/dL 63*         Results from last 7 days   Lab Units 02/25/23  0658 02/25/23  0631   POC GLUCOSE mg/dl 72 63*               Lines/Drains:  Invasive Devices     Peripheral Intravenous Line  Duration           Peripheral IV 02/25/23 Right;Ventral (anterior) Hand 1 day                      Imaging: Reviewed radiology reports from this admission including: chest xray    Recent Cultures (last 7 days):   Results from last 7 days   Lab Units 02/24/23  1747   BLOOD CULTURE  Staphylococcus coagulase negative*  No Growth at 24 hrs  GRAM STAIN RESULT  Gram positive cocci in clusters*       Last 24 Hours Medication List:   Current Facility-Administered Medications   Medication Dose Route Frequency Provider Last Rate   • acetaminophen  650 mg Oral Q6H PRN Mary Barragan MD     • aspirin  81 mg Oral Daily Mary Barragan MD     • atorvastatin  40 mg Oral QPM Mary Barragan MD     • docusate sodium  100 mg Oral BID Mary Barragan MD     • enoxaparin  30 mg Subcutaneous Daily Mary Barragan MD     • hydrochlorothiazide  25 mg Oral Daily Mary Barragan MD     • metoprolol  5 mg Intravenous Q6H PRN Mary Barragan MD     • ondansetron  4 mg Intravenous Q6H PRN Mary Barragan MD          Today, Patient Was Seen By: Guille Dickerson PA-C    **Please Note: This note may have been constructed using a voice recognition system  **

## 2023-02-27 LAB
ALBUMIN SERPL BCP-MCNC: 3.4 G/DL (ref 3.5–5)
ALP SERPL-CCNC: 72 U/L (ref 34–104)
ALT SERPL W P-5'-P-CCNC: 9 U/L (ref 7–52)
ANION GAP SERPL CALCULATED.3IONS-SCNC: 8 MMOL/L (ref 4–13)
AST SERPL W P-5'-P-CCNC: 26 U/L (ref 13–39)
BACTERIA BLD CULT: ABNORMAL
BILIRUB SERPL-MCNC: 0.86 MG/DL (ref 0.2–1)
BUN SERPL-MCNC: 18 MG/DL (ref 5–25)
CALCIUM ALBUM COR SERPL-MCNC: 9.3 MG/DL (ref 8.3–10.1)
CALCIUM SERPL-MCNC: 8.8 MG/DL (ref 8.4–10.2)
CHLORIDE SERPL-SCNC: 94 MMOL/L (ref 96–108)
CO2 SERPL-SCNC: 28 MMOL/L (ref 21–32)
CREAT SERPL-MCNC: 0.9 MG/DL (ref 0.6–1.3)
ERYTHROCYTE [DISTWIDTH] IN BLOOD BY AUTOMATED COUNT: 12.9 % (ref 11.6–15.1)
GFR SERPL CREATININE-BSD FRML MDRD: 55 ML/MIN/1.73SQ M
GLUCOSE SERPL-MCNC: 94 MG/DL (ref 65–140)
GRAM STN SPEC: ABNORMAL
HCT VFR BLD AUTO: 45.1 % (ref 34.8–46.1)
HGB BLD-MCNC: 15.4 G/DL (ref 11.5–15.4)
MAGNESIUM SERPL-MCNC: 1.6 MG/DL (ref 1.9–2.7)
MCH RBC QN AUTO: 30.8 PG (ref 26.8–34.3)
MCHC RBC AUTO-ENTMCNC: 34.1 G/DL (ref 31.4–37.4)
MCV RBC AUTO: 90 FL (ref 82–98)
MRSA NOSE QL CULT: NORMAL
PLATELET # BLD AUTO: 187 THOUSANDS/UL (ref 149–390)
PMV BLD AUTO: 10 FL (ref 8.9–12.7)
POTASSIUM SERPL-SCNC: 3.5 MMOL/L (ref 3.5–5.3)
PROT SERPL-MCNC: 6.3 G/DL (ref 6.4–8.4)
RBC # BLD AUTO: 5 MILLION/UL (ref 3.81–5.12)
S AUREUS+CONS DNA BLD POS NAA+NON-PROBE: DETECTED
SODIUM SERPL-SCNC: 130 MMOL/L (ref 135–147)
WBC # BLD AUTO: 12.26 THOUSAND/UL (ref 4.31–10.16)

## 2023-02-27 RX ORDER — MAGNESIUM SULFATE HEPTAHYDRATE 40 MG/ML
2 INJECTION, SOLUTION INTRAVENOUS ONCE
Status: COMPLETED | OUTPATIENT
Start: 2023-02-27 | End: 2023-02-27

## 2023-02-27 RX ORDER — SODIUM CHLORIDE 9 MG/ML
75 INJECTION, SOLUTION INTRAVENOUS CONTINUOUS
Status: DISCONTINUED | OUTPATIENT
Start: 2023-02-27 | End: 2023-02-28 | Stop reason: HOSPADM

## 2023-02-27 RX ADMIN — DOCUSATE SODIUM 100 MG: 100 CAPSULE, LIQUID FILLED ORAL at 17:09

## 2023-02-27 RX ADMIN — HYDROCHLOROTHIAZIDE 25 MG: 25 TABLET ORAL at 08:36

## 2023-02-27 RX ADMIN — DOCUSATE SODIUM 100 MG: 100 CAPSULE, LIQUID FILLED ORAL at 08:36

## 2023-02-27 RX ADMIN — ACETAMINOPHEN 650 MG: 325 TABLET ORAL at 08:36

## 2023-02-27 RX ADMIN — MAGNESIUM SULFATE HEPTAHYDRATE 2 G: 40 INJECTION, SOLUTION INTRAVENOUS at 14:44

## 2023-02-27 RX ADMIN — ENOXAPARIN SODIUM 30 MG: 100 INJECTION SUBCUTANEOUS at 08:36

## 2023-02-27 RX ADMIN — SODIUM CHLORIDE 75 ML/HR: 0.9 INJECTION, SOLUTION INTRAVENOUS at 14:44

## 2023-02-27 RX ADMIN — ATORVASTATIN CALCIUM 40 MG: 40 TABLET, FILM COATED ORAL at 17:09

## 2023-02-27 RX ADMIN — ASPIRIN 81 MG CHEWABLE TABLET 81 MG: 81 TABLET CHEWABLE at 08:36

## 2023-02-27 NOTE — PLAN OF CARE
Problem: PHYSICAL THERAPY ADULT  Goal: Performs mobility at highest level of function for planned discharge setting  See evaluation for individualized goals  Description: Treatment/Interventions: Functional transfer training, LE strengthening/ROM, Therapeutic exercise, Endurance training, Patient/family training, Cognitive reorientation, Equipment eval/education, Bed mobility, Gait training, Spoke to nursing, OT, Spoke to case management  Equipment Recommended:  (TBD)       See flowsheet documentation for full assessment, interventions and recommendations  Outcome: Not Progressing  Note: Prognosis: Fair  Problem List: Decreased strength, Decreased endurance, Impaired balance, Decreased mobility, Decreased coordination, Decreased cognition, Pain, Impaired judgement  Assessment: Pt seen for PT treatment session this date with interventions consisting of Therapeutic exercise to improve endurance and stability consisting of: AROM 20 reps B LE in sitting position and therapeutic activity to reduce fall risk consisting of training: sit<>stand transfers, static standing tolerance for <1 minutes w/ B UE support, vc and tactile cues for static sitting posture faciliation, vc and tactile cues for static standing posture faciliation and compensatory strategies for pain modulation  Pt agreeable to PT treatment session upon arrival, pt found seated OOB in recliner, A&O x 2  In comparison to previous session, pt with no improvements as evidenced by inability to negotiate steps,pain  Post session: pt returned back to recliner, chair alarm engaged, all needs in reach and RN notified of session findings/recommendations  Continue to recommend post acute rehabilitation services at time of d/c in order to maximize pt's functional independence and safety w/ mobility   Pt continues to be functioning below baseline level, and remains limited 2* factors listed above and including weakness, pain,decreased endurance, activity intolerance, immobility at an increased risk for medical complications  PT will continue to see pt during current hospitalization in order to address the deficits listed above and provide interventions consistent w/ POC in effort to achieve STGs  Barriers to Discharge: Other (Comment) (pt resides at [facility])     PT Discharge Recommendation: Post acute rehabilitation services (maintained recommendation)    See flowsheet documentation for full assessment

## 2023-02-27 NOTE — PLAN OF CARE
Problem: MOBILITY - ADULT  Goal: Maintain or return to baseline ADL function  Description: INTERVENTIONS:  -  Assess patient's ability to carry out ADLs; assess patient's baseline for ADL function and identify physical deficits which impact ability to perform ADLs (bathing, care of mouth/teeth, toileting, grooming, dressing, etc )  - Assess/evaluate cause of self-care deficits   - Assess range of motion  - Assess patient's mobility; develop plan if impaired  - Assess patient's need for assistive devices and provide as appropriate  - Encourage maximum independence but intervene and supervise when necessary  - Involve family in performance of ADLs  - Assess for home care needs following discharge   - Consider OT consult to assist with ADL evaluation and planning for discharge  - Provide patient education as appropriate  2/27/2023 0823 by Georgia Madden RN  Outcome: Progressing  2/27/2023 0823 by Georgia Madden RN  Outcome: Progressing     Problem: CARDIOVASCULAR - ADULT  Goal: Maintains optimal cardiac output and hemodynamic stability  Description: INTERVENTIONS:  - Monitor I/O, vital signs and rhythm  - Monitor for S/S and trends of decreased cardiac output  - Administer and titrate ordered vasoactive medications to optimize hemodynamic stability  - Assess quality of pulses, skin color and temperature  - Assess for signs of decreased coronary artery perfusion  - Instruct patient to report change in severity of symptoms  2/27/2023 0823 by Georgia Madden RN  Outcome: Progressing  2/27/2023 0823 by Georgia Madden RN  Outcome: Progressing

## 2023-02-27 NOTE — CASE MANAGEMENT
Case Management Discharge Planning Note    Patient name Ngoc Morfin  Location /-12 MRN [id number]  : 1930 Date 2023       Current Admission Date: 2023  Current Admission Diagnosis:New onset atrial fibrillation [facility])   Patient Active Problem List    Diagnosis Date Noted   • Hypokalemia 2023   • New onset atrial fibrillation ([facility] [address] ) 2023   • Hypomagnesemia 2023   • RSV (acute bronchiolitis due to respiratory syncytial virus) 2023   • Acute respiratory failure with hypoxia ([facility] [address] ) 2023   • Syncope 2022   • Abnormal CT scan 2022   • Positive blood culture 10/12/2022   • Constipation 10/11/2022   • Gastric distention 10/11/2022   • TANNER (acute kidney injury) ([facility] [address] ) 10/11/2022   • Generalized weakness    • Metabolic encephalopathy    • Hyponatremia 2022   • Current moderate episode of major depressive disorder without prior episode ([facility] [address] ) 2020   • History of suicidal ideation 2020   • History of CVA (cerebrovascular accident) 2020   • Right ankle sprain 2020   • Venous insufficiency of both lower extremities 2020   • Unspecified mood (affective) disorder, r/o major depressive disorder (single episode) 2020   • Mild neurocognitive disorder 2020   • Benign paroxysmal positional vertigo 2019   • Urinary incontinence 2019   • Bilateral lower extremity edema 2019   • Essential hypertension 2019   • Mediastinal mass 2019   • Spinal stenosis at L4-L5 level 2019   • Gait abnormality 2015   • Hearing loss 2013   • Osteoarthritis 2012      LOS (days): 2  Geometric Mean LOS (GMLOS) (days): 2 30  Days to GMLOS:0 3     OBJECTIVE:  Risk of Unplanned Readmission Score: 16 35         Current admission status: Inpatient   Preferred Pharmacy:   [address] [place] [address]  Phone: [phone] Fax: [address], [address] [address]  Phone: [phone] Fax: [phone]    Primary Care Provider: Robert Garcia MD    Primary Insurance: MEDICARE  Secondary Insurance: [facility] HEALTH OPTIONS PROGRAM    DISCHARGE DETAILS:    Discharge planning discussed with[de-identified] Jerald reyna at 16:12pm and  Emmanuel Burrell at 16:31 pm and Paulie Mackey at [address] of Choice: Yes  Comments - Freedom of Choice: pt is active with [facility] contacted family/caregiver?: Yes             Contacts  Patient Contacts: Dorothea Jacob  Relationship to Patient[de-identified] Family (Daughter)  Contact Method: Phone  Phone Number: [phone]  Reason/Outcome: Discharge [address]         Is the patient interested in [facility] at discharge?: Yes  [address] requested[de-identified] Nursing, Occupational Therapy, Physical [address] Name[de-identified] [facility] Provider[de-identified] PCP  Home Health Services Needed[de-identified] Strengthening/Theraputic Exercises to Improve Function, Other (comment) (cardiac assessment, vs, review medications)  Homebound Criteria Met[de-identified] Requires the Assistance of Another Person for Safe Ambulation or to Leave the Home, Uses an Assist Device (i e  cane, walker, etc)  Supporting Clincal Findings[de-identified] Limited Endurance    DME Referral Provided  Referral made for DME?: No    Other Referral/Resources/Interventions Provided:  Interventions: Assisted Living, [facility]  Referral Comments: return to [facility]- family declined rehab- Trisha Robbins is aware- pt is active with [facility]-    Would you like to participate in our [address] service program?  : No - Declined    Treatment Team Recommendation: [facility] ([address] follow up- family)

## 2023-02-27 NOTE — ASSESSMENT & PLAN NOTE
· Daughter reports patient has remained in bed since recent hospital stay  · PT/OT recommended-short-term rehab  · Family wanting patient to return to [facility]- facility okay with patient returning

## 2023-02-27 NOTE — CASE MANAGEMENT
Case Management Progress Note    Patient name Ashleigh Thomas  Location /-01 MRN [id number]  : 1930 Date 2023       LOS (days): 2  Geometric Mean LOS (GMLOS) (days): 2 30  Days to GMLOS:0 4        OBJECTIVE:        Current admission status: Inpatient  Preferred Pharmacy:   [address] [place] [id number]  Phone: [phone] Fax: [address], [address] [facility] [address] [place] [address]  Phone: [phone] Fax: [phone]    Primary Care Provider: Marjorie Fuller MD    Primary Insurance: MEDICARE  Secondary Insurance: [facility] HEALTH OPTIONS PROGRAM    PROGRESS NOTE:    Call to Honey at [facility] [phone] and discussed discharge planning  Honey wants patient to return to facility at discharge  Patient is current with [facility] referral made for [facility] via Ari[facility]  Met with patient at bedside to discuss discharge planning and patient reports she doesn't feel well  Updated AP Avila delaney and patient will not discharge today tentative discharge tomorrow  Melia Andrade and Honey will update dtr   CM department following thru discharge

## 2023-02-27 NOTE — ASSESSMENT & PLAN NOTE
· Patient noted to have atrial fibrillation with RVR in ED, she is status post treatment with 15 mg of IV Cardizem x1 dose in the ED  · She is now in NSR; can monitor off rate or rhythm control medications   · Echocardiogram from 11/2022: EF of 55 to 60%, no gross structural and or valvular abnormality  · Appreciate cards input   · [id number] = 6  · Given risk of falls, debility, advanced age, recommend monitoring off AC   · Started on aspirin 81 mg daily in the interim

## 2023-02-27 NOTE — ASSESSMENT & PLAN NOTE
· 1 of 2 blood cultures: staphylococcus coagulase negative  · Likely a contaminate- IV antibiotics discontinued 2/26/23

## 2023-02-27 NOTE — PROGRESS NOTES
[address]  Progress Note - Aurora Reis 1/28/1930, 80 y o  female MRN: [id number]  Unit/Bed#: -01 Encounter: [id number]  Primary Care Provider: Chelsie Lilly MD   Date and time admitted to hospital: 2/24/2023 11:31 AM    * New onset atrial fibrillation [facility])  Assessment & Plan  · Patient noted to have atrial fibrillation with RVR in ED, she is status post treatment with 15 mg of IV Cardizem x1 dose in the ED  · She is now in NSR; can monitor off rate or rhythm control medications   · Echocardiogram from 11/2022: EF of 55 to 60%, no gross structural and or valvular abnormality  · Appreciate cards input   · [id number] = 6  · Given risk of falls, debility, advanced age, recommend monitoring off AC   · Started on aspirin 81 mg daily in the interim    RSV (acute bronchiolitis due to respiratory syncytial virus)  Assessment & Plan  · Patient was recently discharged from this facility after a stay from February 11, 2023 through February 13, 2023 and was treated for RSV  · Suspect that this is just a residual positive test  · Patient is not requiring supplemental oxygen  · Patient was discharged on prednisone -suspect steroid-induced leukocytosis  · Supportive therapy only  · CBC pending today  Positive blood culture  Assessment & Plan  · 1 of 2 blood cultures: staphylococcus coagulase negative  · Likely a contaminate- IV antibiotics discontinued 2/26/23    Hyponatremia  Assessment & Plan  · POA with  on admission  · Likely due to poor po intake  · Resolved with IV fluids    Generalized weakness  Assessment & Plan  · Daughter reports patient has remained in bed since recent hospital stay  · PT/OT recommended-short-term rehab  · Family wanting patient to return to [facility]- facility okay with patient returning       Mild neurocognitive disorder  Assessment & Plan  · At baseline, continue supportive therapy    History of CVA (cerebrovascular accident)  Assessment & Plan  · Continue aspirin and Lipitor for secondary prevention    Essential hypertension  Assessment & Plan  · Continue home regimen of hydrochlorothiazide with hold parameters        VTE Pharmacologic Prophylaxis:   Moderate Risk (Score 3-4) - Pharmacological DVT Prophylaxis Ordered: enoxaparin (Lovenox)  Patient Centered Rounds: I performed bedside rounds with nursing staff today  Discussions with Specialists or Other Care Team Provider: nursing, CM    Education and Discussions with Family / Patient: Updated  (daughter) via phone  Total Time Spent on Date of Encounter in care of patient: 35 minutes This time was spent on one or more of the following: performing physical exam; counseling and coordination of care; obtaining or reviewing history; documenting in the medical record; reviewing/ordering tests, medications or procedures; communicating with other healthcare professionals and discussing with patient's family/caregivers  Current Length of Stay: 2 day(s)  Current Patient Status: Inpatient   Certification Statement: The patient will continue to require additional inpatient hospital stay due to patient not feeling well today with headache, abdominal pain  Discharge Plan: Anticipate discharge in 24-48 hrs to prior assisted or independent living facility  Code Status: Level 3 - DNAR and DNI    Subjective: The patient was seen and examined  The patient is sitting up in her chair  She reports not feeling well today  She states she has a headache and stomach pain  She denies any nausea/vomiting and tolerated breakfast this morning per nursing  Patient does not have abdominal pain on exam      Objective:     Vitals:   Temp (24hrs), Av 9 °F (36 6 °C), Min:97 6 °F (36 4 °C), Max:98 3 °F (36 8 °C)    Temp:  [97 6 °F (36 4 °C)-98 3 °F (36 8 °C)] 97 6 °F (36 4 °C)  HR:  [68-74] 68  Resp:  [18-20] 18  BP: (105-111)/(65-72) 107/67  SpO2:  [91 %-93 %] 91 %  Body mass index is 22 66 kg/m²  Input and Output Summary (last 24 hours): Intake/Output Summary (Last 24 hours) at 2/27/2023 1022  Last data filed at 2/27/2023 1259  Gross per 24 hour   Intake 480 ml   Output --   Net 480 ml       Physical Exam:   Physical Exam  Vitals and nursing note reviewed  Constitutional:       General: She is awake  Cardiovascular:      Rate and Rhythm: Normal rate and regular rhythm  Pulmonary:      Effort: Pulmonary effort is normal       Breath sounds: Normal breath sounds  Abdominal:      General: There is no distension  Palpations: Abdomen is soft  Tenderness: There is no abdominal tenderness  Skin:     General: Skin is warm and dry  Neurological:      General: No focal deficit present  Mental Status: She is alert and oriented to person, place, and time  Psychiatric:         Attention and Perception: Attention normal          Mood and Affect: Mood normal          Speech: Speech normal          Behavior: Behavior is cooperative  Additional Data:     Labs:  Results from last 7 days   Lab Units 02/26/23  0443   WBC Thousand/uL 11 73*   HEMOGLOBIN g/dL 14 7   HEMATOCRIT % 43 3   PLATELETS Thousands/uL 187   NEUTROS PCT % 69   LYMPHS PCT % 20   MONOS PCT % 9   EOS PCT % 2     Results from last 7 days   Lab Units 02/25/23  0543   SODIUM mmol/L 136   POTASSIUM mmol/L 3 4*   CHLORIDE mmol/L 99   CO2 mmol/L 22   BUN mg/dL 22   CREATININE mg/dL 0 78   ANION GAP mmol/L 15*   CALCIUM mg/dL 8 8   ALBUMIN g/dL 3 4*   TOTAL BILIRUBIN mg/dL 1 51*   ALK PHOS U/L 67   ALT U/L 16   AST U/L 26   GLUCOSE RANDOM mg/dL 63*         Results from last 7 days   Lab Units 02/25/23  0658 02/25/23  0631   POC GLUCOSE mg/dl 72 63*               Lines/Drains:  Invasive Devices     Peripheral Intravenous Line  Duration           Peripheral IV 02/25/23 Right;Ventral (anterior) Hand 2 days                      Imaging: No pertinent imaging reviewed      Recent Cultures (last 7 days):   Results from last 7 days Lab Units 02/24/23  1747   BLOOD CULTURE  Staphylococcus coagulase negative*  No Growth at 48 hrs  GRAM STAIN RESULT  Gram positive cocci in clusters*       Last 24 Hours Medication List:   Current Facility-Administered Medications   Medication Dose Route Frequency Provider Last Rate   • acetaminophen  650 mg Oral Q6H PRN Fonnie MD Sarah     • aspirin  81 mg Oral Daily Fonnie MD Sarah     • atorvastatin  40 mg Oral QPM Fonmeli Smith MD     • docusate sodium  100 mg Oral BID Fonnie MD Sarah     • enoxaparin  30 mg Subcutaneous Daily Fonnie MD Sarah     • hydrochlorothiazide  25 mg Oral Daily Fonnie MD Sarah     • metoprolol  5 mg Intravenous Q6H PRN Fonnifroylan Smith MD     • ondansetron  4 mg Intravenous Q6H PRN Fonnie MD Sarah          Today, Patient Was Seen By: Kristel Claire PA-C    **Please Note: This note may have been constructed using a voice recognition system  **

## 2023-02-27 NOTE — ASSESSMENT & PLAN NOTE
· Patient was recently discharged from this facility after a stay from February 11, 2023 through February 13, 2023 and was treated for RSV  · Suspect that this is just a residual positive test  · Patient is not requiring supplemental oxygen  · Patient was discharged on prednisone -suspect steroid-induced leukocytosis  · Supportive therapy only  · CBC pending today

## 2023-02-27 NOTE — PHYSICAL THERAPY NOTE
Physical Therapy Treatment Session Note    Patient's Name: Gracie Mcgovern    Admitting Diagnosis  Leukocytosis [D72 829]  Weakness [R53 1]  New onset atrial fibrillation (HCC) [I48 91]    Problem List  Patient Active Problem List   Diagnosis    Hearing loss    Gait abnormality    Osteoarthritis    Mediastinal mass    Spinal stenosis at L4-L5 level    Essential hypertension    Bilateral lower extremity edema    Benign paroxysmal positional vertigo    Urinary incontinence    History of CVA (cerebrovascular accident)    Right ankle sprain    Venous insufficiency of both lower extremities    Unspecified mood (affective) disorder, r/o major depressive disorder (single episode)    Mild neurocognitive disorder    Current moderate episode of major depressive disorder without prior episode ([facility] [facility] )    History of suicidal ideation    Generalized weakness    Metabolic encephalopathy    Hyponatremia    Constipation    Gastric distention    TANNER (acute kidney injury) ([facility] )    Positive blood culture    Syncope    Abnormal CT scan    RSV (acute bronchiolitis due to respiratory syncytial virus)    Acute respiratory failure with hypoxia ([facility])    Hypomagnesemia    New onset atrial fibrillation ([facility] )    Hypokalemia       Past Medical History  Past Medical History:   Diagnosis Date    TANNER (acute kidney injury) ([facility] ) 10/11/2022    Arthritis     Cataract     LAST ASSESSED: [id number]    Depression     Dislocation of left shoulder joint     LAST ASSESSSED: [id number]    Essential hypertension 4/3/2019    Gait disorder     LAST ASSESSED: [id number]    Herpes zoster     LAST ASSESSED: 51GLH5138    HL (hearing loss)     Impacted cerumen of both ears     LAST ASSESSED: [id number]    Impacted cerumen of right ear     LAST ASSESSED: 93WLN8198    Lightheadedness     MILD AND PT REFUSES EKG ETC  PT PROMISES TO CALL IF WORSENS   LAST ASSESSED: 98MYB6004    Multiple falls     LAST ASSESSED: 39VCL9657    Near syncope     LAST ASSESSED: 00HAC0977 Postherpetic polyneuropathy     LAST ASSESSED: 66HJD8839       Past Surgical History  Past Surgical History:   Procedure Laterality Date    CATARACT EXTRACTION, BILATERAL      HERNIA REPAIR      TONSILLECTOMY      TUBAL LIGATION          02/27/23 0918   PT Last Visit   PT Visit Date 02/27/23   Note Type   Note Type Treatment   Pain Assessment   Pain Assessment Tool 0-10   Pain Score 3   Pain Location/Orientation Orientation: Mid;Location: Head  (across forehead)   Pain Onset/Description Onset: Ongoing;Frequency: Constant/Continuous; Descriptor: Aching   Effect of Pain on Daily Activities yes   Patient's Stated Pain Goal No pain   Hospital Pain Intervention(s) Repositioned; Emotional support; Environmental changes   Multiple Pain Sites Yes   Pain 2   Pain Score 2 5   Pain Location/Orientation 2 Orientation: Left; Other (Comment)  (last 2 toes)   Pain Onset/Description 2 Onset: Ongoing;Frequency: Constant/Continuous; Descriptor: Sore;Descriptor: Cheryle Lota   Patient's Stated Pain Goal 2 No pain   Hospital Pain Intervention(s) 2 Emotional support; Environmental changes; Elevated   Restrictions/Precautions   Weight Bearing Precautions Per Order No   Other Precautions Cognitive; Chair Alarm; Bed Alarm; Fall Risk;Pain   General   Chart Reviewed Yes   Response to Previous Treatment Patient unable to report, no changes reported from family or staff   Family/Caregiver Present No   Cognition   Overall Cognitive Status Impaired   Arousal/Participation Alert; Cooperative   Attention Attends with cues to redirect   Orientation Level Oriented to person;Oriented to place; Disoriented to time;Disoriented to situation   Memory Decreased recall of recent events   Following Commands Follows one step commands with increased time or repetition   Comments Ada was agreeable to PT treatment session, pleasant     Subjective   Subjective "I was sick"   Bed Mobility   Additional Comments DNT Ada was sitting out of bed on the recliner prior and after session  Transfers   Sit to Stand 3  Moderate assistance   Additional items Assist x 2;Armrests; Increased time required;Verbal cues   Stand to Sit 3  Moderate assistance   Additional items Assist x 2;Armrests; Increased time required;Verbal cues   Stand pivot Unable to assess  (could not safely advance at this time)   Additional Comments Static standing trials x 3 at chair side  Each standing trial complete at 30 seconds respectively  Attempted gait activities with forward step initiation  However could not be translated at this time due to pain severity in left foot and overall fatigue severity  Ambulation/Elevation   Gait pattern Not tested  (attempted, however could not be safely advanced)   Balance   Static Sitting Fair   Dynamic Sitting Fair -   Static Standing Poor   Dynamic Standing Poor   Endurance Deficit   Endurance Deficit Yes   Activity Tolerance   Activity Tolerance Patient limited by fatigue;Patient limited by pain   Medical Staff Made Aware yes, Raina CM   Exercises   Quad Sets Sitting;20 reps;AROM; Bilateral   Heelslides Sitting;20 reps;AROM; Bilateral   Glute Sets Sitting;20 reps;AROM; Bilateral   Hip Abduction Sitting;20 reps;AROM; Bilateral   Hip Adduction Sitting;20 reps;AROM; Bilateral   Ankle Pumps Sitting;20 reps;AROM; Bilateral   Assessment   Prognosis Fair   Problem List Decreased strength;Decreased endurance; Impaired balance;Decreased mobility; Decreased coordination;Decreased cognition;Pain; Impaired judgement   Assessment Pt seen for PT treatment session this date with interventions consisting of Therapeutic exercise to improve endurance and stability consisting of: AROM 20 reps B LE in sitting position and therapeutic activity to reduce fall risk consisting of training: sit<>stand transfers, static standing tolerance for <1 minutes w/ B UE support, vc and tactile cues for static sitting posture faciliation, vc and tactile cues for static standing posture faciliation and compensatory strategies for pain modulation  Pt agreeable to PT treatment session upon arrival, pt found seated OOB in recliner, A&O x 2  In comparison to previous session, pt with no improvements as evidenced by inability to negotiate steps,pain  Post session: pt returned back to recliner, chair alarm engaged, all needs in reach and RN notified of session findings/recommendations  Continue to recommend post acute rehabilitation services at time of d/c in order to maximize pt's functional independence and safety w/ mobility  Pt continues to be functioning below baseline level, and remains limited 2* factors listed above and including weakness, pain,decreased endurance, activity intolerance, immobility at an increased risk for medical complications  PT will continue to see pt during current hospitalization in order to address the deficits listed above and provide interventions consistent w/ POC in effort to achieve STGs  Goals   Patient Goals to have less toe pain   STG Expiration Date 03/07/23   Short Term Goal #1 STGs remain appropriate   PT Treatment Day 1   Plan   Treatment/Interventions Functional transfer training;LE strengthening/ROM; Elevations; Therapeutic exercise; Endurance training;Cognitive reorientation;Patient/family training;Equipment eval/education; Compensatory technique education;Spoke to nursing;Spoke to case management   Progress No functional improvements   PT Frequency 3-5x/wk   Recommendation   PT Discharge Recommendation Post acute rehabilitation services  (maintained recommendation)   Equipment Recommended [facility] Recommended Wheeled walker   Change/add to [place]? No   Additional Comments Upon conclusion, Ada was sitting out of bed on a recliner  All of her needs were within reach and chair alarm was engaged     AM-PAC Basic Mobility Inpatient   Turning in Flat Bed Without Bedrails 2   Lying on Back to Sitting on Edge of Flat Bed Without Bedrails 2   Moving Bed to Chair 2   Standing Up From Chair Using Arms 2   Walk in Room 1   Climb 3-5 Stairs With Railing 1   Basic Mobility Inpatient Raw Score 10   Turning Head Towards Sound 4   Follow Simple Instructions 3   Low Function Basic Mobility Raw Score 17   Low Function Basic Mobility Standardized Score 27 46   Highest Level Of Mobility   -HLM Goal 4: Move to chair/commode   JH-HLM Achieved 4: Move to chair/commode     Treatment Time: 9145-0206    Carlos A Fernandez, PT

## 2023-02-28 VITALS
OXYGEN SATURATION: 91 % | RESPIRATION RATE: 18 BRPM | TEMPERATURE: 97.8 F | DIASTOLIC BLOOD PRESSURE: 71 MMHG | WEIGHT: 124.78 LBS | HEART RATE: 60 BPM | HEIGHT: 62 IN | SYSTOLIC BLOOD PRESSURE: 108 MMHG | BODY MASS INDEX: 22.96 KG/M2

## 2023-02-28 LAB
ANION GAP SERPL CALCULATED.3IONS-SCNC: 6 MMOL/L (ref 4–13)
BUN SERPL-MCNC: 14 MG/DL (ref 5–25)
CALCIUM SERPL-MCNC: 7.9 MG/DL (ref 8.4–10.2)
CHLORIDE SERPL-SCNC: 98 MMOL/L (ref 96–108)
CO2 SERPL-SCNC: 28 MMOL/L (ref 21–32)
CREAT SERPL-MCNC: 0.75 MG/DL (ref 0.6–1.3)
ERYTHROCYTE [DISTWIDTH] IN BLOOD BY AUTOMATED COUNT: 12.9 % (ref 11.6–15.1)
GFR SERPL CREATININE-BSD FRML MDRD: 68 ML/MIN/1.73SQ M
GLUCOSE SERPL-MCNC: 88 MG/DL (ref 65–140)
HCT VFR BLD AUTO: 40.6 % (ref 34.8–46.1)
HGB BLD-MCNC: 13.7 G/DL (ref 11.5–15.4)
MAGNESIUM SERPL-MCNC: 2 MG/DL (ref 1.9–2.7)
MCH RBC QN AUTO: 30.5 PG (ref 26.8–34.3)
MCHC RBC AUTO-ENTMCNC: 33.7 G/DL (ref 31.4–37.4)
MCV RBC AUTO: 90 FL (ref 82–98)
PLATELET # BLD AUTO: 170 THOUSANDS/UL (ref 149–390)
PMV BLD AUTO: 10.6 FL (ref 8.9–12.7)
POTASSIUM SERPL-SCNC: 3.1 MMOL/L (ref 3.5–5.3)
RBC # BLD AUTO: 4.49 MILLION/UL (ref 3.81–5.12)
SODIUM SERPL-SCNC: 132 MMOL/L (ref 135–147)
WBC # BLD AUTO: 9.38 THOUSAND/UL (ref 4.31–10.16)

## 2023-02-28 RX ORDER — POTASSIUM CHLORIDE 20 MEQ/1
40 TABLET, EXTENDED RELEASE ORAL ONCE
Status: COMPLETED | OUTPATIENT
Start: 2023-02-28 | End: 2023-02-28

## 2023-02-28 RX ADMIN — SODIUM CHLORIDE 75 ML/HR: 0.9 INJECTION, SOLUTION INTRAVENOUS at 05:29

## 2023-02-28 RX ADMIN — ENOXAPARIN SODIUM 30 MG: 100 INJECTION SUBCUTANEOUS at 08:39

## 2023-02-28 RX ADMIN — POTASSIUM CHLORIDE 40 MEQ: 1500 TABLET, EXTENDED RELEASE ORAL at 08:39

## 2023-02-28 RX ADMIN — DOCUSATE SODIUM 100 MG: 100 CAPSULE, LIQUID FILLED ORAL at 08:39

## 2023-02-28 RX ADMIN — ASPIRIN 81 MG CHEWABLE TABLET 81 MG: 81 TABLET CHEWABLE at 08:39

## 2023-02-28 NOTE — ASSESSMENT & PLAN NOTE
· Daughter reports patient has remained in bed since recent hospital stay  · PT/OT recommended-short-term rehab  · Family wanting patient to return to [facility]- facility okay with patient returning  · The patient's son will provide transportation back to [facility] today

## 2023-02-28 NOTE — CASE MANAGEMENT
Case Management Discharge Planning Note    Patient name Wang Garber  Location /-21 MRN [id number]  : 1930 Date 2023       Current Admission Date: 2023  Current Admission Diagnosis:New onset atrial fibrillation [facility])   Patient Active Problem List    Diagnosis Date Noted   • Hypokalemia 2023   • New onset atrial fibrillation ([address] ) 2023   • Hypomagnesemia 2023   • RSV (acute bronchiolitis due to respiratory syncytial virus) 2023   • Acute respiratory failure with hypoxia ([address] ) 2023   • Syncope 2022   • Abnormal CT scan 2022   • Positive blood culture 10/12/2022   • Constipation 10/11/2022   • Gastric distention 10/11/2022   • TANNER (acute kidney injury) ([address] ) 10/11/2022   • Generalized weakness    • Metabolic encephalopathy 219   • Hyponatremia 2022   • Current moderate episode of major depressive disorder without prior episode ([address] ) 2020   • History of suicidal ideation 2020   • History of CVA (cerebrovascular accident) 2020   • Right ankle sprain 2020   • Venous insufficiency of both lower extremities 2020   • Unspecified mood (affective) disorder, r/o major depressive disorder (single episode) 2020   • Mild neurocognitive disorder 2020   • Benign paroxysmal positional vertigo 2019   • Urinary incontinence 2019   • Bilateral lower extremity edema 2019   • Essential hypertension 2019   • Mediastinal mass 2019   • Spinal stenosis at L4-L5 level 2019   • Gait abnormality 2015   • Hearing loss 2013   • Osteoarthritis 2012      LOS (days): 3  Geometric Mean LOS (GMLOS) (days): 2 30  Days to GMLOS:-0 5     OBJECTIVE:  Risk of Unplanned Readmission Score: 17 33         Current admission status: Inpatient   Preferred Pharmacy:   [address] [place] [address]  Phone: [phone] Fax: [address], [address] [address]  Phone: [phone] Fax: [phone]    Primary Care Provider: Moiz Weir MD    Primary Insurance: MEDICARE  Secondary Insurance: [facility] HEALTH OPTIONS PROGRAM    DISCHARGE DETAILS:    Discharge planning discussed with[de-identified] Mercy Hinton was called at 10:52 am and cm called Boy ([place]) ans I spoke with Harriet at 10:49 am  Freedom of Choice: Yes  Comments - Freedom of Choice: pt is active with [address]    family declined rehab  CM contacted family/caregiver?: Yes  Were Treatment Team discharge recommendations reviewed with patient/caregiver?: Yes  Did patient/caregiver verbalize understanding of patient care needs?: Yes  Were patient/caregiver advised of the risks associated with not following Treatment Team discharge recommendations?: Yes    Contacts  Patient Contacts: Xuan Little  Relationship to Patient[de-identified] Family (son)  Contact Method: Phone  Phone Number: [phone]  Reason/Outcome: Discharge [address]         Is the patient interested in [address] at discharge?: Yes    DME Referral Provided  Referral made for DME?: No    Other Referral/Resources/Interventions Provided:  Interventions: [facility]  Referral Comments: pt to return to [facility]  family declined rehab- [facility] was made aware of the d/c - avs was faxed to [phone]- av to be faxed to the [facility] [phone] rn was made aware    Would you like to participate in our [address] service program?  : No - Declined    Treatment Team Recommendation: Home with [address], Assisted Living [facility] with [address]  [facility] [address] follow up- family)  Discharge Destination Plan[de-identified] Assisted Living, Home with [facility] with [address]  [facility] [address] follow up)            pt and family are in agreement with the d/c and  D/c plan                    IMM Given (Date):: 02/27/23  IMM Given to[de-identified] Family (reviewed with Noe Min and Imm was mailed)  Family notified[de-identified] son was called   he will transport- rn is in agreement with the mode of transport

## 2023-02-28 NOTE — PLAN OF CARE
Problem: Potential for Falls  Goal: Patient will remain free of falls  Description: INTERVENTIONS:  - Educate patient/family on patient safety including physical limitations  - Instruct patient to call for assistance with activity   - Consult OT/PT to assist with strengthening/mobility   - Keep Call bell within reach  - Keep bed low and locked with side rails adjusted as appropriate  - Keep care items and personal belongings within reach  - Initiate and maintain comfort rounds  - Make Fall Risk Sign visible to staff  - Offer Toileting every 2 Hours, in advance of need  - Initiate/Maintain bed alarm  - Obtain necessary fall risk management equipment:   - Apply yellow socks and bracelet for high fall risk patients  - Consider moving patient to room near nurses station  2/28/2023 1015 by Aracely Laguna RN  Outcome: Adequate for Discharge  2/28/2023 0956 by Aracely Laguna RN  Outcome: Progressing     Problem: Prexisting or High Potential for Compromised Skin Integrity  Goal: Skin integrity is maintained or improved  Description: INTERVENTIONS:  - Identify patients at risk for skin breakdown  - Assess and monitor skin integrity  - Assess and monitor nutrition and hydration status  - Monitor labs   - Assess for incontinence   - Turn and reposition patient  - Assist with mobility/ambulation  - Relieve pressure over bony prominences  - Avoid friction and shearing  - Provide appropriate hygiene as needed including keeping skin clean and dry  - Evaluate need for skin moisturizer/barrier cream  - Collaborate with interdisciplinary team   - Patient/family teaching  - Consider wound care consult   2/28/2023 1015 by Aracely Laguna RN  Outcome: Adequate for Discharge  2/28/2023 0956 by Aracely Laguna RN  Outcome: Progressing     Problem: MOBILITY - ADULT  Goal: Maintain or return to baseline ADL function  Description: INTERVENTIONS:  -  Assess patient's ability to carry out ADLs; assess patient's baseline for ADL function and identify physical deficits which impact ability to perform ADLs (bathing, care of mouth/teeth, toileting, grooming, dressing, etc )  - Assess/evaluate cause of self-care deficits   - Assess range of motion  - Assess patient's mobility; develop plan if impaired  - Assess patient's need for assistive devices and provide as appropriate  - Encourage maximum independence but intervene and supervise when necessary  - Involve family in performance of ADLs  - Assess for home care needs following discharge   - Consider OT consult to assist with ADL evaluation and planning for discharge  - Provide patient education as appropriate  2/28/2023 1015 by Anamika Kamara RN  Outcome: Adequate for Discharge  2/28/2023 0956 by Anamika Kamara RN  Outcome: Progressing  Goal: Maintains/Returns to pre admission functional level  Description: INTERVENTIONS:  - Perform BMAT or MOVE assessment daily    - Set and communicate daily mobility goal to care team and patient/family/caregiver  - Collaborate with rehabilitation services on mobility goals if consulted  - Perform Range of Motion 3 times a day  - Reposition patient every 2 hours    - Dangle patient 3 times a day  - Stand patient 3 times a day  - Ambulate patient 3 times a day  - Out of bed to chair 3 times a day   - Out of bed for meals 3 times a day  - Out of bed for toileting  - Record patient progress and toleration of activity level   2/28/2023 1015 by Anamika Kamara RN  Outcome: Adequate for Discharge  2/28/2023 0956 by Anamika Kamara RN  Outcome: Progressing     Problem: DISCHARGE PLANNING  Goal: Discharge to home or other facility with appropriate resources  Description: INTERVENTIONS:  - Identify barriers to discharge w/patient and caregiver  - Arrange for needed discharge resources and transportation as appropriate  - Identify discharge learning needs (meds, wound care, etc )  - Refer to Case Management Department for coordinating discharge planning if the patient needs post-hospital services based on physician/advanced practitioner order or complex needs related to functional status, cognitive ability, or social support system  2/28/2023 1015 by Laura Vaca RN  Outcome: Adequate for Discharge  2/28/2023 0956 by Laura Vaca RN  Outcome: Progressing     Problem: Knowledge Deficit  Goal: Patient/family/caregiver demonstrates understanding of disease process, treatment plan, medications, and discharge instructions  Description: Complete learning assessment and assess knowledge base    Interventions:  - Provide teaching at level of understanding  - Provide teaching via preferred learning methods  2/28/2023 1015 by Laura Vaca RN  Outcome: Adequate for Discharge  2/28/2023 0956 by Laura Vaca RN  Outcome: Progressing     Problem: CARDIOVASCULAR - ADULT  Goal: Maintains optimal cardiac output and hemodynamic stability  Description: INTERVENTIONS:  - Monitor I/O, vital signs and rhythm  - Monitor for S/S and trends of decreased cardiac output  - Administer and titrate ordered vasoactive medications to optimize hemodynamic stability  - Assess quality of pulses, skin color and temperature  - Assess for signs of decreased coronary artery perfusion  - Instruct patient to report change in severity of symptoms  2/28/2023 1015 by Laura Vaca RN  Outcome: Adequate for Discharge  2/28/2023 0956 by Laura Vaca RN  Outcome: Progressing  Goal: Absence of cardiac dysrhythmias or at baseline rhythm  Description: INTERVENTIONS:  - Continuous cardiac monitoring, vital signs, obtain 12 lead EKG if ordered  - Administer antiarrhythmic and heart rate control medications as ordered  - Monitor electrolytes and administer replacement therapy as ordered  2/28/2023 1015 by Laura Vaca RN  Outcome: Adequate for Discharge  2/28/2023 0956 by Laura Vaca RN  Outcome: Progressing

## 2023-02-28 NOTE — OCCUPATIONAL THERAPY NOTE
02/28/23 1139   OT Last Visit   OT Visit Date 02/28/23   Note Type   Note Type Treatment   Pain Assessment   Pain Assessment Tool 0-10   Pain Score No Pain   Restrictions/Precautions   Weight Bearing Precautions Per Order No   Other Precautions Cognitive; Chair Alarm; Bed Alarm; Fall Risk;Pain   Bed Mobility   Supine to Sit 4  Minimal assistance   Additional items Assist x 1;HOB elevated; Bedrails; Increased time required;Verbal cues;LE management   Additional Comments Pt sat unsupported EOB 1-2 mins with no LOB or dizziness  Transfers   Sit to Stand 4  Minimal assistance   Additional items Assist x 1; Armrests; Increased time required;Verbal cues   Stand to Sit 4  Minimal assistance   Additional items Assist x 1; Armrests; Increased time required;Verbal cues   Stand pivot 3  Moderate assistance   Additional items Assist x 1; Increased time required;Verbal cues   Therapeutic Excerise-Strength   UE Strength Yes   Right Upper Extremity- Strength   R Shoulder Flexion; Extension;Horizontal ABduction  (horiz  add, scapular pro/ret)   R Elbow Elbow flexion;Elbow extension   R Wrist   (wrist rotation)   R Weight/Reps/Sets 1# weight x 15 reps   Left Upper Extremity-Strength   L Weights/Reps/Sets TE same as R UE above   Cognition   Overall Cognitive Status Impaired   Arousal/Participation Alert; Responsive; Cooperative   Attention Attends with cues to redirect   Orientation Level Oriented X4   Memory Decreased recall of recent events   Following Commands Follows one step commands with increased time or repetition   Comments Pt agreeable to OT treatment     Activity Tolerance   Activity Tolerance Patient limited by fatigue   Assessment   Assessment Patient participated in Skilled OT session this date with interventions consisting of functional transfer training, Energy Conservation techniques, therapeutic exercise to: increase functional use of BUEs, increase BUE muscle strength  and increase dynamic sit/ stand balance during functional activity    Patient agreeable to OT treatment session, upon arrival patient was found supine in bed, alert, responsive  and in no apparent distress  Patient transfers supine to sit EOB with Min A x 1  Patient then transfers sit to stand with Min A x 1 and pivots bed to recliner with Mod A x 2 and VCs for safety  Patient then performs UB TE using 1# weight as detailed in flow sheet  Session ended with arrival of lunch and patient seated OOB to chair, all needs met, and call bell within reach  In comparison to previous session, patient with improvements in UB strength, endurance, and increased independence with functional transfers  Patient requiring verbal cues for safety, verbal cues for correct technique, verbal cues for pacing thru activity steps and frequent rest periods  Patient performance  demonstrated good carryover of learned techniques and strategies to facilitate safety during functional tasks  Patient continues to be functioning below baseline level, occupational performance remains limited secondary to factors listed above and increased risk for falls and injury  From OT standpoint, recommendation at time of d/c would be Short Term Rehab  Patient to benefit from continued Occupational Therapy treatment while in the hospital to address deficits as defined above and maximize level of functional independence with ADLs and functional mobility in order to return to [facility]  Plan   Treatment Interventions Functional transfer training;UE strengthening/ROM; Energy conservation   Goal Expiration Date 03/07/23   OT Treatment Day 1   OT Frequency 3-5x/wk   Recommendation   OT Discharge Recommendation Post acute rehabilitation services   Additional Comments 2 The patient's raw score on the AM-PAC Daily Activity Inpatient Short Form is 12  A raw score of less than 19 suggests the patient may benefit from discharge to post-acute rehabilitation services   Please refer to the recommendation of the Occupational Therapist for safe discharge planning     AM-PAC Daily Activity Inpatient   Lower Body Dressing 1   Bathing 2   Toileting 1   Upper Body Dressing 2   Grooming 3   Eating 3   Daily Activity Raw Score 12   Daily Activity Standardized Score (Calc for Raw Score >=11) 30 6   AM-PAC Applied Cognition Inpatient   Following a Speech/Presentation 2   Understanding Ordinary Conversation 2   Taking Medications 1   Remembering Where Things Are Placed or Put Away 1   Remembering List of 4-5 Errands 1   Taking Care of Complicated Tasks 1   Applied Cognition Raw Score 8   Applied Cognition Standardized Score 19 32       Tristan Ortiz

## 2023-02-28 NOTE — DISCHARGE SUMMARY
[address] [address]  Discharge- Evelyn 1/28/1930, 80 y o  female MRN: [id number]  Unit/Bed#: -01 Encounter: [id number]  Primary Care Provider: Marlon Amaya MD   Date and time admitted to hospital: 2/24/2023 11:31 AM    * New onset atrial fibrillation [facility])  Assessment & Plan  · Patient noted to have atrial fibrillation with RVR in ED, she is status post treatment with 15 mg of IV Cardizem x1 dose in the ED  · She is now in NSR; no need for rate or rhythm control medications   · Echocardiogram from 11/2022: EF of 55 to 60%, no gross structural and or valvular abnormality  · Appreciate cards input   · [id number] = 6  · Given risk of falls, debility, advanced age, recommend monitoring off AC   · Continue aspirin 81 mg daily   · Outpatient follow-up with PCP    RSV (acute bronchiolitis due to respiratory syncytial virus)  Assessment & Plan  · Patient was recently discharged from this facility after a stay from February 11, 2023 through February 13, 2023 and was treated for RSV  · Suspect that this is just a residual positive test  · Patient is not requiring supplemental oxygen  · Patient was discharged on prednisone -suspect steroid-induced leukocytosis  · Supportive therapy only    Positive blood culture  Assessment & Plan  · 1 of 2 blood cultures: staphylococcus coagulase negative  · Likely a contaminate- IV antibiotics discontinued 2/26/23    Hyponatremia  Assessment & Plan  · POA with  on admission  · Likely due to poor po intake  · Resolved with IV fluids    Generalized weakness  Assessment & Plan  · Daughter reports patient has remained in bed since recent hospital stay  · PT/OT recommended-short-term rehab  · Family wanting patient to return to [facility]- facility okay with patient returning  · The patient's son will provide transportation back to [facility] today       Mild neurocognitive disorder  Assessment & Plan  · At baseline, continue supportive therapy    History of CVA (cerebrovascular accident)  Assessment & Plan  · Continue aspirin and Lipitor for secondary prevention    Essential hypertension  Assessment & Plan  · Continue home regimen of hydrochlorothiazide      Medical Problems     Resolved Problems  Date Reviewed: 2/28/2023   None       Discharging Physician / Practitioner: Addison Evans PA-C  PCP: Edward Melendez MD  Admission Date:   Admission Orders (From admission, onward)     Ordered        02/25/23 1636  Inpatient Admission  Once            02/24/23 1645  Place in Observation  Once                      Discharge Date: 02/28/23    Consultations During Hospital Stay:  · Cardiology    Procedures Performed:   · none    Significant Findings / Test Results:   XR chest 1 view portable    Result Date: 2/24/2023  · Impression: No acute cardiopulmonary disease  Workstation performed: [id number]   ·     Incidental Findings:   · none     Test Results Pending at Discharge (will require follow up):   · none     Outpatient Tests Requested:  · none    Complications:  none    Reason for Admission: atrial fibrillation with RVR    Hospital Course:   Courtney Guidry is a 80 y o  female patient who originally presented to the hospital on 2/24/2023 due to generalized weakness  Please see H&P by Dr Hesham Burris dated 2/24/2023 for complete details of presentation  The patient was noted to be in atrial fibrillation with RVR in the ED  She received IV Cardizem 15 mg x 1 dose in the ED  She converted back to NSR which she remained in  Cardiology was consulted and did not initiate anticoagulation therapy as she converted to NSR and it was felt Afib was due to RSV infection  Also given patient's high risk for falls  PT/OT revaluated the patient and post acute rehab was recommended however the patient's family wanted the patient to return to [facility] was in agreement to take the patient's back  [facility] referral was placed on discharge   The patient was instructed to follow-up with PCP  Please see above list of diagnoses and related plan for additional information  Condition at Discharge: fair    Discharge Day Visit / Exam:   Subjective:    Vitals: Blood Pressure: 108/71 (02/28/23 0711)  Pulse: 60 (02/28/23 0711)  Temperature: 97 8 °F (36 6 °C) (02/28/23 0711)  Temp Source: Oral (02/25/23 0458)  Respirations: 18 (02/27/23 2300)  Height: 5' 2" (157 5 cm) (02/25/23 1539)  Weight - Scale: 56 6 kg (124 lb 12 5 oz) (one sheet, one pillow, one pad  no scd or O2) (02/28/23 0600)  SpO2: 91 % (02/28/23 0711)  Exam:   Physical Exam  Vitals and nursing note reviewed  Constitutional:       Appearance: Normal appearance  HENT:      Head: Normocephalic and atraumatic  Cardiovascular:      Rate and Rhythm: Normal rate and regular rhythm  Heart sounds: Normal heart sounds  Pulmonary:      Effort: Pulmonary effort is normal       Breath sounds: Normal breath sounds  Abdominal:      General: There is no distension  Palpations: Abdomen is soft  Tenderness: There is no abdominal tenderness  Skin:     General: Skin is warm and dry  Neurological:      General: No focal deficit present  Mental Status: She is alert and oriented to person, place, and time  Psychiatric:         Mood and Affect: Mood normal          Behavior: Behavior normal          Thought Content: Thought content normal           Discussion with Family: Updated  (son) via phone  Discharge instructions/Information to patient and family:   See after visit summary for information provided to patient and family  Provisions for Follow-Up Care:  See after visit summary for information related to follow-up care and any pertinent home health orders  Disposition:   [address] at [facility] Readmission: no     Discharge Statement:  I spent 25 minutes discharging the patient  This time was spent on the day of discharge   I had direct contact with the patient on the day of discharge  Greater than 50% of the total time was spent examining patient, answering all patient questions, arranging and discussing plan of care with patient as well as directly providing post-discharge instructions  Additional time then spent on discharge activities  Discharge Medications:  See after visit summary for reconciled discharge medications provided to patient and/or family        **Please Note: This note may have been constructed using a voice recognition system**

## 2023-02-28 NOTE — ASSESSMENT & PLAN NOTE
· Patient noted to have atrial fibrillation with RVR in ED, she is status post treatment with 15 mg of IV Cardizem x1 dose in the ED  · She is now in NSR; no need for rate or rhythm control medications   · Echocardiogram from 11/2022: EF of 55 to 60%, no gross structural and or valvular abnormality  · Appreciate cards input   · [id number] = 6  · Given risk of falls, debility, advanced age, recommend monitoring off AC   · Continue aspirin 81 mg daily   · Outpatient follow-up with PCP

## 2023-02-28 NOTE — ASSESSMENT & PLAN NOTE
· Patient was recently discharged from this facility after a stay from February 11, 2023 through February 13, 2023 and was treated for RSV  · Suspect that this is just a residual positive test  · Patient is not requiring supplemental oxygen  · Patient was discharged on prednisone -suspect steroid-induced leukocytosis  · Supportive therapy only

## 2023-02-28 NOTE — PLAN OF CARE
Problem: MOBILITY - ADULT  Goal: Maintain or return to baseline ADL function  Description: INTERVENTIONS:  -  Assess patient's ability to carry out ADLs; assess patient's baseline for ADL function and identify physical deficits which impact ability to perform ADLs (bathing, care of mouth/teeth, toileting, grooming, dressing, etc )  - Assess/evaluate cause of self-care deficits   - Assess range of motion  - Assess patient's mobility; develop plan if impaired  - Assess patient's need for assistive devices and provide as appropriate  - Encourage maximum independence but intervene and supervise when necessary  - Involve family in performance of ADLs  - Assess for home care needs following discharge   - Consider OT consult to assist with ADL evaluation and planning for discharge  - Provide patient education as appropriate  Outcome: Progressing     Problem: DISCHARGE PLANNING  Goal: Discharge to home or other facility with appropriate resources  Description: INTERVENTIONS:  - Identify barriers to discharge w/patient and caregiver  - Arrange for needed discharge resources and transportation as appropriate  - Identify discharge learning needs (meds, wound care, etc )  - Refer to Case Management Department for coordinating discharge planning if the patient needs post-hospital services based on physician/advanced practitioner order or complex needs related to functional status, cognitive ability, or social support system  Outcome: Progressing

## 2023-03-01 LAB — BACTERIA BLD CULT: NORMAL

## 2023-04-19 PROBLEM — I48.0 PAROXYSMAL ATRIAL FIBRILLATION (HCC): Status: ACTIVE | Noted: 2023-02-24

## 2023-04-28 PROBLEM — J21.0 RSV (ACUTE BRONCHIOLITIS DUE TO RESPIRATORY SYNCYTIAL VIRUS): Status: RESOLVED | Noted: 2023-02-11 | Resolved: 2023-04-28

## 2023-11-14 ENCOUNTER — HOSPITAL ENCOUNTER (INPATIENT)
Facility: HOSPITAL | Age: 88
LOS: 1 days | Discharge: HOME HEALTH | DRG: 312 | End: 2023-11-15
Attending: EMERGENCY MEDICINE | Admitting: INTERNAL MEDICINE
Payer: MEDICARE

## 2023-11-14 ENCOUNTER — APPOINTMENT (EMERGENCY)
Dept: RADIOLOGY | Facility: HOSPITAL | Age: 88
DRG: 312 | End: 2023-11-14
Payer: MEDICARE

## 2023-11-14 ENCOUNTER — APPOINTMENT (EMERGENCY)
Dept: CT IMAGING | Facility: HOSPITAL | Age: 88
DRG: 312 | End: 2023-11-14
Payer: MEDICARE

## 2023-11-14 DIAGNOSIS — K86.9 PANCREATIC LESION: ICD-10-CM

## 2023-11-14 DIAGNOSIS — R41.89 COGNITIVE IMPAIRMENT: ICD-10-CM

## 2023-11-14 DIAGNOSIS — J81.1 PULMONARY EDEMA: ICD-10-CM

## 2023-11-14 DIAGNOSIS — E07.9 THYROID MASS: ICD-10-CM

## 2023-11-14 DIAGNOSIS — R55 SYNCOPE: Primary | ICD-10-CM

## 2023-11-14 DIAGNOSIS — K59.00 CONSTIPATION: ICD-10-CM

## 2023-11-14 DIAGNOSIS — I27.82 CHRONIC PULMONARY EMBOLISM (HCC): ICD-10-CM

## 2023-11-14 DIAGNOSIS — I71.20 THORACIC AORTIC ANEURYSM (HCC): ICD-10-CM

## 2023-11-14 DIAGNOSIS — J96.01 ACUTE RESPIRATORY FAILURE WITH HYPOXEMIA (HCC): ICD-10-CM

## 2023-11-14 PROBLEM — I26.99 PULMONARY EMBOLISM (HCC): Status: ACTIVE | Noted: 2023-11-14

## 2023-11-14 PROBLEM — R10.13 EPIGASTRIC PAIN: Status: ACTIVE | Noted: 2023-11-14

## 2023-11-14 LAB
2HR DELTA HS TROPONIN: -1 NG/L
ALBUMIN SERPL BCP-MCNC: 4 G/DL (ref 3.5–5)
ALP SERPL-CCNC: 86 U/L (ref 34–104)
ALT SERPL W P-5'-P-CCNC: 15 U/L (ref 7–52)
AMPHETAMINES SERPL QL SCN: NEGATIVE
ANION GAP SERPL CALCULATED.3IONS-SCNC: 10 MMOL/L
AST SERPL W P-5'-P-CCNC: 20 U/L (ref 13–39)
ATRIAL RATE: 48 BPM
ATRIAL RATE: 79 BPM
ATRIAL RATE: 81 BPM
BARBITURATES UR QL: NEGATIVE
BASOPHILS # BLD AUTO: 0.03 THOUSANDS/ÂΜL (ref 0–0.1)
BASOPHILS NFR BLD AUTO: 0 % (ref 0–1)
BENZODIAZ UR QL: NEGATIVE
BILIRUB SERPL-MCNC: 0.73 MG/DL (ref 0.2–1)
BILIRUB UR QL STRIP: NEGATIVE
BNP SERPL-MCNC: 105 PG/ML (ref 0–100)
BUN SERPL-MCNC: 20 MG/DL (ref 5–25)
CALCIUM SERPL-MCNC: 9.3 MG/DL (ref 8.4–10.2)
CARDIAC TROPONIN I PNL SERPL HS: 4 NG/L
CARDIAC TROPONIN I PNL SERPL HS: 5 NG/L
CHLORIDE SERPL-SCNC: 101 MMOL/L (ref 96–108)
CLARITY UR: CLEAR
CO2 SERPL-SCNC: 25 MMOL/L (ref 21–32)
COCAINE UR QL: NEGATIVE
COLOR UR: NORMAL
CREAT SERPL-MCNC: 1.08 MG/DL (ref 0.6–1.3)
D DIMER PPP FEU-MCNC: 2.41 UG/ML FEU
EOSINOPHIL # BLD AUTO: 0.19 THOUSAND/ÂΜL (ref 0–0.61)
EOSINOPHIL NFR BLD AUTO: 2 % (ref 0–6)
ERYTHROCYTE [DISTWIDTH] IN BLOOD BY AUTOMATED COUNT: 13.4 % (ref 11.6–15.1)
FLUAV RNA RESP QL NAA+PROBE: NEGATIVE
FLUBV RNA RESP QL NAA+PROBE: NEGATIVE
GFR SERPL CREATININE-BSD FRML MDRD: 44 ML/MIN/1.73SQ M
GLUCOSE SERPL-MCNC: 104 MG/DL (ref 65–140)
GLUCOSE UR STRIP-MCNC: NEGATIVE MG/DL
HCT VFR BLD AUTO: 44.1 % (ref 34.8–46.1)
HGB BLD-MCNC: 14.4 G/DL (ref 11.5–15.4)
HGB UR QL STRIP.AUTO: NEGATIVE
IMM GRANULOCYTES # BLD AUTO: 0.06 THOUSAND/UL (ref 0–0.2)
IMM GRANULOCYTES NFR BLD AUTO: 1 % (ref 0–2)
KETONES UR STRIP-MCNC: NEGATIVE MG/DL
LEUKOCYTE ESTERASE UR QL STRIP: NEGATIVE
LIPASE SERPL-CCNC: 20 U/L (ref 11–82)
LYMPHOCYTES # BLD AUTO: 3.14 THOUSANDS/ÂΜL (ref 0.6–4.47)
LYMPHOCYTES NFR BLD AUTO: 27 % (ref 14–44)
MCH RBC QN AUTO: 30.1 PG (ref 26.8–34.3)
MCHC RBC AUTO-ENTMCNC: 32.7 G/DL (ref 31.4–37.4)
MCV RBC AUTO: 92 FL (ref 82–98)
MONOCYTES # BLD AUTO: 0.86 THOUSAND/ÂΜL (ref 0.17–1.22)
MONOCYTES NFR BLD AUTO: 7 % (ref 4–12)
NEUTROPHILS # BLD AUTO: 7.36 THOUSANDS/ÂΜL (ref 1.85–7.62)
NEUTS SEG NFR BLD AUTO: 63 % (ref 43–75)
NITRITE UR QL STRIP: NEGATIVE
NRBC BLD AUTO-RTO: 0 /100 WBCS
OPIATES UR QL SCN: NEGATIVE
OXYCODONE+OXYMORPHONE UR QL SCN: NEGATIVE
P AXIS: 39 DEGREES
P AXIS: 63 DEGREES
P AXIS: 68 DEGREES
PCP UR QL: NEGATIVE
PH UR STRIP.AUTO: 6 [PH]
PLATELET # BLD AUTO: 193 THOUSANDS/UL (ref 149–390)
PMV BLD AUTO: 10.5 FL (ref 8.9–12.7)
POTASSIUM SERPL-SCNC: 3.8 MMOL/L (ref 3.5–5.3)
PR INTERVAL: 184 MS
PR INTERVAL: 192 MS
PROT SERPL-MCNC: 7 G/DL (ref 6.4–8.4)
PROT UR STRIP-MCNC: NEGATIVE MG/DL
QRS AXIS: -21 DEGREES
QRS AXIS: -23 DEGREES
QRS AXIS: -26 DEGREES
QRSD INTERVAL: 82 MS
QRSD INTERVAL: 90 MS
QRSD INTERVAL: 90 MS
QT INTERVAL: 392 MS
QT INTERVAL: 392 MS
QT INTERVAL: 396 MS
QTC INTERVAL: 449 MS
QTC INTERVAL: 455 MS
QTC INTERVAL: 460 MS
RBC # BLD AUTO: 4.78 MILLION/UL (ref 3.81–5.12)
RSV RNA RESP QL NAA+PROBE: NEGATIVE
SARS-COV-2 RNA RESP QL NAA+PROBE: NEGATIVE
SODIUM SERPL-SCNC: 136 MMOL/L (ref 135–147)
SP GR UR STRIP.AUTO: 1.01
T WAVE AXIS: 41 DEGREES
T WAVE AXIS: 45 DEGREES
T WAVE AXIS: 68 DEGREES
T4 FREE SERPL-MCNC: 1.02 NG/DL (ref 0.61–1.12)
THC UR QL: NEGATIVE
TSH SERPL DL<=0.05 MIU/L-ACNC: 7.58 UIU/ML (ref 0.45–4.5)
UROBILINOGEN UR QL STRIP.AUTO: 0.2 E.U./DL
VENTRICULAR RATE: 79 BPM
VENTRICULAR RATE: 81 BPM
VENTRICULAR RATE: 81 BPM
WBC # BLD AUTO: 11.64 THOUSAND/UL (ref 4.31–10.16)

## 2023-11-14 PROCEDURE — 0241U HB NFCT DS VIR RESP RNA 4 TRGT: CPT

## 2023-11-14 PROCEDURE — 84484 ASSAY OF TROPONIN QUANT: CPT

## 2023-11-14 PROCEDURE — 72125 CT NECK SPINE W/O DYE: CPT

## 2023-11-14 PROCEDURE — 83690 ASSAY OF LIPASE: CPT

## 2023-11-14 PROCEDURE — 71275 CT ANGIOGRAPHY CHEST: CPT

## 2023-11-14 PROCEDURE — 85379 FIBRIN DEGRADATION QUANT: CPT

## 2023-11-14 PROCEDURE — 73030 X-RAY EXAM OF SHOULDER: CPT

## 2023-11-14 PROCEDURE — 85025 COMPLETE CBC W/AUTO DIFF WBC: CPT | Performed by: EMERGENCY MEDICINE

## 2023-11-14 PROCEDURE — 80053 COMPREHEN METABOLIC PANEL: CPT | Performed by: EMERGENCY MEDICINE

## 2023-11-14 PROCEDURE — G1004 CDSM NDSC: HCPCS

## 2023-11-14 PROCEDURE — 99285 EMERGENCY DEPT VISIT HI MDM: CPT | Performed by: EMERGENCY MEDICINE

## 2023-11-14 PROCEDURE — 80307 DRUG TEST PRSMV CHEM ANLYZR: CPT | Performed by: INTERNAL MEDICINE

## 2023-11-14 PROCEDURE — 87081 CULTURE SCREEN ONLY: CPT | Performed by: NURSE PRACTITIONER

## 2023-11-14 PROCEDURE — 93010 ELECTROCARDIOGRAM REPORT: CPT | Performed by: INTERNAL MEDICINE

## 2023-11-14 PROCEDURE — 83880 ASSAY OF NATRIURETIC PEPTIDE: CPT

## 2023-11-14 PROCEDURE — 74177 CT ABD & PELVIS W/CONTRAST: CPT

## 2023-11-14 PROCEDURE — 81003 URINALYSIS AUTO W/O SCOPE: CPT | Performed by: INTERNAL MEDICINE

## 2023-11-14 PROCEDURE — 36415 COLL VENOUS BLD VENIPUNCTURE: CPT

## 2023-11-14 PROCEDURE — 99223 1ST HOSP IP/OBS HIGH 75: CPT | Performed by: INTERNAL MEDICINE

## 2023-11-14 PROCEDURE — 96374 THER/PROPH/DIAG INJ IV PUSH: CPT

## 2023-11-14 PROCEDURE — 70450 CT HEAD/BRAIN W/O DYE: CPT

## 2023-11-14 PROCEDURE — 84443 ASSAY THYROID STIM HORMONE: CPT | Performed by: INTERNAL MEDICINE

## 2023-11-14 PROCEDURE — 93005 ELECTROCARDIOGRAM TRACING: CPT

## 2023-11-14 PROCEDURE — 84439 ASSAY OF FREE THYROXINE: CPT | Performed by: INTERNAL MEDICINE

## 2023-11-14 PROCEDURE — 99285 EMERGENCY DEPT VISIT HI MDM: CPT

## 2023-11-14 RX ORDER — HEPARIN SODIUM 5000 [USP'U]/ML
5000 INJECTION, SOLUTION INTRAVENOUS; SUBCUTANEOUS EVERY 8 HOURS SCHEDULED
Status: DISCONTINUED | OUTPATIENT
Start: 2023-11-14 | End: 2023-11-15 | Stop reason: HOSPADM

## 2023-11-14 RX ORDER — ONDANSETRON 2 MG/ML
4 INJECTION INTRAMUSCULAR; INTRAVENOUS EVERY 6 HOURS PRN
Status: DISCONTINUED | OUTPATIENT
Start: 2023-11-14 | End: 2023-11-15 | Stop reason: HOSPADM

## 2023-11-14 RX ORDER — ONDANSETRON 2 MG/ML
1 INJECTION INTRAMUSCULAR; INTRAVENOUS ONCE
Status: COMPLETED | OUTPATIENT
Start: 2023-11-14 | End: 2023-11-14

## 2023-11-14 RX ORDER — ATORVASTATIN CALCIUM 40 MG/1
40 TABLET, FILM COATED ORAL EVERY EVENING
Status: DISCONTINUED | OUTPATIENT
Start: 2023-11-14 | End: 2023-11-15 | Stop reason: HOSPADM

## 2023-11-14 RX ORDER — ASPIRIN 81 MG/1
81 TABLET, CHEWABLE ORAL DAILY
Status: DISCONTINUED | OUTPATIENT
Start: 2023-11-15 | End: 2023-11-15

## 2023-11-14 RX ORDER — ACETAMINOPHEN 325 MG/1
650 TABLET ORAL EVERY 6 HOURS PRN
Status: DISCONTINUED | OUTPATIENT
Start: 2023-11-14 | End: 2023-11-15 | Stop reason: HOSPADM

## 2023-11-14 RX ORDER — SODIUM CHLORIDE 9 MG/ML
50 INJECTION, SOLUTION INTRAVENOUS CONTINUOUS
Status: CANCELLED | OUTPATIENT
Start: 2023-11-14

## 2023-11-14 RX ORDER — HYDRALAZINE HYDROCHLORIDE 20 MG/ML
5 INJECTION INTRAMUSCULAR; INTRAVENOUS EVERY 6 HOURS PRN
Status: DISCONTINUED | OUTPATIENT
Start: 2023-11-14 | End: 2023-11-15 | Stop reason: HOSPADM

## 2023-11-14 RX ORDER — METOCLOPRAMIDE HYDROCHLORIDE 5 MG/ML
10 INJECTION INTRAMUSCULAR; INTRAVENOUS ONCE
Status: COMPLETED | OUTPATIENT
Start: 2023-11-14 | End: 2023-11-14

## 2023-11-14 RX ORDER — FUROSEMIDE 10 MG/ML
20 INJECTION INTRAMUSCULAR; INTRAVENOUS ONCE
Status: COMPLETED | OUTPATIENT
Start: 2023-11-14 | End: 2023-11-14

## 2023-11-14 RX ADMIN — ATORVASTATIN CALCIUM 40 MG: 40 TABLET, FILM COATED ORAL at 17:50

## 2023-11-14 RX ADMIN — FUROSEMIDE 20 MG: 10 INJECTION, SOLUTION INTRAMUSCULAR; INTRAVENOUS at 16:00

## 2023-11-14 RX ADMIN — METOCLOPRAMIDE HYDROCHLORIDE 10 MG: 5 INJECTION INTRAMUSCULAR; INTRAVENOUS at 12:54

## 2023-11-14 RX ADMIN — HEPARIN SODIUM 5000 UNITS: 5000 INJECTION INTRAVENOUS; SUBCUTANEOUS at 17:50

## 2023-11-14 RX ADMIN — IOHEXOL 100 ML: 350 INJECTION, SOLUTION INTRAVENOUS at 13:45

## 2023-11-14 NOTE — ASSESSMENT & PLAN NOTE
Presented to emergency department after episode of syncope during bowel movement, she had also vomited  Chart review had history of same one year ago with passing out at [place] suspected to be vasovagal and possibly orthostatic  CT head without acute intracranial abnormality  Admitted to medicine  Telemetry monitoring x24 hours  Update echocardiogram  Check orthostatic vital signs   VS Q4H  Fall precautions

## 2023-11-14 NOTE — ASSESSMENT & PLAN NOTE
Oxygen saturation recorded at 89% in ER  CTA chest PE study with cardiomegaly with pulmonary edema and trace right pleural effusion  She received Lasix 20 mg IV x1.   Will hold off on further diuretics at this time  Daily weights and I&O's  Monitor respiratory status  Monitor oxygen requirements and wean as tolerated

## 2023-11-14 NOTE — PLAN OF CARE
Problem: PAIN - ADULT  Goal: Verbalizes/displays adequate comfort level or baseline comfort level  Description: Interventions:  - Encourage patient to monitor pain and request assistance  - Assess pain using appropriate pain scale  - Administer analgesics based on type and severity of pain and evaluate response  - Implement non-pharmacological measures as appropriate and evaluate response  - Consider cultural and social influences on pain and pain management  - Notify physician/advanced practitioner if interventions unsuccessful or patient reports new pain  Outcome: Not Progressing     Problem: INFECTION - ADULT  Goal: Absence or prevention of progression during hospitalization  Description: INTERVENTIONS:  - Assess and monitor for signs and symptoms of infection  - Monitor lab/diagnostic results  - Monitor all insertion sites, i.e. indwelling lines, tubes, and drains  - Monitor endotracheal if appropriate and nasal secretions for changes in amount and color  - [place] appropriate cooling/warming therapies per order  - Administer medications as ordered  - Instruct and encourage patient and family to use good hand hygiene technique  - Identify and instruct in appropriate isolation precautions for identified infection/condition  Outcome: Not Progressing  Goal: Absence of fever/infection during neutropenic period  Description: INTERVENTIONS:  - Monitor WBC    Outcome: Not Progressing     Problem: SAFETY ADULT  Goal: Patient will remain free of falls  Description: INTERVENTIONS:  - Educate patient/family on patient safety including physical limitations  - Instruct patient to call for assistance with activity   - Consult OT/PT to assist with strengthening/mobility   - Keep Call bell within reach  - Keep bed low and locked with side rails adjusted as appropriate  - Keep care items and personal belongings within reach  - Initiate and maintain comfort rounds  - Make Fall Risk Sign visible to staff  - Offer Toileting every 2 Hours, in advance of need  - Initiate/Maintain bedalarm  - Obtain necessary fall risk management equipment: nonskid socks  - Apply yellow socks and bracelet for high fall risk patients  - Consider moving patient to room near nurses station  Outcome: Not Progressing  Goal: Maintain or return to baseline ADL function  Description: INTERVENTIONS:  -  Assess patient's ability to carry out ADLs; assess patient's baseline for ADL function and identify physical deficits which impact ability to perform ADLs (bathing, care of mouth/teeth, toileting, grooming, dressing, etc.)  - Assess/evaluate cause of self-care deficits   - Assess range of motion  - Assess patient's mobility; develop plan if impaired  - Assess patient's need for assistive devices and provide as appropriate  - Encourage maximum independence but intervene and supervise when necessary  - Involve family in performance of ADLs  - Assess for home care needs following discharge   - Consider OT consult to assist with ADL evaluation and planning for discharge  - Provide patient education as appropriate  Outcome: Not Progressing  Goal: Maintains/Returns to pre admission functional level  Description: INTERVENTIONS:  - Perform AM-PAC 6 Click Basic Mobility/ Daily Activity assessment daily.  - Set and communicate daily mobility goal to care team and patient/family/caregiver. - Collaborate with rehabilitation services on mobility goals if consulted  - Perform Range of Motion 3 times a day. - Reposition patient every 2 hours.   - Dangle patient 2 times a day  - Stand patient 2 times a day  - Ambulate patient 2 times a day  - Out of bed to chair 2 times a day   - Out of bed for meals 2 times a day  - Out of bed for toileting  - Record patient progress and toleration of activity level   Outcome: Not Progressing     Problem: DISCHARGE PLANNING  Goal: Discharge to home or other facility with appropriate resources  Description: INTERVENTIONS:  - Identify barriers to discharge w/patient and caregiver  - Arrange for needed discharge resources and transportation as appropriate  - Identify discharge learning needs (meds, wound care, etc.)  - Arrange for interpretive services to assist at discharge as needed  - Refer to Case Management Department for coordinating discharge planning if the patient needs post-hospital services based on physician/advanced practitioner order or complex needs related to functional status, cognitive ability, or social support system  Outcome: Not Progressing     Problem: Knowledge Deficit  Goal: Patient/family/caregiver demonstrates understanding of disease process, treatment plan, medications, and discharge instructions  Description: Complete learning assessment and assess knowledge base.   Interventions:  - Provide teaching at level of understanding  - Provide teaching via preferred learning methods  Outcome: Not Progressing

## 2023-11-14 NOTE — H&P
[facility] [facility]  H&P  Name: Jennyfer Judge 80 y.o. female I MRN: [id number]  Unit/Bed#: -01 I Date of Admission: 11/14/2023   Date of Service: 11/14/2023 I Hospital Day: 0      Assessment/Plan     * Syncope  Assessment & Plan  Presented to emergency department after episode of syncope during bowel movement, she had also vomited  Chart review had history of same one year ago with passing out at [facility] suspected to be vasovagal and possibly orthostatic  CT head without acute intracranial abnormality  Admitted to medicine  Telemetry monitoring x24 hours  Update echocardiogram  Check orthostatic vital signs   VS Q4H  Fall precautions      Essential hypertension  Assessment & Plan  BP reviewed  Per Cardiology note 4/2023, HCTZ was changed to PRN   Monitor BP    Pulmonary embolism (HCC)  Assessment & Plan  CTA chest: Chronic pulmonary embolism in the subsegmental right lower lobe pulmonary artery  Not on anticoagulation as above  Monitor vital signs and respiratory status    Epigastric pain  Assessment & Plan  She reported epigastric pain and vomited x 1. On admission, she says the pain is no longer present    Paroxysmal atrial fibrillation (HCC)  Assessment & Plan  She does not appear to be on rate control meds, and not on AC due to age, frailty, and fall risk    Acute respiratory failure with hypoxia (HCC)  Assessment & Plan  Oxygen saturation recorded at 89% in ER  CTA chest PE study with cardiomegaly with pulmonary edema and trace right pleural effusion  She received Lasix 20 mg IV x1. Will hold off on further diuretics at this time  Daily weights and I&O's  Monitor respiratory status  Monitor oxygen requirements and wean as tolerated    Mild neurocognitive disorder  Assessment & Plan  At baseline she is disoriented.  On admission assessment she is oriented x 2  Supportive care    History of CVA (cerebrovascular accident)  Assessment & Plan  Continue aspirin and statin         VTE Pharmacologic Prophylaxis: VTE Score: 3 Moderate Risk (Score 3-4) - Pharmacological DVT Prophylaxis Ordered: heparin. Code Status: Level 3 - DNAR and DNI   Discussion with family: Updated  (son) via phone. Anticipated Length of Stay: Patient will be admitted on an inpatient basis with an anticipated length of stay of greater than 2 midnights secondary to syncope, acute respiratory failure. .    Total Time Spent on Date of Encounter in care of patient: 60 mins. This time was spent on one or more of the following: performing physical exam; counseling and coordination of care; obtaining or reviewing history; documenting in the medical record; reviewing/ordering tests, medications or procedures; communicating with other healthcare professionals and discussing with patient's family/caregivers. Chief Complaint: Syncope    History of Present Illness:  Song Ya is a 80 y.o. female with a PMH of atrial fibrillation not on AC, chronic PE, essential hypertension,  CVA, and syncope who presents with syncope. She had passed out while trying to have a bowel movement and then vomited. She was reporting epigastric pain on arrival to the ER but this has resolved. CT of the head was negative for acute findings. CT chest revealed chronic pulmonary embolism and suggested pulmonary edema. She was also noted to be hypoxic in the ER desaturating down to 89%. She is currently using 2 L nasal cannula supplemental oxygen she was treated with IV Lasix 20 mg. She is admitted to medicine. She is disoriented at baseline, but answers questions. She remembers passing out and vomiting. She could not, however remember the name of the place that she lives. Her son reports that it is [facility]. Per chart review, patient had an episode of this last year almost to the day. It was at that time thought to be due to orthostatic change.   Per cardiology note in March 2023, her HCTZ changed to PRN due to blood pressure being, " too well controlled."  She denies fever, shortness of breath, chest pain, current abdominal pain, arthralgia, myalgia, or dizziness. Review of Systems:  Review of Systems   Constitutional:  Negative for chills and fever. HENT:  Negative for ear pain. Eyes:  Negative for visual disturbance. Respiratory:  Negative for cough and shortness of breath. Cardiovascular:  Negative for chest pain and palpitations. Gastrointestinal:  Positive for abdominal pain and vomiting. Genitourinary:  Negative for dysuria. Musculoskeletal:  Negative for arthralgias and back pain. Skin:  Negative for color change and rash. Neurological:  Positive for syncope. Psychiatric/Behavioral:  Positive for confusion. All other systems reviewed and are negative. Past Medical and Surgical History:   Past Medical History:   Diagnosis Date    TANNER (acute kidney injury) ([address]) 10/11/2022    Arthritis     Atrial fibrillation ([address])     Cataract     LAST ASSESSED: [id number]    CVA (cerebral vascular accident) [facility])     Depression     Dislocation of left shoulder joint     LAST ASSESSSED: [id number]    Essential hypertension 04/03/2019    Gait disorder     LAST ASSESSED: [id number]    Herpes zoster     LAST ASSESSED: [id number]    HL (hearing loss)     Hyperlipidemia     Impacted cerumen of both ears     LAST ASSESSED: [id number]    Impacted cerumen of right ear     LAST ASSESSED: [id number]    Lightheadedness     MILD AND PT REFUSES EKG ETC. PT PROMISES TO CALL IF WORSENS. LAST ASSESSED: [id number]    Multiple falls     LAST ASSESSED: [id number]    Near syncope     LAST ASSESSED: [id number]    Postherpetic polyneuropathy     LAST ASSESSED: [id number]       Past Surgical History:   Procedure Laterality Date    CATARACT EXTRACTION, BILATERAL      HERNIA REPAIR      TONSILLECTOMY      TUBAL LIGATION         Meds/Allergies:  Prior to Admission medications    Medication Sig Start Date End Date Taking?  Authorizing Provider   acetaminophen (TYLENOL) 325 mg tablet Take 2 tablets (650 mg total) by mouth every 6 (six) hours as needed for mild pain 8/2/21   Duane Angle, DO   aspirin 81 mg chewable tablet Chew 1 tablet (81 mg total) daily 8/4/22   Duane Angle, DO   atorvastatin (LIPITOR) 40 mg tablet Take 1 tablet (40 mg total) by mouth every evening 8/4/22   Duane Angle, DO   hydrochlorothiazide (HYDRODIURIL) 25 mg tablet Take 1 tablet (25 mg total) by mouth as needed (for morning leg swelling.) 4/19/23   Mala Lundy MD   polyethylene glycol (MIRALAX) 17 g packet Take 17 g by mouth daily as needed (constipation)  Patient not taking: Reported on 4/19/2023 2/13/23   Nora Youssef MD     I have reviewed home medications with a medical source (PCP, Pharmacy, other). Allergies: Allergies   Allergen Reactions    Vancomycin Hives and Itching       Social History:  Marital Status:    Occupation: Retired  Patient Pre-hospital Living Situation: Assisted Living  Patient Pre-hospital Level of Mobility: walks  Patient Pre-hospital Diet Restrictions: None  Substance Use History:   Social History     Substance and Sexual Activity   Alcohol Use Never     Social History     Tobacco Use   Smoking Status Never   Smokeless Tobacco Never     Social History     Substance and Sexual Activity   Drug Use No       Family History:  Family History   Problem Relation Age of Onset    Cirrhosis Father     Heart disease Son     Mental illness Son        Physical Exam:     Vitals:   Blood Pressure: 143/70 (11/14/23 1717)  Pulse: 70 (11/14/23 1717)  Temperature: 98.2 °F (36.8 °C) (11/14/23 1300)  Temp Source: Tympanic (11/14/23 1300)  Respirations: 18 (11/14/23 1514)  Height: 5' 2" (157.5 cm) (11/14/23 1208)  Weight - Scale: 55.3 kg (122 lb) (11/14/23 1208)  SpO2: 96 % (11/14/23 1717)    Physical Exam  Vitals and nursing note reviewed. Constitutional:       General: She is not in acute distress.      Comments: Frail elderly lady HENT:      Head: Normocephalic and atraumatic. Mouth/Throat:      Mouth: Mucous membranes are dry. Pharynx: Oropharynx is clear. Eyes:      Pupils: Pupils are equal, round, and reactive to light. Cardiovascular:      Rate and Rhythm: Normal rate and regular rhythm. Pulses: Normal pulses. Pulmonary:      Effort: Pulmonary effort is normal. No respiratory distress. Breath sounds: Normal breath sounds. Abdominal:      General: Bowel sounds are normal.      Palpations: Abdomen is soft. Tenderness: There is no abdominal tenderness. Musculoskeletal:      Cervical back: Neck supple. Right lower leg: Edema present. Left lower leg: Edema present. Comments: 1+ bilateral lower extremity edema   Skin:     General: Skin is warm and dry. Capillary Refill: Capillary refill takes less than 2 seconds. Coloration: Skin is pale. Neurological:      General: No focal deficit present. Mental Status: She is alert. She is disoriented. GCS: GCS eye subscore is 4. GCS verbal subscore is 4. GCS motor subscore is 6. Cranial Nerves: No facial asymmetry. Motor: No weakness. Comments: Oriented to year, 2023. She is aware that she passed out and vomited.   She was unable to remember where she lives, after several minutes stated, " [employer]."           Additional Data:     Lab Results:  Results from last 7 days   Lab Units 11/14/23  1223   WBC Thousand/uL 11.64*   HEMOGLOBIN g/dL 14.4   HEMATOCRIT % 44.1   PLATELETS Thousands/uL 193   NEUTROS PCT % 63   LYMPHS PCT % 27   MONOS PCT % 7   EOS PCT % 2     Results from last 7 days   Lab Units 11/14/23  1223   SODIUM mmol/L 136   POTASSIUM mmol/L 3.8   CHLORIDE mmol/L 101   CO2 mmol/L 25   BUN mg/dL 20   CREATININE mg/dL 1.08   ANION GAP mmol/L 10   CALCIUM mg/dL 9.3   ALBUMIN g/dL 4.0   TOTAL BILIRUBIN mg/dL 0.73   ALK PHOS U/L 86   ALT U/L 15   AST U/L 20   GLUCOSE RANDOM mg/dL 104 Lines/Drains:  Invasive Devices       Peripheral Intravenous Line  Duration             Peripheral IV 11/14/23 Dorsal (posterior); Left Hand <1 day    Peripheral IV 11/14/23 Right Antecubital <1 day                    Imaging: Reviewed radiology reports from this admission including: chest CT scan and CT head  XR shoulder 2+ views LEFT   ED Interpretation by Joanie Palmer MD (11/14 3731)   Radiograph personally interpreted by me as no acute osseous abnormality. Final Result by Naima Hutson MD (11/14 8591)      Degenerative, chronic and postoperative changes. No acute osseous abnormality. Workstation performed: [id number]         CT pe study w abdomen pelvis w contrast   Final Result by Alex Cullen MD (11/14 0839)      Chronic pulmonary embolism in the subsegmental right lower lobe pulmonary artery. No large central or segmental acute pulmonary embolism identified. Cardiomegaly with pulmonary edema and trace right pleural effusion. Small rectal stool ball. Stable 9 mm pancreatic tail cystic lesion. For simple cyst(s) less than 1.5 cm, recommend followup every 2 year for 2 times. After 4 years, no more followups. Recommend next followup in 2 years. Preferred imaging modality: abdomen MRI and MRCP with and    without IV contrast, or triple phase abdomen CT with IV contrast, or abdomen MRI and MRCP without IV contrast.      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 Recommendation is for follow-up low radiation dose chest CT in one year. Workstation performed: [id number]         CT head wo contrast   Final Result by DEEDEE Shin MD (11/14 0729)      No acute intracranial abnormality. Stable chronic microangiopathy. Workstation performed: [id number]         CT spine cervical without contrast   Final Result by DEEDEE Shin MD (11/14 1419)      No acute fracture or traumatic subluxation.  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 Workstation performed: [id number]             EKG and Other Studies Reviewed on Admission:   EKG: NSR. HR 81.    ** Please Note: This note has been constructed using a voice recognition system.  **

## 2023-11-14 NOTE — ASSESSMENT & PLAN NOTE
CTA chest: Chronic pulmonary embolism in the subsegmental right lower lobe pulmonary artery  Not on anticoagulation as above  Monitor vital signs and respiratory status

## 2023-11-14 NOTE — DISCHARGE INSTRUCTIONS
You were seen after passing out. You likely passed out from a vasovagal syncope but straining to poop. We found no emergent causes for your symptoms. You have a thyroid mass that you were informed about 4 years ago. The mass is still present and should be worked up outside the hospital if desired.

## 2023-11-14 NOTE — ED ATTENDING ATTESTATION
11/14/2023  Torsten Hernandez DO, saw and evaluated the patient. I have discussed the patient with the resident/non-physician practitioner and agree with the resident's/non-physician practitioner's findings, Plan of Care, and MDM as documented in the resident's/non-physician practitioner's note, except where noted. All available labs and Radiology studies were reviewed. I was present for key portions of any procedure(s) performed by the resident/non-physician practitioner and I was immediately available to provide assistance. At this point I agree with the current assessment done in the Emergency Department. I have conducted an independent evaluation of this patient a history and physical is as follows:    Patient is a 79-year-old female who presented to the emergency department complaining of a syncopal episode while on the toilet. It is unclear if the patient had any traumatic event however she has no outward signs of trauma. Patient is a poor historian but on arrival was found to be mildly hypoxic complaining of abdominal discomfort neck pain and vomiting. Patient will be evaluated for potential post head injury, intra-abdominal process or pulmonary embolus. Chest x-ray ordered as well as lab work. Heart is regular rate and rhythm without clicks rubs or gallops lungs are coarse breath sounds bilaterally without wheezes rales or rhonchi. Abdomen is mildly generally tender. Patient will be admitted for syncopal episode as well as poor O2 saturations while in the emergency department.     ED Course  ED Course as of 11/15/23 0723   Tue Nov 14, 2023   1249 WBC(!): 11.64   1306 D-Dimer, Quant(!): 2.41         Critical Care Time  Procedures

## 2023-11-14 NOTE — ED PROVIDER NOTES
History  Chief Complaint   Patient presents with    Syncope     Patient had syncopal episode when attempting to have BM. Now vomiting. 45-year-old woman with relevant PMH HTN, HLD, dementia, a fib not on anticoagulation, and CVA presents with syncope. Patient lives at [facility] and was using the recreational facility. She went to have a bowel movement and syncopized. She does not recall the events after passing out. After awakening, she has had epigastric pain and vomiting. She reports a history of constipation. She denies chest pain and exertional dyspnea. No prior history of DVT/PE. She reports feeling well earlier today. She is denying headache, dysuria, rash, myalgias, muscle weakness, and numbness/tingling. On review of patient's chart, it seems she had a fib earlier this year, but she was cleared from anticoagulation due to fall risk and RSV diagnosis at that time. On arrival here, she is vomiting and reporting epigastric abdominal pain and generally not feeling well. She is not on chronic oxygen at home. Prior to Admission Medications   Prescriptions Last Dose Informant Patient Reported?  Taking?   acetaminophen (TYLENOL) 325 mg tablet   No No   Sig: Take 2 tablets (650 mg total) by mouth every 6 (six) hours as needed for mild pain   aspirin 81 mg chewable tablet   No No   Sig: Chew 1 tablet (81 mg total) daily   atorvastatin (LIPITOR) 40 mg tablet   No No   Sig: Take 1 tablet (40 mg total) by mouth every evening   hydrochlorothiazide (HYDRODIURIL) 25 mg tablet   No No   Sig: Take 1 tablet (25 mg total) by mouth as needed (for morning leg swelling.)   polyethylene glycol (MIRALAX) 17 g packet   No No   Sig: Take 17 g by mouth daily as needed (constipation)   Patient not taking: Reported on 4/19/2023      Facility-Administered Medications: None       Past Medical History:   Diagnosis Date    TANNER (acute kidney injury) ([address]) 10/11/2022    Arthritis     Atrial fibrillation (HCC)     Cataract LAST ASSESSED: [id number]    CVA (cerebral vascular accident) [facility])     Depression     Dislocation of left shoulder joint     LAST ASSESSSED: [id number]    Essential hypertension 04/03/2019    Gait disorder     LAST ASSESSED: [id number]    Herpes zoster     LAST ASSESSED: [id number]    HL (hearing loss)     Hyperlipidemia     Impacted cerumen of both ears     LAST ASSESSED: [id number]    Impacted cerumen of right ear     LAST ASSESSED: [id number]    Lightheadedness     MILD AND PT REFUSES EKG ETC. PT PROMISES TO CALL IF WORSENS. LAST ASSESSED: [id number]    Multiple falls     LAST ASSESSED: [id number]    Near syncope     LAST ASSESSED: [id number]    Postherpetic polyneuropathy     LAST ASSESSED: [id number]       Past Surgical History:   Procedure Laterality Date    CATARACT EXTRACTION, BILATERAL      HERNIA REPAIR      TONSILLECTOMY      TUBAL LIGATION         Family History   Problem Relation Age of Onset    Cirrhosis Father     Heart disease Son     Mental illness Son      I have reviewed and agree with the history as documented.     E-Cigarette/Vaping    E-Cigarette Use Never User      E-Cigarette/Vaping Substances     Social History     Tobacco Use    Smoking status: Never    Smokeless tobacco: Never   Vaping Use    Vaping Use: Never used   Substance Use Topics    Alcohol use: Never    Drug use: No        Review of Systems    Physical Exam  ED Triage Vitals   Temperature Pulse Respirations Blood Pressure SpO2   11/14/23 1300 11/14/23 1208 11/14/23 1208 11/14/23 1208 11/14/23 1208   98.2 °F (36.8 °C) 74 18 126/80 91 %      Temp Source Heart Rate Source Patient Position - Orthostatic VS BP Location FiO2 (%)   11/14/23 1300 11/14/23 1208 11/14/23 1208 11/14/23 1208 --   Tympanic Monitor Lying Right arm       Pain Score       11/14/23 1200       5             Orthostatic Vital Signs  Vitals:    11/14/23 1208 11/14/23 1300 11/14/23 1514   BP: 126/80 131/67 140/61   Pulse: 74 62 77   Patient Position - Orthostatic VS: Lying Lying Lying       Physical Exam  Vitals and nursing note reviewed. Constitutional:       General: She is not in acute distress. Appearance: Normal appearance. She is well-developed. She is not ill-appearing. Comments: Burping and gagging   HENT:      Head: Normocephalic and atraumatic. Right Ear: External ear normal.      Left Ear: External ear normal.      Nose: Nose normal. No congestion. Eyes:      Extraocular Movements: Extraocular movements intact. Pupils: Pupils are equal, round, and reactive to light. Cardiovascular:      Rate and Rhythm: Normal rate and regular rhythm. Pulmonary:      Effort: Pulmonary effort is normal. No respiratory distress. Breath sounds: Examination of the right-lower field reveals rales. Examination of the left-lower field reveals rales. Rales present. Abdominal:      General: There is no distension. Palpations: Abdomen is soft. Tenderness: There is abdominal tenderness in the epigastric area. There is no guarding or rebound. Musculoskeletal:         General: Normal range of motion. Cervical back: Normal range of motion and neck supple. Tenderness present. No rigidity. Right lower leg: Edema present. Left lower leg: Edema present. Comments: Non pitting edema bilaterally of the legs. C tenderness but no T or L tenderness. Skin:     General: Skin is warm and dry. Neurological:      Mental Status: She is alert and oriented to person, place, and time. Mental status is at baseline. Sensory: No sensory deficit. Motor: No weakness. Comments: Equal strength and sensation of the bilateral upper and lower extremities.    Psychiatric:         Mood and Affect: Mood normal.         Behavior: Behavior normal.         ED Medications  Medications   furosemide (LASIX) injection 20 mg (has no administration in time range)   ondansetron (FOR EMS ONLY) (ZOFRAN) 4 mg/2 mL injection 4 mg (0 mg Does not apply Given to EMS 11/14/23 1220)   metoclopramide (REGLAN) injection 10 mg (10 mg Intravenous Given 11/14/23 1254)   iohexol (OMNIPAQUE) 350 MG/ML injection (MULTI-DOSE) 100 mL (100 mL Intravenous Given 11/14/23 1345)       Diagnostic Studies  Results Reviewed       Procedure Component Value Units Date/Time    B-Type Natriuretic Peptide(BNP) [[id number]]  (Abnormal) Collected: 11/14/23 1223    Lab Status: Final result Specimen: Blood from Arm, Right Updated: 11/14/23 1544      pg/mL     HS Troponin I 2hr [[id number]]  (Normal) Collected: 11/14/23 1416    Lab Status: Final result Specimen: Blood from Arm, Right Updated: 11/14/23 1502     hs TnI 2hr 4 ng/L      Delta 2hr hsTnI -1 ng/L     FLU/RSV/COVID - if FLU/RSV clinically relevant [[id number]]  (Normal) Collected: 11/14/23 1233    Lab Status: Final result Specimen: Nares from Nose Updated: 11/14/23 1318     SARS-CoV-2 Negative     INFLUENZA A PCR Negative     INFLUENZA B PCR Negative     RSV PCR Negative    Narrative:      FOR PEDIATRIC PATIENTS - copy/paste COVID Guidelines URL to browser: [email or web address]    SARS-CoV-2 assay is a Nucleic Acid Amplification assay intended for the  qualitative detection of nucleic acid from SARS-CoV-2 in nasopharyngeal  swabs. Results are for the presumptive identification of SARS-CoV-2 RNA. Positive results are indicative of infection with SARS-CoV-2, the virus  causing COVID-19, but do not rule out bacterial infection or co-infection  with other viruses. Laboratories within the [facility] and its  territories are required to report all positive results to the appropriate  public health authorities. Negative results do not preclude SARS-CoV-2  infection and should not be used as the sole basis for treatment or other  patient management decisions. Negative results must be combined with  clinical observations, patient history, and epidemiological information.   This test has not been FDA cleared or approved. This test has been authorized by FDA under an Emergency Use Authorization  (EUA). This test is only authorized for the duration of time the  declaration that circumstances exist justifying the authorization of the  emergency use of an in vitro diagnostic tests for detection of SARS-CoV-2  virus and/or diagnosis of COVID-19 infection under section 564(b)(1) of  the Act, 21 U. S.C. 573DZK-0(D)(5), unless the authorization is terminated  or revoked sooner. The test has been validated but independent review by FDA  and CLIA is pending. Test performed using [employer] GeneXpert: This RT-PCR assay targets N2,  a region unique to SARS-CoV-2. A conserved region in the E-gene was chosen  for pan-Sarbecovirus detection which includes SARS-CoV-2. According to CMS-2020-01-R, this platform meets the definition of high-throughput technology.     HS Troponin 0hr (reflex protocol) [[id number]]  (Normal) Collected: 11/14/23 1233    Lab Status: Final result Specimen: Blood from Arm, Left Updated: 11/14/23 1312     hs TnI 0hr 5 ng/L     Lipase [[id number]]  (Normal) Collected: 11/14/23 1233    Lab Status: Final result Specimen: Blood from Arm, Left Updated: 11/14/23 1306     Lipase 20 u/L     Comprehensive metabolic panel [[id number]] Collected: 11/14/23 1223    Lab Status: Final result Specimen: Blood from Arm, Right Updated: 11/14/23 1305     Sodium 136 mmol/L      Potassium 3.8 mmol/L      Chloride 101 mmol/L      CO2 25 mmol/L      ANION GAP 10 mmol/L      BUN 20 mg/dL      Creatinine 1.08 mg/dL      Glucose 104 mg/dL      Calcium 9.3 mg/dL      AST 20 U/L      ALT 15 U/L      Alkaline Phosphatase 86 U/L      Total Protein 7.0 g/dL      Albumin 4.0 g/dL      Total Bilirubin 0.73 mg/dL      eGFR 44 ml/min/1.73sq m     Narrative:      [place] guidelines for Chronic Kidney Disease (CKD):     Stage 1 with normal or high GFR (GFR > 90 mL/min/1.73 square meters)    Stage 2 Mild CKD (GFR = 60-89 mL/min/1.73 square meters)    Stage 3A Moderate CKD (GFR = 45-59 mL/min/1.73 square meters)    Stage 3B Moderate CKD (GFR = 30-44 mL/min/1.73 square meters)    Stage 4 Severe CKD (GFR = 15-29 mL/min/1.73 square meters)    Stage 5 End Stage CKD (GFR <15 mL/min/1.73 square meters)  Note: GFR calculation is accurate only with a steady state creatinine    D-dimer, quantitative [[id number]]  (Abnormal) Collected: 11/14/23 1233    Lab Status: Final result Specimen: Blood from Arm, Left Updated: 11/14/23 1303     D-Dimer, Quant 2.41 ug/ml FEU     Narrative: In the evaluation for possible pulmonary embolism, in the appropriate (Well's Score of 4 or less) patient, the age adjusted d-dimer cutoff for this patient can be calculated as:    Age x 0.01 (in ug/mL) for Age-adjusted D-dimer exclusion threshold for a patient over 50 years. CBC and differential [[id number]]  (Abnormal) Collected: 11/14/23 1223    Lab Status: Final result Specimen: Blood from Arm, Right Updated: 11/14/23 1242     WBC 11.64 Thousand/uL      RBC 4.78 Million/uL      Hemoglobin 14.4 g/dL      Hematocrit 44.1 %      MCV 92 fL      MCH 30.1 pg      MCHC 32.7 g/dL      RDW 13.4 %      MPV 10.5 fL      Platelets 789 Thousands/uL      nRBC 0 /100 WBCs      Neutrophils Relative 63 %      Immat GRANS % 1 %      Lymphocytes Relative 27 %      Monocytes Relative 7 %      Eosinophils Relative 2 %      Basophils Relative 0 %      Neutrophils Absolute 7.36 Thousands/µL      Immature Grans Absolute 0.06 Thousand/uL      Lymphocytes Absolute 3.14 Thousands/µL      Monocytes Absolute 0.86 Thousand/µL      Eosinophils Absolute 0.19 Thousand/µL      Basophils Absolute 0.03 Thousands/µL                    XR shoulder 2+ views LEFT   ED Interpretation by Benjamin Slade MD (11/14 7420)   Radiograph personally interpreted by me as no acute osseous abnormality.         Final Result by Mark aCrrion MD (11/14 0435)      Degenerative, chronic and postoperative changes. No acute osseous abnormality. Workstation performed: [id number]         CT pe study w abdomen pelvis w contrast   Final Result by Africa Bird MD (11/14 7435)      Chronic pulmonary embolism in the subsegmental right lower lobe pulmonary artery. No large central or segmental acute pulmonary embolism identified. Cardiomegaly with pulmonary edema and trace right pleural effusion. Small rectal stool ball. Stable 9 mm pancreatic tail cystic lesion. For simple cyst(s) less than 1.5 cm, recommend followup every 2 year for 2 times. After 4 years, no more followups. Recommend next followup in 2 years. Preferred imaging modality: abdomen MRI and MRCP with and    without IV contrast, or triple phase abdomen CT with IV contrast, or abdomen MRI and MRCP without IV contrast.      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 Recommendation is for follow-up low radiation dose chest CT in one year. Workstation performed: [id number]         CT head wo contrast   Final Result by DEEDEE Nina MD (11/14 0449)      No acute intracranial abnormality. Stable chronic microangiopathy. Workstation performed: [id number]         CT spine cervical without contrast   Final Result by DEEDEE Nina MD (11/14 4549)      No acute fracture or traumatic subluxation. 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 Workstation performed: [id number]               Procedures  Procedures      ED Course  ED Course as of 11/14/23 1559   Tue Nov 14, 2023   1232 This ECG was interpreted by me. The ECG demonstrates Normal sinus rhythm, normal intervals, normal axis, normal QRS, no acute ST changes present. PACs     1305 D-Dimer, Quant(!): 2.41                             SBIRT 20yo+      Flowsheet Row Most Recent Value   Initial Alcohol Screen: US AUDIT-C     1.  How often do you have a drink containing alcohol? 0 Filed at: 11/14/2023 1207   2. How many drinks containing alcohol do you have on a typical day you are drinking? 0 Filed at: 11/14/2023 1207   3b. FEMALE Any Age, or MALE 65+: How often do you have 4 or more drinks on one occassion? 0 Filed at: 11/14/2023 1207   Audit-C Score 0 Filed at: 11/14/2023 1207   IKE: How many times in the past year have you. .. Used an illegal drug or used a prescription medication for non-medical reasons? Never Filed at: 11/14/2023 1207                  Medical Decision Making  Presents with syncopal event while defecating. Upon review of chart, patient has a history of syncopal events. History seems suspicious for vasovagal syncope, but patient was hypoxemic upon arrival here. She is also having epigastric abdominal pain with vomiting. Will obtain labs, CT head and C spine, and additional chest and abdominal imaging pending d dimer. DDX: vasovagal syncope, arrhythmia, PE, CHF exacerbation, intracranial bleeding    D dimer was elevated, so a PE study was obtained. Patient has a small chronic PE. Her hypoxemia is likely from the bilateral pulmonary edema possibly from undiagnosed CHF. CT head and C spine without acute abnormality. Delta troponin was negative. Attempted to wean O2 supplementation, but patient continued to become hypoxemic. She continued to need 2 L O2. Will admit to medicine for presumed CHF. Patient in agreement with plan and questions were answered. Portions or all of this note were generated using voice recognition software. Occasional wrong word or "sound a like" substitutions may have occurred due to the inherent limitations of voice recognition software. Please interpret any errors within the intended context of the whole sentence or idea. Amount and/or Complexity of Data Reviewed  Labs: ordered. Decision-making details documented in ED Course. Radiology: ordered and independent interpretation performed.     Risk  Prescription drug management. Decision regarding hospitalization. Disposition  Final diagnoses:   Syncope   Constipation   Thyroid mass   Acute respiratory failure with hypoxemia (HCC)   Chronic pulmonary embolism [facility])   Pancreatic lesion   Thoracic aortic aneurysm [facility])     Time reflects when diagnosis was documented in both MDM as applicable and the Disposition within this note       Time User Action Codes Description Comment    11/14/2023  2:45 PM Clinton Angst [R55] Syncope     11/14/2023  2:45 PM Clinton Angst [K59.00] Constipation     11/14/2023  2:45 PM Clinton Angst [E07.9] Thyroid mass     11/14/2023  2:45 PM Carlo Rogers Add [J96.01] Acute respiratory failure with hypoxemia ([address])     11/14/2023  3:21 PM Clinton Angst [I27.82] Chronic pulmonary embolism ([address])     11/14/2023  3:28 PM Carlo Rogers Add [K86.9] Pancreatic lesion     11/14/2023  3:35 PM Carlo Rogers Add [I71.20] Thoracic aortic aneurysm [facility])           ED Disposition       ED Disposition   Admit    Condition   Stable    Date/Time   Tue Nov 14, 2023 1521    Comment   Case was discussed with JOHNIE and the patient's admission status was agreed to be Admission Status: observation status to the service of Dr. Lelo Bangura . Follow-up Information    None         Patient's Medications   Discharge Prescriptions    No medications on file     No discharge procedures on file. PDMP Review       None             ED Provider  Attending physically available and evaluated Chapo Garner. I managed the patient along with the ED Attending.     Electronically Signed by           Von Tsang MD  11/14/23 8468

## 2023-11-15 ENCOUNTER — APPOINTMENT (INPATIENT)
Dept: NON INVASIVE DIAGNOSTICS | Facility: HOSPITAL | Age: 88
DRG: 312 | End: 2023-11-15
Payer: MEDICARE

## 2023-11-15 VITALS
DIASTOLIC BLOOD PRESSURE: 75 MMHG | HEART RATE: 71 BPM | RESPIRATION RATE: 20 BRPM | HEIGHT: 62 IN | OXYGEN SATURATION: 98 % | TEMPERATURE: 97.6 F | SYSTOLIC BLOOD PRESSURE: 108 MMHG | BODY MASS INDEX: 26.31 KG/M2 | WEIGHT: 143 LBS

## 2023-11-15 PROBLEM — R41.89 COGNITIVE IMPAIRMENT: Status: ACTIVE | Noted: 2023-11-15

## 2023-11-15 LAB
ANION GAP SERPL CALCULATED.3IONS-SCNC: 8 MMOL/L
AORTIC ROOT: 2.7 CM
AORTIC VALVE MEAN VELOCITY: 7.4 M/S
APICAL FOUR CHAMBER EJECTION FRACTION: 61 %
ASCENDING AORTA: 3.7 CM
AV AREA BY CONTINUOUS VTI: 2.8 CM2
AV AREA PEAK VELOCITY: 2.3 CM2
AV LVOT MEAN GRADIENT: 2 MMHG
AV LVOT PEAK GRADIENT: 3 MMHG
AV MEAN GRADIENT: 3 MMHG
AV PEAK GRADIENT: 5 MMHG
AV VALVE AREA: 2.79 CM2
AV VELOCITY RATIO: 0.72
BASOPHILS # BLD AUTO: 0.03 THOUSANDS/ÂΜL (ref 0–0.1)
BASOPHILS NFR BLD AUTO: 0 % (ref 0–1)
BUN SERPL-MCNC: 16 MG/DL (ref 5–25)
CALCIUM SERPL-MCNC: 9 MG/DL (ref 8.4–10.2)
CHLORIDE SERPL-SCNC: 102 MMOL/L (ref 96–108)
CO2 SERPL-SCNC: 25 MMOL/L (ref 21–32)
CREAT SERPL-MCNC: 0.89 MG/DL (ref 0.6–1.3)
DOP CALC AO PEAK VEL: 1.12 M/S
DOP CALC AO VTI: 23.46 CM
DOP CALC LVOT AREA: 3.14 CM2
DOP CALC LVOT CARDIAC INDEX: 2.65 L/MIN/M2
DOP CALC LVOT CARDIAC OUTPUT: 4.41 L/MIN
DOP CALC LVOT DIAMETER: 2 CM
DOP CALC LVOT PEAK VEL VTI: 20.87 CM
DOP CALC LVOT PEAK VEL: 0.81 M/S
DOP CALC LVOT STROKE INDEX: 37.3 ML/M2
DOP CALC LVOT STROKE VOLUME: 65.53
E WAVE DECELERATION TIME: 336 MS
E/A RATIO: 0.58
EOSINOPHIL # BLD AUTO: 0.21 THOUSAND/ÂΜL (ref 0–0.61)
EOSINOPHIL NFR BLD AUTO: 3 % (ref 0–6)
ERYTHROCYTE [DISTWIDTH] IN BLOOD BY AUTOMATED COUNT: 13.7 % (ref 11.6–15.1)
FRACTIONAL SHORTENING: 26 (ref 28–44)
GFR SERPL CREATININE-BSD FRML MDRD: 56 ML/MIN/1.73SQ M
GLUCOSE SERPL-MCNC: 86 MG/DL (ref 65–140)
HCT VFR BLD AUTO: 42.6 % (ref 34.8–46.1)
HGB BLD-MCNC: 13.9 G/DL (ref 11.5–15.4)
IMM GRANULOCYTES # BLD AUTO: 0.03 THOUSAND/UL (ref 0–0.2)
IMM GRANULOCYTES NFR BLD AUTO: 0 % (ref 0–2)
INTERVENTRICULAR SEPTUM IN DIASTOLE (PARASTERNAL SHORT AXIS VIEW): 1.3 CM
INTERVENTRICULAR SEPTUM: 1.3 CM (ref 0.6–1.1)
LEFT ATRIUM SIZE: 3.2 CM
LEFT INTERNAL DIMENSION IN SYSTOLE: 2.6 CM (ref 2.1–4)
LEFT VENTRICULAR INTERNAL DIMENSION IN DIASTOLE: 3.5 CM (ref 3.5–6)
LEFT VENTRICULAR POSTERIOR WALL IN END DIASTOLE: 1.2 CM
LEFT VENTRICULAR STROKE VOLUME: 27 ML
LVSV (TEICH): 27 ML
LYMPHOCYTES # BLD AUTO: 2.07 THOUSANDS/ÂΜL (ref 0.6–4.47)
LYMPHOCYTES NFR BLD AUTO: 25 % (ref 14–44)
MCH RBC QN AUTO: 30.1 PG (ref 26.8–34.3)
MCHC RBC AUTO-ENTMCNC: 32.6 G/DL (ref 31.4–37.4)
MCV RBC AUTO: 92 FL (ref 82–98)
MONOCYTES # BLD AUTO: 0.72 THOUSAND/ÂΜL (ref 0.17–1.22)
MONOCYTES NFR BLD AUTO: 9 % (ref 4–12)
MV E'TISSUE VEL-SEP: 6 CM/S
MV PEAK A VEL: 1.11 M/S
MV PEAK E VEL: 64 CM/S
MV STENOSIS PRESSURE HALF TIME: 97 MS
MV VALVE AREA P 1/2 METHOD: 2.27
NEUTROPHILS # BLD AUTO: 5.12 THOUSANDS/ÂΜL (ref 1.85–7.62)
NEUTS SEG NFR BLD AUTO: 63 % (ref 43–75)
NRBC BLD AUTO-RTO: 0 /100 WBCS
PLATELET # BLD AUTO: 180 THOUSANDS/UL (ref 149–390)
PMV BLD AUTO: 10.7 FL (ref 8.9–12.7)
POTASSIUM SERPL-SCNC: 3.8 MMOL/L (ref 3.5–5.3)
RBC # BLD AUTO: 4.62 MILLION/UL (ref 3.81–5.12)
SL CV LV EF: 55
SL CV PED ECHO LEFT VENTRICLE DIASTOLIC VOLUME (MOD BIPLANE) 2D: 51 ML
SL CV PED ECHO LEFT VENTRICLE SYSTOLIC VOLUME (MOD BIPLANE) 2D: 24 ML
SODIUM SERPL-SCNC: 135 MMOL/L (ref 135–147)
TR MAX PG: 16 MMHG
TR PEAK VELOCITY: 2 M/S
TRICUSPID ANNULAR PLANE SYSTOLIC EXCURSION: 1.9 CM
TRICUSPID VALVE PEAK REGURGITATION VELOCITY: 2.03 M/S
WBC # BLD AUTO: 8.18 THOUSAND/UL (ref 4.31–10.16)

## 2023-11-15 PROCEDURE — 97110 THERAPEUTIC EXERCISES: CPT

## 2023-11-15 PROCEDURE — 80048 BASIC METABOLIC PNL TOTAL CA: CPT | Performed by: NURSE PRACTITIONER

## 2023-11-15 PROCEDURE — 93306 TTE W/DOPPLER COMPLETE: CPT

## 2023-11-15 PROCEDURE — 97163 PT EVAL HIGH COMPLEX 45 MIN: CPT

## 2023-11-15 PROCEDURE — 85025 COMPLETE CBC W/AUTO DIFF WBC: CPT | Performed by: NURSE PRACTITIONER

## 2023-11-15 PROCEDURE — 93306 TTE W/DOPPLER COMPLETE: CPT | Performed by: INTERNAL MEDICINE

## 2023-11-15 PROCEDURE — 99222 1ST HOSP IP/OBS MODERATE 55: CPT | Performed by: INTERNAL MEDICINE

## 2023-11-15 PROCEDURE — 97167 OT EVAL HIGH COMPLEX 60 MIN: CPT

## 2023-11-15 PROCEDURE — 99239 HOSP IP/OBS DSCHRG MGMT >30: CPT | Performed by: HOSPITALIST

## 2023-11-15 RX ORDER — ASPIRIN 81 MG/1
81 TABLET, CHEWABLE ORAL DAILY
Status: DISCONTINUED | OUTPATIENT
Start: 2023-11-15 | End: 2023-11-15 | Stop reason: HOSPADM

## 2023-11-15 RX ADMIN — HEPARIN SODIUM 5000 UNITS: 5000 INJECTION INTRAVENOUS; SUBCUTANEOUS at 14:27

## 2023-11-15 RX ADMIN — ASPIRIN 81 MG CHEWABLE TABLET 81 MG: 81 TABLET CHEWABLE at 08:58

## 2023-11-15 RX ADMIN — HEPARIN SODIUM 5000 UNITS: 5000 INJECTION INTRAVENOUS; SUBCUTANEOUS at 05:26

## 2023-11-15 NOTE — PROGRESS NOTES
Patient:    MRN:  [id number]    Tonja Request ID:  [id number]    Level of care reserved:  [address]    Partner Reserved:  [address] [place] [address] [phone]    Clinical needs requested:    Geography searched:  [phone]    Start of Service:    Request sent:  1:11pm EST on 11/15/2023 by Yao Appiah    Partner reserved:  1:28pm EST on 11/15/2023 by Yao Appiah    Choice list shared:  1:22pm EST on 11/15/2023 by Yao Appiah

## 2023-11-15 NOTE — DISCHARGE SUMMARY
[address] [facility]  Discharge- [address] 1/28/1930, 80 y.o. female MRN: [id number]  Unit/Bed#: -[facility] Encounter: [id number]  Primary Care Provider: Jassi Gutierrez MD   Date and time admitted to hospital: 11/14/2023 12:03 PM    * Syncope  Assessment & Plan  Most likely vasovagal since this occurred in the setting of using the bathroom  ACS has been ruled out  No arrhythmias on telemetry  CT head no acute intracranial pathology. Stable chronic microangiopathy  CT cervical spine-No acute fracture or traumatic subluxation. 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 X-ray left shoulder-no acute osseous abnormality, degenerative changes. Echocardiogram-EF of 55%  Status post a cardiology evaluation, cleared by cardiology for discharge  We will discharge back to the patient's facility today  No further inpatient testing, treatment, and work-up is needed  Outpatient follow-up with PCP    Paroxysmal atrial fibrillation ([address])  Assessment & Plan  She does not appear to be on rate control meds, and not on AC due to age, frailty, and fall risk  Discharged on aspirin as the patient was taking prior to arrival    History of CVA (cerebrovascular accident)  Assessment & Plan  Continue aspirin and statin post discharge    Essential hypertension  Assessment & Plan  Blood pressures have been stable. Acute respiratory failure with hypoxia [facility])  Assessment & Plan  This condition was ruled out  Mild pulmonary edema noted, patient was treated with IV Lasix  Patient is being discharged on room air    Mild neurocognitive disorder  Assessment & Plan  At baseline she is disoriented.  On admission assessment she is oriented x 2  Continue supportive care post discharge    Pulmonary embolism [facility])  Assessment & Plan  CTA chest: Chronic pulmonary embolism in the subsegmental right lower lobe pulmonary artery  Not on anticoagulation as above  Vitals have been stable, respiratory status is stable, no anticoagulation at this time    Epigastric pain  Assessment & Plan  Resolved, denies any epigastric discomfort at time of discharge    Cognitive impairment  Assessment & Plan  At baseline        Medical Problems       Resolved Problems  Date Reviewed: 11/14/2023   None       Discharging Physician / Practitioner: Anastasiya Dubose MD  PCP: Ramon Ceballos MD  Admission Date:   Admission Orders (From admission, onward)       Ordered        11/14/23 [address]  Once                          Discharge Date: 11/15/23    Consultations During Hospital Stay:  Cardiology    Procedures Performed:   None    Significant Findings / Test Results:   X-ray left shoulder-degenerative, chronic and postoperative changes. No acute osseous abnormality. CT PE study, abdomen pelvis-Chronic pulmonary embolism in the subsegmental right lower lobe pulmonary artery. No large central or segmental acute pulmonary embolism identified. Cardiomegaly with pulmonary edema and trace right pleural effusion. Small rectal stool ball. Stable 9 mm pancreatic tail cystic lesion. For simple cyst(s) less than 1.5 cm, recommend followup every 2 year for 2 times. After 4 years, no more followups. Recommend next followup in 2 years. Preferred imaging modality: abdomen MRI and MRCP with and   without IV contrast, or triple phase abdomen CT with IV contrast, or abdomen MRI and MRCP without IV contrast. 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 Recommendation is for follow-up low radiation dose chest CT in one year. CT head-see above  CT C-spine-see above  2D echocardiogram-see above, additionally refer to the full report for additional details      Incidental Findings:   None    Test Results Pending at Discharge (will require follow up):    None     Outpatient Tests Requested:  None    Complications: None    Reason for Admission: Syncope rule out [address] [place] [address] Course:   Nelly Lamb is a 80 y.o. female patient who originally presented to the hospital on 11/14/2023 due to a syncopal event. Please refer to the initial history and physical examination completed by ANTONIA Dejesus for the initial presenting features and complaints. In brief, the patient is a pleasant 80year-old female, who was sent over from her facility for further evaluation of a syncopal event. She had extensive imaging with results as outlined above. [facility] She ruled out for the possibility of an acute coronary event with negative troponin testing x3. No arrhythmias noted on telemetry. Imaging as outlined above. 2D echocardiogram was grossly within normal limits. Patient was seen by cardiology who felt that this was all vasovagal and cleared her for discharge. Patient was discharged back to her facility the afternoon of 11/15/2023 with no changes to any of her preadmission medications, and/or to the preadmission doses. Patient will follow-up in the near future in the outpatient setting with her PCP. Please refer to the assessment/plan portion of this discharge summary as outlined above for additional details regarding her stay. The patient, initially admitted to the hospital as inpatient, was discharged earlier than expected given the following: Due to all testing being negative, and the patient being deemed medically stable for discharge from a cardiology standpoint. Please see above list of diagnoses and related plan for additional information. Condition at Discharge: fair    Discharge Day Visit / Exam:   Subjective: Patient seen, resting in bed, is alert, awake, and oriented to person and place but slightly off to time, has no complaints, and feels well.   Vitals: Blood Pressure: 108/75 (11/15/23 0940)  Pulse: 71 (11/15/23 0940)  Temperature: 97.6 °F (36.4 °C) (11/15/23 0723)  Temp Source: Oral (11/15/23 0309)  Respirations: 20 (11/15/23 9664)  Height: 5' 2" (157.5 cm) (11/15/23 0940)  Weight - Scale: 64.9 kg (143 lb) (11/15/23 0940)  SpO2: 98 % (11/15/23 0753)  Exam:   Physical Exam  Constitutional:       General: She is not in acute distress. Appearance: Normal appearance. She is normal weight. She is not ill-appearing. HENT:      Head: Normocephalic and atraumatic. Nose: Nose normal.      Mouth/Throat:      Mouth: Mucous membranes are moist.   Eyes:      Extraocular Movements: Extraocular movements intact. Pupils: Pupils are equal, round, and reactive to light. Cardiovascular:      Rate and Rhythm: Normal rate and regular rhythm. Pulses: Normal pulses. Heart sounds: Normal heart sounds. No murmur heard. No friction rub. No gallop. Pulmonary:      Effort: Pulmonary effort is normal. No respiratory distress. Breath sounds: Normal breath sounds. No wheezing, rhonchi or rales. Abdominal:      General: There is no distension. Palpations: Abdomen is soft. There is no mass. Tenderness: There is no abdominal tenderness. Hernia: No hernia is present. Musculoskeletal:         General: No swelling or tenderness. Normal range of motion. Cervical back: Normal range of motion and neck supple. No rigidity. Right lower leg: No edema. Left lower leg: No edema. Skin:     General: Skin is warm. Capillary Refill: Capillary refill takes less than 2 seconds. Findings: No erythema or rash. Neurological:      General: No focal deficit present. Mental Status: She is alert. Mental status is at baseline. Cranial Nerves: No cranial nerve deficit. Motor: No weakness. Comments: AAO x2, mild cognitive impairment noted   Psychiatric:         Mood and Affect: Mood normal.         Behavior: Behavior normal.          Discussion with Family: Updated  (daughter) at bedside.     Discharge instructions/Information to patient and family:   See after visit summary for information provided to patient and family. Provisions for Follow-Up Care:  See after visit summary for information related to follow-up care and any pertinent home health orders. Mobility at time of Discharge:   Basic Mobility Inpatient Raw Score: 17  JH-HLM Goal: 5: Stand one or more mins  JH-HLM Achieved: 6: Walk 10 steps or more  HLM Goal NOT achieved. Continue to encourage mobility in post discharge setting. Disposition:   Home    Planned Readmission: None     Discharge Statement:  I spent 40 minutes discharging the patient. This time was spent on the day of discharge. I had direct contact with the patient on the day of discharge. Greater than 50% of the total time was spent examining patient, answering all patient questions, arranging and discussing plan of care with patient as well as directly providing post-discharge instructions. Additional time then spent on discharge activities. Discharge Medications:  See after visit summary for reconciled discharge medications provided to patient and/or family.       **Please Note: This note may have been constructed using a voice recognition system**

## 2023-11-15 NOTE — OCCUPATIONAL THERAPY NOTE
Occupational Therapy Evaluation      Ada Reis    11/15/2023    Principal Problem:    Syncope  Active Problems:    Essential hypertension    History of CVA (cerebrovascular accident)    Mild neurocognitive disorder    Acute respiratory failure with hypoxia (HCC)    Paroxysmal atrial fibrillation (HCC)    Epigastric pain    Pulmonary embolism (HCC)      Past Medical History:   Diagnosis Date    TANNER (acute kidney injury) ([address]) 10/11/2022    Arthritis     Atrial fibrillation ([address])     Cataract     LAST ASSESSED: [id number]    CVA (cerebral vascular accident) [facility])     Depression     Dislocation of left shoulder joint     LAST ASSESSSED: [id number]    Essential hypertension 04/03/2019    Gait disorder     LAST ASSESSED: [id number]    Herpes zoster     LAST ASSESSED: [id number]    HL (hearing loss)     Hyperlipidemia     Impacted cerumen of both ears     LAST ASSESSED: [id number]    Impacted cerumen of right ear     LAST ASSESSED: [id number]    Lightheadedness     MILD AND PT REFUSES EKG ETC. PT PROMISES TO CALL IF WORSENS. LAST ASSESSED: [id number]    Multiple falls     LAST ASSESSED: [id number]    Near syncope     LAST ASSESSED: [id number]    Postherpetic polyneuropathy     LAST ASSESSED: [id number]       Past Surgical History:   Procedure Laterality Date    CATARACT EXTRACTION, BILATERAL      HERNIA REPAIR      TONSILLECTOMY      TUBAL LIGATION          11/15/23 0816   OT Last Visit   OT Visit Date 11/15/23   Note Type   Note type Evaluation   Pain Assessment   Pain Assessment Tool 0-10   Pain Score No Pain   Restrictions/Precautions   Weight Bearing Precautions Per Order No   Other Precautions Cognitive; Chair Alarm;Multiple lines;Telemetry; Fall Risk   Home Living   Type of Home Assisted living  [facility])   Home Layout One level;Performs ADLs on one level; Able to live on main level with bedroom/bathroom   Bathroom Shower/Tub Walk-in shower   Bathroom Toilet Standard   Bathroom Equipment Grab bars in shower; Shower chair;Grab bars around toilet   [address]   Prior Function   Level of [address] with ADLs; Independent with functional mobility; Needs assistance with [address] staff   Receives Help From Personal care attendant   IADLs Family/Friend/Other provides transportation; Family/Friend/Other provides meals; Family/Friend/Other provides medication management   Falls in the last 6 months 1 to 4  (1 fall)   Vocational Retired  ()   ADL   [address]  Supervision/Setup   LB Bathing Assistance 3  Moderate Assistance   [address]  [address]  Maximal [address] in recliner upon arrival and conclusion   Transfers   Sit to Stand 4  Minimal assistance   Additional items Assist x 1; Armrests; Increased time required;Verbal cues   Stand to Sit 4  Minimal assistance   Additional items Assist x 1; Armrests; Increased time required;Verbal cues   Additional Comments Pt reports "I've been dizzy my entire life"   Balance   Static Sitting Fair +   Dynamic Sitting Fair   Static Standing Fair -   Dynamic Standing Poor +   Activity Tolerance   Activity Tolerance Patient limited by fatigue; Other (Comment)  (+dizziness)   Medical Staff Made Aware CM notified   Nurse Made Aware RN Dot Meckel   RUE Assessment   RUE Assessment X  (AROM shoulder flexion ~90 degrees, elbow distally WFL)   RUE Strength   RUE Overall Strength Deficits  (3-/5)   LUE Assessment   LUE Assessment   (AROM shoulder flexion <90 degrees, elbow distally WFL)   LUE Strength   LUE Overall Strength Deficits  (2+/5)   Cognition   Overall Cognitive Status Impaired   Arousal/Participation Alert; Cooperative   Attention Attends with cues to redirect   Orientation Level Oriented to person;Oriented to place;Oriented to situation;Disoriented to time   Memory Decreased recall of recent events;Decreased recall of precautions   Following Commands Follows one step commands without difficulty   Assessment   Limitation Decreased ADL status; Decreased UE ROM; Decreased UE strength;Decreased Safe judgement during ADL;Decreased cognition;Decreased endurance;Decreased self-care trans;Decreased high-level ADLs   Prognosis Good   Assessment Pt is a 80 y.o. female seen for OT evaluation s/p [facility] w/ Syncope. Comorbidities affecting pt's functional performance at time of assessment include:  HTN, Hx of CVA, A-fib, PE . Personal factors affecting pt at time of IE include:difficulty performing ADLS, limited insight into deficits, and decreased initiation and engagement . Prior to admission, pt was Mod I with ADLs. Upon evaluation: the following deficits impact occupational performance: decreased ROM, decreased strength, decreased balance, decreased tolerance, impaired initiation, impaired memory, impaired sequencing, impaired problem solving, and decreased safety awareness. Pt to benefit from continued skilled OT tx while in the hospital to address deficits as defined above and maximize level of functional independence w ADL's and functional mobility. Occupational Performance areas to address include: bathing/shower, toilet hygiene, dressing, functional mobility, and clothing management. From OT standpoint, recommendation at time of d/c would be Level II (Moderate Resource Intensity. Goals   Patient Goals to get stronger   Plan   Treatment Interventions ADL retraining;Functional transfer training;UE strengthening/ROM; Endurance training;Cognitive reorientation;Patient/family training; Compensatory technique education; Energy conservation; Activityengagement   Goal Expiration Date 11/25/23   OT Treatment Day 0   OT Frequency 3-5x/wk   Discharge Recommendation   Rehab Resource Intensity Level, OT II (Moderate Resource Intensity)   Additional Comments  The patient's raw score on the AM-PAC Daily Activity Inpatient Short Form is 16. A raw score of less than 19 suggests the patient may benefit from discharge to post-acute rehabilitation services. Please refer to the recommendation of the Occupational Therapist for safe discharge planning.    AM-PAC Daily Activity Inpatient   Lower Body Dressing 2   Bathing 2   Toileting 2   Upper Body Dressing 3   Grooming 3   Eating 4   Daily Activity Raw Score 16   Daily Activity Standardized Score (Calc for Raw Score >=11) 35.96   AM-PAC Applied Cognition Inpatient   Following a Speech/Presentation 3   Understanding Ordinary Conversation 3   Taking Medications 1   Remembering Where Things Are Placed or Put Away 1   Remembering List of 4-5 Errands 1   Taking Care of Complicated Tasks 1   Applied Cognition Raw Score 10   Applied Cognition Standardized Score 24.98     GOALS:    Pt will achieve the following within specified time frame: STG  Pt will achieve the following goals within 5 days    *ADL transfers with CGA for inc'd independence with ADLs/purposeful tasks    *UB ADL with CGA for inc'd independence with self cares    *LB ADL with Mod (A) using AE prn for inc'd independence with self cares    *Toileting with Min (A) for clothing management and hygiene for return to [facility] with personal care    *Increase static stand balance to F and dyn stand balance to F- for inc'd safety with standing purposeful tasks    *Increase stand tolerance x3 m for inc'd tolerance with standing purposeful tasks    *Participate in 10m UE therex to increase overall stamina/activity tolerance for purposeful tasks    *Bed mobility- Min (A) for inc'd independence to manage own comfort and initiate EOB & OOB purposeful tasks    Pt will achieve the following within specified time frame: LTG  Pt will achieve the following goals within 10 days    *ADL transfers with (S) for inc'd independence with ADLs/purposeful tasks    *UB ADL with (S) for inc'd independence with self cares    *LB ADL with Min (A) using AE prn for inc'd independence with self cares    *Toileting with CGA for clothing management and hygiene for return to PLOF with personal care    *Increase static stand balance to F+ and dyn stand balance to F for inc'd safety with standing purposeful tasks    *Increase stand tolerance x5 m for inc'd tolerance with standing purposeful tasks    *Bed mobility- CGA for inc'd independence to manage own comfort and initiate EOB & OOB purposeful tasks      Myrisa Haydee Litten, MS, OTR/L

## 2023-11-15 NOTE — PLAN OF CARE
Problem: OCCUPATIONAL THERAPY ADULT  Goal: Performs self-care activities at highest level of function for planned discharge setting. See evaluation for individualized goals. Description: Treatment Interventions: ADL retraining, Functional transfer training, UE strengthening/ROM, Endurance training, Cognitive reorientation, Patient/family training, Compensatory technique education, Energy conservation, Activityengagement     See flowsheet documentation for full assessment, interventions and recommendations. Note: Limitation: Decreased ADL status, Decreased UE ROM, Decreased UE strength, Decreased Safe judgement during ADL, Decreased cognition, Decreased endurance, Decreased self-care trans, Decreased high-level ADLs  Prognosis: Good  Assessment: Pt is a 80 y.o. female seen for OT evaluation s/p admit to [facility] on 11/14/2023 w/ Syncope. Comorbidities affecting pt's functional performance at time of assessment include:  HTN, Hx of CVA, A-fib, PE . Personal factors affecting pt at time of IE include:difficulty performing ADLS, limited insight into deficits, and decreased initiation and engagement . Prior to admission, pt was Mod I with ADLs. Upon evaluation: the following deficits impact occupational performance: decreased ROM, decreased strength, decreased balance, decreased tolerance, impaired initiation, impaired memory, impaired sequencing, impaired problem solving, and decreased safety awareness. Pt to benefit from continued skilled OT tx while in the hospital to address deficits as defined above and maximize level of functional independence w ADL's and functional mobility. Occupational Performance areas to address include: bathing/shower, toilet hygiene, dressing, functional mobility, and clothing management. From OT standpoint, recommendation at time of d/c would be Level II (Moderate Resource Intensity.      Rehab Resource Intensity Level, OT: II (Moderate Resource Intensity)     Blanca Yousif MS, OTR/L

## 2023-11-15 NOTE — ASSESSMENT & PLAN NOTE
At baseline she is disoriented.  On admission assessment she is oriented x 2  Continue supportive care post discharge

## 2023-11-15 NOTE — CONSULTS
[address]  Consult  Name: Sarai Machado 80 y.o. female I MRN: [id number]  Unit/Bed#: -01 I Date of Admission: 11/14/2023   Date of Service: 11/15/2023 I Hospital Day: 1    Inpatient consult to Cardiology  Consult performed by: Sydney Masters MD  Consult ordered by: Nelson Gamez MD          Assessment/Plan   Cognitive impairment  Assessment & Plan  Seems quite significant. Expeditious discharge to prevent further confusion would be best.    * Syncope  Assessment & Plan  Most c/w transient high vagal tone as it occurred after using the bathroom by description. Other summary comments:   No evidence for acute dysrhythmia. No significant abnormalities by telemetry. I would not resume atorvastatin or HCTZ upon discharge. We will see her again as needed. Outpatient Cardiologist: Serenity Camacho recently    HPI: Sarai Machado is a 80y.o. year old female who presented ysterday after apparently passing out on the toilet. No obvious trauma. Patient can't describe falling. Here trop negative and ecg non ischemic. Tele doesn't reveal any profound changes  No complaints this am.    I have seen her earlier this year for prior PAF and HTN. She seemed too frail for full anti-coagulation and BP too low to continue HCTZ. EKG:   Sinus without significant ST changes    MOST  RECENT CARDIAC IMAGING:    TTE 11/15/2022: Normal LV systolic function. Review of Systems: Not reliably obtainable.     Historical Information   Past Medical History:   Diagnosis Date    TANNER (acute kidney injury) ([address]) 10/11/2022    Arthritis     Atrial fibrillation ([address])     Cataract     LAST ASSESSED: [id number]    CVA (cerebral vascular accident) [facility])     Depression     Dislocation of left shoulder joint     LAST ASSESSSED: [id number]    Essential hypertension 04/03/2019    Gait disorder     LAST ASSESSED: [id number]    Herpes zoster     LAST ASSESSED: [id number]    HL (hearing loss) Hyperlipidemia     Impacted cerumen of both ears     LAST ASSESSED: [id number]    Impacted cerumen of right ear     LAST ASSESSED: [id number]    Lightheadedness     MILD AND PT REFUSES EKG ETC. PT PROMISES TO CALL IF WORSENS. LAST ASSESSED: [id number]    Multiple falls     LAST ASSESSED: [id number]    Near syncope     LAST ASSESSED: [id number]    Postherpetic polyneuropathy     LAST ASSESSED: [id number]     Past Surgical History:   Procedure Laterality Date    CATARACT EXTRACTION, BILATERAL      HERNIA REPAIR      TONSILLECTOMY      TUBAL LIGATION       Social History     Substance and Sexual Activity   Alcohol Use Never     Social History     Substance and Sexual Activity   Drug Use No     Social History     Tobacco Use   Smoking Status Never   Smokeless Tobacco Never       Family History:   No longer relevant    Meds/Allergies   all current active meds have been reviewed and PTA meds:   Prior to Admission Medications   Prescriptions Last Dose Informant Patient Reported?  Taking?   acetaminophen (TYLENOL) 325 mg tablet   No No   Sig: Take 2 tablets (650 mg total) by mouth every 6 (six) hours as needed for mild pain   aspirin 81 mg chewable tablet   No No   Sig: Chew 1 tablet (81 mg total) daily   atorvastatin (LIPITOR) 40 mg tablet   No No   Sig: Take 1 tablet (40 mg total) by mouth every evening   hydrochlorothiazide (HYDRODIURIL) 25 mg tablet   No No   Sig: Take 1 tablet (25 mg total) by mouth as needed (for morning leg swelling.)   polyethylene glycol (MIRALAX) 17 g packet   No No   Sig: Take 17 g by mouth daily as needed (constipation)   Patient not taking: Reported on 4/19/2023      Facility-Administered Medications: None     Medications Prior to Admission   Medication    acetaminophen (TYLENOL) 325 mg tablet    aspirin 81 mg chewable tablet    atorvastatin (LIPITOR) 40 mg tablet    hydrochlorothiazide (HYDRODIURIL) 25 mg tablet    polyethylene glycol (MIRALAX) 17 g packet       Allergies   Allergen Reactions Vancomycin Hives and Itching       Objective   Vitals: Blood pressure 108/75, pulse 71, temperature 97.6 °F (36.4 °C), resp. rate 20, height 5' 2" (1.575 m), weight 64.9 kg (143 lb), SpO2 98 %, not currently breastfeeding., Body mass index is 26.16 kg/m².,   Orthostatic Blood Pressures      Flowsheet Row Most Recent Value   Blood Pressure 108/75 filed at 11/15/2023 0940   Patient Position - Orthostatic VS Standing - Orthostatic VS filed at 11/15/2023 0737            Systolic (61LVI), RACQUEL:639 , Min:107 , KTI:667     Diastolic (18OQD), SOF:34, Min:61, Max:80              Physical Exam:    General:  Normal appearance in no distress. Eyes:  Anicteric. Oral mucosa:  Moist.  Neck:  No JVD. Carotid upstrokes are brisk without bruits. No masses. Chest:  Clear to auscultation. Cardiac:  No palpable PMI. Normal S1 and S2. No murmur gallop or rub. Abdomen:  Soft and nontender. No palpable organomegaly or aortic enlargement. Extremities:  No peripheral edema. Musculoskeletal:  Symmetric. Vascular:  Femoral pulses are brisk without bruits. Popliteal  pulses are intact bilaterally. Pedal pulses are intact. Neuro:  Grossly symmetric.   Psych:  Alert and oriented to self but not to date or where she is        Lab Results:     Troponins:    Results from last 7 days   Lab Units 11/14/23  1416 11/14/23  1233   HS TNI 0HR ng/L  --  5   HS TNI 2HR ng/L 4  --    HSTNI D2 ng/L -1  --      BNP:   Results from last 6 Months   Lab Units 11/14/23  1223   BNP pg/mL 105*       CBC :   Results from last 7 days   Lab Units 11/15/23  0441 11/14/23  1223   WBC Thousand/uL 8.18 11.64*   HEMOGLOBIN g/dL 13.9 14.4   HEMATOCRIT % 42.6 44.1   MCV fL 92 92   PLATELETS Thousands/uL 180 193     TSH:     CMP:   Results from last 7 days   Lab Units 11/15/23  0441 11/14/23  1223   POTASSIUM mmol/L 3.8 3.8   CHLORIDE mmol/L 102 101   CO2 mmol/L 25 25   BUN mg/dL 16 20   CREATININE mg/dL 0.89 1.08   AST U/L  --  20   ALT U/L  --  15   EGFR ml/min/1.73sq m 64 44     Lipid Profile:     Coags:

## 2023-11-15 NOTE — ASSESSMENT & PLAN NOTE
CTA chest: Chronic pulmonary embolism in the subsegmental right lower lobe pulmonary artery  Not on anticoagulation as above  Vitals have been stable, respiratory status is stable, no anticoagulation at this time

## 2023-11-15 NOTE — DISCHARGE INSTR - AVS FIRST PAGE
Dear Paul Manning,     It was our pleasure to care for you here at [facility], [address], [place]. It is our hope that we were always able to exceed the expected standards for your care during your stay. You were hospitalized due to passing out. You were cared for on the medical/surgical floor by Yogi Mathur MD with the [facility] who covers for your primary care physician (PCP), Tonia Landaverde MD, while you were hospitalized. You were additionally seen by the [facility] cardiology associates-Dr. Jeremy Brewster. If you have any questions or concerns related to this hospitalization, you may contact us at [phone]. For follow up as well as any medication refills, we recommend that you follow up with your primary care physician. A registered nurse will reach out to you by phone within a few days after your discharge to answer any additional questions that you may have after going home. However, at this time we provide for you here, the most important instructions / recommendations at discharge:     Notable Medication Adjustments -   Okay to resume all preadmission medications at the preadmission doses  Testing Required after Discharge -   To be further determined in the outpatient setting by your primary care provider, and/or cardiology  Important follow up information -   Please follow-up with the providers as outlined in this discharge package  Other Instructions -   Is maintain a healthy diet  Please review this entire after visit summary as additional general instructions including medication list, appointments, activity, diet, any pertinent wound care, and other additional recommendations from your care team that may be provided for you.       Sincerely,     Yogi Mathur MD

## 2023-11-15 NOTE — ASSESSMENT & PLAN NOTE
This condition was ruled out  Mild pulmonary edema noted, patient was treated with IV Lasix  Patient is being discharged on room air

## 2023-11-15 NOTE — QUICK NOTE
Notified by nursing that HR run low 50-60s and occasional drops into 40s on tele. Asymptomatic and no complaints.     Medications reviewed  Cardiology to see in AM    Continue to monitor HR

## 2023-11-15 NOTE — PHYSICAL THERAPY NOTE
Physical Therapy Evaluation   Time in: 0724  Time out: 0740  Total evaluation time: 16 minutes    Patient's Name: Kaushik Chris    Admitting Diagnosis  Syncope [R55]  Constipation [K59.00]  Thyroid mass [E07.9]  Chronic pulmonary embolism (HCC) [I27.82]  Thoracic aortic aneurysm ([address]) [I71.20]  Pancreatic lesion [K86.9]  Acute respiratory failure with hypoxemia (HCC) [J96.01]    Problem List  Patient Active Problem List   Diagnosis    Hearing loss    Gait abnormality    Osteoarthritis    Mediastinal mass    Spinal stenosis at L4-L5 level    Essential hypertension    Bilateral lower extremity edema    Benign paroxysmal positional vertigo    Urinary incontinence    History of CVA (cerebrovascular accident)    Right ankle sprain    Venous insufficiency of both lower extremities    Unspecified mood (affective) disorder, r/o major depressive disorder (single episode)    Mild neurocognitive disorder    Current moderate episode of major depressive disorder without prior episode ([address])    History of suicidal ideation    Generalized weakness    Metabolic encephalopathy    Hyponatremia    Constipation    Gastric distention    TANNER (acute kidney injury) ([address])    Positive blood culture    Syncope    Abnormal CT scan    Acute respiratory failure with hypoxia (HCC)    Hypomagnesemia    Paroxysmal atrial fibrillation (HCC)    Hypokalemia    Epigastric pain    Pulmonary embolism (HCC)    Cognitive impairment       Past Medical History  Past Medical History:   Diagnosis Date    TANNER (acute kidney injury) ([address]) 10/11/2022    Arthritis     Atrial fibrillation ([address])     Cataract     LAST ASSESSED: [id number]    CVA (cerebral vascular accident) ([address])     Depression     Dislocation of left shoulder joint     LAST ASSESSSED: [id number]    Essential hypertension 04/03/2019    Gait disorder     LAST ASSESSED: 21XPX4449    Herpes zoster     LAST ASSESSED: [id number]    HL (hearing loss)     Hyperlipidemia     Impacted cerumen of both ears LAST ASSESSED: [id number]    Impacted cerumen of right ear     LAST ASSESSED: [id number]    Lightheadedness     MILD AND PT REFUSES EKG ETC. PT PROMISES TO CALL IF WORSENS. LAST ASSESSED: [id number]    Multiple falls     LAST ASSESSED: 79LMJ9113    Near syncope     LAST ASSESSED: [id number]    Postherpetic polyneuropathy     LAST ASSESSED: 32RMZ0722       Past Surgical History  Past Surgical History:   Procedure Laterality Date    CATARACT EXTRACTION, BILATERAL      HERNIA REPAIR      TONSILLECTOMY      TUBAL LIGATION         PT performed at least 2 patient identifiers during session: Name and wristband. 11/15/23 0726   PT Last Visit   PT Visit Date 11/15/23   Note Type   Note type Evaluation   Pain Assessment   Pain Assessment Tool 0-10   Pain Score No Pain   Restrictions/Precautions   Weight Bearing Precautions Per Order No   Other Precautions Cognitive; Bed Alarm; Chair Alarm;O2;Telemetry; Fall Risk   Home Living   Type of Home Assisted living  [facility])   Home Layout One level;Performs ADLs on one level;Ramped entrance;Elevator   Bathroom Shower/Tub Walk-in shower   Bathroom Toilet Standard   Bathroom Equipment Grab bars in shower; Shower chair;Grab bars around toilet   [address]   Prior Function   Level of [address] with ADLs; Needs assistance with IADLS; Independent with functional mobility   Lives With Facility staff   Receives Help From Personal care attendant   IADLs Family/Friend/Other provides transportation; Family/Friend/Other provides meals; Family/Friend/Other provides medication management   Falls in the last 6 months 1 to 4  (1 fall)   Vocational Retired   General   Family/Caregiver Present No   Cognition   Overall Cognitive Status Impaired   Arousal/Participation Alert   Attention Attends with cues to redirect   Orientation Level Oriented to person;Oriented to place;Oriented to situation;Disoriented to time   Memory Decreased recall of precautions;Decreased recall of recent events   Following Commands Follows one step commands without difficulty   Subjective   Subjective "I get anxious at times"   RLE Assessment   RLE Assessment X   Strength RLE   RLE Overall Strength 3/5  (Atleast 3/5 with functional mobility)   LLE Assessment   LLE Assessment X   Strength LLE   LLE Overall Strength 3/5  (Atleast 3/5 due to functional activity tolerance)   Coordination   Movements are Fluid and Coordinated 0   Coordination and Movement Description movements were slow and segmental. Shaking of UE when seated at EOB, pt reports she was nervous and anxious. Bed Mobility   Supine to Sit 4  Minimal assistance   Additional items Assist x 1; Increased time required;HOB elevated; Bedrails   Additional Comments Pt remained OOB at end of session   Transfers   Sit to Stand 4  Minimal assistance   Additional items Assist x 1; Increased time required;Verbal cues   Stand to Sit 4  Minimal assistance   Additional items Assist x 1; Increased time required;Verbal cues;Armrests   Stand pivot 4  Minimal assistance   Additional items Assist x 1; Increased time required;Verbal cues   Toilet transfer 4  Minimal assistance   Additional items Assist x 1; Increased time required;Armrests; Verbal cues  (VC for use of armrests and not RW)   Additional Comments Use of RW for transfers. /67 mmHg supine in bed, /74 mmHg when seated at EOB, /75 mmHg after ambulation and seated at EOB, /69 when seated in bedside chair after therapeutic exercise   Ambulation/Elevation   Gait pattern Improper Weight shift;Decreased foot clearance; Foward flexed; Short stride; Excessively slow;Decreased toe off;Decreased heel strike   Gait Assistance 4  Minimal assist   Additional items Assist x 1;Verbal cues   Assistive Device Rolling walker   Distance 20 ft from bed to bedside commode, 2ft from bedside commode to bedside chair   Ambulation/Elevation Additional Comments /67 mmHg supine in bed, /74 mmHg when seated at EOB, /75 mmHg after ambulation and seated at EOB, /69 when seated in bedside chair after therapeutic exercise   Balance   Static Sitting Fair +   Dynamic Sitting Fair   Static Standing Fair -   Dynamic Standing Poor +   Ambulatory Poor +   Endurance Deficit   Endurance Deficit Yes   Endurance Deficit Description Due to lightheadedness and dizziness reported by patient   Activity Tolerance   Activity Tolerance Patient limited by fatigue   Medical Staff Made Aware Yes, Medical staff made aware   Nurse Made Aware Yes, RN Primitivo Romero made aware of session outcomes and Nursing student present for treatment. Assessment   Prognosis Good   Problem List Decreased strength;Decreased endurance; Impaired balance;Decreased mobility; Decreased cognition;Decreased coordination   Assessment Pt is a 80 y.o. female seen for PT evaluation s/p admit to [address] w/ Syncope. PTA, pt was independent w/ all functional mobility and ADL's but required assistance for IADL's. Pt used RW assistive device for mobility at baseline. PT consulted to assess pt's functional mobility and d/c needs. At time of PT eval, pt found supine in bed and was agreeable to PT evaluation. Nursing student was present in room for evaluation and for further assistance needed. BP was 110/67 mmHg supine in bed before movement. Pt required min assist x1 for bed mobility and reported some lightheadedness when seated at EOB. BP was 124/74 mmHg seated at EOB. Once symptoms surpassed, pt preformed STS transfer min assist x1 and attempted to ambulate to doorway and back. Pt stated she needed to use the bathroom while ambulating. Bedside commode was brought out to bedside due to pt's increase in dizziness with ambulation. Pt was seated back in bed before commode use due to dizziness reports. BP was 108/75 mmHg seated at EOB after ambulation. Once symptoms surpassed, an added treatment session was prompted. Commode transfer was min assist x1 and stand pivot to bedside chair was also min assist x1. Once in bedside chair pt tolerated therapeutic exercises as well during treatment session. BP was 108/69 mmHg at end of session. Over, pt presents with decreased endurance and activity tolerance due to symptoms of dizziness and lightheadedness. Plan to progress pt's overall tolerance to activity as well as strengthen pt with therapeutic exercise. Pt will continue to benefit from immediate acute skilled PT services in order to address the deficits listed above. Areas for improvement include: decreased strength, decreased endurance, impaired balance and decreased coordination. From PT/mobility standpoint, at this time recommendation is a moderate resource intensity, in order to maximize pt's functional independence and safety/consistency w/ mobility. Barriers to Discharge Inaccessible home environment;Decreased caregiver support   Goals   Patient Goals to get stronger   STG Expiration Date 11/25/23   Short Term Goal #1 In 7-10 days, pt will increase LE strength by 1/2 grade in order to increase functional activity tolerance. Pt will ambulate >50 ft MOD I with a RW and a normalized gait pattern in order to return to PLOF and ambulate in her home. Pt will increase balance score by 1/2 grade in order to decrease risk of falling. Pt will perform bed mobility MOD I in order to safely get in/out of bed. Pt will perform transfers MOD I in order to increase independence and decrease caregiver assistance needed. PT Treatment Day 0   Plan   Treatment/Interventions Functional transfer training;LE strengthening/ROM; Therapeutic exercise; Endurance training;Patient/family training;Equipment eval/education; Bed mobility;Gait training;Spoke to nursing   PT Frequency 3-5x/wk   Discharge Recommendation   Rehab Resource Intensity Level, PT II (Moderate Resource Intensity)   Equipment Recommended Francesca Theodore  (REZA)   AM-PAC Basic Mobility Inpatient Turning in Flat Bed Without Bedrails 3   Lying on Back to Sitting on Edge of Flat Bed Without Bedrails 3   Moving Bed to Chair 3   Standing Up From Chair Using Arms 3   Walk in Room 3   Climb 3-5 Stairs With Railing 2   Basic Mobility Inpatient Raw Score 17   Basic Mobility Standardized Score 39.67   Highest Level Of Mobility   -HLM Goal 5: Stand one or more mins   JH-HLM Achieved 6: Walk 10 steps or more   Additional Treatment Session   Start Time 0740   End Time 0756   Treatment Assessment Pt preformed toilet transfer and stand pivot to bedside chair and required min assist x1. Once in bedside chair pt tolerated therapuetic exercises including LAQ, marches and ankle pumps. Pt stated she was dizzy at end of treatment and BP was 108/69 mmHG. Overall, pt presented with decreased endurance due to reported fatigue, dizziness and weakness. Pt was very motivated to get better throughout the session and plan to progress overall activity tolerance. Pt was left with nursing student and was set up for breakfast at end of session. Equipment Use RW   Additional Treatment Day 1   Exercises   Knee AROM Long Arc Quad Sitting;15 reps;Bilateral   Ankle Pumps Sitting;20 reps;Bilateral   Marching Sitting;15 reps;Bilateral   End of Consult   Patient Position at End of Consult Bedside chair;Bed/Chair alarm activated; All needs within reach  (left with nursing student at end of session)       [facility] Jacobo ZAMUDIO, ZENON

## 2023-11-15 NOTE — ASSESSMENT & PLAN NOTE
Most likely vasovagal since this occurred in the setting of using the bathroom  ACS has been ruled out  No arrhythmias on telemetry  CT head no acute intracranial pathology. Stable chronic microangiopathy  CT cervical spine-No acute fracture or traumatic subluxation. 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 X-ray left shoulder-no acute osseous abnormality, degenerative changes.   Echocardiogram-EF of 55%  Status post a cardiology evaluation, cleared by cardiology for discharge  We will discharge back to the patient's facility today  No further inpatient testing, treatment, and work-up is needed  Outpatient follow-up with PCP

## 2023-11-15 NOTE — ASSESSMENT & PLAN NOTE
She does not appear to be on rate control meds, and not on AC due to age, frailty, and fall risk  Discharged on aspirin as the patient was taking prior to arrival

## 2023-11-15 NOTE — PROGRESS NOTES
Patient:    MRN:  [id number]    Tonja Request ID:  [id number]    Level of care reserved:    Partner Reserved:    Clinical needs requested:    Geography searched:  [phone]    Start of Service:    Request sent:  1:11pm EST on 11/15/2023 by Renuka Mane    Partner reserved:  by Renuka Mane    Choice list shared:  1:22pm EST on 11/15/2023 by Renuka Mane

## 2023-11-15 NOTE — PLAN OF CARE
Problem: PAIN - ADULT  Goal: Verbalizes/displays adequate comfort level or baseline comfort level  Description: Interventions:  - Encourage patient to monitor pain and request assistance  - Assess pain using appropriate pain scale  - Administer analgesics based on type and severity of pain and evaluate response  - Implement non-pharmacological measures as appropriate and evaluate response  - Consider cultural and social influences on pain and pain management  - Notify physician/advanced practitioner if interventions unsuccessful or patient reports new pain  Outcome: Progressing     Problem: INFECTION - ADULT  Goal: Absence or prevention of progression during hospitalization  Description: INTERVENTIONS:  - Assess and monitor for signs and symptoms of infection  - Monitor lab/diagnostic results  - Monitor all insertion sites, i.e. indwelling lines, tubes, and drains  - Monitor endotracheal if appropriate and nasal secretions for changes in amount and color  - [place] appropriate cooling/warming therapies per order  - Administer medications as ordered  - Instruct and encourage patient and family to use good hand hygiene technique  - Identify and instruct in appropriate isolation precautions for identified infection/condition  Outcome: Progressing  Goal: Absence of fever/infection during neutropenic period  Description: INTERVENTIONS:  - Monitor WBC    Outcome: Progressing     Problem: SAFETY ADULT  Goal: Patient will remain free of falls  Description: INTERVENTIONS:  - Educate patient/family on patient safety including physical limitations  - Instruct patient to call for assistance with activity   - Consult OT/PT to assist with strengthening/mobility   - Keep Call bell within reach  - Keep bed low and locked with side rails adjusted as appropriate  - Keep care items and personal belongings within reach  - Initiate and maintain comfort rounds  - Make Fall Risk Sign visible to staff  - Offer Toileting every 2 Hours, in advance of need  - Initiate/Maintain bed  alarm  - Obtain necessary fall risk management equipment: nonskid socks  - Apply yellow socks and bracelet for high fall risk patients  - Consider moving patient to room near nurses station  Outcome: Progressing  Goal: Maintain or return to baseline ADL function  Description: INTERVENTIONS:  -  Assess patient's ability to carry out ADLs; assess patient's baseline for ADL function and identify physical deficits which impact ability to perform ADLs (bathing, care of mouth/teeth, toileting, grooming, dressing, etc.)  - Assess/evaluate cause of self-care deficits   - Assess range of motion  - Assess patient's mobility; develop plan if impaired  - Assess patient's need for assistive devices and provide as appropriate  - Encourage maximum independence but intervene and supervise when necessary  - Involve family in performance of ADLs  - Assess for home care needs following discharge   - Consider OT consult to assist with ADL evaluation and planning for discharge  - Provide patient education as appropriate  Outcome: Progressing  Goal: Maintains/Returns to pre admission functional level  Description: INTERVENTIONS:  - Perform AM-PAC 6 Click Basic Mobility/ Daily Activity assessment daily.  - Set and communicate daily mobility goal to care team and patient/family/caregiver. - Collaborate with rehabilitation services on mobility goals if consulted  - Perform Range of Motion 3 times a day. - Reposition patient every 2 hours.   - Dangle patient 3 times a day  - Stand patient 3 times a day  - Ambulate patient 3 times a day  - Out of bed to chair 3 times a day   - Out of bed for meals 3 times a day  - Out of bed for toileting  - Record patient progress and toleration of activity level   Outcome: Progressing     Problem: DISCHARGE PLANNING  Goal: Discharge to home or other facility with appropriate resources  Description: INTERVENTIONS:  - Identify barriers to discharge w/patient and caregiver  - Arrange for needed discharge resources and transportation as appropriate  - Identify discharge learning needs (meds, wound care, etc.)  - Arrange for interpretive services to assist at discharge as needed  - Refer to Case Management Department for coordinating discharge planning if the patient needs post-hospital services based on physician/advanced practitioner order or complex needs related to functional status, cognitive ability, or social support system  Outcome: Progressing     Problem: Knowledge Deficit  Goal: Patient/family/caregiver demonstrates understanding of disease process, treatment plan, medications, and discharge instructions  Description: Complete learning assessment and assess knowledge base.   Interventions:  - Provide teaching at level of understanding  - Provide teaching via preferred learning methods  Outcome: Progressing     Problem: Prexisting or High Potential for Compromised Skin Integrity  Goal: Skin integrity is maintained or improved  Description: INTERVENTIONS:  - Identify patients at risk for skin breakdown  - Assess and monitor skin integrity  - Assess and monitor nutrition and hydration status  - Monitor labs   - Assess for incontinence   - Turn and reposition patient  - Assist with mobility/ambulation  - Relieve pressure over bony prominences  - Avoid friction and shearing  - Provide appropriate hygiene as needed including keeping skin clean and dry  - Evaluate need for skin moisturizer/barrier cream  - Collaborate with interdisciplinary team   - Patient/family teaching  - Consider wound care consult   Outcome: Progressing

## 2023-11-15 NOTE — PLAN OF CARE
Problem: PHYSICAL THERAPY ADULT  Goal: Performs mobility at highest level of function for planned discharge setting. See evaluation for individualized goals. Description: Treatment/Interventions: Functional transfer training, LE strengthening/ROM, Therapeutic exercise, Endurance training, Patient/family training, Equipment eval/education, Bed mobility, Gait training, Spoke to nursing  Equipment Recommended: Francesca Theodore (REZA)       See flowsheet documentation for full assessment, interventions and recommendations. Note: Prognosis: Good  Problem List: Decreased strength, Decreased endurance, Impaired balance, Decreased mobility, Decreased cognition, Decreased coordination  Assessment: Pt is a 80 y.o. female seen for PT evaluation s/p admit to [address] [place] [facility] [place] w/ Syncope. PTA, pt was independent w/ all functional mobility and ADL's but required assistance for IADL's. Pt used RW assistive device for mobility at baseline. PT consulted to assess pt's functional mobility and d/c needs. At time of PT eval, pt found supine in bed and was agreeable to PT evaluation. Nursing student was present in room for evaluation and for further assistance needed. BP was 110/67 mmHg supine in bed before movement. Pt required min assist x1 for bed mobility and reported some lightheadedness when seated at EOB. BP was 124/74 mmHg seated at EOB. Once symptoms surpassed, pt preformed STS transfer min assist x1 and attempted to ambulate to doorway and back. Pt stated she needed to use the bathroom while ambulating. Bedside commode was brought out to bedside due to pt's increase in dizziness with ambulation. Pt was seated back in bed before commode use due to dizziness reports. BP was 108/75 mmHg seated at EOB after ambulation. Once symptoms surpassed, an added treatment session was prompted. Commode transfer was min assist x1 and stand pivot to bedside chair was also min assist x1.  Once in bedside chair pt tolerated therapeutic exercises as well during treatment session. BP was 108/69 mmHg at end of session. Over, pt presents with decreased endurance and activity tolerance due to symptoms of dizziness and lightheadedness. Plan to progress pt's overall tolerance to activity as well as strengthen pt with therapeutic exercise. Pt will continue to benefit from immediate acute skilled PT services in order to address the deficits listed above. Areas for improvement include: decreased strength, decreased endurance, impaired balance and decreased coordination. From PT/mobility standpoint, at this time recommendation is a moderate resource intensity, in order to maximize pt's functional independence and safety/consistency w/ mobility. Barriers to Discharge: Inaccessible home environment, Decreased caregiver support     Rehab Resource Intensity Level, PT: II (Moderate Resource Intensity)    See flowsheet documentation for full assessment.

## 2023-11-16 LAB — MRSA NOSE QL CULT: NORMAL

## 2023-11-16 NOTE — CASE MANAGEMENT
Case Management Assessment & Discharge Planning Note    Patient name Noris Spaulding  Location /-01 MRN [id number]  : 1930 Date 11/15/2023       Current Admission Date: 2023  Current Admission Diagnosis:Syncope   Patient Active Problem List    Diagnosis Date Noted    Cognitive impairment 11/15/2023    Epigastric pain 2023    Pulmonary embolism ([address]) 2023    Hypokalemia 2023    Paroxysmal atrial fibrillation ([address]) 2023    Hypomagnesemia 2023    Acute respiratory failure with hypoxia (HCC) 2023    Syncope 2022    Abnormal CT scan 2022    Positive blood culture 10/12/2022    Constipation 10/11/2022    Gastric distention 10/11/2022    TANNER (acute kidney injury) ([address]) 10/11/2022    Generalized weakness     Metabolic encephalopathy     Hyponatremia 2022    Current moderate episode of major depressive disorder without prior episode ([address]) 2020    History of suicidal ideation 2020    History of CVA (cerebrovascular accident) 2020    Right ankle sprain 2020    Venous insufficiency of both lower extremities 2020    Unspecified mood (affective) disorder, r/o major depressive disorder (single episode) 2020    Mild neurocognitive disorder 2020    Benign paroxysmal positional vertigo 2019    Urinary incontinence 2019    Bilateral lower extremity edema 2019    Essential hypertension 2019    Mediastinal mass 2019    Spinal stenosis at L4-L5 level 2019    Gait abnormality 2015    Hearing loss 2013    Osteoarthritis 2012      LOS (days): 1  Geometric Mean LOS (GMLOS) (days): 2.40  Days to GMLOS:1.4     OBJECTIVE:    Risk of Unplanned Readmission Score: 11.15         Current admission status: Inpatient  Referral Reason: Other (d/c planning)    Preferred Pharmacy:   [facility] [address] [address]  Phone: [phone] Fax: [phone], 10 42 [address] [place] [facility]  [address] [place] [address]  Phone: [phone] Fax: [phone]    Primary Care Provider: Ling Danielle MD    Primary Insurance: MEDICARE  Secondary Insurance: [facility] HEALTH OPTIONS PROGRAM    ASSESSMENT:  Active Health Care Proxies       Matthew Goodwin - Daughter   Primary Phone: [phone] (Mobile)                           Readmission Root Cause  30 Day Readmission: No    Patient Information  Admitted from[de-identified] Facility [facility])  Mental Status: Alert  During Assessment patient was accompanied by: Not accompanied during assessment  Assessment information provided by[de-identified] Other - please comment (Kristen Perez )  Primary Caregiver: Other (Comment) (staff)  Caregiver's Name[de-identified] [facility]  Caregiver's Relationship to Patient[de-identified] Other (Specify) (staff)  Caregiver's Telephone Number[de-identified] Elidia Ceballos [phone]  Support Systems: Son, Daughter, Other (Comment) (staff)  [place] [facility]: [facility] do you live in?: [address] [facility] entry access options.  Select all that apply.: Ramp  Type of Current Residence: 2 story home  Upon entering residence, is there a bedroom on the main floor (no further steps)?: Yes  Upon entering residence, is there a bathroom on the main floor (no further steps)?: Yes  In the last 12 months, was there a time when you were not able to pay the mortgage or rent on time?: No  In the last 12 months, how many places have you lived?: 1  In the last 12 months, was there a time when you did not have a steady place to sleep or slept in a shelter (including now)?: No  Homeless/housing insecurity resource given?: N/A  Living Arrangements: Other (Comment) (assisted living)  Is patient a ?: No    Activities of Daily Living Prior to Admission  Functional Status: Assistance (cooking, cleaning, laundry, shopping. meds,drivng)  Completes ADLs independently?: No  Level of ADL dependence: Assistance  Ambulates independently?: Yes  Does patient use assisted devices?: Yes  Assisted Devices (DME) used: Romelia Inches  Does patient currently own DME?: Yes  What DME does the patient currently own?: Pauline Haddad, Shower Chair  Does patient have a history of Outpatient Therapy (PT/OT)?: No  Does the patient have a history of Short-Term Rehab?: No  Does patient have a history of HHC?: Yes (Meera Vilchis)  Does patient currently have [facility]?: No         Patient Information Continued  Income Source: Pension/FPC  Does patient have prescription coverage?: Yes (Jose's)  Within the past 12 months, you worried that your food would run out before you got the money to buy more.: Never true  Within the past 12 months, the food you bought just didn't last and you didn't have money to get more.: Never true  Food insecurity resource given?: N/A  Does patient receive dialysis treatments?: No  Does patient have a history of substance abuse?: No         Means of Transportation  Means of Transport to Appts[de-identified] Family transport  In the past 12 months, has lack of transportation kept you from medical appointments or from getting medications?: No  In the past 12 months, has lack of transportation kept you from meetings, work, or from getting things needed for daily living?: No  Was application for public transport provided?: N/A        DISCHARGE DETAILS:    Discharge planning discussed with[de-identified] patietn & son was called at 13:08pm & 13:46pm & 15: 59 pm  Freedom of Choice: Yes  Comments - Freedom of Choice: recommndatio level ll- cm reviewed therpay notes- Lukasz Morillo stated elena pt can return and she requested [address] she has them coming to the [facility]- referrals was sent with permission  CM contacted family/caregiver?: Yes  Were Treatment Team discharge recommendations reviewed with patient/caregiver?: Yes  Did patient/caregiver verbalize understanding of patient care needs?: Yes  Were patient/caregiver advised of the risks associated with not following Treatment Team discharge recommendations?: Yes    Contacts  Patient Contacts: Nguyen Francis  Relationship to Patient[de-identified] Family (son)  Contact Method: Phone  Phone Number: [phone]  Reason/Outcome: [facility]         Is the patient interested in [address] at discharge?: Yes    DME Referral Provided  Referral made for DME?: No    Other Referral/Resources/Interventions Provided:  Interventions: [address], Assisted Living, Adult Day Care  Referral Comments: pt does go to the day care  5 days per week- pt was accepted by [address] [place] [address] aware of the d/c & avs was faxed as requested to [phone]    Would you like to participate in our [facility] [employer] service program?  : No - Declined    Treatment Team Recommendation: Home with [address] [facility] (retrun to [facility] with [address] & day care service s& outpt follow up - w/c Mary Seamodilia 14:45pm)  Discharge Destination Plan[de-identified] Home with [address] [facility] with [address] & adult day care & outpt follow up- 14:45pm w/c Jenna Asencio)  Transport at Discharge : [address]  Dispatcher Contacted: Yes  Number/Name of Dispatcher: [phone]  Transported by [employer] and Unit #):  [place]  ETA of Transport (Date): 11/15/23  ETA of Transport (Time): [address] [place] [address] Arrived: Yes  Transfer Mode: Wheelchair   Pt & family are in agreement with the d/c & d/c plan           Family notified[de-identified] son was called  Additional Comments: son was made aware therapy that it would be safer for the pt to be transport via w/c Mary Seamen - he was made aware that there would be a fee=-   permission was given to set up transport , rn, maye & [facility] & patient were made aware of the transport tiem- no covid test needed and Sharlene Barker stated the avs does not need to be faxed and to just send the avs with the pt , rn was made aware    Accepting Facility Name, [place] & State : [facility]  Receiving Facility/Agency Phone Number: [phone]

## 2024-01-02 ENCOUNTER — OFFICE VISIT (OUTPATIENT)
Dept: CARDIOLOGY CLINIC | Facility: CLINIC | Age: 89
End: 2024-01-02
Payer: MEDICARE

## 2024-01-02 ENCOUNTER — CLINICAL SUPPORT (OUTPATIENT)
Dept: CARDIOLOGY CLINIC | Facility: CLINIC | Age: 89
End: 2024-01-02
Payer: MEDICARE

## 2024-01-02 VITALS
BODY MASS INDEX: 26.16 KG/M2 | HEART RATE: 76 BPM | SYSTOLIC BLOOD PRESSURE: 106 MMHG | DIASTOLIC BLOOD PRESSURE: 68 MMHG | HEIGHT: 62 IN

## 2024-01-02 DIAGNOSIS — I48.0 PAF (PAROXYSMAL ATRIAL FIBRILLATION) (HCC): Primary | ICD-10-CM

## 2024-01-02 DIAGNOSIS — R55 VASOVAGAL SYNCOPE: ICD-10-CM

## 2024-01-02 DIAGNOSIS — I26.94 MULTIPLE SUBSEGMENTAL PULMONARY EMBOLI WITHOUT ACUTE COR PULMONALE (HCC): ICD-10-CM

## 2024-01-02 DIAGNOSIS — I10 ESSENTIAL HYPERTENSION: ICD-10-CM

## 2024-01-02 DIAGNOSIS — Z86.73 HISTORY OF CVA (CEREBROVASCULAR ACCIDENT): ICD-10-CM

## 2024-01-02 DIAGNOSIS — I48.0 PAROXYSMAL ATRIAL FIBRILLATION (HCC): Primary | ICD-10-CM

## 2024-01-02 DIAGNOSIS — R41.89 COGNITIVE IMPAIRMENT: ICD-10-CM

## 2024-01-02 DIAGNOSIS — Z86.73 HISTORY OF STROKE: ICD-10-CM

## 2024-01-02 PROCEDURE — 99214 OFFICE O/P EST MOD 30 MIN: CPT | Performed by: PHYSICIAN ASSISTANT

## 2024-01-02 PROCEDURE — 93246 EXT ECG>7D<15D RECORDING: CPT

## 2024-01-02 PROCEDURE — 93000 ELECTROCARDIOGRAM COMPLETE: CPT | Performed by: PHYSICIAN ASSISTANT

## 2024-01-02 NOTE — ASSESSMENT & PLAN NOTE
Transient atrial fibrillation during infectious process  Converted to sinus rhythm  Rate controlled on no AV meredith blockade  EKG showing SR with PACs in bigeminy   [id number] score is high at 6  Will check a two week monitor, if there is recurrence of AFIB then we will commit to Anticoagulation   Otherwise we will continue with ASA.

## 2024-01-02 NOTE — ASSESSMENT & PLAN NOTE
Thought to have had syncope due to high vagal tone  Patient is having a 2-week ZIO monitor to ensure that there is no recurrence of atrial fibrillation.  She is clear that she does not want invasive procedures and if bradycardia or pauses are noted patient is adamant that she does not want permanent pacemaker placed.

## 2024-01-02 NOTE — PROGRESS NOTES
[facility]   Outpatient Note  Ada Reis  1/28/1930  [id number]  [facility] [place] [address] [address] [facility] [address] [phone]    Ada Reis is a 89 y.o. female    Assessment and Plan:   Paroxysmal atrial fibrillation (HCC)  Transient atrial fibrillation during infectious process  Converted to sinus rhythm  Rate controlled on no AV meredith blockade  EKG showing SR with PACs in bigemin   [id number] score is high at 6  Will check a two week monitor, if there is recurrence of AFIB then we will commit to Anticoagulation   Otherwise we will continue with ASA.       Cognitive impairment  Pleasantly forgetful    Essential hypertension  Controlled on current medical regimen  Continue hydrochlorothiazide 25 mg daily    History of CVA (cerebrovascular accident)  Continue aspirin and Lipitor    Syncope  Thought to have had syncope due to high vagal tone  Patient is having a 2-week ZIO monitor to ensure that there is no recurrence of atrial fibrillation.  She is clear that she does not want invasive procedures and if bradycardia or pauses are noted patient is adamant that she does not want permanent pacemaker placed.      Additional Plan:   Medications as detailed above.    Pertinent testing orders as outlined.      Available lab and test results are reviewed with the patient and any additional required labs are ordered as noted.     Return visit will be in one month or earlier if there are problems.     The patient is encouraged to call in the meantime if there are questions or concerns.      Subjective:   The patient is seen in the office today for a follow-up regarding hospitalization for syncope after having been to the bathroom.  Patient was thought to have high vagal tone syncope.  Patient was also hospitalized just previous to that for infectious process and was found to be in transient atrial fibrillation  which converted to sinus rhythm.  Currently EKG shows sinus rhythm with PACs in a bigeminy pattern.  She is asymptomatic without chest pain shortness of breath palpitations dizziness lightheadedness or syncope.  Patient is usually ambulating with a walker but steady on her feet and has had no recent falls or injuries.  She is here with her caregiver who confirms the same.        Social History  Social History     Tobacco Use   Smoking Status Never   Smokeless Tobacco Never   ,   Social History     Substance and Sexual Activity   Alcohol Use Never   ,   Social History     Substance and Sexual Activity   Drug Use No     Family History   Problem Relation Age of Onset    Cirrhosis Father     Heart disease Son     Mental illness Son        Medical and Surgical History  Past Medical History:   Diagnosis Date    TANNER (acute kidney injury) ([facility]) 10/11/2022    Arthritis     Atrial fibrillation ([facility])     Cataract     LAST ASSESSED: [id number]    Cognitive impairment 11/15/2023    CVA (cerebral vascular accident) ([facility])     Depression     Dislocation of left shoulder joint     LAST ASSESSSED: [id number]    Essential hypertension 04/03/2019    Gait disorder     LAST ASSESSED: [id number]    Herpes zoster     LAST ASSESSED: [id number]    HL (hearing loss)     Hyperlipidemia     Impacted cerumen of both ears     LAST ASSESSED: 85AWV7689    Impacted cerumen of right ear     LAST ASSESSED: [id number]    Lightheadedness     MILD AND PT REFUSES EKG ETC. PT PROMISES TO CALL IF WORSENS. LAST ASSESSED: [id number]    Multiple falls     LAST ASSESSED: [id number]    Near syncope     LAST ASSESSED: [id number]    Postherpetic polyneuropathy     LAST ASSESSED: [id number]     Past Surgical History:   Procedure Laterality Date    CATARACT EXTRACTION, BILATERAL      HERNIA REPAIR      TONSILLECTOMY      TUBAL LIGATION           Current Outpatient Medications:     acetaminophen (TYLENOL) 325 mg tablet, Take 2 tablets (650 mg total) by mouth every 6 (six)  "hours as needed for mild pain, Disp: 30 tablet, Rfl: 5    aspirin 81 mg chewable tablet, Chew 1 tablet (81 mg total) daily, Disp: 30 tablet, Rfl: 5    atorvastatin (LIPITOR) 40 mg tablet, Take 1 tablet (40 mg total) by mouth every evening, Disp: 30 tablet, Rfl: 5    hydrochlorothiazide (HYDRODIURIL) 25 mg tablet, Take 1 tablet (25 mg total) by mouth as needed (for morning leg swelling.), Disp: 30 tablet, Rfl: 5  Allergies   Allergen Reactions    Vancomycin Hives and Itching       Review of Systems   Constitutional: Negative.   HENT: Negative.     Eyes: Negative.    Cardiovascular: Negative.  Negative for chest pain, claudication, cyanosis, dyspnea on exertion, irregular heartbeat, leg swelling, near-syncope, orthopnea, palpitations, paroxysmal nocturnal dyspnea and syncope.   Respiratory: Negative.  Negative for cough, hemoptysis, shortness of breath, sleep disturbances due to breathing, snoring, sputum production and wheezing.    Endocrine: Negative.    Hematologic/Lymphatic: Negative.    Skin: Negative.    Musculoskeletal: Negative.         Ambulates with a walker   Gastrointestinal: Negative.    Genitourinary: Negative.    Neurological: Negative.    Psychiatric/Behavioral: Negative.     Allergic/Immunologic: Negative.        Objective:   /68   Pulse 76   Ht 5' 2\" (1.575 m)   BMI 26.16 kg/m²   Physical Exam  Vitals and nursing note reviewed.   Constitutional:       Appearance: She is well-developed.   HENT:      Head: Normocephalic and atraumatic.      Mouth/Throat:      Mouth: Mucous membranes are moist.   Eyes:      General: No scleral icterus.     Conjunctiva/sclera: Conjunctivae normal.   Neck:      Vascular: No JVD.      Trachea: No tracheal deviation.   Cardiovascular:      Rate and Rhythm: Normal rate. Rhythm regularly irregular.      Pulses: Intact distal pulses.      Heart sounds: Normal heart sounds, S1 normal and S2 normal. No murmur heard.     No friction rub. No gallop.   Pulmonary:      " Effort: Pulmonary effort is normal. No respiratory distress.      Breath sounds: Normal breath sounds. No wheezing or rales.   Chest:      Chest wall: No tenderness.   Abdominal:      General: Bowel sounds are normal. There is no distension.      Palpations: Abdomen is soft.      Tenderness: There is no abdominal tenderness.      Comments: Aorta not palpable    Musculoskeletal:         General: No tenderness. Normal range of motion.      Cervical back: Normal range of motion and neck supple.      Right lower leg: No edema.      Left lower leg: No edema.      Comments: Ambulates with a walker   Skin:     General: Skin is warm and dry.      Coloration: Skin is not pale.      Findings: No erythema or rash.   Neurological:      General: No focal deficit present.      Mental Status: She is alert and oriented to person, place, and time.   Psychiatric:         Mood and Affect: Mood normal.         Behavior: Behavior normal.         Judgment: Judgment normal.         Lab Review:   Lab Results   Component Value Date    CHOL 204 03/11/2015     Lab Results   Component Value Date    HDL 37 (L) 05/20/2022     Lab Results   Component Value Date    LDLCALC 30 05/20/2022     Lab Results   Component Value Date    TRIG 46 05/20/2022     Results Reviewed       None          Results Reviewed       None          Results Reviewed       None            Recent Cardiovascular Testing:   Echo 11/15/2023: Normal LVEF 55% G1 DD aortic sclerosis no stenosis mild MAC mild TR ascending aorta mildly dilated 3.7 cm    ECG Review:   1/2/2024 sinus rhythm, PAC in bigeminy pattern

## 2024-01-02 NOTE — PATIENT INSTRUCTIONS
Monitor for two weeks   If there is atrial fibrillation we will consider blood thinner    Return in one month     Call in the meantime if there are any concerns

## 2024-02-20 ENCOUNTER — HOSPITAL ENCOUNTER (EMERGENCY)
Facility: HOSPITAL | Age: 89
Discharge: HOME | End: 2024-02-20
Attending: EMERGENCY MEDICINE
Payer: MEDICARE

## 2024-02-20 ENCOUNTER — APPOINTMENT (EMERGENCY)
Dept: CT IMAGING | Facility: HOSPITAL | Age: 89
End: 2024-02-20
Payer: MEDICARE

## 2024-02-20 VITALS
HEART RATE: 74 BPM | TEMPERATURE: 97.4 F | RESPIRATION RATE: 21 BRPM | OXYGEN SATURATION: 91 % | SYSTOLIC BLOOD PRESSURE: 144 MMHG | DIASTOLIC BLOOD PRESSURE: 117 MMHG

## 2024-02-20 DIAGNOSIS — E07.9 THYROID MASS: ICD-10-CM

## 2024-02-20 DIAGNOSIS — R00.2 PALPITATIONS: ICD-10-CM

## 2024-02-20 DIAGNOSIS — R42 LIGHTHEADEDNESS: Primary | ICD-10-CM

## 2024-02-20 LAB
2HR DELTA HS TROPONIN: 0 NG/L
ANION GAP SERPL CALCULATED.3IONS-SCNC: 7 MMOL/L
ATRIAL RATE: 75 BPM
ATRIAL RATE: 76 BPM
BASOPHILS # BLD AUTO: 0.05 THOUSANDS/ÂΜL (ref 0–0.1)
BASOPHILS NFR BLD AUTO: 1 % (ref 0–1)
BUN SERPL-MCNC: 17 MG/DL (ref 5–25)
CALCIUM SERPL-MCNC: 9.2 MG/DL (ref 8.4–10.2)
CARDIAC TROPONIN I PNL SERPL HS: 4 NG/L
CARDIAC TROPONIN I PNL SERPL HS: 4 NG/L
CHLORIDE SERPL-SCNC: 104 MMOL/L (ref 96–108)
CO2 SERPL-SCNC: 24 MMOL/L (ref 21–32)
CREAT SERPL-MCNC: 0.98 MG/DL (ref 0.6–1.3)
EOSINOPHIL # BLD AUTO: 0.27 THOUSAND/ÂΜL (ref 0–0.61)
EOSINOPHIL NFR BLD AUTO: 3 % (ref 0–6)
ERYTHROCYTE [DISTWIDTH] IN BLOOD BY AUTOMATED COUNT: 13.5 % (ref 11.6–15.1)
GFR SERPL CREATININE-BSD FRML MDRD: 49 ML/MIN/1.73SQ M
GLUCOSE SERPL-MCNC: 90 MG/DL (ref 65–140)
HCT VFR BLD AUTO: 43.1 % (ref 34.8–46.1)
HGB BLD-MCNC: 14 G/DL (ref 11.5–15.4)
IMM GRANULOCYTES # BLD AUTO: 0.02 THOUSAND/UL (ref 0–0.2)
IMM GRANULOCYTES NFR BLD AUTO: 0 % (ref 0–2)
LYMPHOCYTES # BLD AUTO: 2.92 THOUSANDS/ÂΜL (ref 0.6–4.47)
LYMPHOCYTES NFR BLD AUTO: 31 % (ref 14–44)
MAGNESIUM SERPL-MCNC: 2.1 MG/DL (ref 1.9–2.7)
MCH RBC QN AUTO: 30.4 PG (ref 26.8–34.3)
MCHC RBC AUTO-ENTMCNC: 32.5 G/DL (ref 31.4–37.4)
MCV RBC AUTO: 94 FL (ref 82–98)
MONOCYTES # BLD AUTO: 0.93 THOUSAND/ÂΜL (ref 0.17–1.22)
MONOCYTES NFR BLD AUTO: 10 % (ref 4–12)
NEUTROPHILS # BLD AUTO: 5.19 THOUSANDS/ÂΜL (ref 1.85–7.62)
NEUTS SEG NFR BLD AUTO: 55 % (ref 43–75)
NRBC BLD AUTO-RTO: 0 /100 WBCS
P AXIS: 35 DEGREES
P AXIS: 80 DEGREES
PHOSPHATE SERPL-MCNC: 3.5 MG/DL (ref 2.3–4.1)
PLATELET # BLD AUTO: 159 THOUSANDS/UL (ref 149–390)
PMV BLD AUTO: 11.5 FL (ref 8.9–12.7)
POTASSIUM SERPL-SCNC: 4.3 MMOL/L (ref 3.5–5.3)
PR INTERVAL: 172 MS
PR INTERVAL: 200 MS
QRS AXIS: -24 DEGREES
QRS AXIS: -26 DEGREES
QRSD INTERVAL: 82 MS
QRSD INTERVAL: 82 MS
QT INTERVAL: 400 MS
QT INTERVAL: 408 MS
QTC INTERVAL: 446 MS
QTC INTERVAL: 459 MS
RBC # BLD AUTO: 4.6 MILLION/UL (ref 3.81–5.12)
SODIUM SERPL-SCNC: 135 MMOL/L (ref 135–147)
T WAVE AXIS: 36 DEGREES
T WAVE AXIS: 47 DEGREES
TSH SERPL DL<=0.05 MIU/L-ACNC: 1.42 UIU/ML (ref 0.45–4.5)
VENTRICULAR RATE: 75 BPM
VENTRICULAR RATE: 76 BPM
WBC # BLD AUTO: 9.38 THOUSAND/UL (ref 4.31–10.16)

## 2024-02-20 PROCEDURE — 85025 COMPLETE CBC W/AUTO DIFF WBC: CPT | Performed by: EMERGENCY MEDICINE

## 2024-02-20 PROCEDURE — 84443 ASSAY THYROID STIM HORMONE: CPT | Performed by: EMERGENCY MEDICINE

## 2024-02-20 PROCEDURE — 70498 CT ANGIOGRAPHY NECK: CPT

## 2024-02-20 PROCEDURE — 71275 CT ANGIOGRAPHY CHEST: CPT

## 2024-02-20 PROCEDURE — 80048 BASIC METABOLIC PNL TOTAL CA: CPT | Performed by: EMERGENCY MEDICINE

## 2024-02-20 PROCEDURE — 36415 COLL VENOUS BLD VENIPUNCTURE: CPT | Performed by: EMERGENCY MEDICINE

## 2024-02-20 PROCEDURE — 93005 ELECTROCARDIOGRAM TRACING: CPT

## 2024-02-20 PROCEDURE — G1004 CDSM NDSC: HCPCS

## 2024-02-20 PROCEDURE — 93010 ELECTROCARDIOGRAM REPORT: CPT | Performed by: INTERNAL MEDICINE

## 2024-02-20 PROCEDURE — 99285 EMERGENCY DEPT VISIT HI MDM: CPT

## 2024-02-20 PROCEDURE — 96374 THER/PROPH/DIAG INJ IV PUSH: CPT

## 2024-02-20 PROCEDURE — 70496 CT ANGIOGRAPHY HEAD: CPT

## 2024-02-20 PROCEDURE — 83735 ASSAY OF MAGNESIUM: CPT | Performed by: EMERGENCY MEDICINE

## 2024-02-20 PROCEDURE — 84484 ASSAY OF TROPONIN QUANT: CPT | Performed by: EMERGENCY MEDICINE

## 2024-02-20 PROCEDURE — 99285 EMERGENCY DEPT VISIT HI MDM: CPT | Performed by: EMERGENCY MEDICINE

## 2024-02-20 PROCEDURE — 84100 ASSAY OF PHOSPHORUS: CPT | Performed by: EMERGENCY MEDICINE

## 2024-02-20 RX ORDER — ONDANSETRON 2 MG/ML
1 INJECTION INTRAMUSCULAR; INTRAVENOUS ONCE
Status: COMPLETED | OUTPATIENT
Start: 2024-02-20 | End: 2024-02-20

## 2024-02-20 RX ORDER — ONDANSETRON 2 MG/ML
4 INJECTION INTRAMUSCULAR; INTRAVENOUS ONCE
Status: COMPLETED | OUTPATIENT
Start: 2024-02-20 | End: 2024-02-20

## 2024-02-20 RX ADMIN — IOHEXOL 80 ML: 350 INJECTION, SOLUTION INTRAVENOUS at 13:31

## 2024-02-20 RX ADMIN — IOHEXOL 80 ML: 350 INJECTION, SOLUTION INTRAVENOUS at 13:38

## 2024-02-20 RX ADMIN — ONDANSETRON 4 MG: 2 INJECTION INTRAMUSCULAR; INTRAVENOUS at 13:08

## 2024-02-20 NOTE — ED PROVIDER NOTES
History  Chief Complaint   Patient presents with    Dizziness    Vomiting    Irregular Heart Beat     89 yo female presenting to the ed for reports of abnormal heart beat and lightheadedness with near syncope. Complains of lightheadedness and weakness since this am, along with sob worsening throughout the day.        Prior to Admission Medications   Prescriptions Last Dose Informant Patient Reported? Taking?   acetaminophen (TYLENOL) 325 mg tablet   No No   Sig: Take 2 tablets (650 mg total) by mouth every 6 (six) hours as needed for mild pain   aspirin 81 mg chewable tablet   No No   Sig: Chew 1 tablet (81 mg total) daily   atorvastatin (LIPITOR) 40 mg tablet   No No   Sig: Take 1 tablet (40 mg total) by mouth every evening   hydrochlorothiazide (HYDRODIURIL) 25 mg tablet   No No   Sig: Take 1 tablet (25 mg total) by mouth as needed (for morning leg swelling.)      Facility-Administered Medications: None       Past Medical History:   Diagnosis Date    TANNER (acute kidney injury) ([facility]) 10/11/2022    Arthritis     Atrial fibrillation ([facility])     Cataract     LAST ASSESSED: [id number]    Cognitive impairment 11/15/2023    CVA (cerebral vascular accident) ([facility])     Depression     Dislocation of left shoulder joint     LAST ASSESSSED: [id number]    Essential hypertension 04/03/2019    Gait disorder     LAST ASSESSED: [id number]    Herpes zoster     LAST ASSESSED: [id number]    HL (hearing loss)     Hyperlipidemia     Impacted cerumen of both ears     LAST ASSESSED: [id number]    Impacted cerumen of right ear     LAST ASSESSED: [id number]    Lightheadedness     MILD AND PT REFUSES EKG ETC. PT PROMISES TO CALL IF WORSENS. LAST ASSESSED: 11OQI3014    Multiple falls     LAST ASSESSED: 22QNL4384    Near syncope     LAST ASSESSED: [id number]    Postherpetic polyneuropathy     LAST ASSESSED: 66VPJ4057       Past Surgical History:   Procedure Laterality Date    CATARACT EXTRACTION, BILATERAL      HERNIA REPAIR      TONSILLECTOMY       TUBAL LIGATION         Family History   Problem Relation Age of Onset    Cirrhosis Father     Heart disease Son     Mental illness Son      I have reviewed and agree with the history as documented.    E-Cigarette/Vaping    E-Cigarette Use Never User      E-Cigarette/Vaping Substances     Social History     Tobacco Use    Smoking status: Never    Smokeless tobacco: Never   Vaping Use    Vaping status: Never Used   Substance Use Topics    Alcohol use: Never    Drug use: No       Review of Systems   Cardiovascular:  Positive for palpitations.   Gastrointestinal:  Positive for nausea and vomiting.   Neurological:  Positive for light-headedness.   All other systems reviewed and are negative.      Physical Exam  Physical Exam  Vitals and nursing note reviewed.   Constitutional:       General: She is not in acute distress.     Appearance: She is well-developed. She is not diaphoretic.   HENT:      Head: Normocephalic and atraumatic.      Right Ear: External ear normal.      Left Ear: External ear normal.      Nose: Nose normal.   Eyes:      General: No scleral icterus.        Right eye: No discharge.         Left eye: No discharge.      Conjunctiva/sclera: Conjunctivae normal.      Pupils: Pupils are equal, round, and reactive to light.   Neck:      Vascular: No JVD.      Trachea: No tracheal deviation.   Cardiovascular:      Rate and Rhythm: Normal rate and regular rhythm.      Heart sounds: Normal heart sounds. No murmur heard.     No friction rub. No gallop.   Pulmonary:      Effort: Pulmonary effort is normal. No respiratory distress.      Breath sounds: Normal breath sounds. No stridor. No wheezing or rales.   Abdominal:      General: Bowel sounds are normal. There is no distension.      Palpations: Abdomen is soft. There is no mass.      Tenderness: There is no abdominal tenderness. There is no guarding.   Musculoskeletal:         General: No tenderness or deformity. Normal range of motion.      Cervical back: Normal  range of motion and neck supple.   Skin:     General: Skin is warm and dry.      Coloration: Skin is not pale.      Findings: No erythema or rash.   Neurological:      General: No focal deficit present.      Mental Status: She is alert and oriented to person, place, and time.      Cranial Nerves: No cranial nerve deficit.      Sensory: No sensory deficit.      Motor: No abnormal muscle tone.      Comments: 5/5 strength x 4 extremities  Gross sensation intact  CN intact  GCS 15       Psychiatric:         Behavior: Behavior normal.         Thought Content: Thought content normal.         Judgment: Judgment normal.         Vital Signs  ED Triage Vitals   Temperature Pulse Respirations Blood Pressure SpO2   02/20/24 1227 02/20/24 1227 02/20/24 1227 02/20/24 1230 02/20/24 1227   (!) 97.4 °F (36.3 °C) 66 20 135/84 (!) 89 %      Temp Source Heart Rate Source Patient Position - Orthostatic VS BP Location FiO2 (%)   02/20/24 1227 02/20/24 1227 02/20/24 1300 -- --   Tympanic Monitor Lying        Pain Score       02/20/24 1227       No Pain           Vitals:    02/20/24 1345 02/20/24 1415 02/20/24 1500 02/20/24 1530   BP: 103/68 (!) 172/81 (!) 151/101 (!) 144/117   Pulse: 74 69 78 74   Patient Position - Orthostatic VS: Lying Lying Lying Lying         Visual Acuity      ED Medications  Medications   ondansetron (FOR EMS ONLY) (ZOFRAN) 4 mg/2 mL injection 4 mg (0 mg Does not apply Given to EMS 2/20/24 1227)   ondansetron (ZOFRAN) injection 4 mg (4 mg Intravenous Given 2/20/24 1308)   iohexol (OMNIPAQUE) 350 MG/ML injection (MULTI-DOSE) 80 mL (80 mL Intravenous Given 2/20/24 1331)   iohexol (OMNIPAQUE) 350 MG/ML injection (MULTI-DOSE) 80 mL (80 mL Intravenous Given 2/20/24 1338)       Diagnostic Studies  Results Reviewed       Procedure Component Value Units Date/Time    HS Troponin I 2hr [[id number]]  (Normal) Collected: 02/20/24 1457    Lab Status: Final result Specimen: Blood from Arm, Left Updated: 02/20/24 1527     hs TnI  2hr 4 ng/L      Delta 2hr hsTnI 0 ng/L     TSH, 3rd generation with Free T4 reflex [[id number]]  (Normal) Collected: 02/20/24 1250    Lab Status: Final result Specimen: Blood from Arm, Left Updated: 02/20/24 1342     TSH 3RD GENERATON 1.415 uIU/mL     HS Troponin 0hr (reflex protocol) [[id number]]  (Normal) Collected: 02/20/24 1250    Lab Status: Final result Specimen: Blood from Arm, Left Updated: 02/20/24 1321     hs TnI 0hr 4 ng/L     Basic metabolic panel [[id number]] Collected: 02/20/24 1250    Lab Status: Final result Specimen: Blood from Arm, Left Updated: 02/20/24 1315     Sodium 135 mmol/L      Potassium 4.3 mmol/L      Chloride 104 mmol/L      CO2 24 mmol/L      ANION GAP 7 mmol/L      BUN 17 mg/dL      Creatinine 0.98 mg/dL      Glucose 90 mg/dL      Calcium 9.2 mg/dL      eGFR 49 ml/min/1.73sq m     Narrative:      National Kidney Disease Foundation guidelines for Chronic Kidney Disease (CKD):     Stage 1 with normal or high GFR (GFR > 90 mL/min/1.73 square meters)    Stage 2 Mild CKD (GFR = 60-89 mL/min/1.73 square meters)    Stage 3A Moderate CKD (GFR = 45-59 mL/min/1.73 square meters)    Stage 3B Moderate CKD (GFR = 30-44 mL/min/1.73 square meters)    Stage 4 Severe CKD (GFR = 15-29 mL/min/1.73 square meters)    Stage 5 End Stage CKD (GFR <15 mL/min/1.73 square meters)  Note: GFR calculation is accurate only with a steady state creatinine    Magnesium [[id number]]  (Normal) Collected: 02/20/24 1250    Lab Status: Final result Specimen: Blood from Arm, Left Updated: 02/20/24 1315     Magnesium 2.1 mg/dL     Phosphorus [[id number]]  (Normal) Collected: 02/20/24 1250    Lab Status: Final result Specimen: Blood from Arm, Left Updated: 02/20/24 1315     Phosphorus 3.5 mg/dL     CBC and differential [[id number]] Collected: 02/20/24 1250    Lab Status: Final result Specimen: Blood from Arm, Left Updated: 02/20/24 1254     WBC 9.38 Thousand/uL      RBC 4.60 Million/uL      Hemoglobin 14.0 g/dL      Hematocrit 43.1  %      MCV 94 fL      MCH 30.4 pg      MCHC 32.5 g/dL      RDW 13.5 %      MPV 11.5 fL      Platelets 159 Thousands/uL      nRBC 0 /100 WBCs      Neutrophils Relative 55 %      Immat GRANS % 0 %      Lymphocytes Relative 31 %      Monocytes Relative 10 %      Eosinophils Relative 3 %      Basophils Relative 1 %      Neutrophils Absolute 5.19 Thousands/µL      Immature Grans Absolute 0.02 Thousand/uL      Lymphocytes Absolute 2.92 Thousands/µL      Monocytes Absolute 0.93 Thousand/µL      Eosinophils Absolute 0.27 Thousand/µL      Basophils Absolute 0.05 Thousands/µL                    CTA ED chest PE Study   Final Result by Silas Delacruz MD (02/20 1450)      1.  No acute pulmonary embolism identified. 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   2.  Cardiomegaly with mild pulmonary edema.   3.  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 Recommendation is for 1 year follow-up chest CT.                  Workstation performed: [id number]         CTA head and neck with and without contrast   Final Result by Pallav N Shah, MD (02/20 4666)      No acute intracranial abnormality.      Moderate cerebral chronic microangiopathic changes.      Frontotemporal predominant cerebral atrophy.      No cervical intracranial large vessel occlusion.      No hemodynamically significant stenosis at the carotid bifurcations.      Markedly enlarged and heterogeneous left thyroid lobe with a chronic left thyroid mass lesion. Smaller nodules noted in the right thyroid lobe. Findings are grossly unchanged when compared to the November 14, 2023 CT cervical spine study.      Workstation performed: [id number]                    Procedures  Procedures         ED Course  ED Course as of 02/22/24 0743   Tue Feb 20, 2024   1234 EKG discussed with on call cardiology who states that EKG is showing benign atrial bigeminy at this time.    1531 Spoke with patient and patient states she is  feeling better and both feels comfortable going home and would like to go home at this time after reviewing all results.  She states her understanding about the thyroid mass and knows that it was there before.  Will provide with referral to ENT for follow-up if she desires this.   1532 Delta 2hr hsTnI: 0   1532 SpO2(!): 89 %  Patient with one reading of 89% however no other readings below 93% while in the ED.    1542 Spoke with patient's daughter Kesha Goodwin and she is in agreement with [facility] at this time and returning to [facility].              HEART Risk Score      Flowsheet Row Most Recent Value   Heart Score Risk Calculator    History 0 Filed at: 02/22/2024 0743   ECG 0 Filed at: 02/22/2024 0743   Age 2 Filed at: 02/22/2024 0743   Risk Factors 2 Filed at: 02/22/2024 0743   Troponin 0 Filed at: 02/22/2024 0743   HEART Score 4 Filed at: 02/22/2024 0743                          SBIRT 20yo+      Flowsheet Row Most Recent Value   Initial Alcohol Screen: US AUDIT-C     1. How often do you have a drink containing alcohol? 0 Filed at: 02/20/2024 1525   2. How many drinks containing alcohol do you have on a typical day you are drinking?  0 Filed at: 02/20/2024 1525   3a. Male UNDER 65: How often do you have five or more drinks on one occasion? 0 Filed at: 02/20/2024 1525   3b. FEMALE Any Age, or MALE 65+: How often do you have 4 or more drinks on one occassion? 0 Filed at: 02/20/2024 1525   Audit-C Score 0 Filed at: 02/20/2024 1525   IKE: How many times in the past year have you...    Used an illegal drug or used a prescription medication for non-medical reasons? Never Filed at: 02/20/2024 1525                      Medical Decision Making  89 yo female presenting to the ed for initial reports of earlier today having lightheadedness/ not feeling well with N/V. On evaluation patient with no complaints currently stating earlier today was having feeling of irregular heart rate. On evaluation EKG discussed with on-call  cardiology showing benign atrial bigeminy with no signs of ischemia or arrhythmia.  Patient feeling significantly better after testing.  Discussed all results with patient as well as patient's daughter which she allowed and is her POA.  They are both in agreement that patient feels comfortable going home to [facility] at this time.  Patient discharged.    Amount and/or Complexity of Data Reviewed  Labs: ordered. Decision-making details documented in ED Course.  Radiology: ordered.    Risk  Prescription drug management.             Disposition  Final diagnoses:   Lightheadedness   Palpitations   Thyroid mass     Time reflects when diagnosis was documented in both MDM as applicable and the Disposition within this note       Time User Action Codes Description Comment    2/20/2024  3:44 PM Rikki Freedman [R42] Lightheadedness     2/20/2024  3:44 PM Rikki Freedman [R00.2] Palpitations     2/20/2024  3:45 PM Rikki Freedman [E07.9] Thyroid mass           ED Disposition       ED Disposition   Discharge    Condition   Stable    Date/Time   Tue Feb 20, 2024 1544    Comment   Ada Reis discharge to home/self care.                   Follow-up Information       Follow up With Specialties Details Why Contact Info Additional Information    Jade Goncalves PA-C Cardiology, Physician Assistant   [address] [place] [address]. [facility] [address] [phone]       Faisal Cagle MD Nephrology, Internal Medicine   [address] [place] [address] [id number] [phone]       [facility] Emergency Department Emergency Medicine Go to  As needed, If symptoms worsen [address] [facility] [id number] [phone] [facility] Emergency Department, [address] [facility], [place], [place] [address]    [facility] Otolaryngology Call   [address] [facility]  [address]  [facility] [address] [phone] [facility], [facility]  [facility] [address], [place] [address]            Discharge Medication List as of 2/20/2024  3:45 PM        CONTINUE these medications which have NOT CHANGED    Details   acetaminophen (TYLENOL) 325 mg tablet Take 2 tablets (650 mg total) by mouth every 6 (six) hours as needed for mild pain, Starting Mon 8/2/2021, Normal      aspirin 81 mg chewable tablet Chew 1 tablet (81 mg total) daily, Starting Thu 8/4/2022, Normal      atorvastatin (LIPITOR) 40 mg tablet Take 1 tablet (40 mg total) by mouth every evening, Starting Thu 8/4/2022, Normal      hydrochlorothiazide (HYDRODIURIL) 25 mg tablet Take 1 tablet (25 mg total) by mouth as needed (for morning leg swelling.), Starting Wed 4/19/2023, Normal                 PDMP Review       None            ED Provider  Electronically Signed by             Rikki Freedman DO  02/22/24 0711

## 2024-02-20 NOTE — DISCHARGE INSTRUCTIONS
If you would like more information by your thyroid mass you should follow-up with [facility] who are ENT providers.    Please follow up with your Cardiologist as needed.    If you develop any new or worsening symptoms please immediately return to your nearest emergency department.

## 2024-02-21 ENCOUNTER — CLINICAL SUPPORT (OUTPATIENT)
Dept: CARDIOLOGY CLINIC | Facility: CLINIC | Age: 89
End: 2024-02-21
Payer: MEDICARE

## 2024-02-21 DIAGNOSIS — I48.0 PAROXYSMAL ATRIAL FIBRILLATION (HCC): Primary | ICD-10-CM

## 2024-02-21 DIAGNOSIS — Z86.73 HISTORY OF STROKE: ICD-10-CM

## 2024-02-21 PROCEDURE — 93246 EXT ECG>7D<15D RECORDING: CPT | Performed by: PHYSICIAN ASSISTANT

## 2024-02-21 NOTE — PROGRESS NOTES
Left pectoral area prepped and Zio placed without incident.  Patient and aide counseled on the Do's and Don'ts of the device.  Also understanding and agreeable with the removal and return of device.    Serial #[id number]

## 2024-03-05 ENCOUNTER — TELEPHONE (OUTPATIENT)
Dept: CARDIOLOGY CLINIC | Facility: CLINIC | Age: 89
End: 2024-03-05

## 2024-03-05 NOTE — TELEPHONE ENCOUNTER
Ada was on wait list for an appointment.  Called son to see if she could take that appointment for 3/6.  He said she did not have the monitor done and didn't think she needed it.  He agreed to keep her next appointment for May 1.

## 2024-03-13 ENCOUNTER — CLINICAL SUPPORT (OUTPATIENT)
Dept: CARDIOLOGY CLINIC | Facility: CLINIC | Age: 89
End: 2024-03-13
Payer: MEDICARE

## 2024-03-13 DIAGNOSIS — I48.0 PAROXYSMAL ATRIAL FIBRILLATION (HCC): ICD-10-CM

## 2024-03-13 DIAGNOSIS — Z86.73 HISTORY OF CVA (CEREBROVASCULAR ACCIDENT): ICD-10-CM

## 2024-03-13 PROCEDURE — 93248 EXT ECG>7D<15D REV&INTERPJ: CPT | Performed by: INTERNAL MEDICINE

## 2024-03-18 NOTE — RESULT ENCOUNTER NOTE
This extended monitor recording was for 14 days.    The basic mechanism was sinus with rates ranging from 38 - 102 beats per minute while in sinus rhythm.  The average heart rate was 71 beats per minute.  Atrial ectopy was frequent common including several short runs.  The longest run was 16 seconds at an average rate of only 91 bpm.  Ventricular ectopy was infrequent and there were no runs.    No pauses were present.  No symptoms were reported.      Cristopher Rodriguez MD

## 2024-03-19 ENCOUNTER — TELEPHONE (OUTPATIENT)
Dept: CARDIOLOGY CLINIC | Facility: CLINIC | Age: 89
End: 2024-03-19

## 2024-03-19 NOTE — TELEPHONE ENCOUNTER
Spoke to red miller- she will have kerri call me back to schedule an appt with dr waters. If I don't hear back today, they would like me to call back tomorrow and ask for kerri

## 2024-03-19 NOTE — TELEPHONE ENCOUNTER
----- Message from Jade Goncalves PA-C sent at 3/19/2024 10:53 AM EDT -----  Please schedule patient with Dr. Rodriguez for follow up regarding monitor finding   Thank you   ----- Message -----  From: Cristopher Rodriguez MD  Sent: 3/18/2024   4:37 PM EDT  To: Jade Goncalves PA-C      This extended monitor recording was for 14 days.    The basic mechanism was sinus with rates ranging from 38 - 102 beats per minute while in sinus rhythm.  The average heart rate was 71 beats per minute.  Atrial ectopy was frequent common including several short runs.  The longest run was 16 seconds at an average rate of only 91 bpm.  Ventricular ectopy was infrequent and there were no runs.    No pauses were present.  No symptoms were reported.      Cristopher Rodriguez MD

## 2024-03-20 NOTE — TELEPHONE ENCOUNTER
Spoke with Madison from [facility] and notified of results and scheduled patient with Dr. Rodriguez

## 2024-04-05 ENCOUNTER — OFFICE VISIT (OUTPATIENT)
Dept: CARDIOLOGY CLINIC | Facility: CLINIC | Age: 89
End: 2024-04-05
Payer: MEDICARE

## 2024-04-05 VITALS
DIASTOLIC BLOOD PRESSURE: 66 MMHG | HEIGHT: 62 IN | HEART RATE: 57 BPM | BODY MASS INDEX: 25.03 KG/M2 | SYSTOLIC BLOOD PRESSURE: 104 MMHG | WEIGHT: 136 LBS

## 2024-04-05 DIAGNOSIS — N18.30 STAGE 3 CHRONIC KIDNEY DISEASE, UNSPECIFIED WHETHER STAGE 3A OR 3B CKD (HCC): Primary | ICD-10-CM

## 2024-04-05 DIAGNOSIS — R55 SYNCOPE, UNSPECIFIED SYNCOPE TYPE: ICD-10-CM

## 2024-04-05 DIAGNOSIS — I48.0 PAROXYSMAL ATRIAL FIBRILLATION (HCC): ICD-10-CM

## 2024-04-05 DIAGNOSIS — I10 ESSENTIAL HYPERTENSION: ICD-10-CM

## 2024-04-05 DIAGNOSIS — G31.84 MILD NEUROCOGNITIVE DISORDER: ICD-10-CM

## 2024-04-05 PROCEDURE — 99214 OFFICE O/P EST MOD 30 MIN: CPT | Performed by: INTERNAL MEDICINE

## 2024-04-05 RX ORDER — HYDROCHLOROTHIAZIDE 25 MG/1
25 TABLET ORAL AS NEEDED
Start: 2024-04-05

## 2024-04-05 NOTE — PROGRESS NOTES
" Patient ID: Ada Reis is a 89 y.o. female.        Plan:      Paroxysmal atrial fibrillation (HCC)  No clear-cut palpitations.  No clear-cut recurrence since it was present associated with a septic episode.  Given the fact that she has a falling and increasing frailty I would have her remain off full anticoagulation.    Syncope  Likely orthostatic in the past.  Fortunately use of diuretic is down to as needed which is infrequently.    Mild neurocognitive disorder  Given this age and frailty I do not believe that atorvastatin is changing prognosis and this can be discontinued.       Follow up Plan/Other summary comments:  There is a primary care provider visiting patient's at her residence.  I will remain available.    HPI: Patient is seen today in follow-up.  She is brought by a caregiver from personal care residence.  She had a fall without injury several weeks ago.  No recent chest pain or shortness of breath.  There is mild lightheadedness with getting in and out of bed.  No palpitations.          Most recent or relevant cardiac/vascular testing:    TTE 11/15/2023: Normal LV and RV systolic function.  Ambulatory monitor reviewed report 3/13/2024: Sinus rhythm rate of 38 to 102 bpm.  Average heart rate 71.  Short atrial runs.  No ventricular runs.  No pauses.    Past Surgical History:   Procedure Laterality Date    CATARACT EXTRACTION, BILATERAL      HERNIA REPAIR      TONSILLECTOMY      TUBAL LIGATION         Lipid Profile: Reviewed      Review of Systems   10  point ROS  was otherwise non pertinent or negative except as per HPI or as below.   Gait: Using a wheelchair.        Objective:     /66   Pulse 57   Ht 5' 2\" (1.575 m)   Wt 61.7 kg (136 lb)   BMI 24.87 kg/m²     PHYSICAL EXAM:    General:  Normal appearance in no distress.  Eyes:  Anicteric.  Oral mucosa:  Moist.  Neck:  No JVD. Carotid upstrokes are brisk without bruits.  No masses.  Chest:  Clear to auscultation.  Cardiac:  No " palpable PMI.  Normal S1 and S2.  No murmur gallop or rub.  Abdomen:  Soft and nontender. No palpable organomegaly or aortic enlargement.  Extremities:  No peripheral edema.  Musculoskeletal:  Symmetric.   Vascular:Pedal pulses are intact.  Neuro:  Grossly symmetric.  Psych:  Alert and oriented to self but not to date or age.    Meds reviewed.    Past Medical History:   Diagnosis Date    TANNER (acute kidney injury) ([facility]) 10/11/2022    Arthritis     Atrial fibrillation ([facility])     Cataract     LAST ASSESSED: [id number]    Cognitive impairment 11/15/2023    CVA (cerebral vascular accident) ([facility])     Depression     Dislocation of left shoulder joint     LAST ASSESSSED: [id number]    Essential hypertension 04/03/2019    Gait disorder     LAST ASSESSED: [id number]    Herpes zoster     LAST ASSESSED: [id number]    HL (hearing loss)     Hyperlipidemia     Impacted cerumen of both ears     LAST ASSESSED: [id number]    Impacted cerumen of right ear     LAST ASSESSED: [id number]    Lightheadedness     MILD AND PT REFUSES EKG ETC. PT PROMISES TO CALL IF WORSENS. LAST ASSESSED: [id number]    Multiple falls     LAST ASSESSED: [id number]    Near syncope     LAST ASSESSED: [id number]    Postherpetic polyneuropathy     LAST ASSESSED: [id number]           Social History     Tobacco Use   Smoking Status Never   Smokeless Tobacco Never

## 2024-04-05 NOTE — ASSESSMENT & PLAN NOTE
Likely orthostatic in the past.  Fortunately use of diuretic is down to as needed which is infrequently.

## 2024-04-05 NOTE — ASSESSMENT & PLAN NOTE
No clear-cut palpitations.  No clear-cut recurrence since it was present associated with a septic episode.  Given the fact that she has a falling and increasing frailty I would have her remain off full anticoagulation.

## 2024-04-05 NOTE — ASSESSMENT & PLAN NOTE
Given this age and frailty I do not believe that atorvastatin is changing prognosis and this can be discontinued.